# Patient Record
Sex: MALE | Race: WHITE | NOT HISPANIC OR LATINO | Employment: UNEMPLOYED | URBAN - METROPOLITAN AREA
[De-identification: names, ages, dates, MRNs, and addresses within clinical notes are randomized per-mention and may not be internally consistent; named-entity substitution may affect disease eponyms.]

---

## 2017-06-12 ENCOUNTER — HOSPITAL ENCOUNTER (EMERGENCY)
Facility: HOSPITAL | Age: 52
Discharge: HOME | End: 2017-06-12
Attending: EMERGENCY MEDICINE

## 2017-06-12 VITALS
RESPIRATION RATE: 18 BRPM | TEMPERATURE: 98 F | HEART RATE: 79 BPM | WEIGHT: 125 LBS | OXYGEN SATURATION: 98 % | DIASTOLIC BLOOD PRESSURE: 91 MMHG | SYSTOLIC BLOOD PRESSURE: 145 MMHG

## 2017-06-12 DIAGNOSIS — S00.91XA ABRASION OF HEAD, INITIAL ENCOUNTER: ICD-10-CM

## 2017-06-12 DIAGNOSIS — R51.9 ACUTE NONINTRACTABLE HEADACHE, UNSPECIFIED HEADACHE TYPE: Primary | ICD-10-CM

## 2017-06-12 PROCEDURE — 25000003 PHARM REV CODE 250: Performed by: PHYSICIAN ASSISTANT

## 2017-06-12 PROCEDURE — 99283 EMERGENCY DEPT VISIT LOW MDM: CPT

## 2017-06-12 RX ORDER — MUPIROCIN 20 MG/G
OINTMENT TOPICAL 3 TIMES DAILY
Qty: 22 G | Refills: 0 | Status: SHIPPED | OUTPATIENT
Start: 2017-06-12 | End: 2017-06-17

## 2017-06-12 RX ORDER — BUTALBITAL, ACETAMINOPHEN AND CAFFEINE 50; 325; 40 MG/1; MG/1; MG/1
1 TABLET ORAL
Status: COMPLETED | OUTPATIENT
Start: 2017-06-12 | End: 2017-06-12

## 2017-06-12 RX ORDER — BUTALBITAL, ACETAMINOPHEN AND CAFFEINE 50; 325; 40 MG/1; MG/1; MG/1
1 TABLET ORAL EVERY 6 HOURS PRN
Qty: 15 TABLET | Refills: 0 | Status: SHIPPED | OUTPATIENT
Start: 2017-06-12 | End: 2017-07-12

## 2017-06-12 RX ADMIN — BUTALBITAL, ACETAMINOPHEN, AND CAFFEINE 1 TABLET: 50; 325; 40 TABLET ORAL at 08:06

## 2017-06-13 NOTE — DISCHARGE INSTRUCTIONS
Take Fioricet as needed.  Use the cream to help with abrasion.  You need to establish care with a provider or see a health clinic to have your Ambien filled.   For worsening symptoms, chest pain, shortness of breath, increased abdominal pain, high grade fever, stroke or stroke like symptoms, immediately go to the nearest Emergency Room or call 911 as soon as possible.

## 2017-06-13 NOTE — ED PROVIDER NOTES
Encounter Date: 6/12/2017       History     Chief Complaint   Patient presents with    Headache     reports they are frequent. pt has been out of fioricet and ambien. needs refill for both. also reports stomach virus, and scar on head     Review of patient's allergies indicates:  No Known Allergies  Patient is a 51 year old male with complaint of headache. He reports history of headaches. He reports he previously was taking Fioricet but lost his medical insurance a few months ago and has been unable to see his primary care provider. He reports generalized headache that is similar to his previous headaches. He reports mild associated nausea. He denied sudden onset of headache or worse headache of his life. He reports he is also out of his Ambien and has not been sleeping nightly. He has been off of his Ambien for three months. He denied visual changes, photophobia, weakness, speech difficulty, facial droop, weakness or trauma.       The history is provided by the patient and a relative.     History reviewed. No pertinent past medical history.  History reviewed. No pertinent surgical history.  History reviewed. No pertinent family history.  Social History   Substance Use Topics    Smoking status: Current Every Day Smoker    Smokeless tobacco: Not on file    Alcohol use No     Review of Systems   Constitutional: Negative for chills and fever.   HENT: Negative for congestion and sore throat.    Eyes: Negative for photophobia and visual disturbance.   Respiratory: Negative for cough and shortness of breath.    Cardiovascular: Negative for chest pain.   Gastrointestinal: Positive for nausea. Negative for abdominal pain, diarrhea and vomiting.   Genitourinary: Negative for dysuria.   Musculoskeletal: Negative for back pain, neck pain and neck stiffness.   Skin: Negative for rash.   Neurological: Positive for headaches. Negative for dizziness, syncope, weakness and light-headedness.   Hematological: Does not  "bruise/bleed easily.       Physical Exam     Initial Vitals [06/12/17 1810]   BP Pulse Resp Temp SpO2   (!) 145/91 79 18 97.6 °F (36.4 °C) 98 %     Physical Exam    Nursing note and vitals reviewed.  Constitutional: He appears well-developed and well-nourished.   HENT:   Head: Normocephalic and atraumatic.   Right Ear: Tympanic membrane, external ear and ear canal normal.   Left Ear: Tympanic membrane, external ear and ear canal normal.   Nose: Nose normal.   Mouth/Throat: Uvula is midline, oropharynx is clear and moist and mucous membranes are normal.   Eyes: Conjunctivae are normal. Right eye exhibits no discharge. Left eye exhibits no discharge. No scleral icterus.   Neck: Normal range of motion and full passive range of motion without pain. Neck supple.   Cardiovascular: Normal rate, regular rhythm and normal heart sounds. Exam reveals no gallop and no friction rub.    No murmur heard.  Pulmonary/Chest: Breath sounds normal. He has no wheezes. He has no rhonchi. He has no rales.   Abdominal: Soft. Bowel sounds are normal. He exhibits no distension. There is no tenderness.   Musculoskeletal: Normal range of motion. He exhibits no tenderness.   Neurological: He is alert and oriented to person, place, and time. He has normal strength. No cranial nerve deficit or sensory deficit. Gait normal.   Skin: Skin is warm and dry.         ED Course   Procedures  Labs Reviewed - No data to display          Medical Decision Making:   History:   I obtained history from: someone other than patient.  Old Medical Records: I decided to obtain old medical records.       APC / Resident Notes:   This is an emergent evaluation of a 51 year old male who presents with acute on chronic headache. Symptoms are similar to his previous headaches. No clap headache or worst headache of his life. Symptoms were slow on onset with no neuro deficits. He is well appearing and vital signs are stable. He reports "I am just here for a refill on my " "medications. I haven't had insurance for a few months after losing my job and I haven't been able to see anyone". He is requesting Ambien and Fioricet. His neuro exam is normal and I doubt acute intra-cranial process. His symptoms are consistent with his numerous previous headaches. He was given Fioricet with resolution of those symptoms. I discussed that we would be unable to refill his Ambien, that he would need to establish care with a primary care provider for further management of his chronic conditions. Discussed results with patient. Return precautions given. Patient is to follow up with their primary care provider. Case was discussed with Dr. Noriega who is in agreement with the plan of care. All questions answered.            Attending Attestation:     Physician Attestation Statement for NP/PA:   I discussed this assessment and plan of this patient with the NP/PA, but I did not personally examine the patient. The face to face encounter was performed by the NP/PA.    Other NP/PA Attestation Additions:    History of Present Illness: 51-year-old male presented with a chief complaint of a headache.    Medical Decision Making: Initial differential diagnosis included but not limited to migraine headache, tension headache, and cluster headache.  I am in agreement with the physician assistant's  assessment, treatment, and plan of care.                 ED Course     Clinical Impression:   The primary encounter diagnosis was Acute nonintractable headache, unspecified headache type. A diagnosis of Abrasion of head, initial encounter was also pertinent to this visit.          Nancy Dobbs PA-C  06/12/17 0808       Nacho Noriega MD  06/12/17 3464    "

## 2019-11-30 ENCOUNTER — HOSPITAL ENCOUNTER (EMERGENCY)
Facility: HOSPITAL | Age: 54
Discharge: HOME | End: 2019-11-30
Attending: EMERGENCY MEDICINE

## 2019-11-30 VITALS
WEIGHT: 150 LBS | HEIGHT: 69 IN | RESPIRATION RATE: 18 BRPM | OXYGEN SATURATION: 100 % | DIASTOLIC BLOOD PRESSURE: 77 MMHG | SYSTOLIC BLOOD PRESSURE: 135 MMHG | HEART RATE: 64 BPM | BODY MASS INDEX: 22.22 KG/M2 | TEMPERATURE: 98 F

## 2019-11-30 DIAGNOSIS — M54.9 CHRONIC BACK PAIN, UNSPECIFIED BACK LOCATION, UNSPECIFIED BACK PAIN LATERALITY: ICD-10-CM

## 2019-11-30 DIAGNOSIS — R42 VERTIGO: Primary | ICD-10-CM

## 2019-11-30 DIAGNOSIS — G89.29 CHRONIC BACK PAIN, UNSPECIFIED BACK LOCATION, UNSPECIFIED BACK PAIN LATERALITY: ICD-10-CM

## 2019-11-30 DIAGNOSIS — R42 DIZZINESS: ICD-10-CM

## 2019-11-30 LAB
ALBUMIN SERPL BCP-MCNC: 4.3 G/DL (ref 3.5–5.2)
ALP SERPL-CCNC: 48 U/L (ref 55–135)
ALT SERPL W/O P-5'-P-CCNC: 24 U/L (ref 10–44)
ANION GAP SERPL CALC-SCNC: 12 MMOL/L (ref 8–16)
AST SERPL-CCNC: 22 U/L (ref 10–40)
BASOPHILS # BLD AUTO: 0.08 K/UL (ref 0–0.2)
BASOPHILS NFR BLD: 0.7 % (ref 0–1.9)
BILIRUB SERPL-MCNC: 1.1 MG/DL (ref 0.1–1)
BUN SERPL-MCNC: 17 MG/DL (ref 6–20)
CALCIUM SERPL-MCNC: 9.2 MG/DL (ref 8.7–10.5)
CHLORIDE SERPL-SCNC: 102 MMOL/L (ref 95–110)
CO2 SERPL-SCNC: 25 MMOL/L (ref 23–29)
CREAT SERPL-MCNC: 0.8 MG/DL (ref 0.5–1.4)
DIFFERENTIAL METHOD: ABNORMAL
EOSINOPHIL # BLD AUTO: 0.2 K/UL (ref 0–0.5)
EOSINOPHIL NFR BLD: 1.8 % (ref 0–8)
ERYTHROCYTE [DISTWIDTH] IN BLOOD BY AUTOMATED COUNT: 13.1 % (ref 11.5–14.5)
EST. GFR  (AFRICAN AMERICAN): >60 ML/MIN/1.73 M^2
EST. GFR  (NON AFRICAN AMERICAN): >60 ML/MIN/1.73 M^2
GLUCOSE SERPL-MCNC: 101 MG/DL (ref 70–110)
HCT VFR BLD AUTO: 42.8 % (ref 40–54)
HGB BLD-MCNC: 14 G/DL (ref 14–18)
IMM GRANULOCYTES # BLD AUTO: 0.03 K/UL (ref 0–0.04)
IMM GRANULOCYTES NFR BLD AUTO: 0.3 % (ref 0–0.5)
LYMPHOCYTES # BLD AUTO: 2.2 K/UL (ref 1–4.8)
LYMPHOCYTES NFR BLD: 20.1 % (ref 18–48)
MCH RBC QN AUTO: 29.2 PG (ref 27–31)
MCHC RBC AUTO-ENTMCNC: 32.7 G/DL (ref 32–36)
MCV RBC AUTO: 89 FL (ref 82–98)
MONOCYTES # BLD AUTO: 1 K/UL (ref 0.3–1)
MONOCYTES NFR BLD: 9.5 % (ref 4–15)
NEUTROPHILS # BLD AUTO: 7.3 K/UL (ref 1.8–7.7)
NEUTROPHILS NFR BLD: 67.6 % (ref 38–73)
NRBC BLD-RTO: 0 /100 WBC
PLATELET # BLD AUTO: 234 K/UL (ref 150–350)
PMV BLD AUTO: 9 FL (ref 9.2–12.9)
POTASSIUM SERPL-SCNC: 3.3 MMOL/L (ref 3.5–5.1)
PROT SERPL-MCNC: 7.4 G/DL (ref 6–8.4)
RBC # BLD AUTO: 4.8 M/UL (ref 4.6–6.2)
SODIUM SERPL-SCNC: 139 MMOL/L (ref 136–145)
TROPONIN I SERPL DL<=0.01 NG/ML-MCNC: <0.03 NG/ML (ref 0.02–0.04)
TSH SERPL DL<=0.005 MIU/L-ACNC: 0.67 UIU/ML (ref 0.34–5.6)
WBC # BLD AUTO: 10.73 K/UL (ref 3.9–12.7)

## 2019-11-30 PROCEDURE — 93005 ELECTROCARDIOGRAM TRACING: CPT

## 2019-11-30 PROCEDURE — 84484 ASSAY OF TROPONIN QUANT: CPT

## 2019-11-30 PROCEDURE — 25000003 PHARM REV CODE 250: Performed by: EMERGENCY MEDICINE

## 2019-11-30 PROCEDURE — 80053 COMPREHEN METABOLIC PANEL: CPT

## 2019-11-30 PROCEDURE — 99284 EMERGENCY DEPT VISIT MOD MDM: CPT | Mod: 25

## 2019-11-30 PROCEDURE — 85025 COMPLETE CBC W/AUTO DIFF WBC: CPT

## 2019-11-30 PROCEDURE — 96374 THER/PROPH/DIAG INJ IV PUSH: CPT

## 2019-11-30 PROCEDURE — 84443 ASSAY THYROID STIM HORMONE: CPT

## 2019-11-30 PROCEDURE — 96361 HYDRATE IV INFUSION ADD-ON: CPT

## 2019-11-30 PROCEDURE — 63600175 PHARM REV CODE 636 W HCPCS: Performed by: EMERGENCY MEDICINE

## 2019-11-30 RX ORDER — POTASSIUM CHLORIDE 20 MEQ/1
40 TABLET, EXTENDED RELEASE ORAL ONCE
Status: COMPLETED | OUTPATIENT
Start: 2019-11-30 | End: 2019-11-30

## 2019-11-30 RX ORDER — TRAMADOL HYDROCHLORIDE 50 MG/1
50 TABLET ORAL EVERY 6 HOURS PRN
Qty: 12 TABLET | Refills: 0 | Status: ON HOLD | OUTPATIENT
Start: 2019-11-30 | End: 2021-03-18 | Stop reason: HOSPADM

## 2019-11-30 RX ORDER — MECLIZINE HCL 12.5 MG 12.5 MG/1
25 TABLET ORAL
Status: COMPLETED | OUTPATIENT
Start: 2019-11-30 | End: 2019-11-30

## 2019-11-30 RX ORDER — CEPHALEXIN 500 MG/1
500 CAPSULE ORAL EVERY 8 HOURS
Qty: 21 CAPSULE | Refills: 0 | Status: SHIPPED | OUTPATIENT
Start: 2019-11-30 | End: 2019-12-07

## 2019-11-30 RX ORDER — MECLIZINE HYDROCHLORIDE 25 MG/1
25 TABLET ORAL 3 TIMES DAILY PRN
Qty: 20 TABLET | Refills: 0 | Status: SHIPPED | OUTPATIENT
Start: 2019-11-30 | End: 2021-03-02

## 2019-11-30 RX ORDER — KETOROLAC TROMETHAMINE 30 MG/ML
10 INJECTION, SOLUTION INTRAMUSCULAR; INTRAVENOUS
Status: COMPLETED | OUTPATIENT
Start: 2019-11-30 | End: 2019-11-30

## 2019-11-30 RX ADMIN — KETOROLAC TROMETHAMINE 10 MG: 30 INJECTION, SOLUTION INTRAMUSCULAR at 09:11

## 2019-11-30 RX ADMIN — POTASSIUM CHLORIDE 40 MEQ: 1500 TABLET, EXTENDED RELEASE ORAL at 09:11

## 2019-11-30 RX ADMIN — MECLIZINE HYDROCHLORIDE 25 MG: 12.5 TABLET ORAL at 08:11

## 2019-11-30 RX ADMIN — SODIUM CHLORIDE 1000 ML: 900 INJECTION INTRAVENOUS at 08:11

## 2019-12-01 NOTE — DISCHARGE INSTRUCTIONS
The skin findings on your forehead may be a skin infection, but it is also concerning for recurrence of your skin cancer.  Please follow-up with your dermatologist as soon as possible for further evaluation.

## 2019-12-01 NOTE — ED NOTES
APPEARANCE: No acute distress. Appears uncomfortable. C/o everything is spinning, hx of vertigo 5 yrs ago.   NEURO: AAO x 3. Cooperative. Normal affect.   HEENT: No facial asymmetry. Pupils briskly reactive to light.   CARDIAC: Heart tones with normal rate and rhythm; no murmur heard.   PERIPHERAL VASCULAR: Radial pulses present. Cap refill less than 3 seconds. No edema.   RESPIRATORY: Respirations are equal and unlabored. Anterior and posterior bilateral breath sounds clear.  ABDOMEN: Soft, non-tender, non-distended. C/o mild nausea.   MUSCULOSKELETAL: Full ROM. Equal strength bilaterally. Normal gait. C/o mid and lower back pain.  SKIN: Warm, dry, and pink. Normal turgor. Mucous membranes moist. 2 cm round red slightly concave scabbed area to right top of scalp pt states is his skin cancer coming back. He has seen his oncologist and dermatologist for this.

## 2019-12-01 NOTE — ED NOTES
Informed ER Dr of pt's c/o back and head pain and requesting something for pain that won't sedate him. Pt also states that his right forehead feels (lumpy). New orders given.

## 2019-12-01 NOTE — ED PROVIDER NOTES
Encounter Date: 11/30/2019       History     Chief Complaint   Patient presents with    Dizziness     54-year-old male with a history skin cancer, vertigo presents to the ER with dizziness.  Patient states that for the last few days, he has had dizziness that he describes as room spinning, but also notes some lightheadedness.  States it is worse when he turns his head or changes position.  Associated nausea but no vomiting. Also notes some ringing in his left ear.  States this is similar to previous episodes of vertigo.  Denies fever, vomiting, chest pain, shortness of breath, abdominal pain, or changes in bowel or bladder function. Denies pain or swelling in extremities. Denies unilateral weakness, numbness, tingling.        Review of patient's allergies indicates:  No Known Allergies  No past medical history on file.  No past surgical history on file.  No family history on file.  Social History     Tobacco Use    Smoking status: Current Every Day Smoker     Packs/day: 1.00     Types: Cigarettes    Smokeless tobacco: Current User   Substance Use Topics    Alcohol use: No    Drug use: Not on file     Review of Systems   Constitutional: Negative for fever.   HENT: Negative for sore throat.    Respiratory: Negative for shortness of breath.    Cardiovascular: Negative for chest pain.   Gastrointestinal: Negative for nausea and vomiting.   Genitourinary: Negative for dysuria.   Musculoskeletal: Negative for back pain.   Skin: Negative for rash.   Neurological: Positive for dizziness and light-headedness. Negative for weakness and numbness.   Hematological: Does not bruise/bleed easily.   All other systems reviewed and are negative.      Physical Exam     Initial Vitals [11/30/19 1908]   BP Pulse Resp Temp SpO2   (!) 163/93 65 18 97.9 °F (36.6 °C) 99 %      MAP       --         Physical Exam    Constitutional: He appears well-developed and well-nourished. No distress.   HENT:   Head: Normocephalic and atraumatic.    Right Ear: Tympanic membrane normal.   Left Ear: Tympanic membrane normal.   Mouth/Throat: Oropharynx is clear and moist.   Eyes: Conjunctivae and EOM are normal. Pupils are equal, round, and reactive to light.   Neck: Normal range of motion.   Cardiovascular: Normal rate, regular rhythm, normal heart sounds and intact distal pulses.   No murmur heard.  Pulmonary/Chest: Breath sounds normal. No respiratory distress. He has no wheezes. He has no rhonchi. He has no rales.   Abdominal: Soft. He exhibits no distension. There is no tenderness. There is no rebound and no guarding.   Musculoskeletal: Normal range of motion. He exhibits no edema or tenderness.   Neurological: He is alert. He has normal strength. No cranial nerve deficit or sensory deficit. He exhibits normal muscle tone. Coordination normal. GCS eye subscore is 4. GCS verbal subscore is 5. GCS motor subscore is 6.   AAO. EOMI, PERRL. CN II-XII intact. BUE and BLE 5/5 strength. Normal sensation.  Normal finger-to-nose, heel-to-shin, rapid alternating hand movements.     Skin: Skin is warm and dry. No rash noted. No erythema.   Forehead with scarring from previous skin cancer.   Psychiatric: He has a normal mood and affect. Thought content normal.         ED Course   Procedures  Labs Reviewed   CBC W/ AUTO DIFFERENTIAL   COMPREHENSIVE METABOLIC PANEL   TROPONIN I   TSH          Imaging Results    None          Medical Decision Making:   Initial Assessment:   54-year-old male with a history skin cancer, vertigo presents to the ER with dizziness.  ED Management:  Plan:  Afebrile, vital signs stable. Labs, EKG, orthostatics.  IV fluids and meclizine.  Suspect peripheral vertigo.  Low suspicion for central cause at this time.    Reassessed.  Patient lying in bed in no acute distress.  States he feels much better after meclizine and IV fluids.  Lab showed WBC 10.7.  Potassium 3.3, BUN 17, creatinine 0.8.  Troponin less than 0.03.  TSH 0.67.  Suspect patient's  symptoms are secondary to peripheral vertigo from BPPV versus labyrinthitis versus Meniere's.  Low suspicion for central cause.  Patient's skin lesions on his forehead are concerning for recurrence of his skin cancer, although it could be some cellulitis.  Will start on Keflex, but strongly recommend evaluation by a dermatologist.  Patient also complaining of his chronic back pain which he has had for years and is unchanged.  Requesting a refill of tramadol, which I will given.  Recommend follow-up PCP, dermatologist, ENT, and possibly neuro surgery.  Given strict return precautions.  Patient understands plan.                                 Clinical Impression:       ICD-10-CM ICD-9-CM   1. Vertigo R42 780.4   2. Dizziness R42 780.4   3. Chronic back pain, unspecified back location, unspecified back pain laterality M54.9 724.5    G89.29 338.29                             Casey Carlson MD  11/30/19 0266

## 2020-05-24 ENCOUNTER — HOSPITAL ENCOUNTER (EMERGENCY)
Facility: HOSPITAL | Age: 55
Discharge: HOME | End: 2020-05-24
Attending: EMERGENCY MEDICINE
Payer: COMMERCIAL

## 2020-05-24 VITALS
SYSTOLIC BLOOD PRESSURE: 144 MMHG | WEIGHT: 157 LBS | RESPIRATION RATE: 16 BRPM | OXYGEN SATURATION: 96 % | HEART RATE: 55 BPM | HEIGHT: 69 IN | BODY MASS INDEX: 23.25 KG/M2 | TEMPERATURE: 98 F | DIASTOLIC BLOOD PRESSURE: 88 MMHG

## 2020-05-24 DIAGNOSIS — Z85.828 HISTORY OF SKIN CANCER: ICD-10-CM

## 2020-05-24 DIAGNOSIS — M54.9 BACK PAIN, UNSPECIFIED BACK LOCATION, UNSPECIFIED BACK PAIN LATERALITY, UNSPECIFIED CHRONICITY: Primary | ICD-10-CM

## 2020-05-24 DIAGNOSIS — S01.80XA OPEN WOUND OF FACE, INITIAL ENCOUNTER: ICD-10-CM

## 2020-05-24 PROCEDURE — 25000003 PHARM REV CODE 250: Performed by: PHYSICIAN ASSISTANT

## 2020-05-24 PROCEDURE — 99283 EMERGENCY DEPT VISIT LOW MDM: CPT

## 2020-05-24 RX ORDER — HYDROCODONE BITARTRATE AND ACETAMINOPHEN 5; 325 MG/1; MG/1
1 TABLET ORAL EVERY 6 HOURS PRN
Qty: 10 TABLET | Refills: 0 | Status: SHIPPED | OUTPATIENT
Start: 2020-05-24 | End: 2021-03-02

## 2020-05-24 RX ORDER — CEPHALEXIN 500 MG/1
500 CAPSULE ORAL EVERY 8 HOURS
Qty: 21 CAPSULE | Refills: 0 | Status: SHIPPED | OUTPATIENT
Start: 2020-05-24 | End: 2020-05-31

## 2020-05-24 RX ORDER — MUPIROCIN 20 MG/G
1 OINTMENT TOPICAL
Status: COMPLETED | OUTPATIENT
Start: 2020-05-24 | End: 2020-05-24

## 2020-05-24 RX ADMIN — MUPIROCIN 2 G: 20 OINTMENT TOPICAL at 05:05

## 2020-05-24 NOTE — DISCHARGE INSTRUCTIONS
Chronic back pain, advised stop taking tramadol with new pain medication. Only giving for acute pain, call your doctor for follow up on Tuesday.

## 2020-05-24 NOTE — ED PROVIDER NOTES
Encounter Date: 5/24/2020       History     Chief Complaint   Patient presents with    Back Pain     54-year-old male presenting with complaint of back pain.  Patient has a history of back pain with cervical and lumbar disc disease.  Patient states recently had an MRI in April showed this.  Patient states on tramadol for pain and increasing pain.  Patient states was treated may dermatologist Mohs procedure and had chemotherapy.  Not currently on it under any treatment for CA.  Patient states area or cancers treated new ulcer some drainage and redness concern for infection patient states called his dermatologist for an appointment unable to get in as of yet.        Review of patient's allergies indicates:  No Known Allergies  No past medical history on file.  No past surgical history on file.  No family history on file.  Social History     Tobacco Use    Smoking status: Current Every Day Smoker     Packs/day: 1.00     Types: Cigarettes    Smokeless tobacco: Current User   Substance Use Topics    Alcohol use: No    Drug use: Not on file     Review of Systems   HENT: Negative.    Respiratory: Negative.    Cardiovascular: Negative.    Gastrointestinal: Negative.    Genitourinary: Negative.    Musculoskeletal: Positive for back pain. Negative for gait problem, joint swelling, myalgias, neck pain and neck stiffness.   Skin: Positive for rash and wound.   All other systems reviewed and are negative.      Physical Exam     Initial Vitals [05/24/20 1636]   BP Pulse Resp Temp SpO2   (!) 158/104 70 16 97.8 °F (36.6 °C) 97 %      MAP       --         Physical Exam    Nursing note and vitals reviewed.  Constitutional: No distress.   HENT:   Head: Atraumatic.   Right Ear: External ear normal.   Left Ear: External ear normal.   Eyes: EOM are normal.   Neck: Normal range of motion. Neck supple.   Abdominal: Soft. Bowel sounds are normal.   Musculoskeletal: He exhibits tenderness. He exhibits no edema.   Paraspinal muscle  tenderness pain with range of motion patient ambulates without difficulty no numbness or tingling no saddle anesthesia no radicular symptoms   Neurological: He is alert and oriented to person, place, and time. He has normal strength.   Skin: Skin is warm. Capillary refill takes less than 2 seconds.   Forehead ulceration 1cm no drainage present mild tenderness and redness.  Multiple all scars to face from prior bx procedure         ED Course   Procedures  Labs Reviewed - No data to display       Imaging Results    None                                          Clinical Impression:       ICD-10-CM ICD-9-CM   1. Back pain, unspecified back location, unspecified back pain laterality, unspecified chronicity M54.9 724.5   2. Open wound of face, initial encounter S01.80XA 873.40   3. History of skin cancer Z85.828 V10.83         Disposition:   Disposition: Discharged  Condition: Stable                        Yomaira Avlies PA-C  05/24/20 2034

## 2020-09-04 ENCOUNTER — LAB VISIT (OUTPATIENT)
Dept: LAB | Facility: HOSPITAL | Age: 55
End: 2020-09-04
Attending: FAMILY MEDICINE
Payer: COMMERCIAL

## 2020-09-04 ENCOUNTER — HOSPITAL ENCOUNTER (OUTPATIENT)
Dept: RADIOLOGY | Facility: HOSPITAL | Age: 55
Discharge: HOME | End: 2020-09-04
Attending: NURSE PRACTITIONER
Payer: COMMERCIAL

## 2020-09-04 DIAGNOSIS — L02.91 ABSCESS OF SKIN AND SUBCUTANEOUS TISSUE: ICD-10-CM

## 2020-09-04 DIAGNOSIS — Z72.0 TOBACCO ABUSE: ICD-10-CM

## 2020-09-04 DIAGNOSIS — L02.91 ABSCESS OF SKIN AND SUBCUTANEOUS TISSUE: Primary | ICD-10-CM

## 2020-09-04 DIAGNOSIS — Z00.00 ROUTINE GENERAL MEDICAL EXAMINATION AT A HEALTH CARE FACILITY: ICD-10-CM

## 2020-09-04 DIAGNOSIS — E78.5 HYPERLIPEMIA: ICD-10-CM

## 2020-09-04 DIAGNOSIS — I10 ESSENTIAL HYPERTENSION, MALIGNANT: Primary | ICD-10-CM

## 2020-09-04 DIAGNOSIS — R91.1 LUNG NODULE: Primary | ICD-10-CM

## 2020-09-04 LAB
ALBUMIN SERPL BCP-MCNC: 4.3 G/DL (ref 3.5–5.2)
ALP SERPL-CCNC: 80 U/L (ref 55–135)
ALT SERPL W/O P-5'-P-CCNC: 22 U/L (ref 10–44)
ANION GAP SERPL CALC-SCNC: 10 MMOL/L (ref 8–16)
AST SERPL-CCNC: 21 U/L (ref 10–40)
BASOPHILS # BLD AUTO: 0.09 K/UL (ref 0–0.2)
BASOPHILS NFR BLD: 0.9 % (ref 0–1.9)
BILIRUB SERPL-MCNC: 0.7 MG/DL (ref 0.1–1)
BUN SERPL-MCNC: 24 MG/DL (ref 6–20)
CALCIUM SERPL-MCNC: 9.8 MG/DL (ref 8.7–10.5)
CHLORIDE SERPL-SCNC: 102 MMOL/L (ref 95–110)
CHOLEST SERPL-MCNC: 217 MG/DL (ref 120–199)
CHOLEST/HDLC SERPL: 5.7 {RATIO} (ref 2–5)
CO2 SERPL-SCNC: 27 MMOL/L (ref 23–29)
CREAT SERPL-MCNC: 0.9 MG/DL (ref 0.5–1.4)
DIFFERENTIAL METHOD: ABNORMAL
EOSINOPHIL # BLD AUTO: 0.2 K/UL (ref 0–0.5)
EOSINOPHIL NFR BLD: 2.2 % (ref 0–8)
ERYTHROCYTE [DISTWIDTH] IN BLOOD BY AUTOMATED COUNT: 13.5 % (ref 11.5–14.5)
EST. GFR  (AFRICAN AMERICAN): >60 ML/MIN/1.73 M^2
EST. GFR  (NON AFRICAN AMERICAN): >60 ML/MIN/1.73 M^2
GLUCOSE SERPL-MCNC: 80 MG/DL (ref 70–110)
HCT VFR BLD AUTO: 40.1 % (ref 40–54)
HDLC SERPL-MCNC: 38 MG/DL (ref 40–75)
HDLC SERPL: 17.5 % (ref 20–50)
HGB BLD-MCNC: 12.9 G/DL (ref 14–18)
IMM GRANULOCYTES # BLD AUTO: 0.03 K/UL (ref 0–0.04)
IMM GRANULOCYTES NFR BLD AUTO: 0.3 % (ref 0–0.5)
LDLC SERPL CALC-MCNC: 161.4 MG/DL (ref 63–159)
LYMPHOCYTES # BLD AUTO: 1.6 K/UL (ref 1–4.8)
LYMPHOCYTES NFR BLD: 15.8 % (ref 18–48)
MCH RBC QN AUTO: 28.9 PG (ref 27–31)
MCHC RBC AUTO-ENTMCNC: 32.2 G/DL (ref 32–36)
MCV RBC AUTO: 90 FL (ref 82–98)
MONOCYTES # BLD AUTO: 1 K/UL (ref 0.3–1)
MONOCYTES NFR BLD: 9.7 % (ref 4–15)
NEUTROPHILS # BLD AUTO: 6.9 K/UL (ref 1.8–7.7)
NEUTROPHILS NFR BLD: 71.1 % (ref 38–73)
NONHDLC SERPL-MCNC: 179 MG/DL
NRBC BLD-RTO: 0 /100 WBC
PLATELET # BLD AUTO: 358 K/UL (ref 150–350)
PMV BLD AUTO: 8.9 FL (ref 9.2–12.9)
POTASSIUM SERPL-SCNC: 4.4 MMOL/L (ref 3.5–5.1)
PROT SERPL-MCNC: 7.6 G/DL (ref 6–8.4)
RBC # BLD AUTO: 4.47 M/UL (ref 4.6–6.2)
SODIUM SERPL-SCNC: 139 MMOL/L (ref 136–145)
TRIGL SERPL-MCNC: 88 MG/DL (ref 30–150)
TSH SERPL DL<=0.005 MIU/L-ACNC: 0.65 UIU/ML (ref 0.34–5.6)
WBC # BLD AUTO: 9.78 K/UL (ref 3.9–12.7)

## 2020-09-04 PROCEDURE — 80053 COMPREHEN METABOLIC PANEL: CPT

## 2020-09-04 PROCEDURE — 85025 COMPLETE CBC W/AUTO DIFF WBC: CPT

## 2020-09-04 PROCEDURE — 84443 ASSAY THYROID STIM HORMONE: CPT

## 2020-09-04 PROCEDURE — 36415 COLL VENOUS BLD VENIPUNCTURE: CPT

## 2020-09-04 PROCEDURE — 73080 X-RAY EXAM OF ELBOW: CPT | Mod: TC,PO,LT

## 2020-09-04 PROCEDURE — 80061 LIPID PANEL: CPT

## 2020-09-04 PROCEDURE — 71046 X-RAY EXAM CHEST 2 VIEWS: CPT | Mod: TC,PO

## 2020-09-14 ENCOUNTER — HOSPITAL ENCOUNTER (OUTPATIENT)
Dept: RADIOLOGY | Facility: HOSPITAL | Age: 55
Discharge: HOME | End: 2020-09-14
Attending: FAMILY MEDICINE
Payer: COMMERCIAL

## 2020-09-14 DIAGNOSIS — R91.1 LUNG NODULE: ICD-10-CM

## 2020-09-14 PROCEDURE — 71250 CT THORAX DX C-: CPT | Mod: TC,PO

## 2020-09-18 DIAGNOSIS — R91.1 LUNG NODULE: Primary | ICD-10-CM

## 2021-01-26 ENCOUNTER — OFFICE VISIT (OUTPATIENT)
Dept: HEMATOLOGY/ONCOLOGY | Facility: CLINIC | Age: 56
End: 2021-01-26
Payer: COMMERCIAL

## 2021-01-26 VITALS
DIASTOLIC BLOOD PRESSURE: 72 MMHG | BODY MASS INDEX: 20.01 KG/M2 | WEIGHT: 135.13 LBS | SYSTOLIC BLOOD PRESSURE: 102 MMHG | OXYGEN SATURATION: 98 % | TEMPERATURE: 98 F | HEART RATE: 48 BPM | HEIGHT: 69 IN

## 2021-01-26 DIAGNOSIS — C44.41 BASAL CELL CARCINOMA (BCC) OF SCALP: Primary | ICD-10-CM

## 2021-01-26 PROCEDURE — 3008F PR BODY MASS INDEX (BMI) DOCUMENTED: ICD-10-PCS | Mod: CPTII,S$GLB,, | Performed by: OTOLARYNGOLOGY

## 2021-01-26 PROCEDURE — 1125F PR PAIN SEVERITY QUANTIFIED, PAIN PRESENT: ICD-10-PCS | Mod: S$GLB,,, | Performed by: OTOLARYNGOLOGY

## 2021-01-26 PROCEDURE — 3074F PR MOST RECENT SYSTOLIC BLOOD PRESSURE < 130 MM HG: ICD-10-PCS | Mod: CPTII,S$GLB,, | Performed by: OTOLARYNGOLOGY

## 2021-01-26 PROCEDURE — 11106 PR INCISIONAL BIOPSY, SKIN, SINGLE LESION: ICD-10-PCS | Mod: S$GLB,,, | Performed by: OTOLARYNGOLOGY

## 2021-01-26 PROCEDURE — 3078F PR MOST RECENT DIASTOLIC BLOOD PRESSURE < 80 MM HG: ICD-10-PCS | Mod: CPTII,S$GLB,, | Performed by: OTOLARYNGOLOGY

## 2021-01-26 PROCEDURE — 1125F AMNT PAIN NOTED PAIN PRSNT: CPT | Mod: S$GLB,,, | Performed by: OTOLARYNGOLOGY

## 2021-01-26 PROCEDURE — 11105 PR PUNCH BIOPSY, SKIN, EA ADDTL LESION: ICD-10-PCS | Mod: S$GLB,,, | Performed by: OTOLARYNGOLOGY

## 2021-01-26 PROCEDURE — 99203 PR OFFICE/OUTPT VISIT, NEW, LEVL III, 30-44 MIN: ICD-10-PCS | Mod: 25,S$GLB,, | Performed by: OTOLARYNGOLOGY

## 2021-01-26 PROCEDURE — 3008F BODY MASS INDEX DOCD: CPT | Mod: CPTII,S$GLB,, | Performed by: OTOLARYNGOLOGY

## 2021-01-26 PROCEDURE — 3078F DIAST BP <80 MM HG: CPT | Mod: CPTII,S$GLB,, | Performed by: OTOLARYNGOLOGY

## 2021-01-26 PROCEDURE — 3074F SYST BP LT 130 MM HG: CPT | Mod: CPTII,S$GLB,, | Performed by: OTOLARYNGOLOGY

## 2021-01-26 PROCEDURE — 99999 PR PBB SHADOW E&M-NEW PATIENT-LVL V: ICD-10-PCS | Mod: PBBFAC,,, | Performed by: OTOLARYNGOLOGY

## 2021-01-26 PROCEDURE — 11105 PUNCH BX SKIN EA SEP/ADDL: CPT | Mod: S$GLB,,, | Performed by: OTOLARYNGOLOGY

## 2021-01-26 PROCEDURE — 11106 INCAL BX SKN SINGLE LES: CPT | Mod: S$GLB,,, | Performed by: OTOLARYNGOLOGY

## 2021-01-26 PROCEDURE — 99203 OFFICE O/P NEW LOW 30 MIN: CPT | Mod: 25,S$GLB,, | Performed by: OTOLARYNGOLOGY

## 2021-01-26 PROCEDURE — 99999 PR PBB SHADOW E&M-NEW PATIENT-LVL V: CPT | Mod: PBBFAC,,, | Performed by: OTOLARYNGOLOGY

## 2021-01-26 RX ORDER — ATORVASTATIN CALCIUM 20 MG/1
20 TABLET, FILM COATED ORAL DAILY
COMMUNITY
Start: 2020-12-10 | End: 2022-08-19 | Stop reason: CLARIF

## 2021-01-26 RX ORDER — AMLODIPINE BESYLATE 10 MG/1
10 TABLET ORAL DAILY
COMMUNITY
Start: 2021-01-01 | End: 2022-08-19 | Stop reason: CLARIF

## 2021-01-26 RX ORDER — BUTALBITAL, ACETAMINOPHEN AND CAFFEINE 50; 325; 40 MG/1; MG/1; MG/1
1 TABLET ORAL DAILY PRN
Status: ON HOLD | COMMUNITY
Start: 2021-01-21 | End: 2021-03-18 | Stop reason: HOSPADM

## 2021-01-26 RX ORDER — TRAZODONE HYDROCHLORIDE 50 MG/1
50 TABLET ORAL NIGHTLY
Status: ON HOLD | COMMUNITY
End: 2021-03-18 | Stop reason: HOSPADM

## 2021-01-26 RX ORDER — TADALAFIL 20 MG/1
TABLET ORAL
Status: ON HOLD | COMMUNITY
Start: 2021-01-21 | End: 2021-03-18 | Stop reason: HOSPADM

## 2021-01-26 RX ORDER — BUPROPION HYDROCHLORIDE 150 MG/1
TABLET ORAL
COMMUNITY
Start: 2021-01-21

## 2021-01-26 RX ORDER — GABAPENTIN 300 MG/1
CAPSULE ORAL
Status: ON HOLD | COMMUNITY
Start: 2020-12-11 | End: 2021-03-18 | Stop reason: HOSPADM

## 2021-01-26 RX ORDER — ZOLPIDEM TARTRATE 10 MG/1
10 TABLET ORAL
Status: ON HOLD | COMMUNITY
End: 2021-03-18 | Stop reason: HOSPADM

## 2021-01-26 RX ORDER — DIFLUNISAL 500 MG/1
500 TABLET, FILM COATED ORAL 2 TIMES DAILY
COMMUNITY
Start: 2021-01-13 | End: 2021-03-02

## 2021-01-26 RX ORDER — VISMODEGIB 150 MG/1
CAPSULE ORAL DAILY
Status: ON HOLD | COMMUNITY
Start: 2021-01-15 | End: 2021-03-18 | Stop reason: HOSPADM

## 2021-01-26 RX ORDER — BACLOFEN 20 MG/1
20 TABLET ORAL 3 TIMES DAILY PRN
Status: ON HOLD | COMMUNITY
Start: 2021-01-16 | End: 2021-03-18 | Stop reason: HOSPADM

## 2021-01-29 ENCOUNTER — HOSPITAL ENCOUNTER (OUTPATIENT)
Dept: RADIOLOGY | Facility: HOSPITAL | Age: 56
Discharge: HOME | End: 2021-01-29
Attending: NURSE PRACTITIONER
Payer: COMMERCIAL

## 2021-01-29 ENCOUNTER — HOSPITAL ENCOUNTER (OUTPATIENT)
Dept: RADIOLOGY | Facility: HOSPITAL | Age: 56
Discharge: HOME | End: 2021-01-29
Attending: OTOLARYNGOLOGY
Payer: COMMERCIAL

## 2021-01-29 ENCOUNTER — TELEPHONE (OUTPATIENT)
Dept: HEMATOLOGY/ONCOLOGY | Facility: CLINIC | Age: 56
End: 2021-01-29

## 2021-01-29 DIAGNOSIS — R91.1 LUNG NODULE: ICD-10-CM

## 2021-01-29 DIAGNOSIS — C44.41 BASAL CELL CARCINOMA (BCC) OF SCALP: ICD-10-CM

## 2021-01-29 PROCEDURE — 71250 CT CHEST WITHOUT CONTRAST: ICD-10-PCS | Mod: 26,,, | Performed by: RADIOLOGY

## 2021-01-29 PROCEDURE — 71250 CT THORAX DX C-: CPT | Mod: TC

## 2021-01-29 PROCEDURE — 70450 CT HEAD/BRAIN W/O DYE: CPT | Mod: TC

## 2021-01-29 PROCEDURE — 71250 CT THORAX DX C-: CPT | Mod: 26,,, | Performed by: RADIOLOGY

## 2021-01-29 PROCEDURE — 70450 CT HEAD/BRAIN W/O DYE: CPT | Mod: 26,,, | Performed by: RADIOLOGY

## 2021-01-29 PROCEDURE — 70450 CT HEAD WITHOUT CONTRAST: ICD-10-PCS | Mod: 26,,, | Performed by: RADIOLOGY

## 2021-02-04 ENCOUNTER — TELEPHONE (OUTPATIENT)
Dept: NEUROSURGERY | Facility: CLINIC | Age: 56
End: 2021-02-04

## 2021-02-04 ENCOUNTER — OFFICE VISIT (OUTPATIENT)
Dept: HEMATOLOGY/ONCOLOGY | Facility: CLINIC | Age: 56
End: 2021-02-04
Payer: COMMERCIAL

## 2021-02-04 VITALS
SYSTOLIC BLOOD PRESSURE: 125 MMHG | HEART RATE: 76 BPM | WEIGHT: 140 LBS | DIASTOLIC BLOOD PRESSURE: 87 MMHG | OXYGEN SATURATION: 99 % | TEMPERATURE: 98 F | BODY MASS INDEX: 20.73 KG/M2 | HEIGHT: 69 IN

## 2021-02-04 DIAGNOSIS — C44.41 BASAL CELL CARCINOMA (BCC) OF SCALP: Primary | ICD-10-CM

## 2021-02-04 DIAGNOSIS — M85.80 BONE EROSION: ICD-10-CM

## 2021-02-04 DIAGNOSIS — M85.80 EROSION OF BONE: ICD-10-CM

## 2021-02-04 DIAGNOSIS — F32.A DEPRESSION, UNSPECIFIED DEPRESSION TYPE: ICD-10-CM

## 2021-02-04 PROCEDURE — 3008F PR BODY MASS INDEX (BMI) DOCUMENTED: ICD-10-PCS | Mod: CPTII,S$GLB,, | Performed by: OTOLARYNGOLOGY

## 2021-02-04 PROCEDURE — 3079F PR MOST RECENT DIASTOLIC BLOOD PRESSURE 80-89 MM HG: ICD-10-PCS | Mod: CPTII,S$GLB,, | Performed by: OTOLARYNGOLOGY

## 2021-02-04 PROCEDURE — 3074F SYST BP LT 130 MM HG: CPT | Mod: CPTII,S$GLB,, | Performed by: OTOLARYNGOLOGY

## 2021-02-04 PROCEDURE — 3008F BODY MASS INDEX DOCD: CPT | Mod: CPTII,S$GLB,, | Performed by: OTOLARYNGOLOGY

## 2021-02-04 PROCEDURE — 99999 PR PBB SHADOW E&M-EST. PATIENT-LVL V: CPT | Mod: PBBFAC,,, | Performed by: OTOLARYNGOLOGY

## 2021-02-04 PROCEDURE — 99214 PR OFFICE/OUTPT VISIT, EST, LEVL IV, 30-39 MIN: ICD-10-PCS | Mod: S$GLB,,, | Performed by: OTOLARYNGOLOGY

## 2021-02-04 PROCEDURE — 99999 PR PBB SHADOW E&M-EST. PATIENT-LVL V: ICD-10-PCS | Mod: PBBFAC,,, | Performed by: OTOLARYNGOLOGY

## 2021-02-04 PROCEDURE — 99214 OFFICE O/P EST MOD 30 MIN: CPT | Mod: S$GLB,,, | Performed by: OTOLARYNGOLOGY

## 2021-02-04 PROCEDURE — 1126F AMNT PAIN NOTED NONE PRSNT: CPT | Mod: S$GLB,,, | Performed by: OTOLARYNGOLOGY

## 2021-02-04 PROCEDURE — 1126F PR PAIN SEVERITY QUANTIFIED, NO PAIN PRESENT: ICD-10-PCS | Mod: S$GLB,,, | Performed by: OTOLARYNGOLOGY

## 2021-02-04 PROCEDURE — 3074F PR MOST RECENT SYSTOLIC BLOOD PRESSURE < 130 MM HG: ICD-10-PCS | Mod: CPTII,S$GLB,, | Performed by: OTOLARYNGOLOGY

## 2021-02-04 PROCEDURE — 3079F DIAST BP 80-89 MM HG: CPT | Mod: CPTII,S$GLB,, | Performed by: OTOLARYNGOLOGY

## 2021-02-05 ENCOUNTER — TELEPHONE (OUTPATIENT)
Dept: PSYCHIATRY | Facility: CLINIC | Age: 56
End: 2021-02-05

## 2021-02-05 DIAGNOSIS — C44.41 BASAL CELL CARCINOMA (BCC) OF SCALP: Primary | ICD-10-CM

## 2021-02-08 ENCOUNTER — HOSPITAL ENCOUNTER (OUTPATIENT)
Dept: RADIOLOGY | Facility: HOSPITAL | Age: 56
Discharge: HOME | End: 2021-02-08
Attending: OTOLARYNGOLOGY
Payer: COMMERCIAL

## 2021-02-08 DIAGNOSIS — M85.80 EROSION OF BONE: ICD-10-CM

## 2021-02-08 DIAGNOSIS — C44.41 BASAL CELL CARCINOMA (BCC) OF SCALP: ICD-10-CM

## 2021-02-08 PROCEDURE — 70553 MRI BRAIN STEM W/O & W/DYE: CPT | Mod: TC

## 2021-02-08 PROCEDURE — 25500020 PHARM REV CODE 255: Performed by: OTOLARYNGOLOGY

## 2021-02-08 PROCEDURE — A9585 GADOBUTROL INJECTION: HCPCS | Performed by: OTOLARYNGOLOGY

## 2021-02-08 RX ORDER — GADOBUTROL 604.72 MG/ML
6.5 INJECTION INTRAVENOUS
Status: COMPLETED | OUTPATIENT
Start: 2021-02-08 | End: 2021-02-08

## 2021-02-08 RX ADMIN — GADOBUTROL 6.5 ML: 604.72 INJECTION INTRAVENOUS at 06:02

## 2021-02-09 ENCOUNTER — OFFICE VISIT (OUTPATIENT)
Dept: NEUROSURGERY | Facility: CLINIC | Age: 56
End: 2021-02-09
Payer: COMMERCIAL

## 2021-02-09 ENCOUNTER — TELEPHONE (OUTPATIENT)
Dept: NEUROSURGERY | Facility: CLINIC | Age: 56
End: 2021-02-09

## 2021-02-09 ENCOUNTER — OFFICE VISIT (OUTPATIENT)
Dept: OTOLARYNGOLOGY | Facility: CLINIC | Age: 56
End: 2021-02-09
Payer: COMMERCIAL

## 2021-02-09 VITALS
BODY MASS INDEX: 20.35 KG/M2 | DIASTOLIC BLOOD PRESSURE: 75 MMHG | WEIGHT: 137.81 LBS | SYSTOLIC BLOOD PRESSURE: 114 MMHG | HEART RATE: 62 BPM

## 2021-02-09 VITALS — HEART RATE: 63 BPM | SYSTOLIC BLOOD PRESSURE: 118 MMHG | DIASTOLIC BLOOD PRESSURE: 77 MMHG

## 2021-02-09 DIAGNOSIS — C44.41 BASAL CELL CARCINOMA (BCC) OF SCALP: ICD-10-CM

## 2021-02-09 DIAGNOSIS — M85.80 EROSION OF BONE: ICD-10-CM

## 2021-02-09 PROCEDURE — 3074F SYST BP LT 130 MM HG: CPT | Mod: CPTII,S$GLB,, | Performed by: OTOLARYNGOLOGY

## 2021-02-09 PROCEDURE — 3008F BODY MASS INDEX DOCD: CPT | Mod: CPTII,S$GLB,, | Performed by: OTOLARYNGOLOGY

## 2021-02-09 PROCEDURE — 3078F PR MOST RECENT DIASTOLIC BLOOD PRESSURE < 80 MM HG: ICD-10-PCS | Mod: CPTII,S$GLB,, | Performed by: NEUROLOGICAL SURGERY

## 2021-02-09 PROCEDURE — 1126F AMNT PAIN NOTED NONE PRSNT: CPT | Mod: S$GLB,,, | Performed by: NEUROLOGICAL SURGERY

## 2021-02-09 PROCEDURE — 99999 PR PBB SHADOW E&M-EST. PATIENT-LVL III: ICD-10-PCS | Mod: PBBFAC,,, | Performed by: OTOLARYNGOLOGY

## 2021-02-09 PROCEDURE — 3078F PR MOST RECENT DIASTOLIC BLOOD PRESSURE < 80 MM HG: ICD-10-PCS | Mod: CPTII,S$GLB,, | Performed by: OTOLARYNGOLOGY

## 2021-02-09 PROCEDURE — 3074F PR MOST RECENT SYSTOLIC BLOOD PRESSURE < 130 MM HG: ICD-10-PCS | Mod: CPTII,S$GLB,, | Performed by: NEUROLOGICAL SURGERY

## 2021-02-09 PROCEDURE — 99213 OFFICE O/P EST LOW 20 MIN: CPT | Mod: S$GLB,,, | Performed by: OTOLARYNGOLOGY

## 2021-02-09 PROCEDURE — 3078F DIAST BP <80 MM HG: CPT | Mod: CPTII,S$GLB,, | Performed by: OTOLARYNGOLOGY

## 2021-02-09 PROCEDURE — 99213 PR OFFICE/OUTPT VISIT, EST, LEVL III, 20-29 MIN: ICD-10-PCS | Mod: S$GLB,,, | Performed by: OTOLARYNGOLOGY

## 2021-02-09 PROCEDURE — 99999 PR PBB SHADOW E&M-EST. PATIENT-LVL III: CPT | Mod: PBBFAC,,, | Performed by: NEUROLOGICAL SURGERY

## 2021-02-09 PROCEDURE — 3074F SYST BP LT 130 MM HG: CPT | Mod: CPTII,S$GLB,, | Performed by: NEUROLOGICAL SURGERY

## 2021-02-09 PROCEDURE — 99999 PR PBB SHADOW E&M-EST. PATIENT-LVL III: ICD-10-PCS | Mod: PBBFAC,,, | Performed by: NEUROLOGICAL SURGERY

## 2021-02-09 PROCEDURE — 1126F PR PAIN SEVERITY QUANTIFIED, NO PAIN PRESENT: ICD-10-PCS | Mod: S$GLB,,, | Performed by: NEUROLOGICAL SURGERY

## 2021-02-09 PROCEDURE — 1126F AMNT PAIN NOTED NONE PRSNT: CPT | Mod: S$GLB,,, | Performed by: OTOLARYNGOLOGY

## 2021-02-09 PROCEDURE — 99204 OFFICE O/P NEW MOD 45 MIN: CPT | Mod: S$GLB,,, | Performed by: NEUROLOGICAL SURGERY

## 2021-02-09 PROCEDURE — 3008F PR BODY MASS INDEX (BMI) DOCUMENTED: ICD-10-PCS | Mod: CPTII,S$GLB,, | Performed by: OTOLARYNGOLOGY

## 2021-02-09 PROCEDURE — 3078F DIAST BP <80 MM HG: CPT | Mod: CPTII,S$GLB,, | Performed by: NEUROLOGICAL SURGERY

## 2021-02-09 PROCEDURE — 99999 PR PBB SHADOW E&M-EST. PATIENT-LVL III: CPT | Mod: PBBFAC,,, | Performed by: OTOLARYNGOLOGY

## 2021-02-09 PROCEDURE — 1126F PR PAIN SEVERITY QUANTIFIED, NO PAIN PRESENT: ICD-10-PCS | Mod: S$GLB,,, | Performed by: OTOLARYNGOLOGY

## 2021-02-09 PROCEDURE — 3074F PR MOST RECENT SYSTOLIC BLOOD PRESSURE < 130 MM HG: ICD-10-PCS | Mod: CPTII,S$GLB,, | Performed by: OTOLARYNGOLOGY

## 2021-02-09 PROCEDURE — 99204 PR OFFICE/OUTPT VISIT, NEW, LEVL IV, 45-59 MIN: ICD-10-PCS | Mod: S$GLB,,, | Performed by: NEUROLOGICAL SURGERY

## 2021-02-10 ENCOUNTER — TELEPHONE (OUTPATIENT)
Dept: NEUROSURGERY | Facility: CLINIC | Age: 56
End: 2021-02-10

## 2021-02-10 DIAGNOSIS — C44.41 BASAL CELL CARCINOMA (BCC) OF SCALP: Primary | ICD-10-CM

## 2021-02-11 DIAGNOSIS — C44.91 BASAL CELL CARCINOMA: ICD-10-CM

## 2021-02-11 DIAGNOSIS — C44.41 BASAL CELL CARCINOMA (BCC) OF SCALP: Primary | ICD-10-CM

## 2021-02-11 RX ORDER — SODIUM CHLORIDE 0.9 % (FLUSH) 0.9 %
10 SYRINGE (ML) INJECTION
Status: CANCELLED | OUTPATIENT
Start: 2021-02-11

## 2021-02-11 RX ORDER — LIDOCAINE HYDROCHLORIDE 10 MG/ML
1 INJECTION, SOLUTION EPIDURAL; INFILTRATION; INTRACAUDAL; PERINEURAL ONCE
Status: CANCELLED | OUTPATIENT
Start: 2021-02-11 | End: 2021-02-11

## 2021-02-23 DIAGNOSIS — Z01.818 PRE-OP TESTING: ICD-10-CM

## 2021-02-25 ENCOUNTER — TELEPHONE (OUTPATIENT)
Dept: HEMATOLOGY/ONCOLOGY | Facility: CLINIC | Age: 56
End: 2021-02-25

## 2021-02-26 ENCOUNTER — TELEPHONE (OUTPATIENT)
Dept: PREADMISSION TESTING | Facility: HOSPITAL | Age: 56
End: 2021-02-26

## 2021-02-26 ENCOUNTER — OFFICE VISIT (OUTPATIENT)
Dept: RADIATION ONCOLOGY | Facility: CLINIC | Age: 56
End: 2021-02-26
Payer: COMMERCIAL

## 2021-02-26 VITALS
BODY MASS INDEX: 19.94 KG/M2 | OXYGEN SATURATION: 98 % | WEIGHT: 135 LBS | SYSTOLIC BLOOD PRESSURE: 130 MMHG | DIASTOLIC BLOOD PRESSURE: 86 MMHG | RESPIRATION RATE: 16 BRPM | HEART RATE: 68 BPM | TEMPERATURE: 98 F

## 2021-02-26 DIAGNOSIS — C44.41 BASAL CELL CARCINOMA (BCC) OF SCALP: ICD-10-CM

## 2021-02-26 PROCEDURE — 3075F SYST BP GE 130 - 139MM HG: CPT | Mod: S$GLB,,, | Performed by: RADIOLOGY

## 2021-02-26 PROCEDURE — 3079F DIAST BP 80-89 MM HG: CPT | Mod: S$GLB,,, | Performed by: RADIOLOGY

## 2021-02-26 PROCEDURE — 3075F PR MOST RECENT SYSTOLIC BLOOD PRESS GE 130-139MM HG: ICD-10-PCS | Mod: S$GLB,,, | Performed by: RADIOLOGY

## 2021-02-26 PROCEDURE — 99205 OFFICE O/P NEW HI 60 MIN: CPT | Mod: S$GLB,,, | Performed by: RADIOLOGY

## 2021-02-26 PROCEDURE — 3079F PR MOST RECENT DIASTOLIC BLOOD PRESSURE 80-89 MM HG: ICD-10-PCS | Mod: S$GLB,,, | Performed by: RADIOLOGY

## 2021-02-26 PROCEDURE — 1126F PR PAIN SEVERITY QUANTIFIED, NO PAIN PRESENT: ICD-10-PCS | Mod: S$GLB,,, | Performed by: RADIOLOGY

## 2021-02-26 PROCEDURE — 3008F BODY MASS INDEX DOCD: CPT | Mod: S$GLB,,, | Performed by: RADIOLOGY

## 2021-02-26 PROCEDURE — 99205 PR OFFICE/OUTPT VISIT, NEW, LEVL V, 60-74 MIN: ICD-10-PCS | Mod: S$GLB,,, | Performed by: RADIOLOGY

## 2021-02-26 PROCEDURE — 3008F PR BODY MASS INDEX (BMI) DOCUMENTED: ICD-10-PCS | Mod: S$GLB,,, | Performed by: RADIOLOGY

## 2021-02-26 PROCEDURE — 1126F AMNT PAIN NOTED NONE PRSNT: CPT | Mod: S$GLB,,, | Performed by: RADIOLOGY

## 2021-02-28 ENCOUNTER — LAB VISIT (OUTPATIENT)
Dept: PRIMARY CARE CLINIC | Facility: CLINIC | Age: 56
DRG: 577 | End: 2021-02-28
Payer: COMMERCIAL

## 2021-02-28 DIAGNOSIS — Z01.818 PRE-OP TESTING: ICD-10-CM

## 2021-02-28 PROCEDURE — U0003 INFECTIOUS AGENT DETECTION BY NUCLEIC ACID (DNA OR RNA); SEVERE ACUTE RESPIRATORY SYNDROME CORONAVIRUS 2 (SARS-COV-2) (CORONAVIRUS DISEASE [COVID-19]), AMPLIFIED PROBE TECHNIQUE, MAKING USE OF HIGH THROUGHPUT TECHNOLOGIES AS DESCRIBED BY CMS-2020-01-R: HCPCS

## 2021-02-28 PROCEDURE — U0005 INFEC AGEN DETEC AMPLI PROBE: HCPCS

## 2021-03-01 ENCOUNTER — TELEPHONE (OUTPATIENT)
Dept: OTOLARYNGOLOGY | Facility: CLINIC | Age: 56
End: 2021-03-01

## 2021-03-01 ENCOUNTER — TELEPHONE (OUTPATIENT)
Dept: NEUROSURGERY | Facility: CLINIC | Age: 56
End: 2021-03-01

## 2021-03-01 LAB — SARS-COV-2 RNA RESP QL NAA+PROBE: NOT DETECTED

## 2021-03-02 ENCOUNTER — OFFICE VISIT (OUTPATIENT)
Dept: INTERNAL MEDICINE | Facility: CLINIC | Age: 56
DRG: 577 | End: 2021-03-02
Payer: COMMERCIAL

## 2021-03-02 ENCOUNTER — ANESTHESIA EVENT (OUTPATIENT)
Dept: SURGERY | Facility: HOSPITAL | Age: 56
DRG: 577 | End: 2021-03-02
Payer: COMMERCIAL

## 2021-03-02 ENCOUNTER — TELEPHONE (OUTPATIENT)
Dept: HEMATOLOGY/ONCOLOGY | Facility: CLINIC | Age: 56
End: 2021-03-02

## 2021-03-02 ENCOUNTER — HOSPITAL ENCOUNTER (OUTPATIENT)
Dept: CARDIOLOGY | Facility: CLINIC | Age: 56
Discharge: HOME | DRG: 577 | End: 2021-03-02
Payer: COMMERCIAL

## 2021-03-02 ENCOUNTER — HOSPITAL ENCOUNTER (OUTPATIENT)
Dept: RADIOLOGY | Facility: HOSPITAL | Age: 56
Discharge: HOME | DRG: 577 | End: 2021-03-02
Attending: OTOLARYNGOLOGY
Payer: COMMERCIAL

## 2021-03-02 VITALS
OXYGEN SATURATION: 98 % | SYSTOLIC BLOOD PRESSURE: 130 MMHG | BODY MASS INDEX: 20.29 KG/M2 | HEART RATE: 70 BPM | HEIGHT: 69 IN | DIASTOLIC BLOOD PRESSURE: 84 MMHG | TEMPERATURE: 98 F | WEIGHT: 137 LBS

## 2021-03-02 DIAGNOSIS — F32.A DEPRESSION, UNSPECIFIED DEPRESSION TYPE: ICD-10-CM

## 2021-03-02 DIAGNOSIS — M85.80 BONE EROSION: ICD-10-CM

## 2021-03-02 DIAGNOSIS — E78.5 HYPERLIPIDEMIA, UNSPECIFIED HYPERLIPIDEMIA TYPE: ICD-10-CM

## 2021-03-02 DIAGNOSIS — C44.41 BASAL CELL CARCINOMA (BCC) OF SCALP: ICD-10-CM

## 2021-03-02 DIAGNOSIS — Z01.818 PRE-OP TESTING: ICD-10-CM

## 2021-03-02 DIAGNOSIS — I10 HYPERTENSION, UNSPECIFIED TYPE: ICD-10-CM

## 2021-03-02 DIAGNOSIS — N52.9 ERECTILE DYSFUNCTION, UNSPECIFIED ERECTILE DYSFUNCTION TYPE: ICD-10-CM

## 2021-03-02 DIAGNOSIS — M19.90 ARTHRITIS: ICD-10-CM

## 2021-03-02 PROBLEM — R51.9 HEADACHE: Status: ACTIVE | Noted: 2021-03-02

## 2021-03-02 PROCEDURE — 3075F SYST BP GE 130 - 139MM HG: CPT | Mod: CPTII,S$GLB,, | Performed by: HOSPITALIST

## 2021-03-02 PROCEDURE — 3075F PR MOST RECENT SYSTOLIC BLOOD PRESS GE 130-139MM HG: ICD-10-PCS | Mod: CPTII,S$GLB,, | Performed by: HOSPITALIST

## 2021-03-02 PROCEDURE — 93010 ELECTROCARDIOGRAM REPORT: CPT | Mod: S$GLB,,, | Performed by: INTERNAL MEDICINE

## 2021-03-02 PROCEDURE — 99999 PR PBB SHADOW E&M-EST. PATIENT-LVL III: ICD-10-PCS | Mod: PBBFAC,,,

## 2021-03-02 PROCEDURE — 93005 EKG 12-LEAD: ICD-10-PCS | Mod: S$GLB,,, | Performed by: ANESTHESIOLOGY

## 2021-03-02 PROCEDURE — 93005 ELECTROCARDIOGRAM TRACING: CPT | Mod: S$GLB,,, | Performed by: ANESTHESIOLOGY

## 2021-03-02 PROCEDURE — 70450 CT HEAD/BRAIN W/O DYE: CPT | Mod: TC

## 2021-03-02 PROCEDURE — 99999 PR PBB SHADOW E&M-EST. PATIENT-LVL III: CPT | Mod: PBBFAC,,,

## 2021-03-02 PROCEDURE — 3008F BODY MASS INDEX DOCD: CPT | Mod: CPTII,S$GLB,, | Performed by: HOSPITALIST

## 2021-03-02 PROCEDURE — 99204 OFFICE O/P NEW MOD 45 MIN: CPT | Mod: S$GLB,,, | Performed by: HOSPITALIST

## 2021-03-02 PROCEDURE — 99204 PR OFFICE/OUTPT VISIT, NEW, LEVL IV, 45-59 MIN: ICD-10-PCS | Mod: S$GLB,,, | Performed by: HOSPITALIST

## 2021-03-02 PROCEDURE — 3079F DIAST BP 80-89 MM HG: CPT | Mod: CPTII,S$GLB,, | Performed by: HOSPITALIST

## 2021-03-02 PROCEDURE — 3079F PR MOST RECENT DIASTOLIC BLOOD PRESSURE 80-89 MM HG: ICD-10-PCS | Mod: CPTII,S$GLB,, | Performed by: HOSPITALIST

## 2021-03-02 PROCEDURE — 70450 CT HEAD/BRAIN W/O DYE: CPT | Mod: 26,,, | Performed by: RADIOLOGY

## 2021-03-02 PROCEDURE — 70450 CT HEAD STEALTH W/O CONTRAST: ICD-10-PCS | Mod: 26,,, | Performed by: RADIOLOGY

## 2021-03-02 PROCEDURE — 93010 EKG 12-LEAD: ICD-10-PCS | Mod: S$GLB,,, | Performed by: INTERNAL MEDICINE

## 2021-03-02 PROCEDURE — 3008F PR BODY MASS INDEX (BMI) DOCUMENTED: ICD-10-PCS | Mod: CPTII,S$GLB,, | Performed by: HOSPITALIST

## 2021-03-02 RX ORDER — SODIUM CHLORIDE 0.9 % (FLUSH) 0.9 %
10 SYRINGE (ML) INJECTION
Status: CANCELLED | OUTPATIENT
Start: 2021-03-02

## 2021-03-02 RX ORDER — PROCHLORPERAZINE EDISYLATE 5 MG/ML
5 INJECTION INTRAMUSCULAR; INTRAVENOUS EVERY 30 MIN PRN
Status: CANCELLED | OUTPATIENT
Start: 2021-03-02

## 2021-03-02 RX ORDER — FENTANYL CITRATE 50 UG/ML
25 INJECTION, SOLUTION INTRAMUSCULAR; INTRAVENOUS EVERY 5 MIN PRN
Status: CANCELLED | OUTPATIENT
Start: 2021-03-02

## 2021-03-03 ENCOUNTER — HOSPITAL ENCOUNTER (INPATIENT)
Facility: HOSPITAL | Age: 56
LOS: 15 days | Discharge: NURSING, REHAB OR LONG-TERM CARE | DRG: 577 | End: 2021-03-18
Attending: OTOLARYNGOLOGY | Admitting: OTOLARYNGOLOGY
Payer: COMMERCIAL

## 2021-03-03 ENCOUNTER — ANESTHESIA (OUTPATIENT)
Dept: SURGERY | Facility: HOSPITAL | Age: 56
DRG: 577 | End: 2021-03-03
Payer: COMMERCIAL

## 2021-03-03 DIAGNOSIS — I95.2 HYPOTENSION DUE TO DRUGS: ICD-10-CM

## 2021-03-03 DIAGNOSIS — C44.91 BCC (BASAL CELL CARCINOMA OF SKIN): Primary | ICD-10-CM

## 2021-03-03 DIAGNOSIS — Z99.11 ENCOUNTER FOR WEANING FROM VENTILATOR: ICD-10-CM

## 2021-03-03 DIAGNOSIS — R45.1 AGITATION: ICD-10-CM

## 2021-03-03 DIAGNOSIS — R23.1 PALLOR OF EXTREMITY: ICD-10-CM

## 2021-03-03 DIAGNOSIS — C44.41 BASAL CELL CARCINOMA (BCC) OF SCALP: ICD-10-CM

## 2021-03-03 DIAGNOSIS — D62 ACUTE BLOOD LOSS ANEMIA: ICD-10-CM

## 2021-03-03 DIAGNOSIS — C44.91 BASAL CELL CARCINOMA: ICD-10-CM

## 2021-03-03 DIAGNOSIS — Z74.09 IMPAIRED FUNCTIONAL MOBILITY AND ENDURANCE: ICD-10-CM

## 2021-03-03 DIAGNOSIS — J96.01 ACUTE HYPOXEMIC RESPIRATORY FAILURE: ICD-10-CM

## 2021-03-03 DIAGNOSIS — I10 HYPERTENSION, UNSPECIFIED TYPE: ICD-10-CM

## 2021-03-03 LAB
ALBUMIN SERPL BCP-MCNC: 3.6 G/DL (ref 3.5–5.2)
ALLENS TEST: ABNORMAL
ALLENS TEST: ABNORMAL
ALP SERPL-CCNC: 50 U/L (ref 55–135)
ALT SERPL W/O P-5'-P-CCNC: 17 U/L (ref 10–44)
ANION GAP SERPL CALC-SCNC: 10 MMOL/L (ref 8–16)
APTT BLDCRRT: 23.9 SEC (ref 21–32)
AST SERPL-CCNC: 17 U/L (ref 10–40)
BASOPHILS # BLD AUTO: 0.03 K/UL (ref 0–0.2)
BASOPHILS NFR BLD: 0.2 % (ref 0–1.9)
BILIRUB SERPL-MCNC: 0.9 MG/DL (ref 0.1–1)
BUN SERPL-MCNC: 12 MG/DL (ref 6–20)
CALCIUM SERPL-MCNC: 7.6 MG/DL (ref 8.7–10.5)
CHLORIDE SERPL-SCNC: 107 MMOL/L (ref 95–110)
CO2 SERPL-SCNC: 22 MMOL/L (ref 23–29)
CREAT SERPL-MCNC: 0.7 MG/DL (ref 0.5–1.4)
DELSYS: ABNORMAL
DELSYS: ABNORMAL
DIFFERENTIAL METHOD: ABNORMAL
EOSINOPHIL # BLD AUTO: 0 K/UL (ref 0–0.5)
EOSINOPHIL NFR BLD: 0 % (ref 0–8)
ERYTHROCYTE [DISTWIDTH] IN BLOOD BY AUTOMATED COUNT: 13.3 % (ref 11.5–14.5)
ERYTHROCYTE [SEDIMENTATION RATE] IN BLOOD BY WESTERGREN METHOD: 16 MM/H
ERYTHROCYTE [SEDIMENTATION RATE] IN BLOOD BY WESTERGREN METHOD: 20 MM/H
EST. GFR  (AFRICAN AMERICAN): >60 ML/MIN/1.73 M^2
EST. GFR  (NON AFRICAN AMERICAN): >60 ML/MIN/1.73 M^2
FIBRINOGEN PPP-MCNC: 221 MG/DL (ref 182–366)
FIO2: 100
FIO2: 60
GLUCOSE SERPL-MCNC: 100 MG/DL (ref 70–110)
GLUCOSE SERPL-MCNC: 138 MG/DL (ref 70–110)
GLUCOSE SERPL-MCNC: 212 MG/DL (ref 70–110)
HCO3 UR-SCNC: 24.8 MMOL/L (ref 24–28)
HCO3 UR-SCNC: 25.4 MMOL/L (ref 24–28)
HCO3 UR-SCNC: 27.6 MMOL/L (ref 24–28)
HCO3 UR-SCNC: 29.5 MMOL/L (ref 24–28)
HCT VFR BLD AUTO: 29.8 % (ref 40–54)
HCT VFR BLD CALC: 29 %PCV (ref 36–54)
HCT VFR BLD CALC: 35 %PCV (ref 36–54)
HGB BLD-MCNC: 9.7 G/DL (ref 14–18)
IMM GRANULOCYTES # BLD AUTO: 0.07 K/UL (ref 0–0.04)
IMM GRANULOCYTES NFR BLD AUTO: 0.4 % (ref 0–0.5)
INR PPP: 1 (ref 0.8–1.2)
LACTATE SERPL-SCNC: 2 MMOL/L (ref 0.5–2.2)
LYMPHOCYTES # BLD AUTO: 0.7 K/UL (ref 1–4.8)
LYMPHOCYTES NFR BLD: 4.2 % (ref 18–48)
MAGNESIUM SERPL-MCNC: 1.9 MG/DL (ref 1.6–2.6)
MCH RBC QN AUTO: 29 PG (ref 27–31)
MCHC RBC AUTO-ENTMCNC: 32.6 G/DL (ref 32–36)
MCV RBC AUTO: 89 FL (ref 82–98)
MIN VOL: 5.78
MIN VOL: 8.6
MODE: ABNORMAL
MODE: ABNORMAL
MONOCYTES # BLD AUTO: 1.4 K/UL (ref 0.3–1)
MONOCYTES NFR BLD: 8.5 % (ref 4–15)
NEUTROPHILS # BLD AUTO: 13.9 K/UL (ref 1.8–7.7)
NEUTROPHILS NFR BLD: 86.7 % (ref 38–73)
NRBC BLD-RTO: 0 /100 WBC
PCO2 BLDA: 47.3 MMHG (ref 35–45)
PCO2 BLDA: 49.4 MMHG (ref 35–45)
PCO2 BLDA: 53.9 MMHG (ref 35–45)
PCO2 BLDA: 67.8 MMHG (ref 35–45)
PEEP: 5
PEEP: 5
PH SMN: 7.25 [PH] (ref 7.35–7.45)
PH SMN: 7.32 [PH] (ref 7.35–7.45)
PH SMN: 7.32 [PH] (ref 7.35–7.45)
PH SMN: 7.33 [PH] (ref 7.35–7.45)
PHOSPHATE SERPL-MCNC: 3.9 MG/DL (ref 2.7–4.5)
PIP: 17
PIP: 18
PLATELET # BLD AUTO: 208 K/UL (ref 150–350)
PMV BLD AUTO: 9.7 FL (ref 9.2–12.9)
PO2 BLDA: 265 MMHG (ref 80–100)
PO2 BLDA: 282 MMHG (ref 80–100)
PO2 BLDA: 397 MMHG (ref 80–100)
PO2 BLDA: 440 MMHG (ref 80–100)
POC BE: -1 MMOL/L
POC BE: -1 MMOL/L
POC BE: 1 MMOL/L
POC BE: 2 MMOL/L
POC IONIZED CALCIUM: 1 MMOL/L (ref 1.06–1.42)
POC IONIZED CALCIUM: 1.16 MMOL/L (ref 1.06–1.42)
POC SATURATED O2: 100 % (ref 95–100)
POC TCO2: 26 MMOL/L (ref 23–27)
POC TCO2: 27 MMOL/L (ref 23–27)
POC TCO2: 29 MMOL/L (ref 23–27)
POC TCO2: 32 MMOL/L (ref 23–27)
POCT GLUCOSE: 194 MG/DL (ref 70–110)
POTASSIUM BLD-SCNC: 3.6 MMOL/L (ref 3.5–5.1)
POTASSIUM BLD-SCNC: 4 MMOL/L (ref 3.5–5.1)
POTASSIUM SERPL-SCNC: 4.7 MMOL/L (ref 3.5–5.1)
PROT SERPL-MCNC: 5.6 G/DL (ref 6–8.4)
PROTHROMBIN TIME: 11.2 SEC (ref 9–12.5)
RBC # BLD AUTO: 3.34 M/UL (ref 4.6–6.2)
SAMPLE: ABNORMAL
SITE: ABNORMAL
SITE: ABNORMAL
SODIUM BLD-SCNC: 140 MMOL/L (ref 136–145)
SODIUM BLD-SCNC: 141 MMOL/L (ref 136–145)
SODIUM SERPL-SCNC: 139 MMOL/L (ref 136–145)
SP02: 100
SP02: 100
VT: 350
VT: 350
WBC # BLD AUTO: 15.97 K/UL (ref 3.9–12.7)

## 2021-03-03 PROCEDURE — 25000003 PHARM REV CODE 250: Performed by: NURSE ANESTHETIST, CERTIFIED REGISTERED

## 2021-03-03 PROCEDURE — 92242 PR FLUORESCEIN ICG ANGIOGRAPHY: ICD-10-PCS | Mod: 26,,, | Performed by: OTOLARYNGOLOGY

## 2021-03-03 PROCEDURE — 15120 SPLT AGRFT F/S/N/H/F/G/M 1ST: CPT | Mod: 51,,, | Performed by: OTOLARYNGOLOGY

## 2021-03-03 PROCEDURE — 27201037 HC PRESSURE MONITORING SET UP

## 2021-03-03 PROCEDURE — 83735 ASSAY OF MAGNESIUM: CPT | Performed by: OTOLARYNGOLOGY

## 2021-03-03 PROCEDURE — C1769 GUIDE WIRE: HCPCS | Performed by: OTOLARYNGOLOGY

## 2021-03-03 PROCEDURE — 63600175 PHARM REV CODE 636 W HCPCS: Performed by: NURSE ANESTHETIST, CERTIFIED REGISTERED

## 2021-03-03 PROCEDURE — 63600175 PHARM REV CODE 636 W HCPCS: Performed by: OTOLARYNGOLOGY

## 2021-03-03 PROCEDURE — 36000711: Performed by: OTOLARYNGOLOGY

## 2021-03-03 PROCEDURE — 84100 ASSAY OF PHOSPHORUS: CPT | Performed by: OTOLARYNGOLOGY

## 2021-03-03 PROCEDURE — 11626 PR EXC SKIN MALIG >4 CM REMAINDR BODY: ICD-10-PCS | Mod: 51,,, | Performed by: OTOLARYNGOLOGY

## 2021-03-03 PROCEDURE — C1757 CATH, THROMBECTOMY/EMBOLECT: HCPCS | Performed by: OTOLARYNGOLOGY

## 2021-03-03 PROCEDURE — 83605 ASSAY OF LACTIC ACID: CPT | Performed by: OTOLARYNGOLOGY

## 2021-03-03 PROCEDURE — 86920 COMPATIBILITY TEST SPIN: CPT

## 2021-03-03 PROCEDURE — 15757 PR FREE SKIN FLAP W MICROVASC ANAST: ICD-10-PCS | Mod: ,,, | Performed by: OTOLARYNGOLOGY

## 2021-03-03 PROCEDURE — 94002 VENT MGMT INPAT INIT DAY: CPT

## 2021-03-03 PROCEDURE — 62141 CRNOP SKULL DEFECT>5 CM DIAM: CPT | Mod: 59,51,, | Performed by: NEUROLOGICAL SURGERY

## 2021-03-03 PROCEDURE — 61510 PR EXCIS SUPRATENT BRAIN TUMOR: ICD-10-PCS | Mod: ,,, | Performed by: NEUROLOGICAL SURGERY

## 2021-03-03 PROCEDURE — 36620 PR INSERT CATH,ART,PERCUT,SHORTTERM: ICD-10-PCS | Mod: 59,,, | Performed by: ANESTHESIOLOGY

## 2021-03-03 PROCEDURE — 62141 PR REPAIR SKULL DEFECT,>5 CM: ICD-10-PCS | Mod: 59,51,, | Performed by: NEUROLOGICAL SURGERY

## 2021-03-03 PROCEDURE — 85384 FIBRINOGEN ACTIVITY: CPT | Performed by: OTOLARYNGOLOGY

## 2021-03-03 PROCEDURE — 35231: ICD-10-PCS | Mod: 51,RT,, | Performed by: OTOLARYNGOLOGY

## 2021-03-03 PROCEDURE — 20000000 HC ICU ROOM

## 2021-03-03 PROCEDURE — D9220A PRA ANESTHESIA: ICD-10-PCS | Mod: ANES,,, | Performed by: ANESTHESIOLOGY

## 2021-03-03 PROCEDURE — 63600175 PHARM REV CODE 636 W HCPCS: Performed by: STUDENT IN AN ORGANIZED HEALTH CARE EDUCATION/TRAINING PROGRAM

## 2021-03-03 PROCEDURE — 99900035 HC TECH TIME PER 15 MIN (STAT)

## 2021-03-03 PROCEDURE — 15120 PR SPLIT GRFT,HEAD,FAC,HAND,FEET <100 SQCM: ICD-10-PCS | Mod: 51,,, | Performed by: OTOLARYNGOLOGY

## 2021-03-03 PROCEDURE — 88331 PR  PATH CONSULT IN SURG,W FRZ SEC: ICD-10-PCS | Mod: 26,,, | Performed by: PATHOLOGY

## 2021-03-03 PROCEDURE — 88331 PATH CONSLTJ SURG 1 BLK 1SPC: CPT | Mod: 59 | Performed by: PATHOLOGY

## 2021-03-03 PROCEDURE — 35231 REPAIR BLVSL VN GRF NECK: CPT | Mod: 51,RT,, | Performed by: OTOLARYNGOLOGY

## 2021-03-03 PROCEDURE — C1762 CONN TISS, HUMAN(INC FASCIA): HCPCS | Performed by: OTOLARYNGOLOGY

## 2021-03-03 PROCEDURE — 88305 TISSUE EXAM BY PATHOLOGIST: CPT | Mod: 26,,, | Performed by: PATHOLOGY

## 2021-03-03 PROCEDURE — 36000710: Performed by: OTOLARYNGOLOGY

## 2021-03-03 PROCEDURE — 85025 COMPLETE CBC W/AUTO DIFF WBC: CPT | Performed by: OTOLARYNGOLOGY

## 2021-03-03 PROCEDURE — 88305 TISSUE EXAM BY PATHOLOGIST: CPT | Mod: 59 | Performed by: PATHOLOGY

## 2021-03-03 PROCEDURE — 88305 TISSUE EXAM BY PATHOLOGIST: ICD-10-PCS | Mod: 26,,, | Performed by: PATHOLOGY

## 2021-03-03 PROCEDURE — 25000003 PHARM REV CODE 250: Performed by: STUDENT IN AN ORGANIZED HEALTH CARE EDUCATION/TRAINING PROGRAM

## 2021-03-03 PROCEDURE — 85610 PROTHROMBIN TIME: CPT | Performed by: OTOLARYNGOLOGY

## 2021-03-03 PROCEDURE — 27800903 OPTIME MED/SURG SUP & DEVICES OTHER IMPLANTS: Performed by: OTOLARYNGOLOGY

## 2021-03-03 PROCEDURE — 36620 INSERTION CATHETER ARTERY: CPT | Mod: 59,,, | Performed by: ANESTHESIOLOGY

## 2021-03-03 PROCEDURE — D9220A PRA ANESTHESIA: ICD-10-PCS | Mod: CRNA,,, | Performed by: NURSE ANESTHETIST, CERTIFIED REGISTERED

## 2021-03-03 PROCEDURE — 85730 THROMBOPLASTIN TIME PARTIAL: CPT | Performed by: OTOLARYNGOLOGY

## 2021-03-03 PROCEDURE — 37000009 HC ANESTHESIA EA ADD 15 MINS: Performed by: OTOLARYNGOLOGY

## 2021-03-03 PROCEDURE — 94761 N-INVAS EAR/PLS OXIMETRY MLT: CPT

## 2021-03-03 PROCEDURE — C1781 MESH (IMPLANTABLE): HCPCS | Performed by: OTOLARYNGOLOGY

## 2021-03-03 PROCEDURE — 27000221 HC OXYGEN, UP TO 24 HOURS

## 2021-03-03 PROCEDURE — 61781 SCAN PROC CRANIAL INTRA: CPT | Mod: ,,, | Performed by: NEUROLOGICAL SURGERY

## 2021-03-03 PROCEDURE — 92242 FLUORESCEIN&ICG ANGIOGRAPHY: CPT | Mod: 26,,, | Performed by: OTOLARYNGOLOGY

## 2021-03-03 PROCEDURE — C1713 ANCHOR/SCREW BN/BN,TIS/BN: HCPCS | Performed by: OTOLARYNGOLOGY

## 2021-03-03 PROCEDURE — D9220A PRA ANESTHESIA: Mod: CRNA,,, | Performed by: NURSE ANESTHETIST, CERTIFIED REGISTERED

## 2021-03-03 PROCEDURE — 25000003 PHARM REV CODE 250: Performed by: OTOLARYNGOLOGY

## 2021-03-03 PROCEDURE — D9220A PRA ANESTHESIA: Mod: ANES,,, | Performed by: ANESTHESIOLOGY

## 2021-03-03 PROCEDURE — 63600175 PHARM REV CODE 636 W HCPCS: Mod: JG | Performed by: STUDENT IN AN ORGANIZED HEALTH CARE EDUCATION/TRAINING PROGRAM

## 2021-03-03 PROCEDURE — 11626 EXC S/N/H/F/G MAL+MRG >4 CM: CPT | Mod: 51,,, | Performed by: OTOLARYNGOLOGY

## 2021-03-03 PROCEDURE — 15757 FREE SKIN FLAP MICROVASC: CPT | Mod: ,,, | Performed by: OTOLARYNGOLOGY

## 2021-03-03 PROCEDURE — 99900026 HC AIRWAY MAINTENANCE (STAT)

## 2021-03-03 PROCEDURE — 80053 COMPREHEN METABOLIC PANEL: CPT | Performed by: OTOLARYNGOLOGY

## 2021-03-03 PROCEDURE — 37000008 HC ANESTHESIA 1ST 15 MINUTES: Performed by: OTOLARYNGOLOGY

## 2021-03-03 PROCEDURE — 63600175 PHARM REV CODE 636 W HCPCS

## 2021-03-03 PROCEDURE — 25000003 PHARM REV CODE 250: Performed by: NEUROLOGICAL SURGERY

## 2021-03-03 PROCEDURE — P9045 ALBUMIN (HUMAN), 5%, 250 ML: HCPCS | Mod: JG | Performed by: STUDENT IN AN ORGANIZED HEALTH CARE EDUCATION/TRAINING PROGRAM

## 2021-03-03 PROCEDURE — 61781 PR STEREOTACTIC COMP ASSIST PROC,CRANIAL,INTRADURAL: ICD-10-PCS | Mod: ,,, | Performed by: NEUROLOGICAL SURGERY

## 2021-03-03 PROCEDURE — 63600175 PHARM REV CODE 636 W HCPCS: Performed by: NEUROLOGICAL SURGERY

## 2021-03-03 PROCEDURE — 88331 PATH CONSLTJ SURG 1 BLK 1SPC: CPT | Mod: 26,,, | Performed by: PATHOLOGY

## 2021-03-03 PROCEDURE — 61510 CRNEC TREPH EXC BRN TUM STTL: CPT | Mod: ,,, | Performed by: NEUROLOGICAL SURGERY

## 2021-03-03 PROCEDURE — 27201423 OPTIME MED/SURG SUP & DEVICES STERILE SUPPLY: Performed by: OTOLARYNGOLOGY

## 2021-03-03 DEVICE — COUPLER 2.0MM: Type: IMPLANTABLE DEVICE | Site: BRAIN | Status: FUNCTIONAL

## 2021-03-03 DEVICE — IMPLANTABLE DEVICE: Type: IMPLANTABLE DEVICE | Site: BRAIN | Status: FUNCTIONAL

## 2021-03-03 DEVICE — SCREW UN3 AXS SD 1.5X4MM: Type: IMPLANTABLE DEVICE | Site: BRAIN | Status: FUNCTIONAL

## 2021-03-03 DEVICE — DURA MATRIX ONLAY PLUS 3X3: Type: IMPLANTABLE DEVICE | Site: OTHER (ADD COMMENT) | Status: FUNCTIONAL

## 2021-03-03 RX ORDER — CHLORHEXIDINE GLUCONATE ORAL RINSE 1.2 MG/ML
15 SOLUTION DENTAL 2 TIMES DAILY
Status: DISCONTINUED | OUTPATIENT
Start: 2021-03-03 | End: 2021-03-07 | Stop reason: SDUPTHER

## 2021-03-03 RX ORDER — ACETAMINOPHEN 10 MG/ML
INJECTION, SOLUTION INTRAVENOUS
Status: DISCONTINUED | OUTPATIENT
Start: 2021-03-03 | End: 2021-03-03

## 2021-03-03 RX ORDER — FAMOTIDINE 10 MG/ML
20 INJECTION INTRAVENOUS 2 TIMES DAILY
Status: DISCONTINUED | OUTPATIENT
Start: 2021-03-03 | End: 2021-03-10

## 2021-03-03 RX ORDER — MORPHINE SULFATE 2 MG/ML
3 INJECTION, SOLUTION INTRAMUSCULAR; INTRAVENOUS
Status: DISCONTINUED | OUTPATIENT
Start: 2021-03-03 | End: 2021-03-16

## 2021-03-03 RX ORDER — HYDROMORPHONE HYDROCHLORIDE 1 MG/ML
1 INJECTION, SOLUTION INTRAMUSCULAR; INTRAVENOUS; SUBCUTANEOUS EVERY 6 HOURS PRN
Status: DISCONTINUED | OUTPATIENT
Start: 2021-03-03 | End: 2021-03-16

## 2021-03-03 RX ORDER — ACETAMINOPHEN 500 MG
1000 TABLET ORAL
Status: COMPLETED | OUTPATIENT
Start: 2021-03-03 | End: 2021-03-03

## 2021-03-03 RX ORDER — POTASSIUM CHLORIDE 7.45 MG/ML
40 INJECTION INTRAVENOUS
Status: DISCONTINUED | OUTPATIENT
Start: 2021-03-03 | End: 2021-03-13

## 2021-03-03 RX ORDER — PROCHLORPERAZINE EDISYLATE 5 MG/ML
5 INJECTION INTRAMUSCULAR; INTRAVENOUS EVERY 6 HOURS PRN
Status: DISCONTINUED | OUTPATIENT
Start: 2021-03-03 | End: 2021-03-18 | Stop reason: HOSPADM

## 2021-03-03 RX ORDER — HEPARIN SODIUM 10000 [USP'U]/100ML
INJECTION, SOLUTION INTRAVENOUS CONTINUOUS PRN
Status: DISCONTINUED | OUTPATIENT
Start: 2021-03-03 | End: 2021-03-03

## 2021-03-03 RX ORDER — GLUCAGON 1 MG
1 KIT INJECTION
Status: DISCONTINUED | OUTPATIENT
Start: 2021-03-03 | End: 2021-03-13

## 2021-03-03 RX ORDER — DEXTROSE MONOHYDRATE, SODIUM CHLORIDE, AND POTASSIUM CHLORIDE 50; 1.49; 9 G/1000ML; G/1000ML; G/1000ML
INJECTION, SOLUTION INTRAVENOUS CONTINUOUS
Status: DISCONTINUED | OUTPATIENT
Start: 2021-03-03 | End: 2021-03-11

## 2021-03-03 RX ORDER — ALBUMIN HUMAN 50 G/1000ML
SOLUTION INTRAVENOUS CONTINUOUS PRN
Status: DISCONTINUED | OUTPATIENT
Start: 2021-03-03 | End: 2021-03-03

## 2021-03-03 RX ORDER — ROCURONIUM BROMIDE 10 MG/ML
INJECTION, SOLUTION INTRAVENOUS
Status: DISCONTINUED | OUTPATIENT
Start: 2021-03-03 | End: 2021-03-03

## 2021-03-03 RX ORDER — KETAMINE HCL IN 0.9 % NACL 50 MG/5 ML
SYRINGE (ML) INTRAVENOUS
Status: DISCONTINUED | OUTPATIENT
Start: 2021-03-03 | End: 2021-03-03

## 2021-03-03 RX ORDER — BUPIVACAINE HYDROCHLORIDE AND EPINEPHRINE 5; 5 MG/ML; UG/ML
INJECTION, SOLUTION EPIDURAL; INTRACAUDAL; PERINEURAL
Status: DISCONTINUED | OUTPATIENT
Start: 2021-03-03 | End: 2021-03-03 | Stop reason: HOSPADM

## 2021-03-03 RX ORDER — POTASSIUM CHLORIDE 7.45 MG/ML
60 INJECTION INTRAVENOUS
Status: DISCONTINUED | OUTPATIENT
Start: 2021-03-03 | End: 2021-03-13

## 2021-03-03 RX ORDER — MAGNESIUM SULFATE HEPTAHYDRATE 40 MG/ML
2 INJECTION, SOLUTION INTRAVENOUS
Status: DISCONTINUED | OUTPATIENT
Start: 2021-03-03 | End: 2021-03-13

## 2021-03-03 RX ORDER — ENOXAPARIN SODIUM 100 MG/ML
40 INJECTION SUBCUTANEOUS EVERY 24 HOURS
Status: DISCONTINUED | OUTPATIENT
Start: 2021-03-03 | End: 2021-03-06

## 2021-03-03 RX ORDER — BACITRACIN 500 [USP'U]/G
OINTMENT TOPICAL 3 TIMES DAILY
Status: DISCONTINUED | OUTPATIENT
Start: 2021-03-03 | End: 2021-03-18 | Stop reason: HOSPADM

## 2021-03-03 RX ORDER — FENTANYL CITRATE 50 UG/ML
25 INJECTION, SOLUTION INTRAMUSCULAR; INTRAVENOUS ONCE
Status: COMPLETED | OUTPATIENT
Start: 2021-03-03 | End: 2021-03-03

## 2021-03-03 RX ORDER — LIDOCAINE HYDROCHLORIDE 40 MG/ML
INJECTION, SOLUTION RETROBULBAR
Status: DISCONTINUED | OUTPATIENT
Start: 2021-03-03 | End: 2021-03-03 | Stop reason: HOSPADM

## 2021-03-03 RX ORDER — LIDOCAINE HCL/PF 100 MG/5ML
SYRINGE (ML) INTRAVENOUS
Status: DISCONTINUED | OUTPATIENT
Start: 2021-03-03 | End: 2021-03-03

## 2021-03-03 RX ORDER — INDOCYANINE GREEN AND WATER 25 MG
KIT INJECTION
Status: DISCONTINUED | OUTPATIENT
Start: 2021-03-03 | End: 2021-03-03

## 2021-03-03 RX ORDER — HEPARIN SODIUM 1000 [USP'U]/ML
INJECTION, SOLUTION INTRAVENOUS; SUBCUTANEOUS
Status: DISCONTINUED | OUTPATIENT
Start: 2021-03-03 | End: 2021-03-03 | Stop reason: HOSPADM

## 2021-03-03 RX ORDER — PROPOFOL 10 MG/ML
50 INJECTION, EMULSION INTRAVENOUS CONTINUOUS
Status: DISCONTINUED | OUTPATIENT
Start: 2021-03-03 | End: 2021-03-10

## 2021-03-03 RX ORDER — DEXAMETHASONE SODIUM PHOSPHATE 4 MG/ML
INJECTION, SOLUTION INTRA-ARTICULAR; INTRALESIONAL; INTRAMUSCULAR; INTRAVENOUS; SOFT TISSUE
Status: DISCONTINUED | OUTPATIENT
Start: 2021-03-03 | End: 2021-03-03

## 2021-03-03 RX ORDER — VANCOMYCIN HYDROCHLORIDE 1 G/20ML
INJECTION, POWDER, LYOPHILIZED, FOR SOLUTION INTRAVENOUS
Status: DISCONTINUED | OUTPATIENT
Start: 2021-03-03 | End: 2021-03-03 | Stop reason: HOSPADM

## 2021-03-03 RX ORDER — CEFAZOLIN SODIUM 1 G/3ML
2 INJECTION, POWDER, FOR SOLUTION INTRAMUSCULAR; INTRAVENOUS
Status: COMPLETED | OUTPATIENT
Start: 2021-03-03 | End: 2021-03-03

## 2021-03-03 RX ORDER — CALCIUM CHLORIDE INJECTION 100 MG/ML
INJECTION, SOLUTION INTRAVENOUS
Status: DISCONTINUED | OUTPATIENT
Start: 2021-03-03 | End: 2021-03-03

## 2021-03-03 RX ORDER — HYDROMORPHONE HYDROCHLORIDE 1 MG/ML
0.2 INJECTION, SOLUTION INTRAMUSCULAR; INTRAVENOUS; SUBCUTANEOUS EVERY 10 MIN PRN
Status: CANCELLED | OUTPATIENT
Start: 2021-03-03

## 2021-03-03 RX ORDER — ONDANSETRON 2 MG/ML
4 INJECTION INTRAMUSCULAR; INTRAVENOUS EVERY 12 HOURS PRN
Status: DISCONTINUED | OUTPATIENT
Start: 2021-03-03 | End: 2021-03-18 | Stop reason: HOSPADM

## 2021-03-03 RX ORDER — LIDOCAINE HYDROCHLORIDE 10 MG/ML
1 INJECTION, SOLUTION EPIDURAL; INFILTRATION; INTRACAUDAL; PERINEURAL ONCE
Status: COMPLETED | OUTPATIENT
Start: 2021-03-03 | End: 2021-03-03

## 2021-03-03 RX ORDER — PROPOFOL 10 MG/ML
INJECTION, EMULSION INTRAVENOUS
Status: COMPLETED
Start: 2021-03-03 | End: 2021-03-03

## 2021-03-03 RX ORDER — PHENYLEPHRINE HYDROCHLORIDE 10 MG/ML
INJECTION INTRAVENOUS
Status: DISCONTINUED | OUTPATIENT
Start: 2021-03-03 | End: 2021-03-03

## 2021-03-03 RX ORDER — ONDANSETRON 2 MG/ML
INJECTION INTRAMUSCULAR; INTRAVENOUS
Status: DISCONTINUED | OUTPATIENT
Start: 2021-03-03 | End: 2021-03-03

## 2021-03-03 RX ORDER — FENTANYL CITRATE-0.9 % NACL/PF 10 MCG/ML
0-250 PLASTIC BAG, INJECTION (ML) INTRAVENOUS CONTINUOUS
Status: DISCONTINUED | OUTPATIENT
Start: 2021-03-03 | End: 2021-03-10

## 2021-03-03 RX ORDER — VASOPRESSIN 20 [USP'U]/ML
INJECTION, SOLUTION INTRAMUSCULAR; SUBCUTANEOUS
Status: DISCONTINUED | OUTPATIENT
Start: 2021-03-03 | End: 2021-03-03

## 2021-03-03 RX ORDER — SODIUM CHLORIDE 0.9 % (FLUSH) 0.9 %
10 SYRINGE (ML) INJECTION
Status: DISCONTINUED | OUTPATIENT
Start: 2021-03-03 | End: 2021-03-03 | Stop reason: HOSPADM

## 2021-03-03 RX ORDER — LIDOCAINE HYDROCHLORIDE AND EPINEPHRINE 20; 10 MG/ML; UG/ML
INJECTION, SOLUTION INFILTRATION; PERINEURAL
Status: DISCONTINUED | OUTPATIENT
Start: 2021-03-03 | End: 2021-03-03 | Stop reason: HOSPADM

## 2021-03-03 RX ORDER — EPHEDRINE SULFATE 50 MG/ML
INJECTION, SOLUTION INTRAVENOUS
Status: DISCONTINUED | OUTPATIENT
Start: 2021-03-03 | End: 2021-03-03

## 2021-03-03 RX ORDER — POTASSIUM CHLORIDE 7.45 MG/ML
80 INJECTION INTRAVENOUS
Status: DISCONTINUED | OUTPATIENT
Start: 2021-03-03 | End: 2021-03-13

## 2021-03-03 RX ORDER — MUPIROCIN 20 MG/G
OINTMENT TOPICAL 2 TIMES DAILY
Status: DISPENSED | OUTPATIENT
Start: 2021-03-03 | End: 2021-03-08

## 2021-03-03 RX ORDER — FENTANYL CITRATE 50 UG/ML
INJECTION, SOLUTION INTRAMUSCULAR; INTRAVENOUS
Status: DISCONTINUED | OUTPATIENT
Start: 2021-03-03 | End: 2021-03-03

## 2021-03-03 RX ORDER — FENTANYL CITRATE 50 UG/ML
INJECTION, SOLUTION INTRAMUSCULAR; INTRAVENOUS
Status: COMPLETED
Start: 2021-03-03 | End: 2021-03-03

## 2021-03-03 RX ORDER — ALBUMIN HUMAN 50 G/1000ML
25 SOLUTION INTRAVENOUS ONCE
Status: COMPLETED | OUTPATIENT
Start: 2021-03-03 | End: 2021-03-03

## 2021-03-03 RX ORDER — MIDAZOLAM HYDROCHLORIDE 1 MG/ML
INJECTION INTRAMUSCULAR; INTRAVENOUS
Status: DISCONTINUED | OUTPATIENT
Start: 2021-03-03 | End: 2021-03-03

## 2021-03-03 RX ORDER — MAGNESIUM SULFATE HEPTAHYDRATE 40 MG/ML
4 INJECTION, SOLUTION INTRAVENOUS
Status: DISCONTINUED | OUTPATIENT
Start: 2021-03-03 | End: 2021-03-13

## 2021-03-03 RX ORDER — INSULIN ASPART 100 [IU]/ML
0-5 INJECTION, SOLUTION INTRAVENOUS; SUBCUTANEOUS EVERY 6 HOURS PRN
Status: DISCONTINUED | OUTPATIENT
Start: 2021-03-03 | End: 2021-03-13

## 2021-03-03 RX ORDER — PROPOFOL 10 MG/ML
VIAL (ML) INTRAVENOUS CONTINUOUS PRN
Status: DISCONTINUED | OUTPATIENT
Start: 2021-03-03 | End: 2021-03-03

## 2021-03-03 RX ORDER — PROPOFOL 10 MG/ML
VIAL (ML) INTRAVENOUS
Status: DISCONTINUED | OUTPATIENT
Start: 2021-03-03 | End: 2021-03-03

## 2021-03-03 RX ORDER — EPHEDRINE SULFATE/0.9% NACL/PF 25 MG/5 ML
SYRINGE (ML) INTRAVENOUS
Status: DISCONTINUED | OUTPATIENT
Start: 2021-03-03 | End: 2021-03-03

## 2021-03-03 RX ADMIN — EPHEDRINE SULFATE 10 MG: 50 INJECTION INTRAVENOUS at 02:03

## 2021-03-03 RX ADMIN — DEXAMETHASONE SODIUM PHOSPHATE 4 MG: 4 INJECTION, SOLUTION INTRAMUSCULAR; INTRAVENOUS at 10:03

## 2021-03-03 RX ADMIN — ALBUMIN (HUMAN) 25 G: 12.5 SOLUTION INTRAVENOUS at 09:03

## 2021-03-03 RX ADMIN — LIDOCAINE HYDROCHLORIDE 10 MG: 10 INJECTION, SOLUTION EPIDURAL; INFILTRATION; INTRACAUDAL at 07:03

## 2021-03-03 RX ADMIN — VASOPRESSIN 5 UNITS: 20 INJECTION INTRAVENOUS at 03:03

## 2021-03-03 RX ADMIN — Medication 30 MG: at 08:03

## 2021-03-03 RX ADMIN — LIDOCAINE HYDROCHLORIDE 100 MG: 20 INJECTION, SOLUTION INTRAVENOUS at 08:03

## 2021-03-03 RX ADMIN — CALCIUM CHLORIDE 0.5 G: 100 INJECTION, SOLUTION INTRAVENOUS at 01:03

## 2021-03-03 RX ADMIN — FENTANYL CITRATE 100 MCG: 50 INJECTION, SOLUTION INTRAMUSCULAR; INTRAVENOUS at 08:03

## 2021-03-03 RX ADMIN — PHENYLEPHRINE HYDROCHLORIDE 200 MCG: 10 INJECTION INTRAVENOUS at 09:03

## 2021-03-03 RX ADMIN — ROCURONIUM BROMIDE 10 MG: 10 INJECTION, SOLUTION INTRAVENOUS at 02:03

## 2021-03-03 RX ADMIN — VASOPRESSIN 0.06 UNITS/MIN: 20 INJECTION INTRAVENOUS at 09:03

## 2021-03-03 RX ADMIN — MORPHINE SULFATE 3 MG: 2 INJECTION, SOLUTION INTRAMUSCULAR; INTRAVENOUS at 08:03

## 2021-03-03 RX ADMIN — VASOPRESSIN 1 UNITS: 20 INJECTION INTRAVENOUS at 06:03

## 2021-03-03 RX ADMIN — VASOPRESSIN 1 UNITS: 20 INJECTION INTRAVENOUS at 07:03

## 2021-03-03 RX ADMIN — ROCURONIUM BROMIDE 10 MG: 10 INJECTION, SOLUTION INTRAVENOUS at 10:03

## 2021-03-03 RX ADMIN — ALBUMIN (HUMAN): 12.5 SOLUTION INTRAVENOUS at 12:03

## 2021-03-03 RX ADMIN — PROPOFOL 30 MG: 10 INJECTION, EMULSION INTRAVENOUS at 07:03

## 2021-03-03 RX ADMIN — ROCURONIUM BROMIDE 10 MG: 10 INJECTION, SOLUTION INTRAVENOUS at 01:03

## 2021-03-03 RX ADMIN — MUPIROCIN: 20 OINTMENT TOPICAL at 10:03

## 2021-03-03 RX ADMIN — Medication 10 MG: at 10:03

## 2021-03-03 RX ADMIN — PROPOFOL 50 MCG/KG/MIN: 10 INJECTION, EMULSION INTRAVENOUS at 09:03

## 2021-03-03 RX ADMIN — MIDAZOLAM HYDROCHLORIDE 2 MG: 1 INJECTION, SOLUTION INTRAMUSCULAR; INTRAVENOUS at 08:03

## 2021-03-03 RX ADMIN — CEFAZOLIN 2 G: 330 INJECTION, POWDER, FOR SOLUTION INTRAMUSCULAR; INTRAVENOUS at 01:03

## 2021-03-03 RX ADMIN — PROPOFOL 200 MG: 10 INJECTION, EMULSION INTRAVENOUS at 08:03

## 2021-03-03 RX ADMIN — Medication 100 MCG/HR: at 09:03

## 2021-03-03 RX ADMIN — ROCURONIUM BROMIDE 10 MG: 10 INJECTION, SOLUTION INTRAVENOUS at 11:03

## 2021-03-03 RX ADMIN — ROCURONIUM BROMIDE 20 MG: 10 INJECTION, SOLUTION INTRAVENOUS at 05:03

## 2021-03-03 RX ADMIN — ROCURONIUM BROMIDE 10 MG: 10 INJECTION, SOLUTION INTRAVENOUS at 12:03

## 2021-03-03 RX ADMIN — Medication 10 MG: at 09:03

## 2021-03-03 RX ADMIN — SODIUM CHLORIDE 0.25 MCG/KG/MIN: 9 INJECTION, SOLUTION INTRAVENOUS at 10:03

## 2021-03-03 RX ADMIN — HEPARIN SODIUM AND DEXTROSE 500 UNITS/HR: 10000; 5 INJECTION INTRAVENOUS at 05:03

## 2021-03-03 RX ADMIN — ROCURONIUM BROMIDE 10 MG: 10 INJECTION, SOLUTION INTRAVENOUS at 03:03

## 2021-03-03 RX ADMIN — ROCURONIUM BROMIDE 50 MG: 10 INJECTION, SOLUTION INTRAVENOUS at 08:03

## 2021-03-03 RX ADMIN — ROCURONIUM BROMIDE 10 MG: 10 INJECTION, SOLUTION INTRAVENOUS at 06:03

## 2021-03-03 RX ADMIN — EPHEDRINE SULFATE 20 MG: 50 INJECTION INTRAVENOUS at 03:03

## 2021-03-03 RX ADMIN — ACETAMINOPHEN 1000 MG: 10 INJECTION, SOLUTION INTRAVENOUS at 03:03

## 2021-03-03 RX ADMIN — ROCURONIUM BROMIDE 10 MG: 10 INJECTION, SOLUTION INTRAVENOUS at 04:03

## 2021-03-03 RX ADMIN — DEXTROSE MONOHYDRATE, SODIUM CHLORIDE, AND POTASSIUM CHLORIDE: 50; 9; 1.49 INJECTION, SOLUTION INTRAVENOUS at 08:03

## 2021-03-03 RX ADMIN — AMPICILLIN SODIUM AND SULBACTAM SODIUM 3 G: 2; 1 INJECTION, POWDER, FOR SOLUTION INTRAMUSCULAR; INTRAVENOUS at 09:03

## 2021-03-03 RX ADMIN — SODIUM CHLORIDE, SODIUM GLUCONATE, SODIUM ACETATE, POTASSIUM CHLORIDE, MAGNESIUM CHLORIDE, SODIUM PHOSPHATE, DIBASIC, AND POTASSIUM PHOSPHATE: .53; .5; .37; .037; .03; .012; .00082 INJECTION, SOLUTION INTRAVENOUS at 08:03

## 2021-03-03 RX ADMIN — ACETAMINOPHEN 1000 MG: 500 TABLET ORAL at 07:03

## 2021-03-03 RX ADMIN — VASOPRESSIN 0.04 UNITS/HR: 20 INJECTION INTRAVENOUS at 03:03

## 2021-03-03 RX ADMIN — CHLORHEXIDINE GLUCONATE 0.12% ORAL RINSE 15 ML: 1.2 LIQUID ORAL at 10:03

## 2021-03-03 RX ADMIN — ROCURONIUM BROMIDE 30 MG: 10 INJECTION, SOLUTION INTRAVENOUS at 09:03

## 2021-03-03 RX ADMIN — PROPOFOL 25 MCG/KG/MIN: 10 INJECTION, EMULSION INTRAVENOUS at 07:03

## 2021-03-03 RX ADMIN — CEFAZOLIN 2 G: 330 INJECTION, POWDER, FOR SOLUTION INTRAMUSCULAR; INTRAVENOUS at 09:03

## 2021-03-03 RX ADMIN — Medication 10 MG: at 12:03

## 2021-03-03 RX ADMIN — CEFAZOLIN 2 G: 330 INJECTION, POWDER, FOR SOLUTION INTRAMUSCULAR; INTRAVENOUS at 03:03

## 2021-03-03 RX ADMIN — CALCIUM CHLORIDE 0.5 G: 100 INJECTION, SOLUTION INTRAVENOUS at 02:03

## 2021-03-03 RX ADMIN — PHENYLEPHRINE HYDROCHLORIDE 200 MCG: 10 INJECTION INTRAVENOUS at 10:03

## 2021-03-03 RX ADMIN — PHENYLEPHRINE HYDROCHLORIDE 200 MCG: 10 INJECTION INTRAVENOUS at 08:03

## 2021-03-03 RX ADMIN — ENOXAPARIN SODIUM 40 MG: 40 INJECTION SUBCUTANEOUS at 09:03

## 2021-03-03 RX ADMIN — SODIUM CHLORIDE: 0.9 INJECTION, SOLUTION INTRAVENOUS at 08:03

## 2021-03-03 RX ADMIN — INDOCYANINE GREEN AND WATER 10 MG: KIT at 04:03

## 2021-03-03 RX ADMIN — FENTANYL CITRATE 25 MCG: 50 INJECTION, SOLUTION INTRAMUSCULAR; INTRAVENOUS at 06:03

## 2021-03-03 RX ADMIN — PHENYLEPHRINE HYDROCHLORIDE 100 MCG: 10 INJECTION INTRAVENOUS at 10:03

## 2021-03-03 RX ADMIN — ALBUMIN (HUMAN): 12.5 SOLUTION INTRAVENOUS at 06:03

## 2021-03-03 RX ADMIN — ROCURONIUM BROMIDE 20 MG: 10 INJECTION, SOLUTION INTRAVENOUS at 09:03

## 2021-03-03 RX ADMIN — FENTANYL CITRATE 25 MCG: 50 INJECTION, SOLUTION INTRAMUSCULAR; INTRAVENOUS at 09:03

## 2021-03-03 RX ADMIN — FAMOTIDINE 20 MG: 10 INJECTION INTRAVENOUS at 09:03

## 2021-03-03 RX ADMIN — ONDANSETRON 4 MG: 2 INJECTION, SOLUTION INTRAMUSCULAR; INTRAVENOUS at 07:03

## 2021-03-03 RX ADMIN — Medication 10 MG: at 03:03

## 2021-03-03 RX ADMIN — PHENYLEPHRINE HYDROCHLORIDE 100 MCG: 10 INJECTION INTRAVENOUS at 11:03

## 2021-03-03 RX ADMIN — BACITRACIN: 500 OINTMENT TOPICAL at 11:03

## 2021-03-04 ENCOUNTER — ANESTHESIA EVENT (OUTPATIENT)
Dept: SURGERY | Facility: HOSPITAL | Age: 56
DRG: 577 | End: 2021-03-04
Payer: COMMERCIAL

## 2021-03-04 LAB
ALLENS TEST: ABNORMAL
ANION GAP SERPL CALC-SCNC: 10 MMOL/L (ref 8–16)
ANION GAP SERPL CALC-SCNC: 9 MMOL/L (ref 8–16)
APTT BLDCRRT: 24.8 SEC (ref 21–32)
APTT BLDCRRT: 29.1 SEC (ref 21–32)
BASOPHILS # BLD AUTO: 0 K/UL (ref 0–0.2)
BASOPHILS # BLD AUTO: 0.01 K/UL (ref 0–0.2)
BASOPHILS # BLD AUTO: 0.02 K/UL (ref 0–0.2)
BASOPHILS NFR BLD: 0 % (ref 0–1.9)
BASOPHILS NFR BLD: 0.1 % (ref 0–1.9)
BASOPHILS NFR BLD: 0.3 % (ref 0–1.9)
BUN SERPL-MCNC: 13 MG/DL (ref 6–20)
BUN SERPL-MCNC: 14 MG/DL (ref 6–20)
CA-I BLDV-SCNC: 1.31 MMOL/L (ref 1.06–1.42)
CALCIUM SERPL-MCNC: 7.6 MG/DL (ref 8.7–10.5)
CALCIUM SERPL-MCNC: 7.8 MG/DL (ref 8.7–10.5)
CHLORIDE SERPL-SCNC: 108 MMOL/L (ref 95–110)
CHLORIDE SERPL-SCNC: 109 MMOL/L (ref 95–110)
CK SERPL-CCNC: 2982 U/L (ref 20–200)
CO2 SERPL-SCNC: 18 MMOL/L (ref 23–29)
CO2 SERPL-SCNC: 19 MMOL/L (ref 23–29)
CREAT SERPL-MCNC: 0.8 MG/DL (ref 0.5–1.4)
CREAT SERPL-MCNC: 0.8 MG/DL (ref 0.5–1.4)
DELSYS: ABNORMAL
DIFFERENTIAL METHOD: ABNORMAL
EOSINOPHIL # BLD AUTO: 0 K/UL (ref 0–0.5)
EOSINOPHIL NFR BLD: 0 % (ref 0–8)
EOSINOPHIL NFR BLD: 0 % (ref 0–8)
EOSINOPHIL NFR BLD: 0.4 % (ref 0–8)
ERYTHROCYTE [DISTWIDTH] IN BLOOD BY AUTOMATED COUNT: 13.3 % (ref 11.5–14.5)
ERYTHROCYTE [DISTWIDTH] IN BLOOD BY AUTOMATED COUNT: 13.4 % (ref 11.5–14.5)
ERYTHROCYTE [DISTWIDTH] IN BLOOD BY AUTOMATED COUNT: 13.8 % (ref 11.5–14.5)
ERYTHROCYTE [SEDIMENTATION RATE] IN BLOOD BY WESTERGREN METHOD: 20 MM/H
EST. GFR  (AFRICAN AMERICAN): >60 ML/MIN/1.73 M^2
EST. GFR  (AFRICAN AMERICAN): >60 ML/MIN/1.73 M^2
EST. GFR  (NON AFRICAN AMERICAN): >60 ML/MIN/1.73 M^2
EST. GFR  (NON AFRICAN AMERICAN): >60 ML/MIN/1.73 M^2
FIO2: 40
GLUCOSE SERPL-MCNC: 160 MG/DL (ref 70–110)
GLUCOSE SERPL-MCNC: 221 MG/DL (ref 70–110)
HCO3 UR-SCNC: 25.8 MMOL/L (ref 24–28)
HCT VFR BLD AUTO: 23.2 % (ref 40–54)
HCT VFR BLD AUTO: 25 % (ref 40–54)
HCT VFR BLD AUTO: 27.2 % (ref 40–54)
HGB BLD-MCNC: 7.5 G/DL (ref 14–18)
HGB BLD-MCNC: 8.2 G/DL (ref 14–18)
HGB BLD-MCNC: 8.9 G/DL (ref 14–18)
IMM GRANULOCYTES # BLD AUTO: 0.03 K/UL (ref 0–0.04)
IMM GRANULOCYTES # BLD AUTO: 0.03 K/UL (ref 0–0.04)
IMM GRANULOCYTES # BLD AUTO: 0.04 K/UL (ref 0–0.04)
IMM GRANULOCYTES NFR BLD AUTO: 0.3 % (ref 0–0.5)
IMM GRANULOCYTES NFR BLD AUTO: 0.3 % (ref 0–0.5)
IMM GRANULOCYTES NFR BLD AUTO: 0.4 % (ref 0–0.5)
INR PPP: 1 (ref 0.8–1.2)
INR PPP: 1.1 (ref 0.8–1.2)
LACTATE SERPL-SCNC: 2.4 MMOL/L (ref 0.5–2.2)
LACTATE SERPL-SCNC: 2.9 MMOL/L (ref 0.5–2.2)
LYMPHOCYTES # BLD AUTO: 0.7 K/UL (ref 1–4.8)
LYMPHOCYTES # BLD AUTO: 0.8 K/UL (ref 1–4.8)
LYMPHOCYTES # BLD AUTO: 1.4 K/UL (ref 1–4.8)
LYMPHOCYTES NFR BLD: 18.8 % (ref 18–48)
LYMPHOCYTES NFR BLD: 5.4 % (ref 18–48)
LYMPHOCYTES NFR BLD: 7.9 % (ref 18–48)
MAGNESIUM SERPL-MCNC: 1.8 MG/DL (ref 1.6–2.6)
MAGNESIUM SERPL-MCNC: 1.9 MG/DL (ref 1.6–2.6)
MCH RBC QN AUTO: 28.7 PG (ref 27–31)
MCH RBC QN AUTO: 29.3 PG (ref 27–31)
MCH RBC QN AUTO: 29.7 PG (ref 27–31)
MCHC RBC AUTO-ENTMCNC: 32.3 G/DL (ref 32–36)
MCHC RBC AUTO-ENTMCNC: 32.7 G/DL (ref 32–36)
MCHC RBC AUTO-ENTMCNC: 32.8 G/DL (ref 32–36)
MCV RBC AUTO: 89 FL (ref 82–98)
MCV RBC AUTO: 90 FL (ref 82–98)
MCV RBC AUTO: 91 FL (ref 82–98)
MIN VOL: 8.4
MODE: ABNORMAL
MONOCYTES # BLD AUTO: 0.8 K/UL (ref 0.3–1)
MONOCYTES # BLD AUTO: 1.3 K/UL (ref 0.3–1)
MONOCYTES # BLD AUTO: 1.4 K/UL (ref 0.3–1)
MONOCYTES NFR BLD: 10.7 % (ref 4–15)
MONOCYTES NFR BLD: 11.1 % (ref 4–15)
MONOCYTES NFR BLD: 13.8 % (ref 4–15)
NEUTROPHILS # BLD AUTO: 10.7 K/UL (ref 1.8–7.7)
NEUTROPHILS # BLD AUTO: 5.3 K/UL (ref 1.8–7.7)
NEUTROPHILS # BLD AUTO: 7.5 K/UL (ref 1.8–7.7)
NEUTROPHILS NFR BLD: 69.4 % (ref 38–73)
NEUTROPHILS NFR BLD: 78 % (ref 38–73)
NEUTROPHILS NFR BLD: 83.1 % (ref 38–73)
NRBC BLD-RTO: 0 /100 WBC
PCO2 BLDA: 45 MMHG (ref 35–45)
PEEP: 5
PH SMN: 7.37 [PH] (ref 7.35–7.45)
PHOSPHATE SERPL-MCNC: 2.5 MG/DL (ref 2.7–4.5)
PHOSPHATE SERPL-MCNC: 2.7 MG/DL (ref 2.7–4.5)
PIP: 17
PLATELET # BLD AUTO: 131 K/UL (ref 150–350)
PLATELET # BLD AUTO: 137 K/UL (ref 150–350)
PLATELET # BLD AUTO: 150 K/UL (ref 150–350)
PMV BLD AUTO: 9.2 FL (ref 9.2–12.9)
PMV BLD AUTO: 9.3 FL (ref 9.2–12.9)
PMV BLD AUTO: 9.5 FL (ref 9.2–12.9)
PO2 BLDA: 184 MMHG (ref 80–100)
POC BE: 0 MMOL/L
POC PCO2 TEMP: 45.1 MMHG
POC PH TEMP: 7.37
POC PO2 TEMP: 185 MMHG
POC SATURATED O2: 100 % (ref 95–100)
POC TCO2: 27 MMOL/L (ref 23–27)
POC TEMPERATURE: ABNORMAL
POCT GLUCOSE: 115 MG/DL (ref 70–110)
POCT GLUCOSE: 156 MG/DL (ref 70–110)
POCT GLUCOSE: 157 MG/DL (ref 70–110)
POCT GLUCOSE: 218 MG/DL (ref 70–110)
POCT GLUCOSE: 256 MG/DL (ref 70–110)
POCT GLUCOSE: 56 MG/DL (ref 70–110)
POCT GLUCOSE: 93 MG/DL (ref 70–110)
POTASSIUM SERPL-SCNC: 4.4 MMOL/L (ref 3.5–5.1)
POTASSIUM SERPL-SCNC: 4.6 MMOL/L (ref 3.5–5.1)
PROTHROMBIN TIME: 10.9 SEC (ref 9–12.5)
PROTHROMBIN TIME: 11.7 SEC (ref 9–12.5)
RBC # BLD AUTO: 2.61 M/UL (ref 4.6–6.2)
RBC # BLD AUTO: 2.76 M/UL (ref 4.6–6.2)
RBC # BLD AUTO: 3.04 M/UL (ref 4.6–6.2)
SAMPLE: ABNORMAL
SITE: ABNORMAL
SODIUM SERPL-SCNC: 136 MMOL/L (ref 136–145)
SODIUM SERPL-SCNC: 137 MMOL/L (ref 136–145)
SP02: 99
VT: 350
WBC # BLD AUTO: 12.83 K/UL (ref 3.9–12.7)
WBC # BLD AUTO: 7.66 K/UL (ref 3.9–12.7)
WBC # BLD AUTO: 9.59 K/UL (ref 3.9–12.7)

## 2021-03-04 PROCEDURE — 94761 N-INVAS EAR/PLS OXIMETRY MLT: CPT

## 2021-03-04 PROCEDURE — 99223 1ST HOSP IP/OBS HIGH 75: CPT | Mod: ,,, | Performed by: INTERNAL MEDICINE

## 2021-03-04 PROCEDURE — 36430 TRANSFUSION BLD/BLD COMPNT: CPT

## 2021-03-04 PROCEDURE — 85610 PROTHROMBIN TIME: CPT | Mod: 91 | Performed by: OTOLARYNGOLOGY

## 2021-03-04 PROCEDURE — P9021 RED BLOOD CELLS UNIT: HCPCS | Performed by: NEUROLOGICAL SURGERY

## 2021-03-04 PROCEDURE — 82550 ASSAY OF CK (CPK): CPT | Performed by: OTOLARYNGOLOGY

## 2021-03-04 PROCEDURE — 83735 ASSAY OF MAGNESIUM: CPT | Performed by: OTOLARYNGOLOGY

## 2021-03-04 PROCEDURE — 99900035 HC TECH TIME PER 15 MIN (STAT)

## 2021-03-04 PROCEDURE — U0005 INFEC AGEN DETEC AMPLI PROBE: HCPCS | Performed by: STUDENT IN AN ORGANIZED HEALTH CARE EDUCATION/TRAINING PROGRAM

## 2021-03-04 PROCEDURE — 63600175 PHARM REV CODE 636 W HCPCS: Performed by: STUDENT IN AN ORGANIZED HEALTH CARE EDUCATION/TRAINING PROGRAM

## 2021-03-04 PROCEDURE — 85730 THROMBOPLASTIN TIME PARTIAL: CPT | Mod: 91 | Performed by: OTOLARYNGOLOGY

## 2021-03-04 PROCEDURE — 99291 PR CRITICAL CARE, E/M 30-74 MINUTES: ICD-10-PCS | Mod: ,,, | Performed by: SURGERY

## 2021-03-04 PROCEDURE — 99900026 HC AIRWAY MAINTENANCE (STAT)

## 2021-03-04 PROCEDURE — 99223 PR INITIAL HOSPITAL CARE,LEVL III: ICD-10-PCS | Mod: ,,, | Performed by: INTERNAL MEDICINE

## 2021-03-04 PROCEDURE — 85025 COMPLETE CBC W/AUTO DIFF WBC: CPT | Performed by: OTOLARYNGOLOGY

## 2021-03-04 PROCEDURE — 83605 ASSAY OF LACTIC ACID: CPT | Performed by: OTOLARYNGOLOGY

## 2021-03-04 PROCEDURE — 84100 ASSAY OF PHOSPHORUS: CPT | Mod: 91 | Performed by: OTOLARYNGOLOGY

## 2021-03-04 PROCEDURE — 25000003 PHARM REV CODE 250: Performed by: STUDENT IN AN ORGANIZED HEALTH CARE EDUCATION/TRAINING PROGRAM

## 2021-03-04 PROCEDURE — 20000000 HC ICU ROOM

## 2021-03-04 PROCEDURE — 82330 ASSAY OF CALCIUM: CPT | Performed by: OTOLARYNGOLOGY

## 2021-03-04 PROCEDURE — U0003 INFECTIOUS AGENT DETECTION BY NUCLEIC ACID (DNA OR RNA); SEVERE ACUTE RESPIRATORY SYNDROME CORONAVIRUS 2 (SARS-COV-2) (CORONAVIRUS DISEASE [COVID-19]), AMPLIFIED PROBE TECHNIQUE, MAKING USE OF HIGH THROUGHPUT TECHNOLOGIES AS DESCRIBED BY CMS-2020-01-R: HCPCS | Performed by: STUDENT IN AN ORGANIZED HEALTH CARE EDUCATION/TRAINING PROGRAM

## 2021-03-04 PROCEDURE — 27000221 HC OXYGEN, UP TO 24 HOURS

## 2021-03-04 PROCEDURE — P9045 ALBUMIN (HUMAN), 5%, 250 ML: HCPCS | Mod: JG

## 2021-03-04 PROCEDURE — 25000003 PHARM REV CODE 250: Performed by: OTOLARYNGOLOGY

## 2021-03-04 PROCEDURE — 63600175 PHARM REV CODE 636 W HCPCS: Mod: JG

## 2021-03-04 PROCEDURE — 80048 BASIC METABOLIC PNL TOTAL CA: CPT | Mod: 91 | Performed by: OTOLARYNGOLOGY

## 2021-03-04 PROCEDURE — 99291 CRITICAL CARE FIRST HOUR: CPT | Mod: ,,, | Performed by: SURGERY

## 2021-03-04 PROCEDURE — 94003 VENT MGMT INPAT SUBQ DAY: CPT

## 2021-03-04 RX ORDER — ALBUMIN HUMAN 50 G/1000ML
SOLUTION INTRAVENOUS
Status: COMPLETED
Start: 2021-03-04 | End: 2021-03-04

## 2021-03-04 RX ORDER — ALBUMIN HUMAN 50 G/1000ML
25 SOLUTION INTRAVENOUS ONCE
Status: COMPLETED | OUTPATIENT
Start: 2021-03-04 | End: 2021-03-04

## 2021-03-04 RX ORDER — HYDROCODONE BITARTRATE AND ACETAMINOPHEN 500; 5 MG/1; MG/1
TABLET ORAL
Status: DISCONTINUED | OUTPATIENT
Start: 2021-03-04 | End: 2021-03-09

## 2021-03-04 RX ADMIN — DEXTROSE MONOHYDRATE 12.5 G: 25 INJECTION, SOLUTION INTRAVENOUS at 12:03

## 2021-03-04 RX ADMIN — PROPOFOL 30 MCG/KG/MIN: 10 INJECTION, EMULSION INTRAVENOUS at 01:03

## 2021-03-04 RX ADMIN — CHLORHEXIDINE GLUCONATE 0.12% ORAL RINSE 15 ML: 1.2 LIQUID ORAL at 08:03

## 2021-03-04 RX ADMIN — ENOXAPARIN SODIUM 40 MG: 40 INJECTION SUBCUTANEOUS at 05:03

## 2021-03-04 RX ADMIN — BACITRACIN: 500 OINTMENT TOPICAL at 08:03

## 2021-03-04 RX ADMIN — PROPOFOL 50 MCG/KG/MIN: 10 INJECTION, EMULSION INTRAVENOUS at 06:03

## 2021-03-04 RX ADMIN — MUPIROCIN: 20 OINTMENT TOPICAL at 10:03

## 2021-03-04 RX ADMIN — AMPICILLIN SODIUM AND SULBACTAM SODIUM 3 G: 2; 1 INJECTION, POWDER, FOR SOLUTION INTRAMUSCULAR; INTRAVENOUS at 08:03

## 2021-03-04 RX ADMIN — SODIUM CHLORIDE, SODIUM LACTATE, POTASSIUM CHLORIDE, AND CALCIUM CHLORIDE 500 ML: .6; .31; .03; .02 INJECTION, SOLUTION INTRAVENOUS at 02:03

## 2021-03-04 RX ADMIN — FAMOTIDINE 20 MG: 10 INJECTION INTRAVENOUS at 08:03

## 2021-03-04 RX ADMIN — CALCIUM GLUCONATE 2 G: 98 INJECTION, SOLUTION INTRAVENOUS at 03:03

## 2021-03-04 RX ADMIN — AMPICILLIN SODIUM AND SULBACTAM SODIUM 3 G: 2; 1 INJECTION, POWDER, FOR SOLUTION INTRAMUSCULAR; INTRAVENOUS at 06:03

## 2021-03-04 RX ADMIN — ALBUMIN (HUMAN) 25 G: 12.5 SOLUTION INTRAVENOUS at 12:03

## 2021-03-04 RX ADMIN — ALBUMIN HUMAN 25 G: 50 SOLUTION INTRAVENOUS at 12:03

## 2021-03-04 RX ADMIN — VASOPRESSIN 0.04 UNITS/MIN: 20 INJECTION INTRAVENOUS at 08:03

## 2021-03-04 RX ADMIN — Medication 100 MCG/HR: at 08:03

## 2021-03-04 RX ADMIN — VASOPRESSIN 0.06 UNITS/MIN: 20 INJECTION INTRAVENOUS at 03:03

## 2021-03-04 RX ADMIN — AMPICILLIN SODIUM AND SULBACTAM SODIUM 3 G: 2; 1 INJECTION, POWDER, FOR SOLUTION INTRAMUSCULAR; INTRAVENOUS at 12:03

## 2021-03-04 RX ADMIN — CHLORHEXIDINE GLUCONATE 0.12% ORAL RINSE 15 ML: 1.2 LIQUID ORAL at 09:03

## 2021-03-04 RX ADMIN — BACITRACIN: 500 OINTMENT TOPICAL at 10:03

## 2021-03-04 RX ADMIN — MUPIROCIN: 20 OINTMENT TOPICAL at 08:03

## 2021-03-04 RX ADMIN — FAMOTIDINE 20 MG: 10 INJECTION INTRAVENOUS at 09:03

## 2021-03-04 RX ADMIN — DEXTROSE MONOHYDRATE, SODIUM CHLORIDE, AND POTASSIUM CHLORIDE: 50; 9; 1.49 INJECTION, SOLUTION INTRAVENOUS at 01:03

## 2021-03-04 RX ADMIN — BACITRACIN: 500 OINTMENT TOPICAL at 04:03

## 2021-03-04 RX ADMIN — SODIUM PHOSPHATE, MONOBASIC, MONOHYDRATE 15 MMOL: 276; 142 INJECTION, SOLUTION INTRAVENOUS at 03:03

## 2021-03-05 ENCOUNTER — ANESTHESIA (OUTPATIENT)
Dept: SURGERY | Facility: HOSPITAL | Age: 56
DRG: 577 | End: 2021-03-05
Payer: COMMERCIAL

## 2021-03-05 LAB
ABO + RH BLD: NORMAL
ANION GAP SERPL CALC-SCNC: 7 MMOL/L (ref 8–16)
ANION GAP SERPL CALC-SCNC: 8 MMOL/L (ref 8–16)
APTT BLDCRRT: 39.3 SEC (ref 21–32)
BASOPHILS # BLD AUTO: 0.03 K/UL (ref 0–0.2)
BASOPHILS # BLD AUTO: 0.04 K/UL (ref 0–0.2)
BASOPHILS NFR BLD: 0.4 % (ref 0–1.9)
BASOPHILS NFR BLD: 0.4 % (ref 0–1.9)
BLD GP AB SCN CELLS X3 SERPL QL: NORMAL
BUN SERPL-MCNC: 12 MG/DL (ref 6–20)
BUN SERPL-MCNC: 17 MG/DL (ref 6–20)
CALCIUM SERPL-MCNC: 7.5 MG/DL (ref 8.7–10.5)
CALCIUM SERPL-MCNC: 8 MG/DL (ref 8.7–10.5)
CHLORIDE SERPL-SCNC: 107 MMOL/L (ref 95–110)
CHLORIDE SERPL-SCNC: 107 MMOL/L (ref 95–110)
CK SERPL-CCNC: 2979 U/L (ref 20–200)
CO2 SERPL-SCNC: 22 MMOL/L (ref 23–29)
CO2 SERPL-SCNC: 25 MMOL/L (ref 23–29)
CREAT SERPL-MCNC: 0.7 MG/DL (ref 0.5–1.4)
CREAT SERPL-MCNC: 0.8 MG/DL (ref 0.5–1.4)
DIFFERENTIAL METHOD: ABNORMAL
DIFFERENTIAL METHOD: ABNORMAL
EOSINOPHIL # BLD AUTO: 0 K/UL (ref 0–0.5)
EOSINOPHIL # BLD AUTO: 0.1 K/UL (ref 0–0.5)
EOSINOPHIL NFR BLD: 0.2 % (ref 0–8)
EOSINOPHIL NFR BLD: 0.8 % (ref 0–8)
ERYTHROCYTE [DISTWIDTH] IN BLOOD BY AUTOMATED COUNT: 14.1 % (ref 11.5–14.5)
ERYTHROCYTE [DISTWIDTH] IN BLOOD BY AUTOMATED COUNT: 14.2 % (ref 11.5–14.5)
EST. GFR  (AFRICAN AMERICAN): >60 ML/MIN/1.73 M^2
EST. GFR  (AFRICAN AMERICAN): >60 ML/MIN/1.73 M^2
EST. GFR  (NON AFRICAN AMERICAN): >60 ML/MIN/1.73 M^2
EST. GFR  (NON AFRICAN AMERICAN): >60 ML/MIN/1.73 M^2
GLUCOSE SERPL-MCNC: 100 MG/DL (ref 70–110)
GLUCOSE SERPL-MCNC: 104 MG/DL (ref 70–110)
HCT VFR BLD AUTO: 27.1 % (ref 40–54)
HCT VFR BLD AUTO: 27.2 % (ref 40–54)
HGB BLD-MCNC: 8.5 G/DL (ref 14–18)
HGB BLD-MCNC: 8.6 G/DL (ref 14–18)
IMM GRANULOCYTES # BLD AUTO: 0.03 K/UL (ref 0–0.04)
IMM GRANULOCYTES # BLD AUTO: 0.04 K/UL (ref 0–0.04)
IMM GRANULOCYTES NFR BLD AUTO: 0.4 % (ref 0–0.5)
IMM GRANULOCYTES NFR BLD AUTO: 0.4 % (ref 0–0.5)
LYMPHOCYTES # BLD AUTO: 0.9 K/UL (ref 1–4.8)
LYMPHOCYTES # BLD AUTO: 1.8 K/UL (ref 1–4.8)
LYMPHOCYTES NFR BLD: 22.8 % (ref 18–48)
LYMPHOCYTES NFR BLD: 8.6 % (ref 18–48)
MAGNESIUM SERPL-MCNC: 1.8 MG/DL (ref 1.6–2.6)
MAGNESIUM SERPL-MCNC: 2.2 MG/DL (ref 1.6–2.6)
MCH RBC QN AUTO: 28.5 PG (ref 27–31)
MCH RBC QN AUTO: 28.8 PG (ref 27–31)
MCHC RBC AUTO-ENTMCNC: 31.4 G/DL (ref 32–36)
MCHC RBC AUTO-ENTMCNC: 31.6 G/DL (ref 32–36)
MCV RBC AUTO: 90 FL (ref 82–98)
MCV RBC AUTO: 92 FL (ref 82–98)
MONOCYTES # BLD AUTO: 1.1 K/UL (ref 0.3–1)
MONOCYTES # BLD AUTO: 1.1 K/UL (ref 0.3–1)
MONOCYTES NFR BLD: 10.7 % (ref 4–15)
MONOCYTES NFR BLD: 13.7 % (ref 4–15)
NEUTROPHILS # BLD AUTO: 5 K/UL (ref 1.8–7.7)
NEUTROPHILS # BLD AUTO: 8.5 K/UL (ref 1.8–7.7)
NEUTROPHILS NFR BLD: 61.9 % (ref 38–73)
NEUTROPHILS NFR BLD: 79.7 % (ref 38–73)
NRBC BLD-RTO: 0 /100 WBC
NRBC BLD-RTO: 0 /100 WBC
PHOSPHATE SERPL-MCNC: 1.8 MG/DL (ref 2.7–4.5)
PLATELET # BLD AUTO: 143 K/UL (ref 150–350)
PLATELET # BLD AUTO: 153 K/UL (ref 150–350)
PMV BLD AUTO: 10.3 FL (ref 9.2–12.9)
PMV BLD AUTO: 9.4 FL (ref 9.2–12.9)
POCT GLUCOSE: 107 MG/DL (ref 70–110)
POCT GLUCOSE: 120 MG/DL (ref 70–110)
POCT GLUCOSE: 99 MG/DL (ref 70–110)
POTASSIUM SERPL-SCNC: 4.3 MMOL/L (ref 3.5–5.1)
POTASSIUM SERPL-SCNC: 4.4 MMOL/L (ref 3.5–5.1)
RBC # BLD AUTO: 2.95 M/UL (ref 4.6–6.2)
RBC # BLD AUTO: 3.02 M/UL (ref 4.6–6.2)
SARS-COV-2 RNA RESP QL NAA+PROBE: NOT DETECTED
SODIUM SERPL-SCNC: 136 MMOL/L (ref 136–145)
SODIUM SERPL-SCNC: 140 MMOL/L (ref 136–145)
WBC # BLD AUTO: 10.64 K/UL (ref 3.9–12.7)
WBC # BLD AUTO: 7.98 K/UL (ref 3.9–12.7)

## 2021-03-05 PROCEDURE — 15757 PR FREE SKIN FLAP W MICROVASC ANAST: ICD-10-PCS | Mod: ,,, | Performed by: OTOLARYNGOLOGY

## 2021-03-05 PROCEDURE — 27201040 HC RC 50 FILTER: Performed by: NEUROLOGICAL SURGERY

## 2021-03-05 PROCEDURE — 15757 FREE SKIN FLAP MICROVASC: CPT | Mod: ,,, | Performed by: OTOLARYNGOLOGY

## 2021-03-05 PROCEDURE — 63600175 PHARM REV CODE 636 W HCPCS: Performed by: STUDENT IN AN ORGANIZED HEALTH CARE EDUCATION/TRAINING PROGRAM

## 2021-03-05 PROCEDURE — 83735 ASSAY OF MAGNESIUM: CPT | Performed by: OTOLARYNGOLOGY

## 2021-03-05 PROCEDURE — 86920 COMPATIBILITY TEST SPIN: CPT | Performed by: STUDENT IN AN ORGANIZED HEALTH CARE EDUCATION/TRAINING PROGRAM

## 2021-03-05 PROCEDURE — 25000003 PHARM REV CODE 250: Performed by: STUDENT IN AN ORGANIZED HEALTH CARE EDUCATION/TRAINING PROGRAM

## 2021-03-05 PROCEDURE — 27000221 HC OXYGEN, UP TO 24 HOURS

## 2021-03-05 PROCEDURE — 84100 ASSAY OF PHOSPHORUS: CPT | Performed by: OTOLARYNGOLOGY

## 2021-03-05 PROCEDURE — 99900026 HC AIRWAY MAINTENANCE (STAT)

## 2021-03-05 PROCEDURE — 25000003 PHARM REV CODE 250: Performed by: OTOLARYNGOLOGY

## 2021-03-05 PROCEDURE — 63600175 PHARM REV CODE 636 W HCPCS: Performed by: NURSE ANESTHETIST, CERTIFIED REGISTERED

## 2021-03-05 PROCEDURE — D9220A PRA ANESTHESIA: ICD-10-PCS | Mod: CRNA,,, | Performed by: NURSE ANESTHETIST, CERTIFIED REGISTERED

## 2021-03-05 PROCEDURE — 63600175 PHARM REV CODE 636 W HCPCS: Performed by: OTOLARYNGOLOGY

## 2021-03-05 PROCEDURE — 99291 CRITICAL CARE FIRST HOUR: CPT | Mod: ,,, | Performed by: SURGERY

## 2021-03-05 PROCEDURE — 15120 PR SPLIT GRFT,HEAD,FAC,HAND,FEET <100 SQCM: ICD-10-PCS | Mod: 51,,, | Performed by: OTOLARYNGOLOGY

## 2021-03-05 PROCEDURE — 94003 VENT MGMT INPAT SUBQ DAY: CPT

## 2021-03-05 PROCEDURE — 15005 PR WND PREP,PED, FACE/NCK/HND/FT/GEN ADD 100 CM: ICD-10-PCS | Mod: ,,, | Performed by: OTOLARYNGOLOGY

## 2021-03-05 PROCEDURE — 27800903 OPTIME MED/SURG SUP & DEVICES OTHER IMPLANTS: Performed by: OTOLARYNGOLOGY

## 2021-03-05 PROCEDURE — 37000008 HC ANESTHESIA 1ST 15 MINUTES: Performed by: OTOLARYNGOLOGY

## 2021-03-05 PROCEDURE — 86900 BLOOD TYPING SEROLOGIC ABO: CPT | Performed by: OTOLARYNGOLOGY

## 2021-03-05 PROCEDURE — 36000708 HC OR TIME LEV III 1ST 15 MIN: Performed by: OTOLARYNGOLOGY

## 2021-03-05 PROCEDURE — 27201037 HC PRESSURE MONITORING SET UP

## 2021-03-05 PROCEDURE — 36000709 HC OR TIME LEV III EA ADD 15 MIN: Performed by: OTOLARYNGOLOGY

## 2021-03-05 PROCEDURE — D9220A PRA ANESTHESIA: Mod: CRNA,,, | Performed by: NURSE ANESTHETIST, CERTIFIED REGISTERED

## 2021-03-05 PROCEDURE — 25000003 PHARM REV CODE 250: Performed by: NURSE ANESTHETIST, CERTIFIED REGISTERED

## 2021-03-05 PROCEDURE — 88300 SURGICAL PATH GROSS: CPT | Mod: 26,,, | Performed by: PATHOLOGY

## 2021-03-05 PROCEDURE — 15004 PR WND PREP PED, FACE/NCK/HND/FT/GEN 1ST 100 CM: ICD-10-PCS | Mod: ,,, | Performed by: OTOLARYNGOLOGY

## 2021-03-05 PROCEDURE — 15004 WOUND PREP F/N/HF/G: CPT | Mod: ,,, | Performed by: OTOLARYNGOLOGY

## 2021-03-05 PROCEDURE — 37000009 HC ANESTHESIA EA ADD 15 MINS: Performed by: OTOLARYNGOLOGY

## 2021-03-05 PROCEDURE — 85025 COMPLETE CBC W/AUTO DIFF WBC: CPT | Mod: 91 | Performed by: OTOLARYNGOLOGY

## 2021-03-05 PROCEDURE — 99291 PR CRITICAL CARE, E/M 30-74 MINUTES: ICD-10-PCS | Mod: ,,, | Performed by: SURGERY

## 2021-03-05 PROCEDURE — 88300 PR  SURG PATH,GROSS,LEVEL I: ICD-10-PCS | Mod: 26,,, | Performed by: PATHOLOGY

## 2021-03-05 PROCEDURE — 27201423 OPTIME MED/SURG SUP & DEVICES STERILE SUPPLY: Performed by: OTOLARYNGOLOGY

## 2021-03-05 PROCEDURE — 20000000 HC ICU ROOM

## 2021-03-05 PROCEDURE — 99900035 HC TECH TIME PER 15 MIN (STAT)

## 2021-03-05 PROCEDURE — 94761 N-INVAS EAR/PLS OXIMETRY MLT: CPT

## 2021-03-05 PROCEDURE — 15005 WND PREP F/N/HF/G ADDL CM: CPT | Mod: ,,, | Performed by: OTOLARYNGOLOGY

## 2021-03-05 PROCEDURE — 15120 SPLT AGRFT F/S/N/H/F/G/M 1ST: CPT | Mod: 51,,, | Performed by: OTOLARYNGOLOGY

## 2021-03-05 PROCEDURE — 85730 THROMBOPLASTIN TIME PARTIAL: CPT | Performed by: OTOLARYNGOLOGY

## 2021-03-05 PROCEDURE — 88300 SURGICAL PATH GROSS: CPT | Performed by: PATHOLOGY

## 2021-03-05 PROCEDURE — C1769 GUIDE WIRE: HCPCS | Performed by: OTOLARYNGOLOGY

## 2021-03-05 PROCEDURE — D9220A PRA ANESTHESIA: ICD-10-PCS | Mod: ANES,,, | Performed by: ANESTHESIOLOGY

## 2021-03-05 PROCEDURE — D9220A PRA ANESTHESIA: Mod: ANES,,, | Performed by: ANESTHESIOLOGY

## 2021-03-05 PROCEDURE — 80048 BASIC METABOLIC PNL TOTAL CA: CPT | Performed by: OTOLARYNGOLOGY

## 2021-03-05 DEVICE — COUPLER FLOW ANASTOMOSIS 2CM: Type: IMPLANTABLE DEVICE | Site: SCALP | Status: FUNCTIONAL

## 2021-03-05 RX ORDER — HEPARIN SODIUM 1000 [USP'U]/ML
INJECTION, SOLUTION INTRAVENOUS; SUBCUTANEOUS
Status: DISCONTINUED | OUTPATIENT
Start: 2021-03-05 | End: 2021-03-05 | Stop reason: HOSPADM

## 2021-03-05 RX ORDER — LIDOCAINE HYDROCHLORIDE AND EPINEPHRINE 20; 10 MG/ML; UG/ML
INJECTION, SOLUTION INFILTRATION; PERINEURAL
Status: DISCONTINUED | OUTPATIENT
Start: 2021-03-05 | End: 2021-03-05 | Stop reason: HOSPADM

## 2021-03-05 RX ORDER — LIDOCAINE HCL/EPINEPHRINE/PF 2%-1:200K
VIAL (ML) INJECTION
Status: DISPENSED
Start: 2021-03-05 | End: 2021-03-05

## 2021-03-05 RX ORDER — VASOPRESSIN 20 [USP'U]/ML
INJECTION, SOLUTION INTRAMUSCULAR; SUBCUTANEOUS
Status: DISCONTINUED | OUTPATIENT
Start: 2021-03-05 | End: 2021-03-05

## 2021-03-05 RX ORDER — HEPARIN SODIUM 10000 [USP'U]/100ML
500 INJECTION, SOLUTION INTRAVENOUS CONTINUOUS
Status: DISCONTINUED | OUTPATIENT
Start: 2021-03-05 | End: 2021-03-13

## 2021-03-05 RX ORDER — PHENYLEPHRINE HYDROCHLORIDE 10 MG/ML
INJECTION INTRAVENOUS
Status: DISCONTINUED | OUTPATIENT
Start: 2021-03-05 | End: 2021-03-05

## 2021-03-05 RX ORDER — NEOSTIGMINE METHYLSULFATE 0.5 MG/ML
INJECTION, SOLUTION INTRAVENOUS
Status: DISCONTINUED | OUTPATIENT
Start: 2021-03-05 | End: 2021-03-05

## 2021-03-05 RX ORDER — LIDOCAINE HYDROCHLORIDE 40 MG/ML
INJECTION, SOLUTION RETROBULBAR
Status: DISCONTINUED | OUTPATIENT
Start: 2021-03-05 | End: 2021-03-05 | Stop reason: HOSPADM

## 2021-03-05 RX ORDER — HEPARIN SODIUM 10000 [USP'U]/100ML
INJECTION, SOLUTION INTRAVENOUS CONTINUOUS PRN
Status: DISCONTINUED | OUTPATIENT
Start: 2021-03-05 | End: 2021-03-05

## 2021-03-05 RX ORDER — KETAMINE HCL IN 0.9 % NACL 50 MG/5 ML
SYRINGE (ML) INTRAVENOUS
Status: DISCONTINUED | OUTPATIENT
Start: 2021-03-05 | End: 2021-03-05

## 2021-03-05 RX ORDER — BUSPIRONE HYDROCHLORIDE 10 MG/1
10 TABLET ORAL 2 TIMES DAILY
Status: ON HOLD | COMMUNITY
Start: 2021-02-25 | End: 2021-03-18 | Stop reason: HOSPADM

## 2021-03-05 RX ORDER — ROCURONIUM BROMIDE 10 MG/ML
INJECTION, SOLUTION INTRAVENOUS
Status: DISCONTINUED | OUTPATIENT
Start: 2021-03-05 | End: 2021-03-05

## 2021-03-05 RX ORDER — MIDAZOLAM HYDROCHLORIDE 1 MG/ML
INJECTION INTRAMUSCULAR; INTRAVENOUS
Status: DISCONTINUED | OUTPATIENT
Start: 2021-03-05 | End: 2021-03-05

## 2021-03-05 RX ORDER — SODIUM CHLORIDE 0.9 % (FLUSH) 0.9 %
10 SYRINGE (ML) INJECTION
Status: DISCONTINUED | OUTPATIENT
Start: 2021-03-05 | End: 2021-03-18 | Stop reason: HOSPADM

## 2021-03-05 RX ORDER — FENTANYL CITRATE 50 UG/ML
INJECTION, SOLUTION INTRAMUSCULAR; INTRAVENOUS
Status: DISCONTINUED | OUTPATIENT
Start: 2021-03-05 | End: 2021-03-05

## 2021-03-05 RX ORDER — ONDANSETRON 2 MG/ML
INJECTION INTRAMUSCULAR; INTRAVENOUS
Status: DISCONTINUED | OUTPATIENT
Start: 2021-03-05 | End: 2021-03-05

## 2021-03-05 RX ADMIN — HEPARIN SODIUM AND DEXTROSE 500 UNITS/HR: 10000; 5 INJECTION INTRAVENOUS at 12:03

## 2021-03-05 RX ADMIN — SODIUM CHLORIDE, SODIUM GLUCONATE, SODIUM ACETATE, POTASSIUM CHLORIDE, MAGNESIUM CHLORIDE, SODIUM PHOSPHATE, DIBASIC, AND POTASSIUM PHOSPHATE: .53; .5; .37; .037; .03; .012; .00082 INJECTION, SOLUTION INTRAVENOUS at 11:03

## 2021-03-05 RX ADMIN — FENTANYL CITRATE 25 MCG: 50 INJECTION, SOLUTION INTRAMUSCULAR; INTRAVENOUS at 01:03

## 2021-03-05 RX ADMIN — VASOPRESSIN 1 UNITS: 20 INJECTION INTRAVENOUS at 12:03

## 2021-03-05 RX ADMIN — MUPIROCIN: 20 OINTMENT TOPICAL at 09:03

## 2021-03-05 RX ADMIN — GLYCOPYRROLATE 0.4 MCG: 0.2 INJECTION, SOLUTION INTRAMUSCULAR; INTRAVITREAL at 03:03

## 2021-03-05 RX ADMIN — BACITRACIN: 500 OINTMENT TOPICAL at 09:03

## 2021-03-05 RX ADMIN — AMPICILLIN SODIUM AND SULBACTAM SODIUM 3 G: 2; 1 INJECTION, POWDER, FOR SOLUTION INTRAMUSCULAR; INTRAVENOUS at 09:03

## 2021-03-05 RX ADMIN — FENTANYL CITRATE 50 MCG: 50 INJECTION, SOLUTION INTRAMUSCULAR; INTRAVENOUS at 11:03

## 2021-03-05 RX ADMIN — ROCURONIUM BROMIDE 50 MG: 10 INJECTION, SOLUTION INTRAVENOUS at 07:03

## 2021-03-05 RX ADMIN — PHENYLEPHRINE HYDROCHLORIDE 100 MCG: 10 INJECTION INTRAVENOUS at 11:03

## 2021-03-05 RX ADMIN — FENTANYL CITRATE 25 MCG: 50 INJECTION, SOLUTION INTRAMUSCULAR; INTRAVENOUS at 03:03

## 2021-03-05 RX ADMIN — VASOPRESSIN 1 UNITS: 20 INJECTION INTRAVENOUS at 11:03

## 2021-03-05 RX ADMIN — Medication 20 MG: at 08:03

## 2021-03-05 RX ADMIN — SODIUM CHLORIDE, SODIUM GLUCONATE, SODIUM ACETATE, POTASSIUM CHLORIDE, MAGNESIUM CHLORIDE, SODIUM PHOSPHATE, DIBASIC, AND POTASSIUM PHOSPHATE: .53; .5; .37; .037; .03; .012; .00082 INJECTION, SOLUTION INTRAVENOUS at 02:03

## 2021-03-05 RX ADMIN — ENOXAPARIN SODIUM 40 MG: 40 INJECTION SUBCUTANEOUS at 04:03

## 2021-03-05 RX ADMIN — Medication 10 MG: at 11:03

## 2021-03-05 RX ADMIN — CHLORHEXIDINE GLUCONATE 0.12% ORAL RINSE 15 ML: 1.2 LIQUID ORAL at 09:03

## 2021-03-05 RX ADMIN — ONDANSETRON 4 MG: 2 INJECTION, SOLUTION INTRAMUSCULAR; INTRAVENOUS at 03:03

## 2021-03-05 RX ADMIN — PROPOFOL 50 MCG/KG/MIN: 10 INJECTION, EMULSION INTRAVENOUS at 09:03

## 2021-03-05 RX ADMIN — AMPICILLIN SODIUM AND SULBACTAM SODIUM 3 G: 2; 1 INJECTION, POWDER, FOR SOLUTION INTRAMUSCULAR; INTRAVENOUS at 05:03

## 2021-03-05 RX ADMIN — VASOPRESSIN 1 UNITS: 20 INJECTION INTRAVENOUS at 02:03

## 2021-03-05 RX ADMIN — AMPICILLIN SODIUM AND SULBACTAM SODIUM 3 G: 2; 1 INJECTION, POWDER, FOR SOLUTION INTRAMUSCULAR; INTRAVENOUS at 01:03

## 2021-03-05 RX ADMIN — FENTANYL CITRATE 50 MCG: 50 INJECTION, SOLUTION INTRAMUSCULAR; INTRAVENOUS at 09:03

## 2021-03-05 RX ADMIN — FENTANYL CITRATE 25 MCG: 50 INJECTION, SOLUTION INTRAMUSCULAR; INTRAVENOUS at 02:03

## 2021-03-05 RX ADMIN — VASOPRESSIN 0.02 UNITS/MIN: 20 INJECTION INTRAVENOUS at 03:03

## 2021-03-05 RX ADMIN — MAGNESIUM SULFATE IN WATER 2 G: 40 INJECTION, SOLUTION INTRAVENOUS at 03:03

## 2021-03-05 RX ADMIN — ROCURONIUM BROMIDE 30 MG: 10 INJECTION, SOLUTION INTRAVENOUS at 11:03

## 2021-03-05 RX ADMIN — ROCURONIUM BROMIDE 20 MG: 10 INJECTION, SOLUTION INTRAVENOUS at 12:03

## 2021-03-05 RX ADMIN — SODIUM PHOSPHATE, MONOBASIC, MONOHYDRATE 20.01 MMOL: 276; 142 INJECTION, SOLUTION INTRAVENOUS at 03:03

## 2021-03-05 RX ADMIN — FAMOTIDINE 20 MG: 10 INJECTION INTRAVENOUS at 09:03

## 2021-03-05 RX ADMIN — Medication 100 MCG/HR: at 09:03

## 2021-03-05 RX ADMIN — ROCURONIUM BROMIDE 10 MG: 10 INJECTION, SOLUTION INTRAVENOUS at 10:03

## 2021-03-05 RX ADMIN — ROCURONIUM BROMIDE 50 MG: 10 INJECTION, SOLUTION INTRAVENOUS at 08:03

## 2021-03-05 RX ADMIN — ROCURONIUM BROMIDE 10 MG: 10 INJECTION, SOLUTION INTRAVENOUS at 03:03

## 2021-03-05 RX ADMIN — Medication 10 MG: at 10:03

## 2021-03-05 RX ADMIN — PROPOFOL 50 MCG/KG/MIN: 10 INJECTION, EMULSION INTRAVENOUS at 02:03

## 2021-03-05 RX ADMIN — Medication 10 MG: at 01:03

## 2021-03-05 RX ADMIN — HEPARIN SODIUM 3000 UNITS: 1000 INJECTION, SOLUTION INTRAVENOUS; SUBCUTANEOUS at 12:03

## 2021-03-05 RX ADMIN — NEOSTIGMINE METHYLSULFATE 3 MG: 0.5 INJECTION INTRAVENOUS at 03:03

## 2021-03-05 RX ADMIN — ROCURONIUM BROMIDE 10 MG: 10 INJECTION, SOLUTION INTRAVENOUS at 01:03

## 2021-03-05 RX ADMIN — SODIUM CHLORIDE, SODIUM GLUCONATE, SODIUM ACETATE, POTASSIUM CHLORIDE, MAGNESIUM CHLORIDE, SODIUM PHOSPHATE, DIBASIC, AND POTASSIUM PHOSPHATE: .53; .5; .37; .037; .03; .012; .00082 INJECTION, SOLUTION INTRAVENOUS at 07:03

## 2021-03-05 RX ADMIN — MIDAZOLAM HYDROCHLORIDE 2 MG: 1 INJECTION, SOLUTION INTRAMUSCULAR; INTRAVENOUS at 07:03

## 2021-03-06 LAB
ALLENS TEST: ABNORMAL
ANION GAP SERPL CALC-SCNC: 3 MMOL/L (ref 8–16)
APTT BLDCRRT: 35.1 SEC (ref 21–32)
BASOPHILS # BLD AUTO: 0.03 K/UL (ref 0–0.2)
BASOPHILS # BLD AUTO: 0.04 K/UL (ref 0–0.2)
BASOPHILS NFR BLD: 0.4 % (ref 0–1.9)
BASOPHILS NFR BLD: 0.5 % (ref 0–1.9)
BLD PROD TYP BPU: NORMAL
BLOOD UNIT EXPIRATION DATE: NORMAL
BLOOD UNIT TYPE CODE: 6200
BLOOD UNIT TYPE: NORMAL
BUN SERPL-MCNC: 13 MG/DL (ref 6–20)
CA-I BLDV-SCNC: 0.88 MMOL/L (ref 1.06–1.42)
CALCIUM SERPL-MCNC: 7.5 MG/DL (ref 8.7–10.5)
CHLORIDE SERPL-SCNC: 115 MMOL/L (ref 95–110)
CO2 SERPL-SCNC: 27 MMOL/L (ref 23–29)
CODING SYSTEM: NORMAL
CREAT SERPL-MCNC: 0.8 MG/DL (ref 0.5–1.4)
DELSYS: ABNORMAL
DIFFERENTIAL METHOD: ABNORMAL
DIFFERENTIAL METHOD: ABNORMAL
DISPENSE STATUS: NORMAL
EOSINOPHIL # BLD AUTO: 0.1 K/UL (ref 0–0.5)
EOSINOPHIL # BLD AUTO: 0.2 K/UL (ref 0–0.5)
EOSINOPHIL NFR BLD: 0.7 % (ref 0–8)
EOSINOPHIL NFR BLD: 2.2 % (ref 0–8)
ERYTHROCYTE [DISTWIDTH] IN BLOOD BY AUTOMATED COUNT: 13.6 % (ref 11.5–14.5)
ERYTHROCYTE [DISTWIDTH] IN BLOOD BY AUTOMATED COUNT: 14.1 % (ref 11.5–14.5)
ERYTHROCYTE [SEDIMENTATION RATE] IN BLOOD BY WESTERGREN METHOD: 18 MM/H
EST. GFR  (AFRICAN AMERICAN): >60 ML/MIN/1.73 M^2
EST. GFR  (NON AFRICAN AMERICAN): >60 ML/MIN/1.73 M^2
FACT X PPP CHRO-ACNC: <0.1 IU/ML (ref 0.3–0.7)
FIO2: 40
GLUCOSE SERPL-MCNC: 159 MG/DL (ref 70–110)
HCO3 UR-SCNC: 27.9 MMOL/L (ref 24–28)
HCT VFR BLD AUTO: 19.2 % (ref 40–54)
HCT VFR BLD AUTO: 24.1 % (ref 40–54)
HGB BLD-MCNC: 6.2 G/DL (ref 14–18)
HGB BLD-MCNC: 7.6 G/DL (ref 14–18)
IMM GRANULOCYTES # BLD AUTO: 0.03 K/UL (ref 0–0.04)
IMM GRANULOCYTES # BLD AUTO: 0.04 K/UL (ref 0–0.04)
IMM GRANULOCYTES NFR BLD AUTO: 0.4 % (ref 0–0.5)
IMM GRANULOCYTES NFR BLD AUTO: 0.5 % (ref 0–0.5)
LYMPHOCYTES # BLD AUTO: 0.9 K/UL (ref 1–4.8)
LYMPHOCYTES # BLD AUTO: 1.2 K/UL (ref 1–4.8)
LYMPHOCYTES NFR BLD: 12 % (ref 18–48)
LYMPHOCYTES NFR BLD: 15.7 % (ref 18–48)
MAGNESIUM SERPL-MCNC: 2 MG/DL (ref 1.6–2.6)
MCH RBC QN AUTO: 29 PG (ref 27–31)
MCH RBC QN AUTO: 29.2 PG (ref 27–31)
MCHC RBC AUTO-ENTMCNC: 31.5 G/DL (ref 32–36)
MCHC RBC AUTO-ENTMCNC: 32.3 G/DL (ref 32–36)
MCV RBC AUTO: 91 FL (ref 82–98)
MCV RBC AUTO: 92 FL (ref 82–98)
MODE: ABNORMAL
MONOCYTES # BLD AUTO: 1.1 K/UL (ref 0.3–1)
MONOCYTES # BLD AUTO: 1.1 K/UL (ref 0.3–1)
MONOCYTES NFR BLD: 14 % (ref 4–15)
MONOCYTES NFR BLD: 14.9 % (ref 4–15)
NEUTROPHILS # BLD AUTO: 5.2 K/UL (ref 1.8–7.7)
NEUTROPHILS # BLD AUTO: 5.4 K/UL (ref 1.8–7.7)
NEUTROPHILS NFR BLD: 67.2 % (ref 38–73)
NEUTROPHILS NFR BLD: 71.5 % (ref 38–73)
NRBC BLD-RTO: 0 /100 WBC
NRBC BLD-RTO: 0 /100 WBC
NUM UNITS TRANS PACKED RBC: NORMAL
PCO2 BLDA: 38.3 MMHG (ref 35–45)
PEEP: 5
PH SMN: 7.47 [PH] (ref 7.35–7.45)
PHOSPHATE SERPL-MCNC: 2.1 MG/DL (ref 2.7–4.5)
PLATELET # BLD AUTO: 131 K/UL (ref 150–350)
PLATELET # BLD AUTO: 139 K/UL (ref 150–350)
PLATELET BLD QL SMEAR: ABNORMAL
PMV BLD AUTO: 10.1 FL (ref 9.2–12.9)
PMV BLD AUTO: 10.6 FL (ref 9.2–12.9)
PO2 BLDA: 142 MMHG (ref 80–100)
POC BE: 4 MMOL/L
POC SATURATED O2: 99 % (ref 95–100)
POC TCO2: 29 MMOL/L (ref 23–27)
POCT GLUCOSE: 159 MG/DL (ref 70–110)
POCT GLUCOSE: 52 MG/DL (ref 70–110)
POCT GLUCOSE: 78 MG/DL (ref 70–110)
POCT GLUCOSE: 81 MG/DL (ref 70–110)
POCT GLUCOSE: 82 MG/DL (ref 70–110)
POCT GLUCOSE: 95 MG/DL (ref 70–110)
POTASSIUM SERPL-SCNC: 4.7 MMOL/L (ref 3.5–5.1)
RBC # BLD AUTO: 2.12 M/UL (ref 4.6–6.2)
RBC # BLD AUTO: 2.62 M/UL (ref 4.6–6.2)
SAMPLE: ABNORMAL
SITE: ABNORMAL
SODIUM SERPL-SCNC: 145 MMOL/L (ref 136–145)
TRANS ERYTHROCYTES VOL PATIENT: NORMAL ML
VT: 480
WBC # BLD AUTO: 7.6 K/UL (ref 3.9–12.7)
WBC # BLD AUTO: 7.72 K/UL (ref 3.9–12.7)

## 2021-03-06 PROCEDURE — 99900035 HC TECH TIME PER 15 MIN (STAT)

## 2021-03-06 PROCEDURE — 63600175 PHARM REV CODE 636 W HCPCS: Performed by: STUDENT IN AN ORGANIZED HEALTH CARE EDUCATION/TRAINING PROGRAM

## 2021-03-06 PROCEDURE — 80048 BASIC METABOLIC PNL TOTAL CA: CPT | Performed by: OTOLARYNGOLOGY

## 2021-03-06 PROCEDURE — 20000000 HC ICU ROOM

## 2021-03-06 PROCEDURE — 25000003 PHARM REV CODE 250: Performed by: STUDENT IN AN ORGANIZED HEALTH CARE EDUCATION/TRAINING PROGRAM

## 2021-03-06 PROCEDURE — P9038 RBC IRRADIATED: HCPCS | Performed by: STUDENT IN AN ORGANIZED HEALTH CARE EDUCATION/TRAINING PROGRAM

## 2021-03-06 PROCEDURE — 99291 PR CRITICAL CARE, E/M 30-74 MINUTES: ICD-10-PCS | Mod: ,,, | Performed by: SURGERY

## 2021-03-06 PROCEDURE — 83735 ASSAY OF MAGNESIUM: CPT | Performed by: OTOLARYNGOLOGY

## 2021-03-06 PROCEDURE — 36430 TRANSFUSION BLD/BLD COMPNT: CPT

## 2021-03-06 PROCEDURE — P9040 RBC LEUKOREDUCED IRRADIATED: HCPCS | Performed by: STUDENT IN AN ORGANIZED HEALTH CARE EDUCATION/TRAINING PROGRAM

## 2021-03-06 PROCEDURE — 85520 HEPARIN ASSAY: CPT | Performed by: OTOLARYNGOLOGY

## 2021-03-06 PROCEDURE — 27000221 HC OXYGEN, UP TO 24 HOURS

## 2021-03-06 PROCEDURE — 85025 COMPLETE CBC W/AUTO DIFF WBC: CPT | Performed by: OTOLARYNGOLOGY

## 2021-03-06 PROCEDURE — 82803 BLOOD GASES ANY COMBINATION: CPT

## 2021-03-06 PROCEDURE — 94761 N-INVAS EAR/PLS OXIMETRY MLT: CPT

## 2021-03-06 PROCEDURE — 99900026 HC AIRWAY MAINTENANCE (STAT)

## 2021-03-06 PROCEDURE — 85730 THROMBOPLASTIN TIME PARTIAL: CPT | Performed by: OTOLARYNGOLOGY

## 2021-03-06 PROCEDURE — 94003 VENT MGMT INPAT SUBQ DAY: CPT

## 2021-03-06 PROCEDURE — 82330 ASSAY OF CALCIUM: CPT | Performed by: OTOLARYNGOLOGY

## 2021-03-06 PROCEDURE — 84100 ASSAY OF PHOSPHORUS: CPT | Performed by: OTOLARYNGOLOGY

## 2021-03-06 PROCEDURE — 36600 WITHDRAWAL OF ARTERIAL BLOOD: CPT

## 2021-03-06 PROCEDURE — 99291 CRITICAL CARE FIRST HOUR: CPT | Mod: ,,, | Performed by: SURGERY

## 2021-03-06 RX ORDER — PHENYLEPHRINE HCL IN 0.9% NACL 1 MG/10 ML
SYRINGE (ML) INTRAVENOUS
Status: DISCONTINUED
Start: 2021-03-06 | End: 2021-03-06 | Stop reason: WASHOUT

## 2021-03-06 RX ORDER — HYDROCODONE BITARTRATE AND ACETAMINOPHEN 500; 5 MG/1; MG/1
TABLET ORAL
Status: DISCONTINUED | OUTPATIENT
Start: 2021-03-06 | End: 2021-03-09

## 2021-03-06 RX ADMIN — CHLORHEXIDINE GLUCONATE 0.12% ORAL RINSE 15 ML: 1.2 LIQUID ORAL at 08:03

## 2021-03-06 RX ADMIN — HEPARIN SODIUM AND DEXTROSE 500 UNITS/HR: 10000; 5 INJECTION INTRAVENOUS at 08:03

## 2021-03-06 RX ADMIN — DEXTROSE MONOHYDRATE 12.5 G: 25 INJECTION, SOLUTION INTRAVENOUS at 05:03

## 2021-03-06 RX ADMIN — BACITRACIN: 500 OINTMENT TOPICAL at 08:03

## 2021-03-06 RX ADMIN — SODIUM PHOSPHATE, MONOBASIC, MONOHYDRATE 20.01 MMOL: 276; 142 INJECTION, SOLUTION INTRAVENOUS at 03:03

## 2021-03-06 RX ADMIN — CALCIUM GLUCONATE 3 G: 98 INJECTION, SOLUTION INTRAVENOUS at 12:03

## 2021-03-06 RX ADMIN — PROPOFOL 25 MCG/KG/MIN: 10 INJECTION, EMULSION INTRAVENOUS at 07:03

## 2021-03-06 RX ADMIN — AMPICILLIN SODIUM AND SULBACTAM SODIUM 3 G: 2; 1 INJECTION, POWDER, FOR SOLUTION INTRAMUSCULAR; INTRAVENOUS at 05:03

## 2021-03-06 RX ADMIN — MUPIROCIN: 20 OINTMENT TOPICAL at 09:03

## 2021-03-06 RX ADMIN — AMPICILLIN SODIUM AND SULBACTAM SODIUM 3 G: 2; 1 INJECTION, POWDER, FOR SOLUTION INTRAMUSCULAR; INTRAVENOUS at 01:03

## 2021-03-06 RX ADMIN — ONDANSETRON 4 MG: 2 INJECTION INTRAMUSCULAR; INTRAVENOUS at 08:03

## 2021-03-06 RX ADMIN — BACITRACIN: 500 OINTMENT TOPICAL at 02:03

## 2021-03-06 RX ADMIN — MUPIROCIN: 20 OINTMENT TOPICAL at 08:03

## 2021-03-06 RX ADMIN — FAMOTIDINE 20 MG: 10 INJECTION INTRAVENOUS at 09:03

## 2021-03-06 RX ADMIN — CHLORHEXIDINE GLUCONATE 0.12% ORAL RINSE 15 ML: 1.2 LIQUID ORAL at 09:03

## 2021-03-06 RX ADMIN — CALCIUM GLUCONATE 3 G: 98 INJECTION, SOLUTION INTRAVENOUS at 09:03

## 2021-03-06 RX ADMIN — AMPICILLIN SODIUM AND SULBACTAM SODIUM 3 G: 2; 1 INJECTION, POWDER, FOR SOLUTION INTRAMUSCULAR; INTRAVENOUS at 08:03

## 2021-03-06 RX ADMIN — PROPOFOL 25 MCG/KG/MIN: 10 INJECTION, EMULSION INTRAVENOUS at 03:03

## 2021-03-06 RX ADMIN — BACITRACIN: 500 OINTMENT TOPICAL at 09:03

## 2021-03-06 RX ADMIN — FAMOTIDINE 20 MG: 10 INJECTION INTRAVENOUS at 08:03

## 2021-03-07 LAB
ANION GAP SERPL CALC-SCNC: 10 MMOL/L (ref 8–16)
ANISOCYTOSIS BLD QL SMEAR: SLIGHT
BASOPHILS # BLD AUTO: 0.05 K/UL (ref 0–0.2)
BASOPHILS NFR BLD: 0.5 % (ref 0–1.9)
BUN SERPL-MCNC: 11 MG/DL (ref 6–20)
CA-I BLDV-SCNC: 1.15 MMOL/L (ref 1.06–1.42)
CALCIUM SERPL-MCNC: 8.6 MG/DL (ref 8.7–10.5)
CHLORIDE SERPL-SCNC: 107 MMOL/L (ref 95–110)
CO2 SERPL-SCNC: 26 MMOL/L (ref 23–29)
CREAT SERPL-MCNC: 0.8 MG/DL (ref 0.5–1.4)
DIFFERENTIAL METHOD: ABNORMAL
EOSINOPHIL # BLD AUTO: 0.2 K/UL (ref 0–0.5)
EOSINOPHIL NFR BLD: 2.3 % (ref 0–8)
ERYTHROCYTE [DISTWIDTH] IN BLOOD BY AUTOMATED COUNT: 14.2 % (ref 11.5–14.5)
EST. GFR  (AFRICAN AMERICAN): >60 ML/MIN/1.73 M^2
EST. GFR  (NON AFRICAN AMERICAN): >60 ML/MIN/1.73 M^2
GLUCOSE SERPL-MCNC: 116 MG/DL (ref 70–110)
HCT VFR BLD AUTO: 26 % (ref 40–54)
HGB BLD-MCNC: 8.7 G/DL (ref 14–18)
HYPOCHROMIA BLD QL SMEAR: ABNORMAL
IMM GRANULOCYTES # BLD AUTO: 0.04 K/UL (ref 0–0.04)
IMM GRANULOCYTES NFR BLD AUTO: 0.4 % (ref 0–0.5)
LYMPHOCYTES # BLD AUTO: 1.6 K/UL (ref 1–4.8)
LYMPHOCYTES NFR BLD: 17.7 % (ref 18–48)
MAGNESIUM SERPL-MCNC: 1.9 MG/DL (ref 1.6–2.6)
MCH RBC QN AUTO: 29.3 PG (ref 27–31)
MCHC RBC AUTO-ENTMCNC: 33.5 G/DL (ref 32–36)
MCV RBC AUTO: 88 FL (ref 82–98)
MONOCYTES # BLD AUTO: 1.3 K/UL (ref 0.3–1)
MONOCYTES NFR BLD: 13.5 % (ref 4–15)
NEUTROPHILS # BLD AUTO: 6.1 K/UL (ref 1.8–7.7)
NEUTROPHILS NFR BLD: 65.6 % (ref 38–73)
NRBC BLD-RTO: 0 /100 WBC
PHOSPHATE SERPL-MCNC: 2.2 MG/DL (ref 2.7–4.5)
PLATELET # BLD AUTO: 171 K/UL (ref 150–350)
PLATELET BLD QL SMEAR: ABNORMAL
PMV BLD AUTO: 9.8 FL (ref 9.2–12.9)
POCT GLUCOSE: 297 MG/DL (ref 70–110)
POCT GLUCOSE: 65 MG/DL (ref 70–110)
POCT GLUCOSE: 87 MG/DL (ref 70–110)
POCT GLUCOSE: 93 MG/DL (ref 70–110)
POTASSIUM SERPL-SCNC: 3.5 MMOL/L (ref 3.5–5.1)
RBC # BLD AUTO: 2.97 M/UL (ref 4.6–6.2)
SODIUM SERPL-SCNC: 143 MMOL/L (ref 136–145)
WBC # BLD AUTO: 9.27 K/UL (ref 3.9–12.7)

## 2021-03-07 PROCEDURE — 99900026 HC AIRWAY MAINTENANCE (STAT)

## 2021-03-07 PROCEDURE — 63600175 PHARM REV CODE 636 W HCPCS: Performed by: STUDENT IN AN ORGANIZED HEALTH CARE EDUCATION/TRAINING PROGRAM

## 2021-03-07 PROCEDURE — 27000221 HC OXYGEN, UP TO 24 HOURS

## 2021-03-07 PROCEDURE — 85025 COMPLETE CBC W/AUTO DIFF WBC: CPT | Performed by: OTOLARYNGOLOGY

## 2021-03-07 PROCEDURE — 94761 N-INVAS EAR/PLS OXIMETRY MLT: CPT

## 2021-03-07 PROCEDURE — 99900035 HC TECH TIME PER 15 MIN (STAT)

## 2021-03-07 PROCEDURE — 25000003 PHARM REV CODE 250: Performed by: STUDENT IN AN ORGANIZED HEALTH CARE EDUCATION/TRAINING PROGRAM

## 2021-03-07 PROCEDURE — 94003 VENT MGMT INPAT SUBQ DAY: CPT

## 2021-03-07 PROCEDURE — 80048 BASIC METABOLIC PNL TOTAL CA: CPT | Performed by: OTOLARYNGOLOGY

## 2021-03-07 PROCEDURE — 99291 CRITICAL CARE FIRST HOUR: CPT | Mod: ,,, | Performed by: SURGERY

## 2021-03-07 PROCEDURE — 82330 ASSAY OF CALCIUM: CPT | Performed by: OTOLARYNGOLOGY

## 2021-03-07 PROCEDURE — 99291 PR CRITICAL CARE, E/M 30-74 MINUTES: ICD-10-PCS | Mod: ,,, | Performed by: SURGERY

## 2021-03-07 PROCEDURE — 83735 ASSAY OF MAGNESIUM: CPT | Performed by: OTOLARYNGOLOGY

## 2021-03-07 PROCEDURE — 84100 ASSAY OF PHOSPHORUS: CPT | Performed by: OTOLARYNGOLOGY

## 2021-03-07 PROCEDURE — 20000000 HC ICU ROOM

## 2021-03-07 RX ORDER — HEPARIN SODIUM,PORCINE/D5W 25000/250
0-40 INTRAVENOUS SOLUTION INTRAVENOUS CONTINUOUS
Status: CANCELLED | OUTPATIENT
Start: 2021-03-07

## 2021-03-07 RX ADMIN — MUPIROCIN: 20 OINTMENT TOPICAL at 09:03

## 2021-03-07 RX ADMIN — PROPOFOL 50 MCG/KG/MIN: 10 INJECTION, EMULSION INTRAVENOUS at 05:03

## 2021-03-07 RX ADMIN — PROPOFOL 50 MCG/KG/MIN: 10 INJECTION, EMULSION INTRAVENOUS at 03:03

## 2021-03-07 RX ADMIN — Medication 100 MCG/HR: at 11:03

## 2021-03-07 RX ADMIN — PROPOFOL 50 MCG/KG/MIN: 10 INJECTION, EMULSION INTRAVENOUS at 09:03

## 2021-03-07 RX ADMIN — FAMOTIDINE 20 MG: 10 INJECTION INTRAVENOUS at 09:03

## 2021-03-07 RX ADMIN — BACITRACIN: 500 OINTMENT TOPICAL at 02:03

## 2021-03-07 RX ADMIN — BACITRACIN: 500 OINTMENT TOPICAL at 09:03

## 2021-03-07 RX ADMIN — DEXTROSE MONOHYDRATE, SODIUM CHLORIDE, AND POTASSIUM CHLORIDE: 50; 9; 1.49 INJECTION, SOLUTION INTRAVENOUS at 09:03

## 2021-03-07 RX ADMIN — DEXTROSE MONOHYDRATE 12.5 G: 25 INJECTION, SOLUTION INTRAVENOUS at 01:03

## 2021-03-07 RX ADMIN — AMPICILLIN SODIUM AND SULBACTAM SODIUM 3 G: 2; 1 INJECTION, POWDER, FOR SOLUTION INTRAMUSCULAR; INTRAVENOUS at 12:03

## 2021-03-07 RX ADMIN — AMPICILLIN SODIUM AND SULBACTAM SODIUM 3 G: 2; 1 INJECTION, POWDER, FOR SOLUTION INTRAMUSCULAR; INTRAVENOUS at 09:03

## 2021-03-07 RX ADMIN — SODIUM PHOSPHATE, MONOBASIC, MONOHYDRATE 20.01 MMOL: 276; 142 INJECTION, SOLUTION INTRAVENOUS at 06:03

## 2021-03-07 RX ADMIN — SODIUM CHLORIDE, SODIUM LACTATE, POTASSIUM CHLORIDE, AND CALCIUM CHLORIDE 500 ML: .6; .31; .03; .02 INJECTION, SOLUTION INTRAVENOUS at 06:03

## 2021-03-07 RX ADMIN — PROPOFOL 50 MCG/KG/MIN: 10 INJECTION, EMULSION INTRAVENOUS at 10:03

## 2021-03-07 RX ADMIN — POTASSIUM CHLORIDE 40 MEQ: 10 INJECTION, SOLUTION INTRAVENOUS at 09:03

## 2021-03-07 RX ADMIN — AMPICILLIN SODIUM AND SULBACTAM SODIUM 3 G: 2; 1 INJECTION, POWDER, FOR SOLUTION INTRAMUSCULAR; INTRAVENOUS at 05:03

## 2021-03-08 LAB
ABO + RH BLD: NORMAL
ANION GAP SERPL CALC-SCNC: 7 MMOL/L (ref 8–16)
BASOPHILS # BLD AUTO: 0.02 K/UL (ref 0–0.2)
BASOPHILS # BLD AUTO: 0.03 K/UL (ref 0–0.2)
BASOPHILS NFR BLD: 0.4 % (ref 0–1.9)
BASOPHILS NFR BLD: 0.4 % (ref 0–1.9)
BLD GP AB SCN CELLS X3 SERPL QL: NORMAL
BLD PROD TYP BPU: NORMAL
BLOOD UNIT EXPIRATION DATE: NORMAL
BLOOD UNIT TYPE CODE: 6200
BLOOD UNIT TYPE: NORMAL
BUN SERPL-MCNC: 10 MG/DL (ref 6–20)
CA-I BLDV-SCNC: 1.09 MMOL/L (ref 1.06–1.42)
CALCIUM SERPL-MCNC: 7.5 MG/DL (ref 8.7–10.5)
CHLORIDE SERPL-SCNC: 109 MMOL/L (ref 95–110)
CO2 SERPL-SCNC: 23 MMOL/L (ref 23–29)
CODING SYSTEM: NORMAL
CREAT SERPL-MCNC: 0.7 MG/DL (ref 0.5–1.4)
DIFFERENTIAL METHOD: ABNORMAL
DIFFERENTIAL METHOD: ABNORMAL
DISPENSE STATUS: NORMAL
EOSINOPHIL # BLD AUTO: 0.2 K/UL (ref 0–0.5)
EOSINOPHIL # BLD AUTO: 0.2 K/UL (ref 0–0.5)
EOSINOPHIL NFR BLD: 3.2 % (ref 0–8)
EOSINOPHIL NFR BLD: 3.6 % (ref 0–8)
ERYTHROCYTE [DISTWIDTH] IN BLOOD BY AUTOMATED COUNT: 13.8 % (ref 11.5–14.5)
ERYTHROCYTE [DISTWIDTH] IN BLOOD BY AUTOMATED COUNT: 14.2 % (ref 11.5–14.5)
EST. GFR  (AFRICAN AMERICAN): >60 ML/MIN/1.73 M^2
EST. GFR  (NON AFRICAN AMERICAN): >60 ML/MIN/1.73 M^2
GLUCOSE SERPL-MCNC: 81 MG/DL (ref 70–110)
HCT VFR BLD AUTO: 20.3 % (ref 40–54)
HCT VFR BLD AUTO: 27.5 % (ref 40–54)
HGB BLD-MCNC: 7.1 G/DL (ref 14–18)
HGB BLD-MCNC: 9.1 G/DL (ref 14–18)
IMM GRANULOCYTES # BLD AUTO: 0.01 K/UL (ref 0–0.04)
IMM GRANULOCYTES # BLD AUTO: 0.03 K/UL (ref 0–0.04)
IMM GRANULOCYTES NFR BLD AUTO: 0.2 % (ref 0–0.5)
IMM GRANULOCYTES NFR BLD AUTO: 0.4 % (ref 0–0.5)
LYMPHOCYTES # BLD AUTO: 1.1 K/UL (ref 1–4.8)
LYMPHOCYTES # BLD AUTO: 1.7 K/UL (ref 1–4.8)
LYMPHOCYTES NFR BLD: 22.2 % (ref 18–48)
LYMPHOCYTES NFR BLD: 22.7 % (ref 18–48)
MAGNESIUM SERPL-MCNC: 1.7 MG/DL (ref 1.6–2.6)
MCH RBC QN AUTO: 28.7 PG (ref 27–31)
MCH RBC QN AUTO: 30.5 PG (ref 27–31)
MCHC RBC AUTO-ENTMCNC: 33.1 G/DL (ref 32–36)
MCHC RBC AUTO-ENTMCNC: 35 G/DL (ref 32–36)
MCV RBC AUTO: 87 FL (ref 82–98)
MCV RBC AUTO: 87 FL (ref 82–98)
MONOCYTES # BLD AUTO: 0.6 K/UL (ref 0.3–1)
MONOCYTES # BLD AUTO: 1.1 K/UL (ref 0.3–1)
MONOCYTES NFR BLD: 13.7 % (ref 4–15)
MONOCYTES NFR BLD: 14.7 % (ref 4–15)
NEUTROPHILS # BLD AUTO: 2.8 K/UL (ref 1.8–7.7)
NEUTROPHILS # BLD AUTO: 4.4 K/UL (ref 1.8–7.7)
NEUTROPHILS NFR BLD: 59.1 % (ref 38–73)
NEUTROPHILS NFR BLD: 59.4 % (ref 38–73)
NRBC BLD-RTO: 0 /100 WBC
NRBC BLD-RTO: 0 /100 WBC
NUM UNITS TRANS PACKED RBC: NORMAL
PHOSPHATE SERPL-MCNC: 3 MG/DL (ref 2.7–4.5)
PLATELET # BLD AUTO: 148 K/UL (ref 150–350)
PLATELET # BLD AUTO: 193 K/UL (ref 150–350)
PMV BLD AUTO: 10.4 FL (ref 9.2–12.9)
PMV BLD AUTO: 9.7 FL (ref 9.2–12.9)
POCT GLUCOSE: 102 MG/DL (ref 70–110)
POCT GLUCOSE: 132 MG/DL (ref 70–110)
POCT GLUCOSE: 164 MG/DL (ref 70–110)
POCT GLUCOSE: 75 MG/DL (ref 70–110)
POCT GLUCOSE: 76 MG/DL (ref 70–110)
POCT GLUCOSE: 96 MG/DL (ref 70–110)
POTASSIUM SERPL-SCNC: 3.9 MMOL/L (ref 3.5–5.1)
RBC # BLD AUTO: 2.33 M/UL (ref 4.6–6.2)
RBC # BLD AUTO: 3.17 M/UL (ref 4.6–6.2)
SODIUM SERPL-SCNC: 139 MMOL/L (ref 136–145)
WBC # BLD AUTO: 4.66 K/UL (ref 3.9–12.7)
WBC # BLD AUTO: 7.49 K/UL (ref 3.9–12.7)

## 2021-03-08 PROCEDURE — 99900026 HC AIRWAY MAINTENANCE (STAT)

## 2021-03-08 PROCEDURE — 25000003 PHARM REV CODE 250: Performed by: STUDENT IN AN ORGANIZED HEALTH CARE EDUCATION/TRAINING PROGRAM

## 2021-03-08 PROCEDURE — 63600175 PHARM REV CODE 636 W HCPCS: Performed by: STUDENT IN AN ORGANIZED HEALTH CARE EDUCATION/TRAINING PROGRAM

## 2021-03-08 PROCEDURE — 85025 COMPLETE CBC W/AUTO DIFF WBC: CPT | Mod: 91 | Performed by: STUDENT IN AN ORGANIZED HEALTH CARE EDUCATION/TRAINING PROGRAM

## 2021-03-08 PROCEDURE — 85025 COMPLETE CBC W/AUTO DIFF WBC: CPT | Performed by: OTOLARYNGOLOGY

## 2021-03-08 PROCEDURE — 20000000 HC ICU ROOM

## 2021-03-08 PROCEDURE — 99900035 HC TECH TIME PER 15 MIN (STAT)

## 2021-03-08 PROCEDURE — 27000221 HC OXYGEN, UP TO 24 HOURS

## 2021-03-08 PROCEDURE — P9040 RBC LEUKOREDUCED IRRADIATED: HCPCS | Performed by: STUDENT IN AN ORGANIZED HEALTH CARE EDUCATION/TRAINING PROGRAM

## 2021-03-08 PROCEDURE — 94003 VENT MGMT INPAT SUBQ DAY: CPT

## 2021-03-08 PROCEDURE — 99291 CRITICAL CARE FIRST HOUR: CPT | Mod: ,,, | Performed by: STUDENT IN AN ORGANIZED HEALTH CARE EDUCATION/TRAINING PROGRAM

## 2021-03-08 PROCEDURE — 36410 VNPNXR 3YR/> PHY/QHP DX/THER: CPT

## 2021-03-08 PROCEDURE — 25000242 PHARM REV CODE 250 ALT 637 W/ HCPCS: Performed by: STUDENT IN AN ORGANIZED HEALTH CARE EDUCATION/TRAINING PROGRAM

## 2021-03-08 PROCEDURE — 84100 ASSAY OF PHOSPHORUS: CPT | Performed by: OTOLARYNGOLOGY

## 2021-03-08 PROCEDURE — 86900 BLOOD TYPING SEROLOGIC ABO: CPT | Performed by: OTOLARYNGOLOGY

## 2021-03-08 PROCEDURE — 36430 TRANSFUSION BLD/BLD COMPNT: CPT

## 2021-03-08 PROCEDURE — 80048 BASIC METABOLIC PNL TOTAL CA: CPT | Performed by: OTOLARYNGOLOGY

## 2021-03-08 PROCEDURE — 76937 US GUIDE VASCULAR ACCESS: CPT

## 2021-03-08 PROCEDURE — 82330 ASSAY OF CALCIUM: CPT | Performed by: OTOLARYNGOLOGY

## 2021-03-08 PROCEDURE — C1751 CATH, INF, PER/CENT/MIDLINE: HCPCS

## 2021-03-08 PROCEDURE — 99291 PR CRITICAL CARE, E/M 30-74 MINUTES: ICD-10-PCS | Mod: ,,, | Performed by: STUDENT IN AN ORGANIZED HEALTH CARE EDUCATION/TRAINING PROGRAM

## 2021-03-08 PROCEDURE — 94761 N-INVAS EAR/PLS OXIMETRY MLT: CPT

## 2021-03-08 PROCEDURE — 27200966 HC CLOSED SUCTION SYSTEM

## 2021-03-08 PROCEDURE — 83735 ASSAY OF MAGNESIUM: CPT | Performed by: OTOLARYNGOLOGY

## 2021-03-08 RX ORDER — QUETIAPINE FUMARATE 25 MG/1
25 TABLET, FILM COATED ORAL ONCE
Status: DISCONTINUED | OUTPATIENT
Start: 2021-03-08 | End: 2021-03-08

## 2021-03-08 RX ORDER — HYDROCODONE BITARTRATE AND ACETAMINOPHEN 500; 5 MG/1; MG/1
TABLET ORAL
Status: DISCONTINUED | OUTPATIENT
Start: 2021-03-08 | End: 2021-03-09

## 2021-03-08 RX ORDER — DEXMEDETOMIDINE HYDROCHLORIDE 4 UG/ML
0-1.4 INJECTION, SOLUTION INTRAVENOUS CONTINUOUS
Status: DISCONTINUED | OUTPATIENT
Start: 2021-03-08 | End: 2021-03-10

## 2021-03-08 RX ORDER — OXYCODONE HCL 5 MG/5 ML
5 SOLUTION, ORAL ORAL EVERY 4 HOURS PRN
Status: DISCONTINUED | OUTPATIENT
Start: 2021-03-08 | End: 2021-03-18 | Stop reason: HOSPADM

## 2021-03-08 RX ORDER — HALOPERIDOL 5 MG/ML
5 INJECTION INTRAMUSCULAR ONCE
Status: COMPLETED | OUTPATIENT
Start: 2021-03-08 | End: 2021-03-08

## 2021-03-08 RX ADMIN — HALOPERIDOL LACTATE 5 MG: 5 INJECTION, SOLUTION INTRAMUSCULAR at 10:03

## 2021-03-08 RX ADMIN — BACITRACIN: 500 OINTMENT TOPICAL at 10:03

## 2021-03-08 RX ADMIN — FAMOTIDINE 20 MG: 10 INJECTION INTRAVENOUS at 10:03

## 2021-03-08 RX ADMIN — DEXTROSE MONOHYDRATE 12.5 G: 25 INJECTION, SOLUTION INTRAVENOUS at 12:03

## 2021-03-08 RX ADMIN — BACITRACIN: 500 OINTMENT TOPICAL at 03:03

## 2021-03-08 RX ADMIN — BACITRACIN: 500 OINTMENT TOPICAL at 09:03

## 2021-03-08 RX ADMIN — AMPICILLIN SODIUM AND SULBACTAM SODIUM 3 G: 2; 1 INJECTION, POWDER, FOR SOLUTION INTRAMUSCULAR; INTRAVENOUS at 01:03

## 2021-03-08 RX ADMIN — OXYCODONE HYDROCHLORIDE 5 MG: 5 SOLUTION ORAL at 12:03

## 2021-03-08 RX ADMIN — MUPIROCIN: 20 OINTMENT TOPICAL at 10:03

## 2021-03-08 RX ADMIN — AMPICILLIN SODIUM AND SULBACTAM SODIUM 3 G: 2; 1 INJECTION, POWDER, FOR SOLUTION INTRAMUSCULAR; INTRAVENOUS at 04:03

## 2021-03-08 RX ADMIN — AMPICILLIN SODIUM AND SULBACTAM SODIUM 3 G: 2; 1 INJECTION, POWDER, FOR SOLUTION INTRAMUSCULAR; INTRAVENOUS at 09:03

## 2021-03-08 RX ADMIN — PROPOFOL 50 MCG/KG/MIN: 10 INJECTION, EMULSION INTRAVENOUS at 02:03

## 2021-03-08 RX ADMIN — Medication 100 MCG/HR: at 10:03

## 2021-03-08 RX ADMIN — DEXTROSE MONOHYDRATE 12.5 G: 25 INJECTION, SOLUTION INTRAVENOUS at 05:03

## 2021-03-08 RX ADMIN — FAMOTIDINE 20 MG: 10 INJECTION INTRAVENOUS at 09:03

## 2021-03-08 RX ADMIN — PROPOFOL 50 MCG/KG/MIN: 10 INJECTION, EMULSION INTRAVENOUS at 10:03

## 2021-03-09 LAB
ANION GAP SERPL CALC-SCNC: 10 MMOL/L (ref 8–16)
APTT BLDCRRT: 24.3 SEC (ref 21–32)
BASOPHILS # BLD AUTO: 0.02 K/UL (ref 0–0.2)
BASOPHILS NFR BLD: 0.3 % (ref 0–1.9)
BUN SERPL-MCNC: 10 MG/DL (ref 6–20)
CA-I BLDV-SCNC: 1.12 MMOL/L (ref 1.06–1.42)
CALCIUM SERPL-MCNC: 8.2 MG/DL (ref 8.7–10.5)
CHLORIDE SERPL-SCNC: 108 MMOL/L (ref 95–110)
CO2 SERPL-SCNC: 23 MMOL/L (ref 23–29)
CREAT SERPL-MCNC: 0.7 MG/DL (ref 0.5–1.4)
DIFFERENTIAL METHOD: ABNORMAL
EOSINOPHIL # BLD AUTO: 0.2 K/UL (ref 0–0.5)
EOSINOPHIL NFR BLD: 3.5 % (ref 0–8)
ERYTHROCYTE [DISTWIDTH] IN BLOOD BY AUTOMATED COUNT: 13.8 % (ref 11.5–14.5)
EST. GFR  (AFRICAN AMERICAN): >60 ML/MIN/1.73 M^2
EST. GFR  (NON AFRICAN AMERICAN): >60 ML/MIN/1.73 M^2
GLUCOSE SERPL-MCNC: 79 MG/DL (ref 70–110)
HCT VFR BLD AUTO: 26.1 % (ref 40–54)
HGB BLD-MCNC: 8.6 G/DL (ref 14–18)
IMM GRANULOCYTES # BLD AUTO: 0.04 K/UL (ref 0–0.04)
IMM GRANULOCYTES NFR BLD AUTO: 0.6 % (ref 0–0.5)
LYMPHOCYTES # BLD AUTO: 1.4 K/UL (ref 1–4.8)
LYMPHOCYTES NFR BLD: 22.8 % (ref 18–48)
MAGNESIUM SERPL-MCNC: 1.9 MG/DL (ref 1.6–2.6)
MCH RBC QN AUTO: 29 PG (ref 27–31)
MCHC RBC AUTO-ENTMCNC: 33 G/DL (ref 32–36)
MCV RBC AUTO: 88 FL (ref 82–98)
MONOCYTES # BLD AUTO: 1.1 K/UL (ref 0.3–1)
MONOCYTES NFR BLD: 16.9 % (ref 4–15)
NEUTROPHILS # BLD AUTO: 3.5 K/UL (ref 1.8–7.7)
NEUTROPHILS NFR BLD: 55.9 % (ref 38–73)
NRBC BLD-RTO: 0 /100 WBC
PHOSPHATE SERPL-MCNC: 3.6 MG/DL (ref 2.7–4.5)
PLATELET # BLD AUTO: 182 K/UL (ref 150–350)
PMV BLD AUTO: 9.5 FL (ref 9.2–12.9)
POCT GLUCOSE: 65 MG/DL (ref 70–110)
POCT GLUCOSE: 70 MG/DL (ref 70–110)
POCT GLUCOSE: 73 MG/DL (ref 70–110)
POCT GLUCOSE: 75 MG/DL (ref 70–110)
POTASSIUM SERPL-SCNC: 4 MMOL/L (ref 3.5–5.1)
RBC # BLD AUTO: 2.97 M/UL (ref 4.6–6.2)
SODIUM SERPL-SCNC: 141 MMOL/L (ref 136–145)
WBC # BLD AUTO: 6.23 K/UL (ref 3.9–12.7)

## 2021-03-09 PROCEDURE — 85025 COMPLETE CBC W/AUTO DIFF WBC: CPT | Performed by: OTOLARYNGOLOGY

## 2021-03-09 PROCEDURE — 99900026 HC AIRWAY MAINTENANCE (STAT)

## 2021-03-09 PROCEDURE — 82330 ASSAY OF CALCIUM: CPT | Performed by: OTOLARYNGOLOGY

## 2021-03-09 PROCEDURE — 20000000 HC ICU ROOM

## 2021-03-09 PROCEDURE — 94761 N-INVAS EAR/PLS OXIMETRY MLT: CPT

## 2021-03-09 PROCEDURE — 85730 THROMBOPLASTIN TIME PARTIAL: CPT | Performed by: OTOLARYNGOLOGY

## 2021-03-09 PROCEDURE — 99291 PR CRITICAL CARE, E/M 30-74 MINUTES: ICD-10-PCS | Mod: ,,, | Performed by: STUDENT IN AN ORGANIZED HEALTH CARE EDUCATION/TRAINING PROGRAM

## 2021-03-09 PROCEDURE — 25000003 PHARM REV CODE 250: Performed by: STUDENT IN AN ORGANIZED HEALTH CARE EDUCATION/TRAINING PROGRAM

## 2021-03-09 PROCEDURE — 99900035 HC TECH TIME PER 15 MIN (STAT)

## 2021-03-09 PROCEDURE — 84100 ASSAY OF PHOSPHORUS: CPT | Performed by: OTOLARYNGOLOGY

## 2021-03-09 PROCEDURE — 63600175 PHARM REV CODE 636 W HCPCS: Performed by: STUDENT IN AN ORGANIZED HEALTH CARE EDUCATION/TRAINING PROGRAM

## 2021-03-09 PROCEDURE — 27000221 HC OXYGEN, UP TO 24 HOURS

## 2021-03-09 PROCEDURE — 94003 VENT MGMT INPAT SUBQ DAY: CPT

## 2021-03-09 PROCEDURE — 80048 BASIC METABOLIC PNL TOTAL CA: CPT | Performed by: OTOLARYNGOLOGY

## 2021-03-09 PROCEDURE — 83735 ASSAY OF MAGNESIUM: CPT | Performed by: OTOLARYNGOLOGY

## 2021-03-09 PROCEDURE — 25000242 PHARM REV CODE 250 ALT 637 W/ HCPCS: Performed by: STUDENT IN AN ORGANIZED HEALTH CARE EDUCATION/TRAINING PROGRAM

## 2021-03-09 PROCEDURE — 99291 CRITICAL CARE FIRST HOUR: CPT | Mod: ,,, | Performed by: STUDENT IN AN ORGANIZED HEALTH CARE EDUCATION/TRAINING PROGRAM

## 2021-03-09 RX ORDER — QUETIAPINE FUMARATE 25 MG/1
25 TABLET, FILM COATED ORAL ONCE
Status: COMPLETED | OUTPATIENT
Start: 2021-03-09 | End: 2021-03-09

## 2021-03-09 RX ORDER — QUETIAPINE FUMARATE 25 MG/1
25 TABLET, FILM COATED ORAL NIGHTLY
Status: DISCONTINUED | OUTPATIENT
Start: 2021-03-09 | End: 2021-03-11

## 2021-03-09 RX ORDER — BUPROPION HYDROCHLORIDE 75 MG/1
75 TABLET ORAL 2 TIMES DAILY
Status: DISCONTINUED | OUTPATIENT
Start: 2021-03-09 | End: 2021-03-11

## 2021-03-09 RX ORDER — TRAZODONE HYDROCHLORIDE 50 MG/1
50 TABLET ORAL NIGHTLY
Status: DISCONTINUED | OUTPATIENT
Start: 2021-03-09 | End: 2021-03-11

## 2021-03-09 RX ORDER — BUPROPION HYDROCHLORIDE 150 MG/1
150 TABLET ORAL DAILY
Status: DISCONTINUED | OUTPATIENT
Start: 2021-03-09 | End: 2021-03-09

## 2021-03-09 RX ADMIN — BACITRACIN: 500 OINTMENT TOPICAL at 02:03

## 2021-03-09 RX ADMIN — BACITRACIN: 500 OINTMENT TOPICAL at 08:03

## 2021-03-09 RX ADMIN — AMPICILLIN SODIUM AND SULBACTAM SODIUM 3 G: 2; 1 INJECTION, POWDER, FOR SOLUTION INTRAMUSCULAR; INTRAVENOUS at 08:03

## 2021-03-09 RX ADMIN — DEXTROSE MONOHYDRATE 12.5 G: 25 INJECTION, SOLUTION INTRAVENOUS at 12:03

## 2021-03-09 RX ADMIN — OXYCODONE HYDROCHLORIDE 5 MG: 5 SOLUTION ORAL at 08:03

## 2021-03-09 RX ADMIN — BUPROPION HYDROCHLORIDE 75 MG: 75 TABLET, FILM COATED ORAL at 08:03

## 2021-03-09 RX ADMIN — OXYCODONE HYDROCHLORIDE 5 MG: 5 SOLUTION ORAL at 02:03

## 2021-03-09 RX ADMIN — AMPICILLIN SODIUM AND SULBACTAM SODIUM 3 G: 2; 1 INJECTION, POWDER, FOR SOLUTION INTRAMUSCULAR; INTRAVENOUS at 12:03

## 2021-03-09 RX ADMIN — HEPARIN SODIUM AND DEXTROSE 500 UNITS/HR: 10000; 5 INJECTION INTRAVENOUS at 04:03

## 2021-03-09 RX ADMIN — TRAZODONE HYDROCHLORIDE 50 MG: 50 TABLET ORAL at 08:03

## 2021-03-09 RX ADMIN — QUETIAPINE FUMARATE 25 MG: 25 TABLET ORAL at 08:03

## 2021-03-09 RX ADMIN — DEXMEDETOMIDINE HYDROCHLORIDE 1.2 MCG/KG/HR: 4 INJECTION, SOLUTION INTRAVENOUS at 10:03

## 2021-03-09 RX ADMIN — FAMOTIDINE 20 MG: 10 INJECTION INTRAVENOUS at 08:03

## 2021-03-09 RX ADMIN — AMPICILLIN SODIUM AND SULBACTAM SODIUM 3 G: 2; 1 INJECTION, POWDER, FOR SOLUTION INTRAMUSCULAR; INTRAVENOUS at 04:03

## 2021-03-09 RX ADMIN — DEXMEDETOMIDINE HYDROCHLORIDE 0.2 MCG/KG/HR: 4 INJECTION, SOLUTION INTRAVENOUS at 05:03

## 2021-03-10 LAB
ANION GAP SERPL CALC-SCNC: 11 MMOL/L (ref 8–16)
APTT BLDCRRT: 22.7 SEC (ref 21–32)
BASOPHILS # BLD AUTO: 0.03 K/UL (ref 0–0.2)
BASOPHILS NFR BLD: 0.4 % (ref 0–1.9)
BUN SERPL-MCNC: 13 MG/DL (ref 6–20)
CA-I BLDV-SCNC: 1.19 MMOL/L (ref 1.06–1.42)
CALCIUM SERPL-MCNC: 8.7 MG/DL (ref 8.7–10.5)
CHLORIDE SERPL-SCNC: 106 MMOL/L (ref 95–110)
CO2 SERPL-SCNC: 25 MMOL/L (ref 23–29)
CREAT SERPL-MCNC: 0.7 MG/DL (ref 0.5–1.4)
DIFFERENTIAL METHOD: ABNORMAL
EOSINOPHIL # BLD AUTO: 0.2 K/UL (ref 0–0.5)
EOSINOPHIL NFR BLD: 3 % (ref 0–8)
ERYTHROCYTE [DISTWIDTH] IN BLOOD BY AUTOMATED COUNT: 13.2 % (ref 11.5–14.5)
EST. GFR  (AFRICAN AMERICAN): >60 ML/MIN/1.73 M^2
EST. GFR  (NON AFRICAN AMERICAN): >60 ML/MIN/1.73 M^2
FINAL PATHOLOGIC DIAGNOSIS: NORMAL
GLUCOSE SERPL-MCNC: 90 MG/DL (ref 70–110)
GROSS: NORMAL
HCT VFR BLD AUTO: 28.1 % (ref 40–54)
HGB BLD-MCNC: 9.4 G/DL (ref 14–18)
IMM GRANULOCYTES # BLD AUTO: 0.05 K/UL (ref 0–0.04)
IMM GRANULOCYTES NFR BLD AUTO: 0.7 % (ref 0–0.5)
LYMPHOCYTES # BLD AUTO: 1 K/UL (ref 1–4.8)
LYMPHOCYTES NFR BLD: 13.5 % (ref 18–48)
Lab: NORMAL
MAGNESIUM SERPL-MCNC: 2 MG/DL (ref 1.6–2.6)
MCH RBC QN AUTO: 29 PG (ref 27–31)
MCHC RBC AUTO-ENTMCNC: 33.5 G/DL (ref 32–36)
MCV RBC AUTO: 87 FL (ref 82–98)
MONOCYTES # BLD AUTO: 1 K/UL (ref 0.3–1)
MONOCYTES NFR BLD: 13.6 % (ref 4–15)
NEUTROPHILS # BLD AUTO: 5.1 K/UL (ref 1.8–7.7)
NEUTROPHILS NFR BLD: 68.8 % (ref 38–73)
NRBC BLD-RTO: 0 /100 WBC
PHOSPHATE SERPL-MCNC: 3.4 MG/DL (ref 2.7–4.5)
PLATELET # BLD AUTO: 203 K/UL (ref 150–350)
PMV BLD AUTO: 9.3 FL (ref 9.2–12.9)
POCT GLUCOSE: 68 MG/DL (ref 70–110)
POCT GLUCOSE: 73 MG/DL (ref 70–110)
POCT GLUCOSE: 77 MG/DL (ref 70–110)
POCT GLUCOSE: 87 MG/DL (ref 70–110)
POCT GLUCOSE: 92 MG/DL (ref 70–110)
POCT GLUCOSE: 93 MG/DL (ref 70–110)
POCT GLUCOSE: 97 MG/DL (ref 70–110)
POCT GLUCOSE: 98 MG/DL (ref 70–110)
POTASSIUM SERPL-SCNC: 4.2 MMOL/L (ref 3.5–5.1)
RBC # BLD AUTO: 3.24 M/UL (ref 4.6–6.2)
SODIUM SERPL-SCNC: 142 MMOL/L (ref 136–145)
WBC # BLD AUTO: 7.42 K/UL (ref 3.9–12.7)

## 2021-03-10 PROCEDURE — 84100 ASSAY OF PHOSPHORUS: CPT | Performed by: OTOLARYNGOLOGY

## 2021-03-10 PROCEDURE — 99291 PR CRITICAL CARE, E/M 30-74 MINUTES: ICD-10-PCS | Mod: ,,, | Performed by: STUDENT IN AN ORGANIZED HEALTH CARE EDUCATION/TRAINING PROGRAM

## 2021-03-10 PROCEDURE — 99291 CRITICAL CARE FIRST HOUR: CPT | Mod: ,,, | Performed by: STUDENT IN AN ORGANIZED HEALTH CARE EDUCATION/TRAINING PROGRAM

## 2021-03-10 PROCEDURE — 82330 ASSAY OF CALCIUM: CPT | Performed by: OTOLARYNGOLOGY

## 2021-03-10 PROCEDURE — 63600175 PHARM REV CODE 636 W HCPCS: Performed by: STUDENT IN AN ORGANIZED HEALTH CARE EDUCATION/TRAINING PROGRAM

## 2021-03-10 PROCEDURE — 85730 THROMBOPLASTIN TIME PARTIAL: CPT | Performed by: OTOLARYNGOLOGY

## 2021-03-10 PROCEDURE — 25000003 PHARM REV CODE 250: Performed by: STUDENT IN AN ORGANIZED HEALTH CARE EDUCATION/TRAINING PROGRAM

## 2021-03-10 PROCEDURE — 83735 ASSAY OF MAGNESIUM: CPT | Performed by: OTOLARYNGOLOGY

## 2021-03-10 PROCEDURE — 80048 BASIC METABOLIC PNL TOTAL CA: CPT | Performed by: OTOLARYNGOLOGY

## 2021-03-10 PROCEDURE — 94010 BREATHING CAPACITY TEST: CPT

## 2021-03-10 PROCEDURE — 99900017 HC EXTUBATION W/PARAMETERS (STAT)

## 2021-03-10 PROCEDURE — 99900035 HC TECH TIME PER 15 MIN (STAT)

## 2021-03-10 PROCEDURE — 94761 N-INVAS EAR/PLS OXIMETRY MLT: CPT

## 2021-03-10 PROCEDURE — 27000221 HC OXYGEN, UP TO 24 HOURS

## 2021-03-10 PROCEDURE — 20000000 HC ICU ROOM

## 2021-03-10 PROCEDURE — 99900026 HC AIRWAY MAINTENANCE (STAT)

## 2021-03-10 PROCEDURE — 85025 COMPLETE CBC W/AUTO DIFF WBC: CPT | Performed by: OTOLARYNGOLOGY

## 2021-03-10 PROCEDURE — 94150 VITAL CAPACITY TEST: CPT

## 2021-03-10 PROCEDURE — 94003 VENT MGMT INPAT SUBQ DAY: CPT

## 2021-03-10 RX ORDER — HALOPERIDOL 5 MG/ML
5 INJECTION INTRAMUSCULAR ONCE
Status: DISCONTINUED | OUTPATIENT
Start: 2021-03-10 | End: 2021-03-11

## 2021-03-10 RX ORDER — ACETAMINOPHEN 325 MG/1
650 TABLET ORAL EVERY 6 HOURS PRN
Status: DISCONTINUED | OUTPATIENT
Start: 2021-03-10 | End: 2021-03-16

## 2021-03-10 RX ADMIN — TRAZODONE HYDROCHLORIDE 50 MG: 50 TABLET ORAL at 09:03

## 2021-03-10 RX ADMIN — PROCHLORPERAZINE EDISYLATE 5 MG: 5 INJECTION INTRAMUSCULAR; INTRAVENOUS at 08:03

## 2021-03-10 RX ADMIN — BACITRACIN: 500 OINTMENT TOPICAL at 10:03

## 2021-03-10 RX ADMIN — DEXMEDETOMIDINE HYDROCHLORIDE 1.4 MCG/KG/HR: 4 INJECTION, SOLUTION INTRAVENOUS at 06:03

## 2021-03-10 RX ADMIN — BUPROPION HYDROCHLORIDE 75 MG: 75 TABLET, FILM COATED ORAL at 09:03

## 2021-03-10 RX ADMIN — DEXMEDETOMIDINE HYDROCHLORIDE 1.4 MCG/KG/HR: 4 INJECTION, SOLUTION INTRAVENOUS at 03:03

## 2021-03-10 RX ADMIN — AMPICILLIN SODIUM AND SULBACTAM SODIUM 3 G: 2; 1 INJECTION, POWDER, FOR SOLUTION INTRAMUSCULAR; INTRAVENOUS at 09:03

## 2021-03-10 RX ADMIN — BACITRACIN: 500 OINTMENT TOPICAL at 03:03

## 2021-03-10 RX ADMIN — BACITRACIN: 500 OINTMENT TOPICAL at 09:03

## 2021-03-10 RX ADMIN — HEPARIN SODIUM AND DEXTROSE 500 UNITS/HR: 10000; 5 INJECTION INTRAVENOUS at 09:03

## 2021-03-10 RX ADMIN — AMPICILLIN SODIUM AND SULBACTAM SODIUM 3 G: 2; 1 INJECTION, POWDER, FOR SOLUTION INTRAMUSCULAR; INTRAVENOUS at 01:03

## 2021-03-10 RX ADMIN — AMPICILLIN SODIUM AND SULBACTAM SODIUM 3 G: 2; 1 INJECTION, POWDER, FOR SOLUTION INTRAMUSCULAR; INTRAVENOUS at 05:03

## 2021-03-10 RX ADMIN — QUETIAPINE FUMARATE 25 MG: 25 TABLET ORAL at 09:03

## 2021-03-10 RX ADMIN — BUPROPION HYDROCHLORIDE 75 MG: 75 TABLET, FILM COATED ORAL at 12:03

## 2021-03-10 RX ADMIN — ONDANSETRON 4 MG: 2 INJECTION INTRAMUSCULAR; INTRAVENOUS at 04:03

## 2021-03-10 RX ADMIN — ACETAMINOPHEN 650 MG: 325 TABLET ORAL at 01:03

## 2021-03-11 LAB
ALLENS TEST: ABNORMAL
ALLENS TEST: NORMAL
ANION GAP SERPL CALC-SCNC: 14 MMOL/L (ref 8–16)
APTT BLDCRRT: 25.1 SEC (ref 21–32)
BASOPHILS # BLD AUTO: 0.03 K/UL (ref 0–0.2)
BASOPHILS NFR BLD: 0.3 % (ref 0–1.9)
BUN SERPL-MCNC: 19 MG/DL (ref 6–20)
CA-I BLDV-SCNC: 1.12 MMOL/L (ref 1.06–1.42)
CALCIUM SERPL-MCNC: 8.6 MG/DL (ref 8.7–10.5)
CHLORIDE SERPL-SCNC: 104 MMOL/L (ref 95–110)
CO2 SERPL-SCNC: 24 MMOL/L (ref 23–29)
CREAT SERPL-MCNC: 0.7 MG/DL (ref 0.5–1.4)
DELSYS: ABNORMAL
DELSYS: NORMAL
DIFFERENTIAL METHOD: ABNORMAL
EOSINOPHIL # BLD AUTO: 0 K/UL (ref 0–0.5)
EOSINOPHIL NFR BLD: 0.3 % (ref 0–8)
ERYTHROCYTE [DISTWIDTH] IN BLOOD BY AUTOMATED COUNT: 13.2 % (ref 11.5–14.5)
EST. GFR  (AFRICAN AMERICAN): >60 ML/MIN/1.73 M^2
EST. GFR  (NON AFRICAN AMERICAN): >60 ML/MIN/1.73 M^2
GLUCOSE SERPL-MCNC: 88 MG/DL (ref 70–110)
HCO3 UR-SCNC: 26.8 MMOL/L (ref 24–28)
HCT VFR BLD AUTO: 30.4 % (ref 40–54)
HGB BLD-MCNC: 10.3 G/DL (ref 14–18)
IMM GRANULOCYTES # BLD AUTO: 0.08 K/UL (ref 0–0.04)
IMM GRANULOCYTES NFR BLD AUTO: 0.8 % (ref 0–0.5)
LDH SERPL L TO P-CCNC: 0.78 MMOL/L (ref 0.36–1.25)
LYMPHOCYTES # BLD AUTO: 1.2 K/UL (ref 1–4.8)
LYMPHOCYTES NFR BLD: 12.6 % (ref 18–48)
MAGNESIUM SERPL-MCNC: 2 MG/DL (ref 1.6–2.6)
MCH RBC QN AUTO: 28.6 PG (ref 27–31)
MCHC RBC AUTO-ENTMCNC: 33.9 G/DL (ref 32–36)
MCV RBC AUTO: 84 FL (ref 82–98)
MODE: ABNORMAL
MODE: NORMAL
MONOCYTES # BLD AUTO: 1.4 K/UL (ref 0.3–1)
MONOCYTES NFR BLD: 14.6 % (ref 4–15)
NEUTROPHILS # BLD AUTO: 6.9 K/UL (ref 1.8–7.7)
NEUTROPHILS NFR BLD: 71.4 % (ref 38–73)
NRBC BLD-RTO: 0 /100 WBC
PCO2 BLDA: 31 MMHG (ref 35–45)
PH SMN: 7.54 [PH] (ref 7.35–7.45)
PHOSPHATE SERPL-MCNC: 3 MG/DL (ref 2.7–4.5)
PLATELET # BLD AUTO: 216 K/UL (ref 150–350)
PMV BLD AUTO: 9.9 FL (ref 9.2–12.9)
PO2 BLDA: 77 MMHG (ref 80–100)
POC BE: 4 MMOL/L
POC SATURATED O2: 97 % (ref 95–100)
POC TCO2: 28 MMOL/L (ref 23–27)
POCT GLUCOSE: 121 MG/DL (ref 70–110)
POCT GLUCOSE: 90 MG/DL (ref 70–110)
POTASSIUM SERPL-SCNC: 3.6 MMOL/L (ref 3.5–5.1)
RBC # BLD AUTO: 3.6 M/UL (ref 4.6–6.2)
SAMPLE: ABNORMAL
SAMPLE: NORMAL
SITE: ABNORMAL
SITE: NORMAL
SODIUM SERPL-SCNC: 142 MMOL/L (ref 136–145)
WBC # BLD AUTO: 9.7 K/UL (ref 3.9–12.7)

## 2021-03-11 PROCEDURE — 99291 CRITICAL CARE FIRST HOUR: CPT | Mod: ,,, | Performed by: STUDENT IN AN ORGANIZED HEALTH CARE EDUCATION/TRAINING PROGRAM

## 2021-03-11 PROCEDURE — 63600175 PHARM REV CODE 636 W HCPCS: Performed by: STUDENT IN AN ORGANIZED HEALTH CARE EDUCATION/TRAINING PROGRAM

## 2021-03-11 PROCEDURE — 99291 PR CRITICAL CARE, E/M 30-74 MINUTES: ICD-10-PCS | Mod: ,,, | Performed by: STUDENT IN AN ORGANIZED HEALTH CARE EDUCATION/TRAINING PROGRAM

## 2021-03-11 PROCEDURE — 83605 ASSAY OF LACTIC ACID: CPT

## 2021-03-11 PROCEDURE — 93005 ELECTROCARDIOGRAM TRACING: CPT

## 2021-03-11 PROCEDURE — 82803 BLOOD GASES ANY COMBINATION: CPT

## 2021-03-11 PROCEDURE — 25000003 PHARM REV CODE 250: Performed by: STUDENT IN AN ORGANIZED HEALTH CARE EDUCATION/TRAINING PROGRAM

## 2021-03-11 PROCEDURE — 99900035 HC TECH TIME PER 15 MIN (STAT)

## 2021-03-11 PROCEDURE — 93010 EKG 12-LEAD: ICD-10-PCS | Mod: ,,, | Performed by: INTERNAL MEDICINE

## 2021-03-11 PROCEDURE — 85025 COMPLETE CBC W/AUTO DIFF WBC: CPT | Performed by: OTOLARYNGOLOGY

## 2021-03-11 PROCEDURE — 84100 ASSAY OF PHOSPHORUS: CPT | Performed by: OTOLARYNGOLOGY

## 2021-03-11 PROCEDURE — 82330 ASSAY OF CALCIUM: CPT | Performed by: OTOLARYNGOLOGY

## 2021-03-11 PROCEDURE — 86900 BLOOD TYPING SEROLOGIC ABO: CPT | Performed by: OTOLARYNGOLOGY

## 2021-03-11 PROCEDURE — 93010 ELECTROCARDIOGRAM REPORT: CPT | Mod: ,,, | Performed by: INTERNAL MEDICINE

## 2021-03-11 PROCEDURE — 36600 WITHDRAWAL OF ARTERIAL BLOOD: CPT

## 2021-03-11 PROCEDURE — 83735 ASSAY OF MAGNESIUM: CPT | Performed by: OTOLARYNGOLOGY

## 2021-03-11 PROCEDURE — 20000000 HC ICU ROOM

## 2021-03-11 PROCEDURE — 85730 THROMBOPLASTIN TIME PARTIAL: CPT | Performed by: OTOLARYNGOLOGY

## 2021-03-11 PROCEDURE — 94761 N-INVAS EAR/PLS OXIMETRY MLT: CPT

## 2021-03-11 PROCEDURE — 80048 BASIC METABOLIC PNL TOTAL CA: CPT | Performed by: OTOLARYNGOLOGY

## 2021-03-11 RX ORDER — POTASSIUM CHLORIDE 750 MG/1
40 CAPSULE, EXTENDED RELEASE ORAL ONCE
Status: DISCONTINUED | OUTPATIENT
Start: 2021-03-11 | End: 2021-03-18 | Stop reason: HOSPADM

## 2021-03-11 RX ORDER — AMLODIPINE BESYLATE 10 MG/1
10 TABLET ORAL DAILY
Status: DISCONTINUED | OUTPATIENT
Start: 2021-03-11 | End: 2021-03-18 | Stop reason: HOSPADM

## 2021-03-11 RX ORDER — DEXTROSE MONOHYDRATE, SODIUM CHLORIDE, AND POTASSIUM CHLORIDE 50; 1.49; 4.5 G/1000ML; G/1000ML; G/1000ML
INJECTION, SOLUTION INTRAVENOUS CONTINUOUS
Status: DISCONTINUED | OUTPATIENT
Start: 2021-03-11 | End: 2021-03-17

## 2021-03-11 RX ORDER — QUETIAPINE FUMARATE 25 MG/1
25 TABLET, FILM COATED ORAL NIGHTLY
Status: DISCONTINUED | OUTPATIENT
Start: 2021-03-11 | End: 2021-03-18 | Stop reason: HOSPADM

## 2021-03-11 RX ORDER — BUPROPION HYDROCHLORIDE 75 MG/1
75 TABLET ORAL 2 TIMES DAILY
Status: DISCONTINUED | OUTPATIENT
Start: 2021-03-11 | End: 2021-03-18 | Stop reason: HOSPADM

## 2021-03-11 RX ORDER — METOCLOPRAMIDE HYDROCHLORIDE 5 MG/ML
10 INJECTION INTRAMUSCULAR; INTRAVENOUS EVERY 6 HOURS
Status: COMPLETED | OUTPATIENT
Start: 2021-03-11 | End: 2021-03-12

## 2021-03-11 RX ORDER — TRAZODONE HYDROCHLORIDE 50 MG/1
50 TABLET ORAL NIGHTLY
Status: DISCONTINUED | OUTPATIENT
Start: 2021-03-11 | End: 2021-03-18 | Stop reason: HOSPADM

## 2021-03-11 RX ORDER — ATORVASTATIN CALCIUM 20 MG/1
20 TABLET, FILM COATED ORAL DAILY
Status: DISCONTINUED | OUTPATIENT
Start: 2021-03-11 | End: 2021-03-18 | Stop reason: HOSPADM

## 2021-03-11 RX ORDER — INDOMETHACIN 25 MG/1
CAPSULE ORAL
Status: DISPENSED
Start: 2021-03-11 | End: 2021-03-12

## 2021-03-11 RX ADMIN — PROCHLORPERAZINE EDISYLATE 5 MG: 5 INJECTION INTRAMUSCULAR; INTRAVENOUS at 02:03

## 2021-03-11 RX ADMIN — PROMETHAZINE HYDROCHLORIDE 6.25 MG: 25 INJECTION INTRAMUSCULAR; INTRAVENOUS at 11:03

## 2021-03-11 RX ADMIN — ONDANSETRON 4 MG: 2 INJECTION INTRAMUSCULAR; INTRAVENOUS at 10:03

## 2021-03-11 RX ADMIN — AMLODIPINE BESYLATE 10 MG: 10 TABLET ORAL at 10:03

## 2021-03-11 RX ADMIN — ATORVASTATIN CALCIUM 20 MG: 20 TABLET, FILM COATED ORAL at 10:03

## 2021-03-11 RX ADMIN — METOCLOPRAMIDE 10 MG: 5 INJECTION, SOLUTION INTRAMUSCULAR; INTRAVENOUS at 11:03

## 2021-03-11 RX ADMIN — BUPROPION HYDROCHLORIDE 75 MG: 75 TABLET, FILM COATED ORAL at 10:03

## 2021-03-11 RX ADMIN — POTASSIUM CHLORIDE 40 MEQ: 10 INJECTION, SOLUTION INTRAVENOUS at 06:03

## 2021-03-11 RX ADMIN — AMPICILLIN SODIUM AND SULBACTAM SODIUM 3 G: 2; 1 INJECTION, POWDER, FOR SOLUTION INTRAMUSCULAR; INTRAVENOUS at 09:03

## 2021-03-11 RX ADMIN — POTASSIUM CHLORIDE, DEXTROSE MONOHYDRATE AND SODIUM CHLORIDE: 150; 5; 450 INJECTION, SOLUTION INTRAVENOUS at 03:03

## 2021-03-11 RX ADMIN — BACITRACIN: 500 OINTMENT TOPICAL at 09:03

## 2021-03-11 RX ADMIN — BACITRACIN: 500 OINTMENT TOPICAL at 03:03

## 2021-03-11 RX ADMIN — AMPICILLIN SODIUM AND SULBACTAM SODIUM 3 G: 2; 1 INJECTION, POWDER, FOR SOLUTION INTRAMUSCULAR; INTRAVENOUS at 05:03

## 2021-03-11 RX ADMIN — HYDROMORPHONE HYDROCHLORIDE 1 MG: 1 INJECTION, SOLUTION INTRAMUSCULAR; INTRAVENOUS; SUBCUTANEOUS at 09:03

## 2021-03-11 RX ADMIN — METOCLOPRAMIDE 10 MG: 5 INJECTION, SOLUTION INTRAMUSCULAR; INTRAVENOUS at 01:03

## 2021-03-11 RX ADMIN — AMPICILLIN SODIUM AND SULBACTAM SODIUM 3 G: 2; 1 INJECTION, POWDER, FOR SOLUTION INTRAMUSCULAR; INTRAVENOUS at 01:03

## 2021-03-11 RX ADMIN — BACITRACIN: 500 OINTMENT TOPICAL at 10:03

## 2021-03-11 RX ADMIN — METOCLOPRAMIDE 10 MG: 5 INJECTION, SOLUTION INTRAMUSCULAR; INTRAVENOUS at 07:03

## 2021-03-12 LAB
ABO + RH BLD: NORMAL
ANION GAP SERPL CALC-SCNC: 10 MMOL/L (ref 8–16)
APTT BLDCRRT: 22.2 SEC (ref 21–32)
BASOPHILS # BLD AUTO: 0.05 K/UL (ref 0–0.2)
BASOPHILS NFR BLD: 0.4 % (ref 0–1.9)
BLD GP AB SCN CELLS X3 SERPL QL: NORMAL
BUN SERPL-MCNC: 29 MG/DL (ref 6–20)
CA-I BLDV-SCNC: 1.14 MMOL/L (ref 1.06–1.42)
CALCIUM SERPL-MCNC: 8.4 MG/DL (ref 8.7–10.5)
CHLORIDE SERPL-SCNC: 104 MMOL/L (ref 95–110)
CO2 SERPL-SCNC: 29 MMOL/L (ref 23–29)
CREAT SERPL-MCNC: 0.8 MG/DL (ref 0.5–1.4)
DIFFERENTIAL METHOD: ABNORMAL
EOSINOPHIL # BLD AUTO: 0.1 K/UL (ref 0–0.5)
EOSINOPHIL NFR BLD: 0.5 % (ref 0–8)
ERYTHROCYTE [DISTWIDTH] IN BLOOD BY AUTOMATED COUNT: 13.4 % (ref 11.5–14.5)
EST. GFR  (AFRICAN AMERICAN): >60 ML/MIN/1.73 M^2
EST. GFR  (NON AFRICAN AMERICAN): >60 ML/MIN/1.73 M^2
GLUCOSE SERPL-MCNC: 144 MG/DL (ref 70–110)
HCT VFR BLD AUTO: 30.7 % (ref 40–54)
HGB BLD-MCNC: 10.2 G/DL (ref 14–18)
IMM GRANULOCYTES # BLD AUTO: 0.14 K/UL (ref 0–0.04)
IMM GRANULOCYTES NFR BLD AUTO: 1.1 % (ref 0–0.5)
LYMPHOCYTES # BLD AUTO: 1.7 K/UL (ref 1–4.8)
LYMPHOCYTES NFR BLD: 12.7 % (ref 18–48)
MAGNESIUM SERPL-MCNC: 2.3 MG/DL (ref 1.6–2.6)
MCH RBC QN AUTO: 29 PG (ref 27–31)
MCHC RBC AUTO-ENTMCNC: 33.2 G/DL (ref 32–36)
MCV RBC AUTO: 87 FL (ref 82–98)
MONOCYTES # BLD AUTO: 2 K/UL (ref 0.3–1)
MONOCYTES NFR BLD: 15.4 % (ref 4–15)
NEUTROPHILS # BLD AUTO: 9.2 K/UL (ref 1.8–7.7)
NEUTROPHILS NFR BLD: 69.9 % (ref 38–73)
NRBC BLD-RTO: 0 /100 WBC
PHOSPHATE SERPL-MCNC: 2.8 MG/DL (ref 2.7–4.5)
PLATELET # BLD AUTO: 138 K/UL (ref 150–350)
PMV BLD AUTO: 9.7 FL (ref 9.2–12.9)
POCT GLUCOSE: 139 MG/DL (ref 70–110)
POCT GLUCOSE: 143 MG/DL (ref 70–110)
POCT GLUCOSE: 143 MG/DL (ref 70–110)
POCT GLUCOSE: 174 MG/DL (ref 70–110)
POTASSIUM SERPL-SCNC: 3.8 MMOL/L (ref 3.5–5.1)
RBC # BLD AUTO: 3.52 M/UL (ref 4.6–6.2)
SODIUM SERPL-SCNC: 143 MMOL/L (ref 136–145)
WBC # BLD AUTO: 13.12 K/UL (ref 3.9–12.7)

## 2021-03-12 PROCEDURE — 83735 ASSAY OF MAGNESIUM: CPT | Performed by: OTOLARYNGOLOGY

## 2021-03-12 PROCEDURE — 25000003 PHARM REV CODE 250: Performed by: STUDENT IN AN ORGANIZED HEALTH CARE EDUCATION/TRAINING PROGRAM

## 2021-03-12 PROCEDURE — 84100 ASSAY OF PHOSPHORUS: CPT | Performed by: OTOLARYNGOLOGY

## 2021-03-12 PROCEDURE — 63600175 PHARM REV CODE 636 W HCPCS: Performed by: STUDENT IN AN ORGANIZED HEALTH CARE EDUCATION/TRAINING PROGRAM

## 2021-03-12 PROCEDURE — 20600001 HC STEP DOWN PRIVATE ROOM

## 2021-03-12 PROCEDURE — 97162 PT EVAL MOD COMPLEX 30 MIN: CPT

## 2021-03-12 PROCEDURE — 97165 OT EVAL LOW COMPLEX 30 MIN: CPT

## 2021-03-12 PROCEDURE — 94761 N-INVAS EAR/PLS OXIMETRY MLT: CPT

## 2021-03-12 PROCEDURE — 85025 COMPLETE CBC W/AUTO DIFF WBC: CPT | Performed by: OTOLARYNGOLOGY

## 2021-03-12 PROCEDURE — 82330 ASSAY OF CALCIUM: CPT | Performed by: OTOLARYNGOLOGY

## 2021-03-12 PROCEDURE — 99291 PR CRITICAL CARE, E/M 30-74 MINUTES: ICD-10-PCS | Mod: ,,, | Performed by: STUDENT IN AN ORGANIZED HEALTH CARE EDUCATION/TRAINING PROGRAM

## 2021-03-12 PROCEDURE — 85730 THROMBOPLASTIN TIME PARTIAL: CPT | Performed by: OTOLARYNGOLOGY

## 2021-03-12 PROCEDURE — 97112 NEUROMUSCULAR REEDUCATION: CPT

## 2021-03-12 PROCEDURE — 99291 CRITICAL CARE FIRST HOUR: CPT | Mod: ,,, | Performed by: STUDENT IN AN ORGANIZED HEALTH CARE EDUCATION/TRAINING PROGRAM

## 2021-03-12 PROCEDURE — 80048 BASIC METABOLIC PNL TOTAL CA: CPT | Performed by: OTOLARYNGOLOGY

## 2021-03-12 PROCEDURE — 97530 THERAPEUTIC ACTIVITIES: CPT

## 2021-03-12 PROCEDURE — 92610 EVALUATE SWALLOWING FUNCTION: CPT

## 2021-03-12 RX ADMIN — AMLODIPINE BESYLATE 10 MG: 10 TABLET ORAL at 09:03

## 2021-03-12 RX ADMIN — ONDANSETRON 4 MG: 2 INJECTION INTRAMUSCULAR; INTRAVENOUS at 06:03

## 2021-03-12 RX ADMIN — AMPICILLIN SODIUM AND SULBACTAM SODIUM 3 G: 2; 1 INJECTION, POWDER, FOR SOLUTION INTRAMUSCULAR; INTRAVENOUS at 08:03

## 2021-03-12 RX ADMIN — MORPHINE SULFATE 3 MG: 2 INJECTION, SOLUTION INTRAMUSCULAR; INTRAVENOUS at 11:03

## 2021-03-12 RX ADMIN — HYDROMORPHONE HYDROCHLORIDE 1 MG: 1 INJECTION, SOLUTION INTRAMUSCULAR; INTRAVENOUS; SUBCUTANEOUS at 08:03

## 2021-03-12 RX ADMIN — HYDROMORPHONE HYDROCHLORIDE 1 MG: 1 INJECTION, SOLUTION INTRAMUSCULAR; INTRAVENOUS; SUBCUTANEOUS at 02:03

## 2021-03-12 RX ADMIN — ONDANSETRON 4 MG: 2 INJECTION INTRAMUSCULAR; INTRAVENOUS at 09:03

## 2021-03-12 RX ADMIN — HYDROMORPHONE HYDROCHLORIDE 1 MG: 1 INJECTION, SOLUTION INTRAMUSCULAR; INTRAVENOUS; SUBCUTANEOUS at 12:03

## 2021-03-12 RX ADMIN — AMPICILLIN SODIUM AND SULBACTAM SODIUM 3 G: 2; 1 INJECTION, POWDER, FOR SOLUTION INTRAMUSCULAR; INTRAVENOUS at 05:03

## 2021-03-12 RX ADMIN — BUPROPION HYDROCHLORIDE 75 MG: 75 TABLET, FILM COATED ORAL at 09:03

## 2021-03-12 RX ADMIN — ATORVASTATIN CALCIUM 20 MG: 20 TABLET, FILM COATED ORAL at 09:03

## 2021-03-12 RX ADMIN — BUPROPION HYDROCHLORIDE 75 MG: 75 TABLET, FILM COATED ORAL at 08:03

## 2021-03-12 RX ADMIN — TRAZODONE HYDROCHLORIDE 50 MG: 50 TABLET ORAL at 08:03

## 2021-03-12 RX ADMIN — POTASSIUM CHLORIDE 40 MEQ: 10 INJECTION, SOLUTION INTRAVENOUS at 09:03

## 2021-03-12 RX ADMIN — AMPICILLIN SODIUM AND SULBACTAM SODIUM 3 G: 2; 1 INJECTION, POWDER, FOR SOLUTION INTRAMUSCULAR; INTRAVENOUS at 12:03

## 2021-03-12 RX ADMIN — BACITRACIN: 500 OINTMENT TOPICAL at 09:03

## 2021-03-12 RX ADMIN — BACITRACIN: 500 OINTMENT TOPICAL at 08:03

## 2021-03-12 RX ADMIN — METOCLOPRAMIDE 10 MG: 5 INJECTION, SOLUTION INTRAMUSCULAR; INTRAVENOUS at 05:03

## 2021-03-12 RX ADMIN — ONDANSETRON 4 MG: 2 INJECTION INTRAMUSCULAR; INTRAVENOUS at 08:03

## 2021-03-12 RX ADMIN — QUETIAPINE FUMARATE 25 MG: 25 TABLET ORAL at 08:03

## 2021-03-12 RX ADMIN — POTASSIUM CHLORIDE, DEXTROSE MONOHYDRATE AND SODIUM CHLORIDE: 150; 5; 450 INJECTION, SOLUTION INTRAVENOUS at 01:03

## 2021-03-13 LAB
ANION GAP SERPL CALC-SCNC: 9 MMOL/L (ref 8–16)
ANISOCYTOSIS BLD QL SMEAR: SLIGHT
APTT BLDCRRT: 24.2 SEC (ref 21–32)
BASOPHILS # BLD AUTO: 0.05 K/UL (ref 0–0.2)
BASOPHILS NFR BLD: 0.3 % (ref 0–1.9)
BUN SERPL-MCNC: 21 MG/DL (ref 6–20)
CA-I BLDV-SCNC: 1.18 MMOL/L (ref 1.06–1.42)
CALCIUM SERPL-MCNC: 8.7 MG/DL (ref 8.7–10.5)
CHLORIDE SERPL-SCNC: 103 MMOL/L (ref 95–110)
CO2 SERPL-SCNC: 27 MMOL/L (ref 23–29)
CREAT SERPL-MCNC: 0.8 MG/DL (ref 0.5–1.4)
DIFFERENTIAL METHOD: ABNORMAL
EOSINOPHIL # BLD AUTO: 0.1 K/UL (ref 0–0.5)
EOSINOPHIL NFR BLD: 0.5 % (ref 0–8)
ERYTHROCYTE [DISTWIDTH] IN BLOOD BY AUTOMATED COUNT: 13.9 % (ref 11.5–14.5)
EST. GFR  (AFRICAN AMERICAN): >60 ML/MIN/1.73 M^2
EST. GFR  (NON AFRICAN AMERICAN): >60 ML/MIN/1.73 M^2
GIANT PLATELETS BLD QL SMEAR: PRESENT
GLUCOSE SERPL-MCNC: 124 MG/DL (ref 70–110)
HCT VFR BLD AUTO: 30.7 % (ref 40–54)
HGB BLD-MCNC: 10 G/DL (ref 14–18)
HYPOCHROMIA BLD QL SMEAR: ABNORMAL
IMM GRANULOCYTES # BLD AUTO: 0.16 K/UL (ref 0–0.04)
IMM GRANULOCYTES NFR BLD AUTO: 1.1 % (ref 0–0.5)
LYMPHOCYTES # BLD AUTO: 1.4 K/UL (ref 1–4.8)
LYMPHOCYTES NFR BLD: 9.8 % (ref 18–48)
MAGNESIUM SERPL-MCNC: 2 MG/DL (ref 1.6–2.6)
MCH RBC QN AUTO: 28.7 PG (ref 27–31)
MCHC RBC AUTO-ENTMCNC: 32.6 G/DL (ref 32–36)
MCV RBC AUTO: 88 FL (ref 82–98)
MONOCYTES # BLD AUTO: 2.2 K/UL (ref 0.3–1)
MONOCYTES NFR BLD: 14.6 % (ref 4–15)
NEUTROPHILS # BLD AUTO: 10.9 K/UL (ref 1.8–7.7)
NEUTROPHILS NFR BLD: 73.7 % (ref 38–73)
NRBC BLD-RTO: 0 /100 WBC
OVALOCYTES BLD QL SMEAR: ABNORMAL
PHOSPHATE SERPL-MCNC: 2.7 MG/DL (ref 2.7–4.5)
PLATELET # BLD AUTO: 113 K/UL (ref 150–350)
PLATELET BLD QL SMEAR: ABNORMAL
PMV BLD AUTO: 9.9 FL (ref 9.2–12.9)
POCT GLUCOSE: 102 MG/DL (ref 70–110)
POCT GLUCOSE: 120 MG/DL (ref 70–110)
POCT GLUCOSE: 151 MG/DL (ref 70–110)
POCT GLUCOSE: 171 MG/DL (ref 70–110)
POIKILOCYTOSIS BLD QL SMEAR: SLIGHT
POLYCHROMASIA BLD QL SMEAR: ABNORMAL
POTASSIUM SERPL-SCNC: 3.8 MMOL/L (ref 3.5–5.1)
RBC # BLD AUTO: 3.48 M/UL (ref 4.6–6.2)
SODIUM SERPL-SCNC: 139 MMOL/L (ref 136–145)
WBC # BLD AUTO: 14.75 K/UL (ref 3.9–12.7)

## 2021-03-13 PROCEDURE — 25000003 PHARM REV CODE 250: Performed by: STUDENT IN AN ORGANIZED HEALTH CARE EDUCATION/TRAINING PROGRAM

## 2021-03-13 PROCEDURE — 80048 BASIC METABOLIC PNL TOTAL CA: CPT | Performed by: OTOLARYNGOLOGY

## 2021-03-13 PROCEDURE — 25000242 PHARM REV CODE 250 ALT 637 W/ HCPCS: Performed by: STUDENT IN AN ORGANIZED HEALTH CARE EDUCATION/TRAINING PROGRAM

## 2021-03-13 PROCEDURE — 36415 COLL VENOUS BLD VENIPUNCTURE: CPT | Performed by: OTOLARYNGOLOGY

## 2021-03-13 PROCEDURE — 63600175 PHARM REV CODE 636 W HCPCS: Performed by: STUDENT IN AN ORGANIZED HEALTH CARE EDUCATION/TRAINING PROGRAM

## 2021-03-13 PROCEDURE — 85730 THROMBOPLASTIN TIME PARTIAL: CPT | Performed by: OTOLARYNGOLOGY

## 2021-03-13 PROCEDURE — 84100 ASSAY OF PHOSPHORUS: CPT | Performed by: OTOLARYNGOLOGY

## 2021-03-13 PROCEDURE — 20600001 HC STEP DOWN PRIVATE ROOM

## 2021-03-13 PROCEDURE — 82330 ASSAY OF CALCIUM: CPT | Performed by: OTOLARYNGOLOGY

## 2021-03-13 PROCEDURE — 83735 ASSAY OF MAGNESIUM: CPT | Performed by: OTOLARYNGOLOGY

## 2021-03-13 PROCEDURE — 85025 COMPLETE CBC W/AUTO DIFF WBC: CPT | Performed by: OTOLARYNGOLOGY

## 2021-03-13 RX ORDER — IBUPROFEN 200 MG
24 TABLET ORAL
Status: DISCONTINUED | OUTPATIENT
Start: 2021-03-13 | End: 2021-03-17

## 2021-03-13 RX ORDER — BISACODYL 10 MG
10 SUPPOSITORY, RECTAL RECTAL DAILY PRN
Status: DISCONTINUED | OUTPATIENT
Start: 2021-03-13 | End: 2021-03-18 | Stop reason: HOSPADM

## 2021-03-13 RX ORDER — ENOXAPARIN SODIUM 100 MG/ML
40 INJECTION SUBCUTANEOUS EVERY 24 HOURS
Status: DISCONTINUED | OUTPATIENT
Start: 2021-03-13 | End: 2021-03-18 | Stop reason: HOSPADM

## 2021-03-13 RX ORDER — GLUCAGON 1 MG
1 KIT INJECTION
Status: DISCONTINUED | OUTPATIENT
Start: 2021-03-13 | End: 2021-03-17

## 2021-03-13 RX ORDER — IBUPROFEN 200 MG
16 TABLET ORAL
Status: DISCONTINUED | OUTPATIENT
Start: 2021-03-13 | End: 2021-03-17

## 2021-03-13 RX ORDER — INSULIN ASPART 100 [IU]/ML
0-5 INJECTION, SOLUTION INTRAVENOUS; SUBCUTANEOUS
Status: DISCONTINUED | OUTPATIENT
Start: 2021-03-13 | End: 2021-03-17

## 2021-03-13 RX ORDER — DOCUSATE SODIUM 50 MG/5ML
100 LIQUID ORAL DAILY
Status: DISCONTINUED | OUTPATIENT
Start: 2021-03-13 | End: 2021-03-18 | Stop reason: HOSPADM

## 2021-03-13 RX ADMIN — MORPHINE SULFATE 3 MG: 2 INJECTION, SOLUTION INTRAMUSCULAR; INTRAVENOUS at 06:03

## 2021-03-13 RX ADMIN — DOCUSATE SODIUM 100 MG: 50 LIQUID ORAL at 08:03

## 2021-03-13 RX ADMIN — MORPHINE SULFATE 3 MG: 2 INJECTION, SOLUTION INTRAMUSCULAR; INTRAVENOUS at 01:03

## 2021-03-13 RX ADMIN — OXYCODONE HYDROCHLORIDE 5 MG: 5 SOLUTION ORAL at 08:03

## 2021-03-13 RX ADMIN — OXYCODONE HYDROCHLORIDE 5 MG: 5 SOLUTION ORAL at 12:03

## 2021-03-13 RX ADMIN — ONDANSETRON 4 MG: 2 INJECTION INTRAMUSCULAR; INTRAVENOUS at 06:03

## 2021-03-13 RX ADMIN — HYDROMORPHONE HYDROCHLORIDE 1 MG: 1 INJECTION, SOLUTION INTRAMUSCULAR; INTRAVENOUS; SUBCUTANEOUS at 04:03

## 2021-03-13 RX ADMIN — BUPROPION HYDROCHLORIDE 75 MG: 75 TABLET, FILM COATED ORAL at 09:03

## 2021-03-13 RX ADMIN — AMPICILLIN SODIUM AND SULBACTAM SODIUM 3 G: 2; 1 INJECTION, POWDER, FOR SOLUTION INTRAMUSCULAR; INTRAVENOUS at 08:03

## 2021-03-13 RX ADMIN — BACITRACIN: 500 OINTMENT TOPICAL at 09:03

## 2021-03-13 RX ADMIN — ATORVASTATIN CALCIUM 20 MG: 20 TABLET, FILM COATED ORAL at 08:03

## 2021-03-13 RX ADMIN — POTASSIUM CHLORIDE, DEXTROSE MONOHYDRATE AND SODIUM CHLORIDE: 150; 5; 450 INJECTION, SOLUTION INTRAVENOUS at 12:03

## 2021-03-13 RX ADMIN — AMPICILLIN SODIUM AND SULBACTAM SODIUM 3 G: 2; 1 INJECTION, POWDER, FOR SOLUTION INTRAMUSCULAR; INTRAVENOUS at 12:03

## 2021-03-13 RX ADMIN — BACITRACIN: 500 OINTMENT TOPICAL at 02:03

## 2021-03-13 RX ADMIN — HYDROMORPHONE HYDROCHLORIDE 1 MG: 1 INJECTION, SOLUTION INTRAMUSCULAR; INTRAVENOUS; SUBCUTANEOUS at 03:03

## 2021-03-13 RX ADMIN — MORPHINE SULFATE 3 MG: 2 INJECTION, SOLUTION INTRAMUSCULAR; INTRAVENOUS at 09:03

## 2021-03-13 RX ADMIN — HEPARIN SODIUM AND DEXTROSE 500 UNITS/HR: 10000; 5 INJECTION INTRAVENOUS at 12:03

## 2021-03-13 RX ADMIN — AMLODIPINE BESYLATE 10 MG: 10 TABLET ORAL at 08:03

## 2021-03-13 RX ADMIN — HYDROMORPHONE HYDROCHLORIDE 1 MG: 1 INJECTION, SOLUTION INTRAMUSCULAR; INTRAVENOUS; SUBCUTANEOUS at 10:03

## 2021-03-13 RX ADMIN — TRAZODONE HYDROCHLORIDE 50 MG: 50 TABLET ORAL at 09:03

## 2021-03-13 RX ADMIN — ENOXAPARIN SODIUM 40 MG: 40 INJECTION SUBCUTANEOUS at 04:03

## 2021-03-13 RX ADMIN — QUETIAPINE FUMARATE 25 MG: 25 TABLET ORAL at 09:03

## 2021-03-13 RX ADMIN — AMPICILLIN SODIUM AND SULBACTAM SODIUM 3 G: 2; 1 INJECTION, POWDER, FOR SOLUTION INTRAMUSCULAR; INTRAVENOUS at 09:03

## 2021-03-13 RX ADMIN — OXYCODONE HYDROCHLORIDE 5 MG: 5 SOLUTION ORAL at 07:03

## 2021-03-13 RX ADMIN — BUPROPION HYDROCHLORIDE 75 MG: 75 TABLET, FILM COATED ORAL at 08:03

## 2021-03-13 RX ADMIN — POTASSIUM CHLORIDE, DEXTROSE MONOHYDRATE AND SODIUM CHLORIDE: 150; 5; 450 INJECTION, SOLUTION INTRAVENOUS at 01:03

## 2021-03-14 LAB
ANION GAP SERPL CALC-SCNC: 10 MMOL/L (ref 8–16)
BASOPHILS # BLD AUTO: 0.06 K/UL (ref 0–0.2)
BASOPHILS NFR BLD: 0.3 % (ref 0–1.9)
BUN SERPL-MCNC: 19 MG/DL (ref 6–20)
CA-I BLDV-SCNC: 1.2 MMOL/L (ref 1.06–1.42)
CALCIUM SERPL-MCNC: 8.8 MG/DL (ref 8.7–10.5)
CHLORIDE SERPL-SCNC: 102 MMOL/L (ref 95–110)
CO2 SERPL-SCNC: 25 MMOL/L (ref 23–29)
CREAT SERPL-MCNC: 0.7 MG/DL (ref 0.5–1.4)
DIFFERENTIAL METHOD: ABNORMAL
EOSINOPHIL # BLD AUTO: 0.1 K/UL (ref 0–0.5)
EOSINOPHIL NFR BLD: 0.7 % (ref 0–8)
ERYTHROCYTE [DISTWIDTH] IN BLOOD BY AUTOMATED COUNT: 13.8 % (ref 11.5–14.5)
EST. GFR  (AFRICAN AMERICAN): >60 ML/MIN/1.73 M^2
EST. GFR  (NON AFRICAN AMERICAN): >60 ML/MIN/1.73 M^2
GLUCOSE SERPL-MCNC: 89 MG/DL (ref 70–110)
HCT VFR BLD AUTO: 30.2 % (ref 40–54)
HGB BLD-MCNC: 10 G/DL (ref 14–18)
IMM GRANULOCYTES # BLD AUTO: 0.16 K/UL (ref 0–0.04)
IMM GRANULOCYTES NFR BLD AUTO: 0.9 % (ref 0–0.5)
LYMPHOCYTES # BLD AUTO: 1.3 K/UL (ref 1–4.8)
LYMPHOCYTES NFR BLD: 7.5 % (ref 18–48)
MAGNESIUM SERPL-MCNC: 1.9 MG/DL (ref 1.6–2.6)
MCH RBC QN AUTO: 28.7 PG (ref 27–31)
MCHC RBC AUTO-ENTMCNC: 33.1 G/DL (ref 32–36)
MCV RBC AUTO: 87 FL (ref 82–98)
MONOCYTES # BLD AUTO: 1.9 K/UL (ref 0.3–1)
MONOCYTES NFR BLD: 10.7 % (ref 4–15)
NEUTROPHILS # BLD AUTO: 14.1 K/UL (ref 1.8–7.7)
NEUTROPHILS NFR BLD: 79.9 % (ref 38–73)
NRBC BLD-RTO: 0 /100 WBC
PHOSPHATE SERPL-MCNC: 2.7 MG/DL (ref 2.7–4.5)
PLATELET # BLD AUTO: 106 K/UL (ref 150–350)
PMV BLD AUTO: 10.2 FL (ref 9.2–12.9)
POCT GLUCOSE: 105 MG/DL (ref 70–110)
POCT GLUCOSE: 108 MG/DL (ref 70–110)
POCT GLUCOSE: 108 MG/DL (ref 70–110)
POCT GLUCOSE: 76 MG/DL (ref 70–110)
POTASSIUM SERPL-SCNC: 3.8 MMOL/L (ref 3.5–5.1)
RBC # BLD AUTO: 3.49 M/UL (ref 4.6–6.2)
SODIUM SERPL-SCNC: 137 MMOL/L (ref 136–145)
WBC # BLD AUTO: 17.7 K/UL (ref 3.9–12.7)

## 2021-03-14 PROCEDURE — 63600175 PHARM REV CODE 636 W HCPCS: Performed by: STUDENT IN AN ORGANIZED HEALTH CARE EDUCATION/TRAINING PROGRAM

## 2021-03-14 PROCEDURE — 83735 ASSAY OF MAGNESIUM: CPT | Performed by: OTOLARYNGOLOGY

## 2021-03-14 PROCEDURE — 25000003 PHARM REV CODE 250: Performed by: STUDENT IN AN ORGANIZED HEALTH CARE EDUCATION/TRAINING PROGRAM

## 2021-03-14 PROCEDURE — 25000242 PHARM REV CODE 250 ALT 637 W/ HCPCS: Performed by: STUDENT IN AN ORGANIZED HEALTH CARE EDUCATION/TRAINING PROGRAM

## 2021-03-14 PROCEDURE — 84100 ASSAY OF PHOSPHORUS: CPT | Performed by: OTOLARYNGOLOGY

## 2021-03-14 PROCEDURE — 85025 COMPLETE CBC W/AUTO DIFF WBC: CPT | Performed by: OTOLARYNGOLOGY

## 2021-03-14 PROCEDURE — 20600001 HC STEP DOWN PRIVATE ROOM

## 2021-03-14 PROCEDURE — 36415 COLL VENOUS BLD VENIPUNCTURE: CPT | Performed by: OTOLARYNGOLOGY

## 2021-03-14 PROCEDURE — 80048 BASIC METABOLIC PNL TOTAL CA: CPT | Performed by: OTOLARYNGOLOGY

## 2021-03-14 PROCEDURE — 82330 ASSAY OF CALCIUM: CPT | Performed by: OTOLARYNGOLOGY

## 2021-03-14 RX ADMIN — AMLODIPINE BESYLATE 10 MG: 10 TABLET ORAL at 08:03

## 2021-03-14 RX ADMIN — OXYCODONE HYDROCHLORIDE 5 MG: 5 SOLUTION ORAL at 05:03

## 2021-03-14 RX ADMIN — OXYCODONE HYDROCHLORIDE 5 MG: 5 SOLUTION ORAL at 11:03

## 2021-03-14 RX ADMIN — QUETIAPINE FUMARATE 25 MG: 25 TABLET ORAL at 08:03

## 2021-03-14 RX ADMIN — AMPICILLIN SODIUM AND SULBACTAM SODIUM 3 G: 2; 1 INJECTION, POWDER, FOR SOLUTION INTRAMUSCULAR; INTRAVENOUS at 02:03

## 2021-03-14 RX ADMIN — POTASSIUM CHLORIDE, DEXTROSE MONOHYDRATE AND SODIUM CHLORIDE: 150; 5; 450 INJECTION, SOLUTION INTRAVENOUS at 08:03

## 2021-03-14 RX ADMIN — BACITRACIN: 500 OINTMENT TOPICAL at 02:03

## 2021-03-14 RX ADMIN — OXYCODONE HYDROCHLORIDE 5 MG: 5 SOLUTION ORAL at 08:03

## 2021-03-14 RX ADMIN — POTASSIUM CHLORIDE, DEXTROSE MONOHYDRATE AND SODIUM CHLORIDE: 150; 5; 450 INJECTION, SOLUTION INTRAVENOUS at 01:03

## 2021-03-14 RX ADMIN — AMPICILLIN SODIUM AND SULBACTAM SODIUM 3 G: 2; 1 INJECTION, POWDER, FOR SOLUTION INTRAMUSCULAR; INTRAVENOUS at 04:03

## 2021-03-14 RX ADMIN — HYDROMORPHONE HYDROCHLORIDE 1 MG: 1 INJECTION, SOLUTION INTRAMUSCULAR; INTRAVENOUS; SUBCUTANEOUS at 06:03

## 2021-03-14 RX ADMIN — BACITRACIN: 500 OINTMENT TOPICAL at 08:03

## 2021-03-14 RX ADMIN — OXYCODONE HYDROCHLORIDE 5 MG: 5 SOLUTION ORAL at 12:03

## 2021-03-14 RX ADMIN — BUPROPION HYDROCHLORIDE 75 MG: 75 TABLET, FILM COATED ORAL at 08:03

## 2021-03-14 RX ADMIN — OXYCODONE HYDROCHLORIDE 5 MG: 5 SOLUTION ORAL at 01:03

## 2021-03-14 RX ADMIN — TRAZODONE HYDROCHLORIDE 50 MG: 50 TABLET ORAL at 08:03

## 2021-03-14 RX ADMIN — AMPICILLIN SODIUM AND SULBACTAM SODIUM 3 G: 2; 1 INJECTION, POWDER, FOR SOLUTION INTRAMUSCULAR; INTRAVENOUS at 08:03

## 2021-03-14 RX ADMIN — ENOXAPARIN SODIUM 40 MG: 40 INJECTION SUBCUTANEOUS at 04:03

## 2021-03-14 RX ADMIN — DOCUSATE SODIUM 100 MG: 50 LIQUID ORAL at 08:03

## 2021-03-14 RX ADMIN — ATORVASTATIN CALCIUM 20 MG: 20 TABLET, FILM COATED ORAL at 08:03

## 2021-03-14 RX ADMIN — HYDROMORPHONE HYDROCHLORIDE 1 MG: 1 INJECTION, SOLUTION INTRAMUSCULAR; INTRAVENOUS; SUBCUTANEOUS at 10:03

## 2021-03-15 PROBLEM — Z74.09 IMPAIRED FUNCTIONAL MOBILITY AND ENDURANCE: Status: ACTIVE | Noted: 2021-03-15

## 2021-03-15 LAB
ANION GAP SERPL CALC-SCNC: 8 MMOL/L (ref 8–16)
BASOPHILS # BLD AUTO: 0.07 K/UL (ref 0–0.2)
BASOPHILS NFR BLD: 0.5 % (ref 0–1.9)
BUN SERPL-MCNC: 23 MG/DL (ref 6–20)
CA-I BLDV-SCNC: 1.21 MMOL/L (ref 1.06–1.42)
CALCIUM SERPL-MCNC: 8.2 MG/DL (ref 8.7–10.5)
CHLORIDE SERPL-SCNC: 102 MMOL/L (ref 95–110)
CO2 SERPL-SCNC: 25 MMOL/L (ref 23–29)
COMMENT: NORMAL
CREAT SERPL-MCNC: 0.8 MG/DL (ref 0.5–1.4)
CRP SERPL-MCNC: 177.6 MG/L (ref 0–8.2)
DIFFERENTIAL METHOD: ABNORMAL
EOSINOPHIL # BLD AUTO: 0.2 K/UL (ref 0–0.5)
EOSINOPHIL NFR BLD: 1.1 % (ref 0–8)
ERYTHROCYTE [DISTWIDTH] IN BLOOD BY AUTOMATED COUNT: 14 % (ref 11.5–14.5)
EST. GFR  (AFRICAN AMERICAN): >60 ML/MIN/1.73 M^2
EST. GFR  (NON AFRICAN AMERICAN): >60 ML/MIN/1.73 M^2
FINAL PATHOLOGIC DIAGNOSIS: NORMAL
FROZEN SECTION DIAGNOSIS: NORMAL
FROZEN SECTION FOOTNOTE: NORMAL
GLUCOSE SERPL-MCNC: 79 MG/DL (ref 70–110)
GROSS: NORMAL
HCT VFR BLD AUTO: 27.7 % (ref 40–54)
HGB BLD-MCNC: 9 G/DL (ref 14–18)
IMM GRANULOCYTES # BLD AUTO: 0.12 K/UL (ref 0–0.04)
IMM GRANULOCYTES NFR BLD AUTO: 0.8 % (ref 0–0.5)
LYMPHOCYTES # BLD AUTO: 1.5 K/UL (ref 1–4.8)
LYMPHOCYTES NFR BLD: 10 % (ref 18–48)
Lab: NORMAL
MAGNESIUM SERPL-MCNC: 1.8 MG/DL (ref 1.6–2.6)
MCH RBC QN AUTO: 28.5 PG (ref 27–31)
MCHC RBC AUTO-ENTMCNC: 32.5 G/DL (ref 32–36)
MCV RBC AUTO: 88 FL (ref 82–98)
MONOCYTES # BLD AUTO: 1.6 K/UL (ref 0.3–1)
MONOCYTES NFR BLD: 10.8 % (ref 4–15)
NEUTROPHILS # BLD AUTO: 11.6 K/UL (ref 1.8–7.7)
NEUTROPHILS NFR BLD: 76.8 % (ref 38–73)
NRBC BLD-RTO: 0 /100 WBC
PHOSPHATE SERPL-MCNC: 3.1 MG/DL (ref 2.7–4.5)
PLATELET # BLD AUTO: 102 K/UL (ref 150–350)
PMV BLD AUTO: 10.7 FL (ref 9.2–12.9)
POCT GLUCOSE: 91 MG/DL (ref 70–110)
POTASSIUM SERPL-SCNC: 4 MMOL/L (ref 3.5–5.1)
PREALB SERPL-MCNC: 9 MG/DL (ref 20–43)
RBC # BLD AUTO: 3.16 M/UL (ref 4.6–6.2)
SODIUM SERPL-SCNC: 135 MMOL/L (ref 136–145)
WBC # BLD AUTO: 15.02 K/UL (ref 3.9–12.7)

## 2021-03-15 PROCEDURE — 97530 THERAPEUTIC ACTIVITIES: CPT

## 2021-03-15 PROCEDURE — 84134 ASSAY OF PREALBUMIN: CPT | Performed by: STUDENT IN AN ORGANIZED HEALTH CARE EDUCATION/TRAINING PROGRAM

## 2021-03-15 PROCEDURE — 97535 SELF CARE MNGMENT TRAINING: CPT

## 2021-03-15 PROCEDURE — 97116 GAIT TRAINING THERAPY: CPT | Mod: CQ

## 2021-03-15 PROCEDURE — 84100 ASSAY OF PHOSPHORUS: CPT | Performed by: OTOLARYNGOLOGY

## 2021-03-15 PROCEDURE — 97530 THERAPEUTIC ACTIVITIES: CPT | Mod: CQ

## 2021-03-15 PROCEDURE — 83735 ASSAY OF MAGNESIUM: CPT | Performed by: OTOLARYNGOLOGY

## 2021-03-15 PROCEDURE — 92526 ORAL FUNCTION THERAPY: CPT

## 2021-03-15 PROCEDURE — 99222 1ST HOSP IP/OBS MODERATE 55: CPT | Mod: ,,, | Performed by: NURSE PRACTITIONER

## 2021-03-15 PROCEDURE — 25000003 PHARM REV CODE 250: Performed by: STUDENT IN AN ORGANIZED HEALTH CARE EDUCATION/TRAINING PROGRAM

## 2021-03-15 PROCEDURE — 99222 PR INITIAL HOSPITAL CARE,LEVL II: ICD-10-PCS | Mod: ,,, | Performed by: NURSE PRACTITIONER

## 2021-03-15 PROCEDURE — 80048 BASIC METABOLIC PNL TOTAL CA: CPT | Performed by: OTOLARYNGOLOGY

## 2021-03-15 PROCEDURE — 20600001 HC STEP DOWN PRIVATE ROOM

## 2021-03-15 PROCEDURE — 85025 COMPLETE CBC W/AUTO DIFF WBC: CPT | Performed by: OTOLARYNGOLOGY

## 2021-03-15 PROCEDURE — 63600175 PHARM REV CODE 636 W HCPCS: Performed by: STUDENT IN AN ORGANIZED HEALTH CARE EDUCATION/TRAINING PROGRAM

## 2021-03-15 PROCEDURE — 25000242 PHARM REV CODE 250 ALT 637 W/ HCPCS: Performed by: STUDENT IN AN ORGANIZED HEALTH CARE EDUCATION/TRAINING PROGRAM

## 2021-03-15 PROCEDURE — 86140 C-REACTIVE PROTEIN: CPT | Performed by: STUDENT IN AN ORGANIZED HEALTH CARE EDUCATION/TRAINING PROGRAM

## 2021-03-15 PROCEDURE — 97110 THERAPEUTIC EXERCISES: CPT

## 2021-03-15 PROCEDURE — 82330 ASSAY OF CALCIUM: CPT | Performed by: OTOLARYNGOLOGY

## 2021-03-15 RX ORDER — CALCIUM CARBONATE 200(500)MG
500 TABLET,CHEWABLE ORAL DAILY PRN
Status: DISCONTINUED | OUTPATIENT
Start: 2021-03-15 | End: 2021-03-18 | Stop reason: HOSPADM

## 2021-03-15 RX ADMIN — HYDROMORPHONE HYDROCHLORIDE 1 MG: 1 INJECTION, SOLUTION INTRAMUSCULAR; INTRAVENOUS; SUBCUTANEOUS at 01:03

## 2021-03-15 RX ADMIN — BUPROPION HYDROCHLORIDE 75 MG: 75 TABLET, FILM COATED ORAL at 09:03

## 2021-03-15 RX ADMIN — CALCIUM CARBONATE (ANTACID) CHEW TAB 500 MG 500 MG: 500 CHEW TAB at 05:03

## 2021-03-15 RX ADMIN — ATORVASTATIN CALCIUM 20 MG: 20 TABLET, FILM COATED ORAL at 08:03

## 2021-03-15 RX ADMIN — HYDROMORPHONE HYDROCHLORIDE 1 MG: 1 INJECTION, SOLUTION INTRAMUSCULAR; INTRAVENOUS; SUBCUTANEOUS at 07:03

## 2021-03-15 RX ADMIN — AMLODIPINE BESYLATE 10 MG: 10 TABLET ORAL at 08:03

## 2021-03-15 RX ADMIN — BACITRACIN: 500 OINTMENT TOPICAL at 08:03

## 2021-03-15 RX ADMIN — AMPICILLIN SODIUM AND SULBACTAM SODIUM 3 G: 2; 1 INJECTION, POWDER, FOR SOLUTION INTRAMUSCULAR; INTRAVENOUS at 04:03

## 2021-03-15 RX ADMIN — HYDROMORPHONE HYDROCHLORIDE 1 MG: 1 INJECTION, SOLUTION INTRAMUSCULAR; INTRAVENOUS; SUBCUTANEOUS at 11:03

## 2021-03-15 RX ADMIN — AMPICILLIN SODIUM AND SULBACTAM SODIUM 3 G: 2; 1 INJECTION, POWDER, FOR SOLUTION INTRAMUSCULAR; INTRAVENOUS at 12:03

## 2021-03-15 RX ADMIN — OXYCODONE HYDROCHLORIDE 5 MG: 5 SOLUTION ORAL at 07:03

## 2021-03-15 RX ADMIN — BUPROPION HYDROCHLORIDE 75 MG: 75 TABLET, FILM COATED ORAL at 08:03

## 2021-03-15 RX ADMIN — ENOXAPARIN SODIUM 40 MG: 40 INJECTION SUBCUTANEOUS at 04:03

## 2021-03-15 RX ADMIN — POTASSIUM CHLORIDE, DEXTROSE MONOHYDRATE AND SODIUM CHLORIDE: 150; 5; 450 INJECTION, SOLUTION INTRAVENOUS at 09:03

## 2021-03-15 RX ADMIN — TRAZODONE HYDROCHLORIDE 50 MG: 50 TABLET ORAL at 09:03

## 2021-03-15 RX ADMIN — AMPICILLIN SODIUM AND SULBACTAM SODIUM 3 G: 2; 1 INJECTION, POWDER, FOR SOLUTION INTRAMUSCULAR; INTRAVENOUS at 09:03

## 2021-03-15 RX ADMIN — POTASSIUM CHLORIDE, DEXTROSE MONOHYDRATE AND SODIUM CHLORIDE: 150; 5; 450 INJECTION, SOLUTION INTRAVENOUS at 10:03

## 2021-03-15 RX ADMIN — OXYCODONE HYDROCHLORIDE 5 MG: 5 SOLUTION ORAL at 10:03

## 2021-03-15 RX ADMIN — BACITRACIN: 500 OINTMENT TOPICAL at 02:03

## 2021-03-15 RX ADMIN — OXYCODONE HYDROCHLORIDE 5 MG: 5 SOLUTION ORAL at 02:03

## 2021-03-15 RX ADMIN — OXYCODONE HYDROCHLORIDE 5 MG: 5 SOLUTION ORAL at 03:03

## 2021-03-15 RX ADMIN — HYDROMORPHONE HYDROCHLORIDE 1 MG: 1 INJECTION, SOLUTION INTRAMUSCULAR; INTRAVENOUS; SUBCUTANEOUS at 04:03

## 2021-03-15 RX ADMIN — ACETAMINOPHEN 650 MG: 325 TABLET ORAL at 06:03

## 2021-03-15 RX ADMIN — DOCUSATE SODIUM 100 MG: 50 LIQUID ORAL at 08:03

## 2021-03-15 RX ADMIN — QUETIAPINE FUMARATE 25 MG: 25 TABLET ORAL at 09:03

## 2021-03-15 RX ADMIN — BACITRACIN: 500 OINTMENT TOPICAL at 09:03

## 2021-03-16 LAB
ANION GAP SERPL CALC-SCNC: 10 MMOL/L (ref 8–16)
BASOPHILS # BLD AUTO: 0.07 K/UL (ref 0–0.2)
BASOPHILS NFR BLD: 0.5 % (ref 0–1.9)
BUN SERPL-MCNC: 20 MG/DL (ref 6–20)
CA-I BLDV-SCNC: 1.21 MMOL/L (ref 1.06–1.42)
CALCIUM SERPL-MCNC: 8.2 MG/DL (ref 8.7–10.5)
CHLORIDE SERPL-SCNC: 100 MMOL/L (ref 95–110)
CO2 SERPL-SCNC: 24 MMOL/L (ref 23–29)
CREAT SERPL-MCNC: 0.9 MG/DL (ref 0.5–1.4)
DIFFERENTIAL METHOD: ABNORMAL
EOSINOPHIL # BLD AUTO: 0.2 K/UL (ref 0–0.5)
EOSINOPHIL NFR BLD: 1.6 % (ref 0–8)
ERYTHROCYTE [DISTWIDTH] IN BLOOD BY AUTOMATED COUNT: 14 % (ref 11.5–14.5)
EST. GFR  (AFRICAN AMERICAN): >60 ML/MIN/1.73 M^2
EST. GFR  (NON AFRICAN AMERICAN): >60 ML/MIN/1.73 M^2
GLUCOSE SERPL-MCNC: 84 MG/DL (ref 70–110)
HCT VFR BLD AUTO: 26.6 % (ref 40–54)
HGB BLD-MCNC: 8.7 G/DL (ref 14–18)
IMM GRANULOCYTES # BLD AUTO: 0.11 K/UL (ref 0–0.04)
IMM GRANULOCYTES NFR BLD AUTO: 0.8 % (ref 0–0.5)
LYMPHOCYTES # BLD AUTO: 1.3 K/UL (ref 1–4.8)
LYMPHOCYTES NFR BLD: 9.6 % (ref 18–48)
MAGNESIUM SERPL-MCNC: 1.9 MG/DL (ref 1.6–2.6)
MCH RBC QN AUTO: 29.2 PG (ref 27–31)
MCHC RBC AUTO-ENTMCNC: 32.7 G/DL (ref 32–36)
MCV RBC AUTO: 89 FL (ref 82–98)
MONOCYTES # BLD AUTO: 1.6 K/UL (ref 0.3–1)
MONOCYTES NFR BLD: 11.3 % (ref 4–15)
NEUTROPHILS # BLD AUTO: 10.5 K/UL (ref 1.8–7.7)
NEUTROPHILS NFR BLD: 76.2 % (ref 38–73)
NRBC BLD-RTO: 0 /100 WBC
PHOSPHATE SERPL-MCNC: 3.5 MG/DL (ref 2.7–4.5)
PLATELET # BLD AUTO: 148 K/UL (ref 150–350)
PMV BLD AUTO: 10.7 FL (ref 9.2–12.9)
POTASSIUM SERPL-SCNC: 4.1 MMOL/L (ref 3.5–5.1)
RBC # BLD AUTO: 2.98 M/UL (ref 4.6–6.2)
SODIUM SERPL-SCNC: 134 MMOL/L (ref 136–145)
WBC # BLD AUTO: 13.76 K/UL (ref 3.9–12.7)

## 2021-03-16 PROCEDURE — 83735 ASSAY OF MAGNESIUM: CPT | Performed by: OTOLARYNGOLOGY

## 2021-03-16 PROCEDURE — 63600175 PHARM REV CODE 636 W HCPCS: Performed by: STUDENT IN AN ORGANIZED HEALTH CARE EDUCATION/TRAINING PROGRAM

## 2021-03-16 PROCEDURE — 84100 ASSAY OF PHOSPHORUS: CPT | Performed by: OTOLARYNGOLOGY

## 2021-03-16 PROCEDURE — 99232 PR SUBSEQUENT HOSPITAL CARE,LEVL II: ICD-10-PCS | Mod: ,,, | Performed by: NURSE PRACTITIONER

## 2021-03-16 PROCEDURE — 85025 COMPLETE CBC W/AUTO DIFF WBC: CPT | Performed by: OTOLARYNGOLOGY

## 2021-03-16 PROCEDURE — 36415 COLL VENOUS BLD VENIPUNCTURE: CPT | Performed by: OTOLARYNGOLOGY

## 2021-03-16 PROCEDURE — 20600001 HC STEP DOWN PRIVATE ROOM

## 2021-03-16 PROCEDURE — 97530 THERAPEUTIC ACTIVITIES: CPT

## 2021-03-16 PROCEDURE — 25000242 PHARM REV CODE 250 ALT 637 W/ HCPCS: Performed by: STUDENT IN AN ORGANIZED HEALTH CARE EDUCATION/TRAINING PROGRAM

## 2021-03-16 PROCEDURE — 25000003 PHARM REV CODE 250: Performed by: STUDENT IN AN ORGANIZED HEALTH CARE EDUCATION/TRAINING PROGRAM

## 2021-03-16 PROCEDURE — 99232 SBSQ HOSP IP/OBS MODERATE 35: CPT | Mod: ,,, | Performed by: NURSE PRACTITIONER

## 2021-03-16 PROCEDURE — 82330 ASSAY OF CALCIUM: CPT | Performed by: OTOLARYNGOLOGY

## 2021-03-16 PROCEDURE — 97535 SELF CARE MNGMENT TRAINING: CPT

## 2021-03-16 PROCEDURE — 80048 BASIC METABOLIC PNL TOTAL CA: CPT | Performed by: OTOLARYNGOLOGY

## 2021-03-16 RX ORDER — OXYCODONE HCL 5 MG/5 ML
10 SOLUTION, ORAL ORAL EVERY 4 HOURS PRN
Status: DISCONTINUED | OUTPATIENT
Start: 2021-03-16 | End: 2021-03-18 | Stop reason: HOSPADM

## 2021-03-16 RX ORDER — MORPHINE SULFATE 2 MG/ML
2 INJECTION, SOLUTION INTRAMUSCULAR; INTRAVENOUS EVERY 6 HOURS PRN
Status: DISCONTINUED | OUTPATIENT
Start: 2021-03-16 | End: 2021-03-17

## 2021-03-16 RX ORDER — ACETAMINOPHEN 325 MG/1
650 TABLET ORAL EVERY 6 HOURS PRN
Status: DISCONTINUED | OUTPATIENT
Start: 2021-03-16 | End: 2021-03-18 | Stop reason: HOSPADM

## 2021-03-16 RX ADMIN — TRAZODONE HYDROCHLORIDE 50 MG: 50 TABLET ORAL at 08:03

## 2021-03-16 RX ADMIN — OXYCODONE HYDROCHLORIDE 10 MG: 5 SOLUTION ORAL at 04:03

## 2021-03-16 RX ADMIN — BACITRACIN: 500 OINTMENT TOPICAL at 08:03

## 2021-03-16 RX ADMIN — QUETIAPINE FUMARATE 25 MG: 25 TABLET ORAL at 08:03

## 2021-03-16 RX ADMIN — AMPICILLIN SODIUM AND SULBACTAM SODIUM 3 G: 2; 1 INJECTION, POWDER, FOR SOLUTION INTRAMUSCULAR; INTRAVENOUS at 12:03

## 2021-03-16 RX ADMIN — ACETAMINOPHEN 650 MG: 325 TABLET ORAL at 12:03

## 2021-03-16 RX ADMIN — ATORVASTATIN CALCIUM 20 MG: 20 TABLET, FILM COATED ORAL at 07:03

## 2021-03-16 RX ADMIN — AMPICILLIN SODIUM AND SULBACTAM SODIUM 3 G: 2; 1 INJECTION, POWDER, FOR SOLUTION INTRAMUSCULAR; INTRAVENOUS at 08:03

## 2021-03-16 RX ADMIN — HYDROMORPHONE HYDROCHLORIDE 1 MG: 1 INJECTION, SOLUTION INTRAMUSCULAR; INTRAVENOUS; SUBCUTANEOUS at 07:03

## 2021-03-16 RX ADMIN — OXYCODONE HYDROCHLORIDE 5 MG: 5 SOLUTION ORAL at 02:03

## 2021-03-16 RX ADMIN — AMPICILLIN SODIUM AND SULBACTAM SODIUM 3 G: 2; 1 INJECTION, POWDER, FOR SOLUTION INTRAMUSCULAR; INTRAVENOUS at 05:03

## 2021-03-16 RX ADMIN — BUPROPION HYDROCHLORIDE 75 MG: 75 TABLET, FILM COATED ORAL at 08:03

## 2021-03-16 RX ADMIN — POTASSIUM CHLORIDE, DEXTROSE MONOHYDRATE AND SODIUM CHLORIDE: 150; 5; 450 INJECTION, SOLUTION INTRAVENOUS at 04:03

## 2021-03-16 RX ADMIN — BUPROPION HYDROCHLORIDE 75 MG: 75 TABLET, FILM COATED ORAL at 07:03

## 2021-03-16 RX ADMIN — BACITRACIN: 500 OINTMENT TOPICAL at 03:03

## 2021-03-16 RX ADMIN — OXYCODONE HYDROCHLORIDE 5 MG: 5 SOLUTION ORAL at 12:03

## 2021-03-16 RX ADMIN — OXYCODONE HYDROCHLORIDE 10 MG: 5 SOLUTION ORAL at 08:03

## 2021-03-16 RX ADMIN — AMLODIPINE BESYLATE 10 MG: 10 TABLET ORAL at 07:03

## 2021-03-17 LAB
ANION GAP SERPL CALC-SCNC: 9 MMOL/L (ref 8–16)
BASOPHILS # BLD AUTO: 0.06 K/UL (ref 0–0.2)
BASOPHILS NFR BLD: 0.5 % (ref 0–1.9)
BUN SERPL-MCNC: 18 MG/DL (ref 6–20)
CA-I BLDV-SCNC: 1.11 MMOL/L (ref 1.06–1.42)
CALCIUM SERPL-MCNC: 8 MG/DL (ref 8.7–10.5)
CHLORIDE SERPL-SCNC: 100 MMOL/L (ref 95–110)
CO2 SERPL-SCNC: 23 MMOL/L (ref 23–29)
CREAT SERPL-MCNC: 0.8 MG/DL (ref 0.5–1.4)
DIFFERENTIAL METHOD: ABNORMAL
EOSINOPHIL # BLD AUTO: 0.3 K/UL (ref 0–0.5)
EOSINOPHIL NFR BLD: 2.5 % (ref 0–8)
ERYTHROCYTE [DISTWIDTH] IN BLOOD BY AUTOMATED COUNT: 13.9 % (ref 11.5–14.5)
EST. GFR  (AFRICAN AMERICAN): >60 ML/MIN/1.73 M^2
EST. GFR  (NON AFRICAN AMERICAN): >60 ML/MIN/1.73 M^2
GLUCOSE SERPL-MCNC: 67 MG/DL (ref 70–110)
HCT VFR BLD AUTO: 25.6 % (ref 40–54)
HGB BLD-MCNC: 8.4 G/DL (ref 14–18)
IMM GRANULOCYTES # BLD AUTO: 0.07 K/UL (ref 0–0.04)
IMM GRANULOCYTES NFR BLD AUTO: 0.6 % (ref 0–0.5)
LYMPHOCYTES # BLD AUTO: 1.7 K/UL (ref 1–4.8)
LYMPHOCYTES NFR BLD: 15 % (ref 18–48)
MAGNESIUM SERPL-MCNC: 2 MG/DL (ref 1.6–2.6)
MCH RBC QN AUTO: 28.5 PG (ref 27–31)
MCHC RBC AUTO-ENTMCNC: 32.8 G/DL (ref 32–36)
MCV RBC AUTO: 87 FL (ref 82–98)
MONOCYTES # BLD AUTO: 1.5 K/UL (ref 0.3–1)
MONOCYTES NFR BLD: 13.3 % (ref 4–15)
NEUTROPHILS # BLD AUTO: 7.8 K/UL (ref 1.8–7.7)
NEUTROPHILS NFR BLD: 68.1 % (ref 38–73)
NRBC BLD-RTO: 0 /100 WBC
PHOSPHATE SERPL-MCNC: 3.7 MG/DL (ref 2.7–4.5)
PLATELET # BLD AUTO: 206 K/UL (ref 150–350)
PMV BLD AUTO: 10.6 FL (ref 9.2–12.9)
POTASSIUM SERPL-SCNC: 4.2 MMOL/L (ref 3.5–5.1)
RBC # BLD AUTO: 2.95 M/UL (ref 4.6–6.2)
SODIUM SERPL-SCNC: 132 MMOL/L (ref 136–145)
WBC # BLD AUTO: 11.4 K/UL (ref 3.9–12.7)

## 2021-03-17 PROCEDURE — 63600175 PHARM REV CODE 636 W HCPCS: Performed by: STUDENT IN AN ORGANIZED HEALTH CARE EDUCATION/TRAINING PROGRAM

## 2021-03-17 PROCEDURE — 97116 GAIT TRAINING THERAPY: CPT

## 2021-03-17 PROCEDURE — 25000003 PHARM REV CODE 250: Performed by: STUDENT IN AN ORGANIZED HEALTH CARE EDUCATION/TRAINING PROGRAM

## 2021-03-17 PROCEDURE — 36415 COLL VENOUS BLD VENIPUNCTURE: CPT | Performed by: OTOLARYNGOLOGY

## 2021-03-17 PROCEDURE — 85025 COMPLETE CBC W/AUTO DIFF WBC: CPT | Performed by: OTOLARYNGOLOGY

## 2021-03-17 PROCEDURE — 80048 BASIC METABOLIC PNL TOTAL CA: CPT | Performed by: OTOLARYNGOLOGY

## 2021-03-17 PROCEDURE — 20600001 HC STEP DOWN PRIVATE ROOM

## 2021-03-17 PROCEDURE — 25000242 PHARM REV CODE 250 ALT 637 W/ HCPCS: Performed by: STUDENT IN AN ORGANIZED HEALTH CARE EDUCATION/TRAINING PROGRAM

## 2021-03-17 PROCEDURE — 97110 THERAPEUTIC EXERCISES: CPT

## 2021-03-17 PROCEDURE — 83735 ASSAY OF MAGNESIUM: CPT | Performed by: OTOLARYNGOLOGY

## 2021-03-17 PROCEDURE — 84100 ASSAY OF PHOSPHORUS: CPT | Performed by: OTOLARYNGOLOGY

## 2021-03-17 PROCEDURE — 97535 SELF CARE MNGMENT TRAINING: CPT

## 2021-03-17 PROCEDURE — 97530 THERAPEUTIC ACTIVITIES: CPT

## 2021-03-17 PROCEDURE — 82330 ASSAY OF CALCIUM: CPT | Performed by: OTOLARYNGOLOGY

## 2021-03-17 RX ORDER — ENOXAPARIN SODIUM 100 MG/ML
40 INJECTION SUBCUTANEOUS DAILY
Status: CANCELLED
Start: 2021-03-17

## 2021-03-17 RX ORDER — QUETIAPINE FUMARATE 25 MG/1
25 TABLET, FILM COATED ORAL NIGHTLY
Qty: 30 TABLET | Refills: 0 | Status: CANCELLED
Start: 2021-03-17 | End: 2022-03-17

## 2021-03-17 RX ORDER — OXYCODONE HCL 5 MG/5 ML
10 SOLUTION, ORAL ORAL EVERY 4 HOURS PRN
Refills: 0 | Status: CANCELLED
Start: 2021-03-17

## 2021-03-17 RX ORDER — ACETAMINOPHEN 325 MG/1
650 TABLET ORAL EVERY 6 HOURS PRN
Refills: 0 | Status: CANCELLED
Start: 2021-03-17

## 2021-03-17 RX ORDER — BISACODYL 10 MG
10 SUPPOSITORY, RECTAL RECTAL DAILY PRN
Refills: 0 | Status: CANCELLED | COMMUNITY
Start: 2021-03-17

## 2021-03-17 RX ORDER — DOCUSATE SODIUM 50 MG/5ML
100 LIQUID ORAL DAILY
Refills: 0 | Status: CANCELLED
Start: 2021-03-18

## 2021-03-17 RX ORDER — CALCIUM CARBONATE 200(500)MG
500 TABLET,CHEWABLE ORAL DAILY PRN
Status: CANCELLED | COMMUNITY
Start: 2021-03-17 | End: 2022-03-17

## 2021-03-17 RX ORDER — BACITRACIN 500 [USP'U]/G
OINTMENT TOPICAL 3 TIMES DAILY
Refills: 0 | Status: CANCELLED | COMMUNITY
Start: 2021-03-17

## 2021-03-17 RX ADMIN — BUPROPION HYDROCHLORIDE 75 MG: 75 TABLET, FILM COATED ORAL at 08:03

## 2021-03-17 RX ADMIN — OXYCODONE HYDROCHLORIDE 5 MG: 5 SOLUTION ORAL at 03:03

## 2021-03-17 RX ADMIN — BACITRACIN: 500 OINTMENT TOPICAL at 08:03

## 2021-03-17 RX ADMIN — AMPICILLIN SODIUM AND SULBACTAM SODIUM 3 G: 2; 1 INJECTION, POWDER, FOR SOLUTION INTRAMUSCULAR; INTRAVENOUS at 05:03

## 2021-03-17 RX ADMIN — ATORVASTATIN CALCIUM 20 MG: 20 TABLET, FILM COATED ORAL at 08:03

## 2021-03-17 RX ADMIN — AMLODIPINE BESYLATE 10 MG: 10 TABLET ORAL at 08:03

## 2021-03-17 RX ADMIN — ENOXAPARIN SODIUM 40 MG: 40 INJECTION SUBCUTANEOUS at 04:03

## 2021-03-17 RX ADMIN — BACITRACIN: 500 OINTMENT TOPICAL at 03:03

## 2021-03-17 RX ADMIN — QUETIAPINE FUMARATE 25 MG: 25 TABLET ORAL at 08:03

## 2021-03-17 RX ADMIN — TRAZODONE HYDROCHLORIDE 50 MG: 50 TABLET ORAL at 08:03

## 2021-03-17 RX ADMIN — OXYCODONE HYDROCHLORIDE 10 MG: 5 SOLUTION ORAL at 08:03

## 2021-03-17 RX ADMIN — POTASSIUM CHLORIDE, DEXTROSE MONOHYDRATE AND SODIUM CHLORIDE 100 ML/HR: 150; 5; 450 INJECTION, SOLUTION INTRAVENOUS at 07:03

## 2021-03-17 RX ADMIN — AMPICILLIN SODIUM AND SULBACTAM SODIUM 3 G: 2; 1 INJECTION, POWDER, FOR SOLUTION INTRAMUSCULAR; INTRAVENOUS at 12:03

## 2021-03-17 RX ADMIN — AMPICILLIN SODIUM AND SULBACTAM SODIUM 3 G: 2; 1 INJECTION, POWDER, FOR SOLUTION INTRAMUSCULAR; INTRAVENOUS at 08:03

## 2021-03-17 RX ADMIN — DOCUSATE SODIUM 100 MG: 50 LIQUID ORAL at 08:03

## 2021-03-18 ENCOUNTER — PATIENT MESSAGE (OUTPATIENT)
Dept: OTOLARYNGOLOGY | Facility: CLINIC | Age: 56
End: 2021-03-18

## 2021-03-18 VITALS
TEMPERATURE: 98 F | RESPIRATION RATE: 8 BRPM | HEART RATE: 75 BPM | SYSTOLIC BLOOD PRESSURE: 94 MMHG | DIASTOLIC BLOOD PRESSURE: 59 MMHG | WEIGHT: 132.06 LBS | OXYGEN SATURATION: 98 % | HEIGHT: 69 IN | BODY MASS INDEX: 19.56 KG/M2

## 2021-03-18 LAB
ANION GAP SERPL CALC-SCNC: 8 MMOL/L (ref 8–16)
BASOPHILS # BLD AUTO: 0.07 K/UL (ref 0–0.2)
BASOPHILS NFR BLD: 0.8 % (ref 0–1.9)
BUN SERPL-MCNC: 22 MG/DL (ref 6–20)
CA-I BLDV-SCNC: 1.13 MMOL/L (ref 1.06–1.42)
CALCIUM SERPL-MCNC: 8.4 MG/DL (ref 8.7–10.5)
CHLORIDE SERPL-SCNC: 99 MMOL/L (ref 95–110)
CO2 SERPL-SCNC: 26 MMOL/L (ref 23–29)
CREAT SERPL-MCNC: 1 MG/DL (ref 0.5–1.4)
CRP SERPL-MCNC: 78 MG/L (ref 0–8.2)
DIFFERENTIAL METHOD: ABNORMAL
EOSINOPHIL # BLD AUTO: 0.2 K/UL (ref 0–0.5)
EOSINOPHIL NFR BLD: 2.6 % (ref 0–8)
ERYTHROCYTE [DISTWIDTH] IN BLOOD BY AUTOMATED COUNT: 14 % (ref 11.5–14.5)
EST. GFR  (AFRICAN AMERICAN): >60 ML/MIN/1.73 M^2
EST. GFR  (NON AFRICAN AMERICAN): >60 ML/MIN/1.73 M^2
GLUCOSE SERPL-MCNC: 92 MG/DL (ref 70–110)
HCT VFR BLD AUTO: 27 % (ref 40–54)
HGB BLD-MCNC: 8.6 G/DL (ref 14–18)
IMM GRANULOCYTES # BLD AUTO: 0.1 K/UL (ref 0–0.04)
IMM GRANULOCYTES NFR BLD AUTO: 1.1 % (ref 0–0.5)
LYMPHOCYTES # BLD AUTO: 2 K/UL (ref 1–4.8)
LYMPHOCYTES NFR BLD: 21.4 % (ref 18–48)
MAGNESIUM SERPL-MCNC: 2.2 MG/DL (ref 1.6–2.6)
MCH RBC QN AUTO: 28.1 PG (ref 27–31)
MCHC RBC AUTO-ENTMCNC: 31.9 G/DL (ref 32–36)
MCV RBC AUTO: 88 FL (ref 82–98)
MONOCYTES # BLD AUTO: 1.3 K/UL (ref 0.3–1)
MONOCYTES NFR BLD: 14.7 % (ref 4–15)
NEUTROPHILS # BLD AUTO: 5.4 K/UL (ref 1.8–7.7)
NEUTROPHILS NFR BLD: 59.4 % (ref 38–73)
NRBC BLD-RTO: 0 /100 WBC
PHOSPHATE SERPL-MCNC: 3.9 MG/DL (ref 2.7–4.5)
PLATELET # BLD AUTO: 234 K/UL (ref 150–350)
PMV BLD AUTO: 9.4 FL (ref 9.2–12.9)
POTASSIUM SERPL-SCNC: 4.2 MMOL/L (ref 3.5–5.1)
PREALB SERPL-MCNC: 11 MG/DL (ref 20–43)
RBC # BLD AUTO: 3.06 M/UL (ref 4.6–6.2)
SODIUM SERPL-SCNC: 133 MMOL/L (ref 136–145)
WBC # BLD AUTO: 9.13 K/UL (ref 3.9–12.7)

## 2021-03-18 PROCEDURE — 84100 ASSAY OF PHOSPHORUS: CPT | Performed by: OTOLARYNGOLOGY

## 2021-03-18 PROCEDURE — 80048 BASIC METABOLIC PNL TOTAL CA: CPT | Performed by: OTOLARYNGOLOGY

## 2021-03-18 PROCEDURE — 82330 ASSAY OF CALCIUM: CPT | Performed by: OTOLARYNGOLOGY

## 2021-03-18 PROCEDURE — 25000003 PHARM REV CODE 250: Performed by: STUDENT IN AN ORGANIZED HEALTH CARE EDUCATION/TRAINING PROGRAM

## 2021-03-18 PROCEDURE — 85025 COMPLETE CBC W/AUTO DIFF WBC: CPT | Performed by: OTOLARYNGOLOGY

## 2021-03-18 PROCEDURE — 63600175 PHARM REV CODE 636 W HCPCS: Performed by: STUDENT IN AN ORGANIZED HEALTH CARE EDUCATION/TRAINING PROGRAM

## 2021-03-18 PROCEDURE — 86140 C-REACTIVE PROTEIN: CPT | Performed by: STUDENT IN AN ORGANIZED HEALTH CARE EDUCATION/TRAINING PROGRAM

## 2021-03-18 PROCEDURE — 84134 ASSAY OF PREALBUMIN: CPT | Performed by: STUDENT IN AN ORGANIZED HEALTH CARE EDUCATION/TRAINING PROGRAM

## 2021-03-18 PROCEDURE — 36415 COLL VENOUS BLD VENIPUNCTURE: CPT | Performed by: STUDENT IN AN ORGANIZED HEALTH CARE EDUCATION/TRAINING PROGRAM

## 2021-03-18 PROCEDURE — 25000242 PHARM REV CODE 250 ALT 637 W/ HCPCS: Performed by: STUDENT IN AN ORGANIZED HEALTH CARE EDUCATION/TRAINING PROGRAM

## 2021-03-18 PROCEDURE — 83735 ASSAY OF MAGNESIUM: CPT | Performed by: OTOLARYNGOLOGY

## 2021-03-18 RX ORDER — CEPHALEXIN 500 MG/1
500 CAPSULE ORAL EVERY 6 HOURS
Start: 2021-03-25 | End: 2021-11-19

## 2021-03-18 RX ORDER — DOCUSATE SODIUM 50 MG/5ML
100 LIQUID ORAL DAILY
Refills: 0
Start: 2021-03-19

## 2021-03-18 RX ORDER — ENOXAPARIN SODIUM 100 MG/ML
40 INJECTION SUBCUTANEOUS DAILY
Start: 2021-03-18

## 2021-03-18 RX ORDER — BISACODYL 10 MG
10 SUPPOSITORY, RECTAL RECTAL DAILY PRN
Refills: 0 | COMMUNITY
Start: 2021-03-18

## 2021-03-18 RX ORDER — OXYCODONE HCL 5 MG/5 ML
10 SOLUTION, ORAL ORAL EVERY 4 HOURS PRN
Refills: 0
Start: 2021-03-18 | End: 2021-06-22 | Stop reason: ALTCHOICE

## 2021-03-18 RX ORDER — CALCIUM CARBONATE 200(500)MG
500 TABLET,CHEWABLE ORAL DAILY PRN
COMMUNITY
Start: 2021-03-18 | End: 2022-08-24

## 2021-03-18 RX ORDER — ACETAMINOPHEN 325 MG/1
650 TABLET ORAL EVERY 6 HOURS PRN
Refills: 0
Start: 2021-03-18

## 2021-03-18 RX ORDER — QUETIAPINE FUMARATE 25 MG/1
25 TABLET, FILM COATED ORAL NIGHTLY
Qty: 30 TABLET | Refills: 11
Start: 2021-03-18 | End: 2022-03-18

## 2021-03-18 RX ORDER — BACITRACIN 500 [USP'U]/G
OINTMENT TOPICAL 3 TIMES DAILY
Refills: 0 | COMMUNITY
Start: 2021-03-18

## 2021-03-18 RX ADMIN — ATORVASTATIN CALCIUM 20 MG: 20 TABLET, FILM COATED ORAL at 08:03

## 2021-03-18 RX ADMIN — BACITRACIN: 500 OINTMENT TOPICAL at 08:03

## 2021-03-18 RX ADMIN — OXYCODONE HYDROCHLORIDE 10 MG: 5 SOLUTION ORAL at 02:03

## 2021-03-18 RX ADMIN — AMPICILLIN SODIUM AND SULBACTAM SODIUM 3 G: 2; 1 INJECTION, POWDER, FOR SOLUTION INTRAMUSCULAR; INTRAVENOUS at 05:03

## 2021-03-18 RX ADMIN — BUPROPION HYDROCHLORIDE 75 MG: 75 TABLET, FILM COATED ORAL at 08:03

## 2021-03-18 RX ADMIN — DOCUSATE SODIUM 100 MG: 50 LIQUID ORAL at 08:03

## 2021-03-18 RX ADMIN — AMPICILLIN SODIUM AND SULBACTAM SODIUM 3 G: 2; 1 INJECTION, POWDER, FOR SOLUTION INTRAMUSCULAR; INTRAVENOUS at 01:03

## 2021-03-18 RX ADMIN — AMLODIPINE BESYLATE 10 MG: 10 TABLET ORAL at 08:03

## 2021-03-18 RX ADMIN — OXYCODONE HYDROCHLORIDE 10 MG: 5 SOLUTION ORAL at 03:03

## 2021-03-22 ENCOUNTER — TELEPHONE (OUTPATIENT)
Dept: SURGERY | Facility: CLINIC | Age: 56
End: 2021-03-22

## 2021-03-23 ENCOUNTER — DOCUMENTATION ONLY (OUTPATIENT)
Dept: HEMATOLOGY/ONCOLOGY | Facility: CLINIC | Age: 56
End: 2021-03-23

## 2021-03-23 ENCOUNTER — TELEPHONE (OUTPATIENT)
Dept: HEMATOLOGY/ONCOLOGY | Facility: CLINIC | Age: 56
End: 2021-03-23

## 2021-03-25 ENCOUNTER — OFFICE VISIT (OUTPATIENT)
Dept: HEMATOLOGY/ONCOLOGY | Facility: CLINIC | Age: 56
End: 2021-03-25
Payer: COMMERCIAL

## 2021-03-25 ENCOUNTER — TELEPHONE (OUTPATIENT)
Dept: RADIATION ONCOLOGY | Facility: CLINIC | Age: 56
End: 2021-03-25

## 2021-03-25 ENCOUNTER — DOCUMENTATION ONLY (OUTPATIENT)
Dept: HEMATOLOGY/ONCOLOGY | Facility: CLINIC | Age: 56
End: 2021-03-25

## 2021-03-25 ENCOUNTER — TELEPHONE (OUTPATIENT)
Dept: HEMATOLOGY/ONCOLOGY | Facility: CLINIC | Age: 56
End: 2021-03-25

## 2021-03-25 VITALS
BODY MASS INDEX: 18.92 KG/M2 | OXYGEN SATURATION: 98 % | DIASTOLIC BLOOD PRESSURE: 76 MMHG | WEIGHT: 127.75 LBS | SYSTOLIC BLOOD PRESSURE: 122 MMHG | TEMPERATURE: 99 F | HEART RATE: 83 BPM | HEIGHT: 69 IN

## 2021-03-25 DIAGNOSIS — C44.41 BASAL CELL CARCINOMA (BCC) OF SCALP: Primary | ICD-10-CM

## 2021-03-25 PROCEDURE — 99024 POSTOP FOLLOW-UP VISIT: CPT | Mod: S$GLB,,, | Performed by: OTOLARYNGOLOGY

## 2021-03-25 PROCEDURE — 3008F PR BODY MASS INDEX (BMI) DOCUMENTED: ICD-10-PCS | Mod: CPTII,S$GLB,, | Performed by: OTOLARYNGOLOGY

## 2021-03-25 PROCEDURE — 99024 PR POST-OP FOLLOW-UP VISIT: ICD-10-PCS | Mod: S$GLB,,, | Performed by: OTOLARYNGOLOGY

## 2021-03-25 PROCEDURE — 1125F AMNT PAIN NOTED PAIN PRSNT: CPT | Mod: S$GLB,,, | Performed by: OTOLARYNGOLOGY

## 2021-03-25 PROCEDURE — 3008F BODY MASS INDEX DOCD: CPT | Mod: CPTII,S$GLB,, | Performed by: OTOLARYNGOLOGY

## 2021-03-25 PROCEDURE — 99999 PR PBB SHADOW E&M-EST. PATIENT-LVL III: ICD-10-PCS | Mod: PBBFAC,,, | Performed by: OTOLARYNGOLOGY

## 2021-03-25 PROCEDURE — 1125F PR PAIN SEVERITY QUANTIFIED, PAIN PRESENT: ICD-10-PCS | Mod: S$GLB,,, | Performed by: OTOLARYNGOLOGY

## 2021-03-25 PROCEDURE — 99999 PR PBB SHADOW E&M-EST. PATIENT-LVL III: CPT | Mod: PBBFAC,,, | Performed by: OTOLARYNGOLOGY

## 2021-03-26 ENCOUNTER — TELEPHONE (OUTPATIENT)
Dept: HEMATOLOGY/ONCOLOGY | Facility: CLINIC | Age: 56
End: 2021-03-26

## 2021-03-26 ENCOUNTER — OFFICE VISIT (OUTPATIENT)
Dept: OTOLARYNGOLOGY | Facility: CLINIC | Age: 56
End: 2021-03-26
Payer: COMMERCIAL

## 2021-03-26 ENCOUNTER — TELEPHONE (OUTPATIENT)
Dept: RADIATION ONCOLOGY | Facility: CLINIC | Age: 56
End: 2021-03-26

## 2021-03-26 VITALS
WEIGHT: 125 LBS | SYSTOLIC BLOOD PRESSURE: 133 MMHG | HEART RATE: 68 BPM | DIASTOLIC BLOOD PRESSURE: 77 MMHG | BODY MASS INDEX: 18.46 KG/M2

## 2021-03-26 DIAGNOSIS — C44.41 BASAL CELL CARCINOMA (BCC) OF SCALP: Primary | ICD-10-CM

## 2021-03-26 PROBLEM — C44.91 BASAL CELL CARCINOMA: Status: RESOLVED | Noted: 2021-03-03 | Resolved: 2021-03-26

## 2021-03-26 PROCEDURE — 1126F AMNT PAIN NOTED NONE PRSNT: CPT | Mod: S$GLB,,, | Performed by: NURSE PRACTITIONER

## 2021-03-26 PROCEDURE — 3008F BODY MASS INDEX DOCD: CPT | Mod: CPTII,S$GLB,, | Performed by: NURSE PRACTITIONER

## 2021-03-26 PROCEDURE — 1126F PR PAIN SEVERITY QUANTIFIED, NO PAIN PRESENT: ICD-10-PCS | Mod: S$GLB,,, | Performed by: NURSE PRACTITIONER

## 2021-03-26 PROCEDURE — 99024 POSTOP FOLLOW-UP VISIT: CPT | Mod: S$GLB,,, | Performed by: NURSE PRACTITIONER

## 2021-03-26 PROCEDURE — 3008F PR BODY MASS INDEX (BMI) DOCUMENTED: ICD-10-PCS | Mod: CPTII,S$GLB,, | Performed by: NURSE PRACTITIONER

## 2021-03-26 PROCEDURE — 99999 PR PBB SHADOW E&M-EST. PATIENT-LVL III: ICD-10-PCS | Mod: PBBFAC,,, | Performed by: NURSE PRACTITIONER

## 2021-03-26 PROCEDURE — 99999 PR PBB SHADOW E&M-EST. PATIENT-LVL III: CPT | Mod: PBBFAC,,, | Performed by: NURSE PRACTITIONER

## 2021-03-26 PROCEDURE — 99024 PR POST-OP FOLLOW-UP VISIT: ICD-10-PCS | Mod: S$GLB,,, | Performed by: NURSE PRACTITIONER

## 2021-03-30 ENCOUNTER — DOCUMENTATION ONLY (OUTPATIENT)
Dept: HEMATOLOGY/ONCOLOGY | Facility: CLINIC | Age: 56
End: 2021-03-30

## 2021-04-01 ENCOUNTER — TREATMENT (OUTPATIENT)
Dept: RADIATION ONCOLOGY | Facility: CLINIC | Age: 56
End: 2021-04-01
Payer: COMMERCIAL

## 2021-04-01 ENCOUNTER — EXTERNAL HOME HEALTH (OUTPATIENT)
Dept: HOME HEALTH SERVICES | Facility: HOSPITAL | Age: 56
End: 2021-04-01

## 2021-04-07 ENCOUNTER — DOCUMENT SCAN (OUTPATIENT)
Dept: HOME HEALTH SERVICES | Facility: HOSPITAL | Age: 56
End: 2021-04-07
Payer: COMMERCIAL

## 2021-04-08 ENCOUNTER — OFFICE VISIT (OUTPATIENT)
Dept: HEMATOLOGY/ONCOLOGY | Facility: CLINIC | Age: 56
End: 2021-04-08
Payer: COMMERCIAL

## 2021-04-08 VITALS
WEIGHT: 126.19 LBS | TEMPERATURE: 98 F | BODY MASS INDEX: 18.69 KG/M2 | HEIGHT: 69 IN | DIASTOLIC BLOOD PRESSURE: 86 MMHG | OXYGEN SATURATION: 99 % | HEART RATE: 85 BPM | SYSTOLIC BLOOD PRESSURE: 125 MMHG

## 2021-04-08 DIAGNOSIS — G89.18 POST-OP PAIN: Primary | ICD-10-CM

## 2021-04-08 DIAGNOSIS — C44.41 BASAL CELL CARCINOMA (BCC) OF SCALP: ICD-10-CM

## 2021-04-08 PROCEDURE — 99024 POSTOP FOLLOW-UP VISIT: CPT | Mod: S$GLB,,, | Performed by: OTOLARYNGOLOGY

## 2021-04-08 PROCEDURE — 99999 PR PBB SHADOW E&M-EST. PATIENT-LVL IV: CPT | Mod: PBBFAC,,, | Performed by: OTOLARYNGOLOGY

## 2021-04-08 PROCEDURE — 3008F PR BODY MASS INDEX (BMI) DOCUMENTED: ICD-10-PCS | Mod: CPTII,S$GLB,, | Performed by: OTOLARYNGOLOGY

## 2021-04-08 PROCEDURE — 1125F PR PAIN SEVERITY QUANTIFIED, PAIN PRESENT: ICD-10-PCS | Mod: S$GLB,,, | Performed by: OTOLARYNGOLOGY

## 2021-04-08 PROCEDURE — 1125F AMNT PAIN NOTED PAIN PRSNT: CPT | Mod: S$GLB,,, | Performed by: OTOLARYNGOLOGY

## 2021-04-08 PROCEDURE — 3008F BODY MASS INDEX DOCD: CPT | Mod: CPTII,S$GLB,, | Performed by: OTOLARYNGOLOGY

## 2021-04-08 PROCEDURE — 99999 PR PBB SHADOW E&M-EST. PATIENT-LVL IV: ICD-10-PCS | Mod: PBBFAC,,, | Performed by: OTOLARYNGOLOGY

## 2021-04-08 PROCEDURE — 99024 PR POST-OP FOLLOW-UP VISIT: ICD-10-PCS | Mod: S$GLB,,, | Performed by: OTOLARYNGOLOGY

## 2021-04-08 RX ORDER — TRAMADOL HYDROCHLORIDE 100 MG/1
50 TABLET, EXTENDED RELEASE ORAL DAILY
COMMUNITY
End: 2023-03-09 | Stop reason: DRUGHIGH

## 2021-04-08 RX ORDER — PANTOPRAZOLE SODIUM 40 MG/1
40 TABLET, DELAYED RELEASE ORAL DAILY
COMMUNITY
End: 2022-08-19 | Stop reason: CLARIF

## 2021-04-08 RX ORDER — NAPROXEN SODIUM 220 MG/1
81 TABLET, FILM COATED ORAL DAILY
COMMUNITY

## 2021-04-08 RX ORDER — OXYCODONE AND ACETAMINOPHEN 10; 325 MG/1; MG/1
1 TABLET ORAL EVERY 8 HOURS PRN
Qty: 90 TABLET | Refills: 0 | Status: SHIPPED | OUTPATIENT
Start: 2021-04-08 | End: 2021-05-08

## 2021-04-09 ENCOUNTER — DOCUMENTATION ONLY (OUTPATIENT)
Dept: INFUSION THERAPY | Facility: HOSPITAL | Age: 56
End: 2021-04-09

## 2021-04-09 ENCOUNTER — DOCUMENT SCAN (OUTPATIENT)
Dept: HOME HEALTH SERVICES | Facility: HOSPITAL | Age: 56
End: 2021-04-09
Payer: COMMERCIAL

## 2021-04-14 ENCOUNTER — DOCUMENT SCAN (OUTPATIENT)
Dept: HOME HEALTH SERVICES | Facility: HOSPITAL | Age: 56
End: 2021-04-14
Payer: COMMERCIAL

## 2021-04-16 ENCOUNTER — TELEPHONE (OUTPATIENT)
Dept: HEMATOLOGY/ONCOLOGY | Facility: CLINIC | Age: 56
End: 2021-04-16

## 2021-04-19 ENCOUNTER — TREATMENT (OUTPATIENT)
Dept: RADIATION ONCOLOGY | Facility: CLINIC | Age: 56
End: 2021-04-19
Payer: COMMERCIAL

## 2021-04-19 PROCEDURE — 77263 THER RADIOLOGY TX PLNG CPLX: CPT | Mod: S$GLB,,, | Performed by: RADIOLOGY

## 2021-04-19 PROCEDURE — 77334 RADIATION TREATMENT AID(S): CPT | Mod: S$GLB,,, | Performed by: RADIOLOGY

## 2021-04-19 PROCEDURE — 77263 PR  RADIATION THERAPY PLAN COMPLEX: ICD-10-PCS | Mod: S$GLB,,, | Performed by: RADIOLOGY

## 2021-04-19 PROCEDURE — 77399 PR IMAGE FUSION, RADIATION THERAPY: ICD-10-PCS | Mod: S$GLB,,, | Performed by: RADIOLOGY

## 2021-04-19 PROCEDURE — 77334 PR  RADN TREATMENT AID(S) COMPLX: ICD-10-PCS | Mod: S$GLB,,, | Performed by: RADIOLOGY

## 2021-04-19 PROCEDURE — 77399 UNLISTED PX MED RADJ PHYSICS: CPT | Mod: S$GLB,,, | Performed by: RADIOLOGY

## 2021-04-21 PROCEDURE — 77301 RADIOTHERAPY DOSE PLAN IMRT: CPT | Mod: S$GLB,,, | Performed by: RADIOLOGY

## 2021-04-21 PROCEDURE — 77338 DESIGN MLC DEVICE FOR IMRT: CPT | Mod: S$GLB,,, | Performed by: RADIOLOGY

## 2021-04-21 PROCEDURE — 77301 PR  INTEN MOD RADIOTHER PLAN W/DOSE VOL HIST: ICD-10-PCS | Mod: S$GLB,,, | Performed by: RADIOLOGY

## 2021-04-21 PROCEDURE — 77300 RADIATION THERAPY DOSE PLAN: CPT | Mod: S$GLB,,, | Performed by: RADIOLOGY

## 2021-04-21 PROCEDURE — 77338 PR  MLC IMRT DESIGN & CONSTRUCTION PER IMRT PLAN: ICD-10-PCS | Mod: S$GLB,,, | Performed by: RADIOLOGY

## 2021-04-21 PROCEDURE — 77300 PR RADIATION THERAPY,DOSIMETRY PLAN: ICD-10-PCS | Mod: S$GLB,,, | Performed by: RADIOLOGY

## 2021-04-28 ENCOUNTER — TELEPHONE (OUTPATIENT)
Dept: HEMATOLOGY/ONCOLOGY | Facility: CLINIC | Age: 56
End: 2021-04-28

## 2021-04-29 ENCOUNTER — TELEPHONE (OUTPATIENT)
Dept: HEMATOLOGY/ONCOLOGY | Facility: CLINIC | Age: 56
End: 2021-04-29

## 2021-04-29 ENCOUNTER — SOCIAL WORK (OUTPATIENT)
Dept: HEMATOLOGY/ONCOLOGY | Facility: CLINIC | Age: 56
End: 2021-04-29

## 2021-04-29 ENCOUNTER — PATIENT MESSAGE (OUTPATIENT)
Dept: RESEARCH | Facility: HOSPITAL | Age: 56
End: 2021-04-29

## 2021-05-03 ENCOUNTER — SOCIAL WORK (OUTPATIENT)
Dept: HEMATOLOGY/ONCOLOGY | Facility: CLINIC | Age: 56
End: 2021-05-03

## 2021-05-04 ENCOUNTER — TELEPHONE (OUTPATIENT)
Dept: HEMATOLOGY/ONCOLOGY | Facility: CLINIC | Age: 56
End: 2021-05-04

## 2021-05-05 ENCOUNTER — TELEPHONE (OUTPATIENT)
Dept: HEMATOLOGY/ONCOLOGY | Facility: CLINIC | Age: 56
End: 2021-05-05

## 2021-05-11 ENCOUNTER — SOCIAL WORK (OUTPATIENT)
Dept: HEMATOLOGY/ONCOLOGY | Facility: CLINIC | Age: 56
End: 2021-05-11

## 2021-05-17 ENCOUNTER — DOCUMENT SCAN (OUTPATIENT)
Dept: HOME HEALTH SERVICES | Facility: HOSPITAL | Age: 56
End: 2021-05-17
Payer: COMMERCIAL

## 2021-05-18 ENCOUNTER — TELEPHONE (OUTPATIENT)
Dept: HEMATOLOGY/ONCOLOGY | Facility: CLINIC | Age: 56
End: 2021-05-18

## 2021-05-18 ENCOUNTER — OFFICE VISIT (OUTPATIENT)
Dept: HEMATOLOGY/ONCOLOGY | Facility: CLINIC | Age: 56
End: 2021-05-18
Payer: COMMERCIAL

## 2021-05-18 VITALS
OXYGEN SATURATION: 98 % | WEIGHT: 133.06 LBS | HEART RATE: 81 BPM | SYSTOLIC BLOOD PRESSURE: 124 MMHG | TEMPERATURE: 98 F | BODY MASS INDEX: 19.71 KG/M2 | HEIGHT: 69 IN | DIASTOLIC BLOOD PRESSURE: 74 MMHG

## 2021-05-18 DIAGNOSIS — H53.9 VISUAL CHANGES: ICD-10-CM

## 2021-05-18 DIAGNOSIS — C44.41 BASAL CELL CARCINOMA (BCC) OF SCALP: ICD-10-CM

## 2021-05-18 DIAGNOSIS — G89.3 CANCER ASSOCIATED PAIN: ICD-10-CM

## 2021-05-18 DIAGNOSIS — C44.41 BASAL CELL CARCINOMA (BCC) OF SCALP: Primary | ICD-10-CM

## 2021-05-18 PROCEDURE — 3008F BODY MASS INDEX DOCD: CPT | Mod: CPTII,S$GLB,, | Performed by: OTOLARYNGOLOGY

## 2021-05-18 PROCEDURE — 3008F PR BODY MASS INDEX (BMI) DOCUMENTED: ICD-10-PCS | Mod: CPTII,S$GLB,, | Performed by: OTOLARYNGOLOGY

## 2021-05-18 PROCEDURE — 99999 PR PBB SHADOW E&M-EST. PATIENT-LVL IV: ICD-10-PCS | Mod: PBBFAC,,, | Performed by: OTOLARYNGOLOGY

## 2021-05-18 PROCEDURE — 99024 PR POST-OP FOLLOW-UP VISIT: ICD-10-PCS | Mod: S$GLB,,, | Performed by: OTOLARYNGOLOGY

## 2021-05-18 PROCEDURE — 99024 POSTOP FOLLOW-UP VISIT: CPT | Mod: S$GLB,,, | Performed by: OTOLARYNGOLOGY

## 2021-05-18 PROCEDURE — 1125F AMNT PAIN NOTED PAIN PRSNT: CPT | Mod: S$GLB,,, | Performed by: OTOLARYNGOLOGY

## 2021-05-18 PROCEDURE — 1125F PR PAIN SEVERITY QUANTIFIED, PAIN PRESENT: ICD-10-PCS | Mod: S$GLB,,, | Performed by: OTOLARYNGOLOGY

## 2021-05-18 PROCEDURE — 99999 PR PBB SHADOW E&M-EST. PATIENT-LVL IV: CPT | Mod: PBBFAC,,, | Performed by: OTOLARYNGOLOGY

## 2021-05-18 RX ORDER — OXYCODONE AND ACETAMINOPHEN 10; 325 MG/1; MG/1
1 TABLET ORAL EVERY 6 HOURS PRN
Qty: 60 TABLET | Refills: 0 | Status: SHIPPED | OUTPATIENT
Start: 2021-05-18 | End: 2021-06-17

## 2021-05-19 ENCOUNTER — TELEPHONE (OUTPATIENT)
Dept: HEMATOLOGY/ONCOLOGY | Facility: CLINIC | Age: 56
End: 2021-05-19

## 2021-05-19 ENCOUNTER — TELEPHONE (OUTPATIENT)
Dept: GASTROENTEROLOGY | Facility: CLINIC | Age: 56
End: 2021-05-19

## 2021-05-20 ENCOUNTER — SOCIAL WORK (OUTPATIENT)
Dept: HEMATOLOGY/ONCOLOGY | Facility: CLINIC | Age: 56
End: 2021-05-20

## 2021-05-25 DIAGNOSIS — L58.9 RADIATION DERMATITIS: ICD-10-CM

## 2021-05-25 DIAGNOSIS — C44.41 BASAL CELL CARCINOMA (BCC) OF SCALP: Primary | ICD-10-CM

## 2021-05-25 RX ORDER — SILVER SULFADIAZINE 10 G/1000G
CREAM TOPICAL 2 TIMES DAILY
Qty: 400 G | Refills: 0 | Status: SHIPPED | OUTPATIENT
Start: 2021-05-25

## 2021-06-11 ENCOUNTER — TREATMENT (OUTPATIENT)
Dept: RADIATION ONCOLOGY | Facility: CLINIC | Age: 56
End: 2021-06-11
Payer: COMMERCIAL

## 2021-06-11 PROCEDURE — G6015 PR RADN TX DELIVERY,  INTENS MOD, 1+ FIELDS PER TX: ICD-10-PCS | Mod: S$GLB,,, | Performed by: RADIOLOGY

## 2021-06-11 PROCEDURE — 77014 PR  CT GUIDANCE PLACEMENT RAD THERAPY FIELDS: ICD-10-PCS | Mod: S$GLB,,, | Performed by: RADIOLOGY

## 2021-06-11 PROCEDURE — 77014 PR  CT GUIDANCE PLACEMENT RAD THERAPY FIELDS: CPT | Mod: S$GLB,,, | Performed by: RADIOLOGY

## 2021-06-11 PROCEDURE — G6015 RADIATION TX DELIVERY IMRT: HCPCS | Mod: S$GLB,,, | Performed by: RADIOLOGY

## 2021-06-22 ENCOUNTER — OFFICE VISIT (OUTPATIENT)
Dept: HEMATOLOGY/ONCOLOGY | Facility: CLINIC | Age: 56
End: 2021-06-22
Payer: COMMERCIAL

## 2021-06-22 VITALS
TEMPERATURE: 98 F | SYSTOLIC BLOOD PRESSURE: 122 MMHG | DIASTOLIC BLOOD PRESSURE: 63 MMHG | BODY MASS INDEX: 19.92 KG/M2 | WEIGHT: 134.94 LBS | RESPIRATION RATE: 16 BRPM | HEART RATE: 63 BPM | OXYGEN SATURATION: 98 %

## 2021-06-22 DIAGNOSIS — S01.00XS OPEN WOUND OF SCALP, UNSPECIFIED OPEN WOUND TYPE, SEQUELA: ICD-10-CM

## 2021-06-22 DIAGNOSIS — G89.18 POST-OP PAIN: ICD-10-CM

## 2021-06-22 DIAGNOSIS — M62.838 MUSCLE SPASM: Primary | ICD-10-CM

## 2021-06-22 PROBLEM — S01.00XA OPEN WOUND OF SCALP: Status: ACTIVE | Noted: 2021-06-22

## 2021-06-22 PROCEDURE — 99213 OFFICE O/P EST LOW 20 MIN: CPT | Mod: S$GLB,,, | Performed by: OTOLARYNGOLOGY

## 2021-06-22 PROCEDURE — 1126F AMNT PAIN NOTED NONE PRSNT: CPT | Mod: S$GLB,,, | Performed by: OTOLARYNGOLOGY

## 2021-06-22 PROCEDURE — 99213 PR OFFICE/OUTPT VISIT, EST, LEVL III, 20-29 MIN: ICD-10-PCS | Mod: S$GLB,,, | Performed by: OTOLARYNGOLOGY

## 2021-06-22 PROCEDURE — 1126F PR PAIN SEVERITY QUANTIFIED, NO PAIN PRESENT: ICD-10-PCS | Mod: S$GLB,,, | Performed by: OTOLARYNGOLOGY

## 2021-06-22 PROCEDURE — 99999 PR PBB SHADOW E&M-EST. PATIENT-LVL III: ICD-10-PCS | Mod: PBBFAC,,, | Performed by: OTOLARYNGOLOGY

## 2021-06-22 PROCEDURE — 3008F PR BODY MASS INDEX (BMI) DOCUMENTED: ICD-10-PCS | Mod: CPTII,S$GLB,, | Performed by: OTOLARYNGOLOGY

## 2021-06-22 PROCEDURE — 99999 PR PBB SHADOW E&M-EST. PATIENT-LVL III: CPT | Mod: PBBFAC,,, | Performed by: OTOLARYNGOLOGY

## 2021-06-22 PROCEDURE — 3008F BODY MASS INDEX DOCD: CPT | Mod: CPTII,S$GLB,, | Performed by: OTOLARYNGOLOGY

## 2021-06-22 RX ORDER — CYCLOBENZAPRINE HCL 10 MG
10 TABLET ORAL 3 TIMES DAILY PRN
Qty: 90 TABLET | Refills: 1 | Status: SHIPPED | OUTPATIENT
Start: 2021-06-22 | End: 2021-07-22

## 2021-06-22 RX ORDER — HYDROCODONE BITARTRATE AND ACETAMINOPHEN 7.5; 325 MG/1; MG/1
1 TABLET ORAL EVERY 8 HOURS PRN
Qty: 90 TABLET | Refills: 0 | Status: SHIPPED | OUTPATIENT
Start: 2021-06-22 | End: 2021-07-22

## 2021-06-25 ENCOUNTER — TELEPHONE (OUTPATIENT)
Dept: PSYCHIATRY | Facility: CLINIC | Age: 56
End: 2021-06-25

## 2021-06-28 ENCOUNTER — TELEPHONE (OUTPATIENT)
Dept: OPTOMETRY | Facility: CLINIC | Age: 56
End: 2021-06-28

## 2021-07-02 ENCOUNTER — CLINICAL SUPPORT (OUTPATIENT)
Dept: RADIATION ONCOLOGY | Facility: CLINIC | Age: 56
End: 2021-07-02
Payer: COMMERCIAL

## 2021-07-02 ENCOUNTER — TELEPHONE (OUTPATIENT)
Dept: RADIATION ONCOLOGY | Facility: CLINIC | Age: 56
End: 2021-07-02

## 2021-07-02 DIAGNOSIS — C76.0 MALIGNANT NEOPLASM OF HEAD, FACE AND NECK: ICD-10-CM

## 2021-07-02 DIAGNOSIS — C44.41 BASAL CELL CARCINOMA (BCC) OF SCALP: Primary | ICD-10-CM

## 2021-07-16 DIAGNOSIS — G89.3 CANCER ASSOCIATED PAIN: ICD-10-CM

## 2021-07-16 DIAGNOSIS — C76.0 MALIGNANT NEOPLASM OF HEAD, FACE AND NECK: Primary | ICD-10-CM

## 2021-07-16 RX ORDER — DEXAMETHASONE 2 MG/1
2 TABLET ORAL 2 TIMES DAILY WITH MEALS
Qty: 60 TABLET | Refills: 4 | Status: SHIPPED | OUTPATIENT
Start: 2021-07-16

## 2021-07-26 ENCOUNTER — TELEPHONE (OUTPATIENT)
Dept: HEMATOLOGY/ONCOLOGY | Facility: CLINIC | Age: 56
End: 2021-07-26

## 2021-07-27 ENCOUNTER — OFFICE VISIT (OUTPATIENT)
Dept: HEMATOLOGY/ONCOLOGY | Facility: CLINIC | Age: 56
End: 2021-07-27
Payer: COMMERCIAL

## 2021-07-27 ENCOUNTER — TELEPHONE (OUTPATIENT)
Dept: HEMATOLOGY/ONCOLOGY | Facility: CLINIC | Age: 56
End: 2021-07-27

## 2021-07-27 VITALS
RESPIRATION RATE: 18 BRPM | SYSTOLIC BLOOD PRESSURE: 148 MMHG | DIASTOLIC BLOOD PRESSURE: 98 MMHG | WEIGHT: 133.81 LBS | BODY MASS INDEX: 19.82 KG/M2 | OXYGEN SATURATION: 98 % | HEIGHT: 69 IN | TEMPERATURE: 98 F | HEART RATE: 70 BPM

## 2021-07-27 DIAGNOSIS — S01.00XS OPEN WOUND OF SCALP, UNSPECIFIED OPEN WOUND TYPE, SEQUELA: ICD-10-CM

## 2021-07-27 DIAGNOSIS — C44.41 BASAL CELL CARCINOMA (BCC) OF SCALP: Primary | ICD-10-CM

## 2021-07-27 PROCEDURE — 3008F PR BODY MASS INDEX (BMI) DOCUMENTED: ICD-10-PCS | Mod: CPTII,S$GLB,, | Performed by: OTOLARYNGOLOGY

## 2021-07-27 PROCEDURE — 3080F DIAST BP >= 90 MM HG: CPT | Mod: CPTII,S$GLB,, | Performed by: OTOLARYNGOLOGY

## 2021-07-27 PROCEDURE — 1159F MED LIST DOCD IN RCRD: CPT | Mod: CPTII,S$GLB,, | Performed by: OTOLARYNGOLOGY

## 2021-07-27 PROCEDURE — 1125F AMNT PAIN NOTED PAIN PRSNT: CPT | Mod: CPTII,S$GLB,, | Performed by: OTOLARYNGOLOGY

## 2021-07-27 PROCEDURE — 3080F PR MOST RECENT DIASTOLIC BLOOD PRESSURE >= 90 MM HG: ICD-10-PCS | Mod: CPTII,S$GLB,, | Performed by: OTOLARYNGOLOGY

## 2021-07-27 PROCEDURE — 99999 PR PBB SHADOW E&M-EST. PATIENT-LVL V: ICD-10-PCS | Mod: PBBFAC,,, | Performed by: OTOLARYNGOLOGY

## 2021-07-27 PROCEDURE — 1159F PR MEDICATION LIST DOCUMENTED IN MEDICAL RECORD: ICD-10-PCS | Mod: CPTII,S$GLB,, | Performed by: OTOLARYNGOLOGY

## 2021-07-27 PROCEDURE — 3077F SYST BP >= 140 MM HG: CPT | Mod: CPTII,S$GLB,, | Performed by: OTOLARYNGOLOGY

## 2021-07-27 PROCEDURE — 1125F PR PAIN SEVERITY QUANTIFIED, PAIN PRESENT: ICD-10-PCS | Mod: CPTII,S$GLB,, | Performed by: OTOLARYNGOLOGY

## 2021-07-27 PROCEDURE — 1160F PR REVIEW ALL MEDS BY PRESCRIBER/CLIN PHARMACIST DOCUMENTED: ICD-10-PCS | Mod: CPTII,S$GLB,, | Performed by: OTOLARYNGOLOGY

## 2021-07-27 PROCEDURE — 99213 PR OFFICE/OUTPT VISIT, EST, LEVL III, 20-29 MIN: ICD-10-PCS | Mod: S$GLB,,, | Performed by: OTOLARYNGOLOGY

## 2021-07-27 PROCEDURE — 3077F PR MOST RECENT SYSTOLIC BLOOD PRESSURE >= 140 MM HG: ICD-10-PCS | Mod: CPTII,S$GLB,, | Performed by: OTOLARYNGOLOGY

## 2021-07-27 PROCEDURE — 3008F BODY MASS INDEX DOCD: CPT | Mod: CPTII,S$GLB,, | Performed by: OTOLARYNGOLOGY

## 2021-07-27 PROCEDURE — 1160F RVW MEDS BY RX/DR IN RCRD: CPT | Mod: CPTII,S$GLB,, | Performed by: OTOLARYNGOLOGY

## 2021-07-27 PROCEDURE — 99999 PR PBB SHADOW E&M-EST. PATIENT-LVL V: CPT | Mod: PBBFAC,,, | Performed by: OTOLARYNGOLOGY

## 2021-07-27 PROCEDURE — 99213 OFFICE O/P EST LOW 20 MIN: CPT | Mod: S$GLB,,, | Performed by: OTOLARYNGOLOGY

## 2021-07-27 RX ORDER — GABAPENTIN 300 MG/1
600 CAPSULE ORAL 3 TIMES DAILY PRN
COMMUNITY
Start: 2021-07-11

## 2021-07-27 RX ORDER — BACLOFEN 20 MG/1
20 TABLET ORAL 3 TIMES DAILY PRN
COMMUNITY
Start: 2021-07-07

## 2021-07-27 RX ORDER — OXYCODONE HYDROCHLORIDE 10 MG/1
TABLET ORAL
COMMUNITY
Start: 2021-03-23 | End: 2021-09-21 | Stop reason: DRUGHIGH

## 2021-07-27 RX ORDER — ASPIRIN 81 MG/1
TABLET ORAL
COMMUNITY
Start: 2021-04-19

## 2021-07-27 RX ORDER — SILDENAFIL 50 MG/1
50 TABLET, FILM COATED ORAL DAILY PRN
COMMUNITY
Start: 2021-07-11

## 2021-07-27 RX ORDER — TRAMADOL HYDROCHLORIDE 50 MG/1
TABLET ORAL 3 TIMES DAILY PRN
COMMUNITY
Start: 2021-07-20 | End: 2021-09-21 | Stop reason: DRUGHIGH

## 2021-07-27 RX ORDER — BUTALBITAL, ACETAMINOPHEN AND CAFFEINE 50; 325; 40 MG/1; MG/1; MG/1
1 TABLET ORAL 2 TIMES DAILY
COMMUNITY
Start: 2021-07-07

## 2021-08-06 ENCOUNTER — HOSPITAL ENCOUNTER (OUTPATIENT)
Dept: RADIOLOGY | Facility: HOSPITAL | Age: 56
Discharge: HOME | End: 2021-08-06
Attending: RADIOLOGY
Payer: COMMERCIAL

## 2021-08-06 ENCOUNTER — TELEPHONE (OUTPATIENT)
Dept: RADIATION ONCOLOGY | Facility: CLINIC | Age: 56
End: 2021-08-06

## 2021-08-06 DIAGNOSIS — C44.41 BASAL CELL CARCINOMA (BCC) OF SCALP: ICD-10-CM

## 2021-08-06 DIAGNOSIS — C76.0 MALIGNANT NEOPLASM OF HEAD, FACE AND NECK: Primary | ICD-10-CM

## 2021-08-06 DIAGNOSIS — Z12.89 ENCOUNTER FOR SCREENING FOR MALIGNANT NEOPLASM OF OTHER SITES: ICD-10-CM

## 2021-08-06 LAB
CREAT SERPL-MCNC: 1 MG/DL (ref 0.5–1.4)
SAMPLE: NORMAL

## 2021-08-06 PROCEDURE — 25500020 PHARM REV CODE 255: Mod: PO | Performed by: RADIOLOGY

## 2021-08-06 PROCEDURE — 70553 MRI BRAIN STEM W/O & W/DYE: CPT | Mod: TC,PO

## 2021-08-06 PROCEDURE — A9585 GADOBUTROL INJECTION: HCPCS | Mod: PO | Performed by: RADIOLOGY

## 2021-08-06 PROCEDURE — 70491 CT SOFT TISSUE NECK W/DYE: CPT | Mod: TC,PO

## 2021-08-06 PROCEDURE — 82565 ASSAY OF CREATININE: CPT | Mod: PO

## 2021-08-06 RX ORDER — GADOBUTROL 604.72 MG/ML
6.5 INJECTION INTRAVENOUS
Status: COMPLETED | OUTPATIENT
Start: 2021-08-06 | End: 2021-08-06

## 2021-08-06 RX ADMIN — GADOBUTROL 6.5 ML: 604.72 INJECTION INTRAVENOUS at 11:08

## 2021-08-06 RX ADMIN — IOHEXOL 100 ML: 350 INJECTION, SOLUTION INTRAVENOUS at 10:08

## 2021-08-19 ENCOUNTER — TELEPHONE (OUTPATIENT)
Dept: HEMATOLOGY/ONCOLOGY | Facility: CLINIC | Age: 56
End: 2021-08-19

## 2021-08-24 RX ORDER — TRAMADOL HYDROCHLORIDE 50 MG/1
TABLET ORAL 3 TIMES DAILY PRN
OUTPATIENT
Start: 2021-08-24

## 2021-08-24 RX ORDER — AMLODIPINE BESYLATE 10 MG/1
10 TABLET ORAL DAILY
OUTPATIENT
Start: 2021-08-24

## 2021-09-21 DIAGNOSIS — C76.0 MALIGNANT NEOPLASM OF HEAD, FACE AND NECK: ICD-10-CM

## 2021-09-21 DIAGNOSIS — G89.3 CANCER ASSOCIATED PAIN: Primary | ICD-10-CM

## 2021-09-21 RX ORDER — HYDROCODONE BITARTRATE AND ACETAMINOPHEN 5; 325 MG/1; MG/1
1 TABLET ORAL EVERY 8 HOURS PRN
Qty: 60 TABLET | Refills: 0 | Status: ON HOLD | OUTPATIENT
Start: 2021-09-21 | End: 2022-08-24 | Stop reason: ALTCHOICE

## 2021-09-28 ENCOUNTER — OFFICE VISIT (OUTPATIENT)
Dept: HEMATOLOGY/ONCOLOGY | Facility: CLINIC | Age: 56
End: 2021-09-28
Payer: COMMERCIAL

## 2021-09-28 VITALS
SYSTOLIC BLOOD PRESSURE: 131 MMHG | HEART RATE: 75 BPM | RESPIRATION RATE: 18 BRPM | HEIGHT: 69 IN | OXYGEN SATURATION: 99 % | WEIGHT: 146.38 LBS | DIASTOLIC BLOOD PRESSURE: 81 MMHG | TEMPERATURE: 98 F | BODY MASS INDEX: 21.68 KG/M2

## 2021-09-28 DIAGNOSIS — C44.41 BASAL CELL CARCINOMA (BCC) OF SCALP: Primary | ICD-10-CM

## 2021-09-28 PROCEDURE — 3079F PR MOST RECENT DIASTOLIC BLOOD PRESSURE 80-89 MM HG: ICD-10-PCS | Mod: CPTII,S$GLB,, | Performed by: OTOLARYNGOLOGY

## 2021-09-28 PROCEDURE — 99999 PR PBB SHADOW E&M-EST. PATIENT-LVL V: ICD-10-PCS | Mod: PBBFAC,,, | Performed by: OTOLARYNGOLOGY

## 2021-09-28 PROCEDURE — 1160F PR REVIEW ALL MEDS BY PRESCRIBER/CLIN PHARMACIST DOCUMENTED: ICD-10-PCS | Mod: CPTII,S$GLB,, | Performed by: OTOLARYNGOLOGY

## 2021-09-28 PROCEDURE — 1159F PR MEDICATION LIST DOCUMENTED IN MEDICAL RECORD: ICD-10-PCS | Mod: CPTII,S$GLB,, | Performed by: OTOLARYNGOLOGY

## 2021-09-28 PROCEDURE — 3079F DIAST BP 80-89 MM HG: CPT | Mod: CPTII,S$GLB,, | Performed by: OTOLARYNGOLOGY

## 2021-09-28 PROCEDURE — 1160F RVW MEDS BY RX/DR IN RCRD: CPT | Mod: CPTII,S$GLB,, | Performed by: OTOLARYNGOLOGY

## 2021-09-28 PROCEDURE — 3075F PR MOST RECENT SYSTOLIC BLOOD PRESS GE 130-139MM HG: ICD-10-PCS | Mod: CPTII,S$GLB,, | Performed by: OTOLARYNGOLOGY

## 2021-09-28 PROCEDURE — 3075F SYST BP GE 130 - 139MM HG: CPT | Mod: CPTII,S$GLB,, | Performed by: OTOLARYNGOLOGY

## 2021-09-28 PROCEDURE — 99999 PR PBB SHADOW E&M-EST. PATIENT-LVL V: CPT | Mod: PBBFAC,,, | Performed by: OTOLARYNGOLOGY

## 2021-09-28 PROCEDURE — 99213 OFFICE O/P EST LOW 20 MIN: CPT | Mod: S$GLB,,, | Performed by: OTOLARYNGOLOGY

## 2021-09-28 PROCEDURE — 3008F BODY MASS INDEX DOCD: CPT | Mod: CPTII,S$GLB,, | Performed by: OTOLARYNGOLOGY

## 2021-09-28 PROCEDURE — 99213 PR OFFICE/OUTPT VISIT, EST, LEVL III, 20-29 MIN: ICD-10-PCS | Mod: S$GLB,,, | Performed by: OTOLARYNGOLOGY

## 2021-09-28 PROCEDURE — 1159F MED LIST DOCD IN RCRD: CPT | Mod: CPTII,S$GLB,, | Performed by: OTOLARYNGOLOGY

## 2021-09-28 PROCEDURE — 3008F PR BODY MASS INDEX (BMI) DOCUMENTED: ICD-10-PCS | Mod: CPTII,S$GLB,, | Performed by: OTOLARYNGOLOGY

## 2021-09-28 RX ORDER — HYDROCODONE BITARTRATE AND ACETAMINOPHEN 7.5; 325 MG/1; MG/1
TABLET ORAL
Status: ON HOLD | COMMUNITY
Start: 2021-08-21 | End: 2022-08-24 | Stop reason: ALTCHOICE

## 2021-09-28 RX ORDER — DEXTROAMPHETAMINE SACCHARATE, AMPHETAMINE ASPARTATE, DEXTROAMPHETAMINE SULFATE AND AMPHETAMINE SULFATE 3.75; 3.75; 3.75; 3.75 MG/1; MG/1; MG/1; MG/1
15 TABLET ORAL 2 TIMES DAILY
COMMUNITY
Start: 2021-08-26

## 2021-09-28 RX ORDER — TADALAFIL 20 MG/1
TABLET ORAL
COMMUNITY
Start: 2021-09-22

## 2021-10-27 ENCOUNTER — OFFICE VISIT (OUTPATIENT)
Dept: OTOLARYNGOLOGY | Facility: CLINIC | Age: 56
End: 2021-10-27
Payer: COMMERCIAL

## 2021-10-27 VITALS
DIASTOLIC BLOOD PRESSURE: 99 MMHG | BODY MASS INDEX: 22.17 KG/M2 | WEIGHT: 150.13 LBS | HEART RATE: 84 BPM | SYSTOLIC BLOOD PRESSURE: 145 MMHG

## 2021-10-27 DIAGNOSIS — C44.41 BASAL CELL CARCINOMA (BCC) OF SCALP: Primary | ICD-10-CM

## 2021-10-27 DIAGNOSIS — S01.00XS OPEN WOUND OF SCALP, UNSPECIFIED OPEN WOUND TYPE, SEQUELA: ICD-10-CM

## 2021-10-27 PROCEDURE — 99213 OFFICE O/P EST LOW 20 MIN: CPT | Mod: S$GLB,,, | Performed by: OTOLARYNGOLOGY

## 2021-10-27 PROCEDURE — 99999 PR PBB SHADOW E&M-EST. PATIENT-LVL III: CPT | Mod: PBBFAC,,, | Performed by: OTOLARYNGOLOGY

## 2021-10-27 PROCEDURE — 3080F DIAST BP >= 90 MM HG: CPT | Mod: CPTII,S$GLB,, | Performed by: OTOLARYNGOLOGY

## 2021-10-27 PROCEDURE — 99999 PR PBB SHADOW E&M-EST. PATIENT-LVL III: ICD-10-PCS | Mod: PBBFAC,,, | Performed by: OTOLARYNGOLOGY

## 2021-10-27 PROCEDURE — 99213 PR OFFICE/OUTPT VISIT, EST, LEVL III, 20-29 MIN: ICD-10-PCS | Mod: S$GLB,,, | Performed by: OTOLARYNGOLOGY

## 2021-10-27 PROCEDURE — 3080F PR MOST RECENT DIASTOLIC BLOOD PRESSURE >= 90 MM HG: ICD-10-PCS | Mod: CPTII,S$GLB,, | Performed by: OTOLARYNGOLOGY

## 2021-10-27 PROCEDURE — 1159F MED LIST DOCD IN RCRD: CPT | Mod: CPTII,S$GLB,, | Performed by: OTOLARYNGOLOGY

## 2021-10-27 PROCEDURE — 3008F BODY MASS INDEX DOCD: CPT | Mod: CPTII,S$GLB,, | Performed by: OTOLARYNGOLOGY

## 2021-10-27 PROCEDURE — 3077F SYST BP >= 140 MM HG: CPT | Mod: CPTII,S$GLB,, | Performed by: OTOLARYNGOLOGY

## 2021-10-27 PROCEDURE — 3077F PR MOST RECENT SYSTOLIC BLOOD PRESSURE >= 140 MM HG: ICD-10-PCS | Mod: CPTII,S$GLB,, | Performed by: OTOLARYNGOLOGY

## 2021-10-27 PROCEDURE — 1159F PR MEDICATION LIST DOCUMENTED IN MEDICAL RECORD: ICD-10-PCS | Mod: CPTII,S$GLB,, | Performed by: OTOLARYNGOLOGY

## 2021-10-27 PROCEDURE — 3008F PR BODY MASS INDEX (BMI) DOCUMENTED: ICD-10-PCS | Mod: CPTII,S$GLB,, | Performed by: OTOLARYNGOLOGY

## 2021-11-06 ENCOUNTER — HOSPITAL ENCOUNTER (EMERGENCY)
Facility: HOSPITAL | Age: 56
Discharge: HOME | End: 2021-11-06
Attending: EMERGENCY MEDICINE
Payer: COMMERCIAL

## 2021-11-06 VITALS
DIASTOLIC BLOOD PRESSURE: 80 MMHG | RESPIRATION RATE: 18 BRPM | HEART RATE: 89 BPM | OXYGEN SATURATION: 96 % | TEMPERATURE: 99 F | SYSTOLIC BLOOD PRESSURE: 131 MMHG

## 2021-11-06 DIAGNOSIS — N50.819 TESTICLE PAIN: ICD-10-CM

## 2021-11-06 DIAGNOSIS — N43.3 HYDROCELE, UNSPECIFIED HYDROCELE TYPE: Primary | ICD-10-CM

## 2021-11-06 PROCEDURE — 99283 EMERGENCY DEPT VISIT LOW MDM: CPT

## 2021-11-06 PROCEDURE — 25000003 PHARM REV CODE 250: Performed by: EMERGENCY MEDICINE

## 2021-11-06 RX ORDER — CIPROFLOXACIN 500 MG/1
500 TABLET ORAL 2 TIMES DAILY
Qty: 14 TABLET | Refills: 0 | Status: SHIPPED | OUTPATIENT
Start: 2021-11-06 | End: 2021-11-14 | Stop reason: SDUPTHER

## 2021-11-06 RX ORDER — HYDROCODONE BITARTRATE AND ACETAMINOPHEN 5; 325 MG/1; MG/1
1 TABLET ORAL
Status: COMPLETED | OUTPATIENT
Start: 2021-11-06 | End: 2021-11-06

## 2021-11-06 RX ORDER — OXYCODONE HYDROCHLORIDE 10 MG/1
TABLET ORAL
COMMUNITY
End: 2023-03-09 | Stop reason: ALTCHOICE

## 2021-11-06 RX ORDER — ZOLPIDEM TARTRATE 10 MG/1
TABLET ORAL
COMMUNITY

## 2021-11-06 RX ADMIN — HYDROCODONE BITARTRATE AND ACETAMINOPHEN 1 TABLET: 5; 325 TABLET ORAL at 03:11

## 2021-11-14 ENCOUNTER — TELEPHONE (OUTPATIENT)
Dept: EMERGENCY MEDICINE | Facility: HOSPITAL | Age: 56
End: 2021-11-14
Payer: COMMERCIAL

## 2021-11-14 RX ORDER — CIPROFLOXACIN 500 MG/1
500 TABLET ORAL 2 TIMES DAILY
Qty: 20 TABLET | Refills: 0 | Status: SHIPPED | OUTPATIENT
Start: 2021-11-14 | End: 2021-11-19 | Stop reason: SDUPTHER

## 2021-11-19 ENCOUNTER — OFFICE VISIT (OUTPATIENT)
Dept: UROLOGY | Facility: CLINIC | Age: 56
End: 2021-11-19
Payer: COMMERCIAL

## 2021-11-19 VITALS — DIASTOLIC BLOOD PRESSURE: 82 MMHG | HEART RATE: 86 BPM | TEMPERATURE: 99 F | SYSTOLIC BLOOD PRESSURE: 118 MMHG

## 2021-11-19 DIAGNOSIS — N50.812 PAIN IN LEFT TESTICLE: ICD-10-CM

## 2021-11-19 DIAGNOSIS — N43.3 HYDROCELE, BILATERAL: ICD-10-CM

## 2021-11-19 DIAGNOSIS — N50.819 PAIN IN TESTICLE, UNSPECIFIED LATERALITY: Primary | ICD-10-CM

## 2021-11-19 PROCEDURE — 1159F MED LIST DOCD IN RCRD: CPT | Mod: CPTII,S$GLB,, | Performed by: NURSE PRACTITIONER

## 2021-11-19 PROCEDURE — 99213 PR OFFICE/OUTPT VISIT, EST, LEVL III, 20-29 MIN: ICD-10-PCS | Mod: S$GLB,,, | Performed by: NURSE PRACTITIONER

## 2021-11-19 PROCEDURE — 3079F PR MOST RECENT DIASTOLIC BLOOD PRESSURE 80-89 MM HG: ICD-10-PCS | Mod: CPTII,S$GLB,, | Performed by: NURSE PRACTITIONER

## 2021-11-19 PROCEDURE — 1160F PR REVIEW ALL MEDS BY PRESCRIBER/CLIN PHARMACIST DOCUMENTED: ICD-10-PCS | Mod: CPTII,S$GLB,, | Performed by: NURSE PRACTITIONER

## 2021-11-19 PROCEDURE — 1160F RVW MEDS BY RX/DR IN RCRD: CPT | Mod: CPTII,S$GLB,, | Performed by: NURSE PRACTITIONER

## 2021-11-19 PROCEDURE — 3074F SYST BP LT 130 MM HG: CPT | Mod: CPTII,S$GLB,, | Performed by: NURSE PRACTITIONER

## 2021-11-19 PROCEDURE — 99213 OFFICE O/P EST LOW 20 MIN: CPT | Mod: S$GLB,,, | Performed by: NURSE PRACTITIONER

## 2021-11-19 PROCEDURE — 3074F PR MOST RECENT SYSTOLIC BLOOD PRESSURE < 130 MM HG: ICD-10-PCS | Mod: CPTII,S$GLB,, | Performed by: NURSE PRACTITIONER

## 2021-11-19 PROCEDURE — 1159F PR MEDICATION LIST DOCUMENTED IN MEDICAL RECORD: ICD-10-PCS | Mod: CPTII,S$GLB,, | Performed by: NURSE PRACTITIONER

## 2021-11-19 PROCEDURE — 99999 PR PBB SHADOW E&M-EST. PATIENT-LVL V: CPT | Mod: PBBFAC,,, | Performed by: NURSE PRACTITIONER

## 2021-11-19 PROCEDURE — 99999 PR PBB SHADOW E&M-EST. PATIENT-LVL V: ICD-10-PCS | Mod: PBBFAC,,, | Performed by: NURSE PRACTITIONER

## 2021-11-19 PROCEDURE — 3079F DIAST BP 80-89 MM HG: CPT | Mod: CPTII,S$GLB,, | Performed by: NURSE PRACTITIONER

## 2021-11-19 RX ORDER — CIPROFLOXACIN 500 MG/1
500 TABLET ORAL 2 TIMES DAILY
Qty: 20 TABLET | Refills: 0 | Status: SHIPPED | OUTPATIENT
Start: 2021-11-19 | End: 2021-11-29

## 2021-11-29 ENCOUNTER — HOSPITAL ENCOUNTER (EMERGENCY)
Facility: HOSPITAL | Age: 56
Discharge: HOME | End: 2021-11-29
Attending: EMERGENCY MEDICINE
Payer: COMMERCIAL

## 2021-11-29 VITALS
HEIGHT: 69 IN | HEART RATE: 62 BPM | BODY MASS INDEX: 24.44 KG/M2 | DIASTOLIC BLOOD PRESSURE: 92 MMHG | SYSTOLIC BLOOD PRESSURE: 152 MMHG | RESPIRATION RATE: 18 BRPM | TEMPERATURE: 99 F | WEIGHT: 165 LBS | OXYGEN SATURATION: 99 %

## 2021-11-29 DIAGNOSIS — N50.812 PAIN IN LEFT TESTICLE: ICD-10-CM

## 2021-11-29 DIAGNOSIS — N43.3 BILATERAL HYDROCELE: ICD-10-CM

## 2021-11-29 DIAGNOSIS — N50.3 EPIDIDYMAL CYST: ICD-10-CM

## 2021-11-29 DIAGNOSIS — K40.90 UNILATERAL INGUINAL HERNIA WITHOUT OBSTRUCTION OR GANGRENE, RECURRENCE NOT SPECIFIED: Primary | ICD-10-CM

## 2021-11-29 LAB
BILIRUB UR QL STRIP: NEGATIVE
CLARITY UR: CLEAR
COLOR UR: YELLOW
GLUCOSE UR QL STRIP: NEGATIVE
HGB UR QL STRIP: NEGATIVE
KETONES UR QL STRIP: NEGATIVE
LEUKOCYTE ESTERASE UR QL STRIP: NEGATIVE
NITRITE UR QL STRIP: NEGATIVE
PH UR STRIP: 7 [PH] (ref 5–8)
PROT UR QL STRIP: NEGATIVE
SP GR UR STRIP: 1.01 (ref 1–1.03)
URN SPEC COLLECT METH UR: NORMAL
UROBILINOGEN UR STRIP-ACNC: NEGATIVE EU/DL

## 2021-11-29 PROCEDURE — 99284 EMERGENCY DEPT VISIT MOD MDM: CPT

## 2021-11-29 PROCEDURE — 25000003 PHARM REV CODE 250: Performed by: NURSE PRACTITIONER

## 2021-11-29 PROCEDURE — 81003 URINALYSIS AUTO W/O SCOPE: CPT | Performed by: NURSE PRACTITIONER

## 2021-11-29 RX ORDER — NAPROXEN 500 MG/1
500 TABLET ORAL 2 TIMES DAILY WITH MEALS
Qty: 10 TABLET | Refills: 0 | Status: SHIPPED | OUTPATIENT
Start: 2021-11-29 | End: 2021-11-29 | Stop reason: SDUPTHER

## 2021-11-29 RX ORDER — NAPROXEN 500 MG/1
500 TABLET ORAL 2 TIMES DAILY WITH MEALS
Qty: 10 TABLET | Refills: 0 | Status: SHIPPED | OUTPATIENT
Start: 2021-11-29 | End: 2021-12-04

## 2021-11-29 RX ORDER — CEPHALEXIN 500 MG/1
CAPSULE ORAL
COMMUNITY
End: 2022-08-19 | Stop reason: CLARIF

## 2021-11-29 RX ORDER — MELOXICAM 7.5 MG/1
7.5 TABLET ORAL DAILY
Status: DISCONTINUED | OUTPATIENT
Start: 2021-11-29 | End: 2021-11-29

## 2021-11-29 RX ORDER — MELOXICAM 7.5 MG/1
7.5 TABLET ORAL
Status: COMPLETED | OUTPATIENT
Start: 2021-11-29 | End: 2021-11-29

## 2021-11-29 RX ADMIN — MELOXICAM 7.5 MG: 7.5 TABLET ORAL at 09:11

## 2021-12-13 ENCOUNTER — DOCUMENTATION ONLY (OUTPATIENT)
Dept: RADIATION ONCOLOGY | Facility: CLINIC | Age: 56
End: 2021-12-13
Payer: COMMERCIAL

## 2021-12-13 VITALS — BODY MASS INDEX: 21.43 KG/M2 | WEIGHT: 145.13 LBS

## 2022-01-26 ENCOUNTER — HOSPITAL ENCOUNTER (OUTPATIENT)
Dept: RADIOLOGY | Facility: HOSPITAL | Age: 57
Discharge: HOME | End: 2022-01-26
Attending: RADIOLOGY
Payer: COMMERCIAL

## 2022-01-26 DIAGNOSIS — C76.0 MALIGNANT NEOPLASM OF HEAD, FACE AND NECK: ICD-10-CM

## 2022-01-26 LAB
CREAT SERPL-MCNC: 0.9 MG/DL (ref 0.5–1.4)
SAMPLE: NORMAL

## 2022-01-26 PROCEDURE — 25500020 PHARM REV CODE 255: Mod: PO | Performed by: RADIOLOGY

## 2022-01-26 PROCEDURE — A9585 GADOBUTROL INJECTION: HCPCS | Mod: PO | Performed by: RADIOLOGY

## 2022-01-26 PROCEDURE — 70553 MRI BRAIN STEM W/O & W/DYE: CPT | Mod: TC,PO

## 2022-01-26 PROCEDURE — 70491 CT SOFT TISSUE NECK W/DYE: CPT | Mod: TC,PO

## 2022-01-26 RX ORDER — GADOBUTROL 604.72 MG/ML
7 INJECTION INTRAVENOUS
Status: COMPLETED | OUTPATIENT
Start: 2022-01-26 | End: 2022-01-26

## 2022-01-26 RX ADMIN — GADOBUTROL 7 ML: 604.72 INJECTION INTRAVENOUS at 02:01

## 2022-01-26 RX ADMIN — IOHEXOL 100 ML: 350 INJECTION, SOLUTION INTRAVENOUS at 03:01

## 2022-03-22 ENCOUNTER — DOCUMENTATION ONLY (OUTPATIENT)
Dept: HEMATOLOGY/ONCOLOGY | Facility: CLINIC | Age: 57
End: 2022-03-22
Payer: COMMERCIAL

## 2022-03-22 ENCOUNTER — TELEPHONE (OUTPATIENT)
Dept: RADIATION ONCOLOGY | Facility: CLINIC | Age: 57
End: 2022-03-22

## 2022-03-22 ENCOUNTER — TELEPHONE (OUTPATIENT)
Dept: HEMATOLOGY/ONCOLOGY | Facility: CLINIC | Age: 57
End: 2022-03-22
Payer: COMMERCIAL

## 2022-03-22 ENCOUNTER — OFFICE VISIT (OUTPATIENT)
Dept: RADIATION ONCOLOGY | Facility: CLINIC | Age: 57
End: 2022-03-22
Payer: COMMERCIAL

## 2022-03-22 VITALS
DIASTOLIC BLOOD PRESSURE: 72 MMHG | HEART RATE: 78 BPM | RESPIRATION RATE: 18 BRPM | OXYGEN SATURATION: 98 % | BODY MASS INDEX: 20.02 KG/M2 | TEMPERATURE: 98 F | WEIGHT: 135.63 LBS | SYSTOLIC BLOOD PRESSURE: 108 MMHG

## 2022-03-22 DIAGNOSIS — C76.0 MALIGNANT NEOPLASM OF HEAD, FACE AND NECK: Primary | ICD-10-CM

## 2022-03-22 PROCEDURE — 3078F PR MOST RECENT DIASTOLIC BLOOD PRESSURE < 80 MM HG: ICD-10-PCS | Mod: S$GLB,,, | Performed by: RADIOLOGY

## 2022-03-22 PROCEDURE — 99213 PR OFFICE/OUTPT VISIT, EST, LEVL III, 20-29 MIN: ICD-10-PCS | Mod: S$GLB,,, | Performed by: RADIOLOGY

## 2022-03-22 PROCEDURE — 3008F PR BODY MASS INDEX (BMI) DOCUMENTED: ICD-10-PCS | Mod: S$GLB,,, | Performed by: RADIOLOGY

## 2022-03-22 PROCEDURE — 4010F ACE/ARB THERAPY RXD/TAKEN: CPT | Mod: S$GLB,,, | Performed by: RADIOLOGY

## 2022-03-22 PROCEDURE — 4010F PR ACE/ARB THEARPY RXD/TAKEN: ICD-10-PCS | Mod: S$GLB,,, | Performed by: RADIOLOGY

## 2022-03-22 PROCEDURE — 3074F SYST BP LT 130 MM HG: CPT | Mod: S$GLB,,, | Performed by: RADIOLOGY

## 2022-03-22 PROCEDURE — 3008F BODY MASS INDEX DOCD: CPT | Mod: S$GLB,,, | Performed by: RADIOLOGY

## 2022-03-22 PROCEDURE — 99213 OFFICE O/P EST LOW 20 MIN: CPT | Mod: S$GLB,,, | Performed by: RADIOLOGY

## 2022-03-22 PROCEDURE — 3078F DIAST BP <80 MM HG: CPT | Mod: S$GLB,,, | Performed by: RADIOLOGY

## 2022-03-22 PROCEDURE — 3074F PR MOST RECENT SYSTOLIC BLOOD PRESSURE < 130 MM HG: ICD-10-PCS | Mod: S$GLB,,, | Performed by: RADIOLOGY

## 2022-03-22 NOTE — TELEPHONE ENCOUNTER
----- Message from Ariella Vincent sent at 3/22/2022  2:40 PM CDT -----  Contact: patient  Type: Needs Medical Advice  Who Called:  patient  Best Call Back Number: [phone] (home)   Additional Information: patient is requesting a call back- he is wanting to know if he can come in today instead of next week. Please advise

## 2022-03-22 NOTE — TELEPHONE ENCOUNTER
----- Message from Jooj Middleton sent at 3/22/2022  2:24 PM CDT -----  Regarding: sooner appointment  Contact: patient  Type:  Sooner Apoointment Request    Caller is requesting a sooner appointment.  Caller declined first available appointment listed below.  Caller will not accept being placed on the waitlist and is requesting a message be sent to doctor.    Name of Caller:  Patient   When is the first available appointment?  March 29th  Symptoms:  follow up  Best Call Back Number:  [phone] (home)

## 2022-03-22 NOTE — PROGRESS NOTES
Dr Cherry is concerned about the pearly telangiectasic area above his right brow-may need punch biopsy I    Also 2 new cancers but can't afford co pay to have removed  He sent him to  for medicaid application.

## 2022-03-22 NOTE — PROGRESS NOTES
Margarito Mcnamara  [id number]  1965  3/22/2022    DIAGNOSIS: Recurrent BCC of the vertex scalp w/ invasion through cranium into superior sagittal sinus    REASON FOR VISIT: Routine scheduled follow-up.     HISTORY OF PRESENT ILLNESS:   Mr. Mcnamara is a 55M who was referred to Dr. Vidal for evaluation and treatment of recurrent basal cell carcinoma of the right forehead and scalp for possible resection.  He was previously treated with a hedgehog inhibitor, Erivedge, in 2018 with good response.  However, patient discontinued this prematurely after 6 months and the tumor has recurred. The tumor then recurred locally and, per imaging, the tumor was larger and had invaded through cranium into superior sagittal sinus.     He then underwent WLE w/ mesh/graft closure by Ksenia Vidal, Lauren, and Norma on 3/3/21 folllowed by reexcisions for (+)SM on 3/5/21.  He then underwent adj scalp/cranial RT @ 6600 cGy in 33 fxns, completed 6/11/21.    INTERVAL HISTORY:     Since his last visit, he has continue to endure slow healing of his scalp desquamation w/o any concerning regression or infection/ulceration.  Additionally, his CT and MRI imaging studies from Jan 2022 remain w/ [facility].  He has f/u w/ / [facility] next week as well. He continues daily use of silvadene on scalp. He also reports having had bx's of a SCC of his right neck and BCC of his left neck, however due to work/insurance issues cannot afford his copay for WLE right now.  He denies other changes/issues/lesions/LAD, pain, focal deficits, sz's, syncope, falls, or f/c/n/v/d/cp/sob..              CT neck (1/26/22):        Brain MRI (1/26/22):        REVIEW OF SYSTEMS:  A complete review of systems was performed and the patient denies any acute concerns/changes other than per HPI    Past Medical History:   Diagnosis Date    Arthritis     Depression     Hydrocele in adult     Migraine      Past Surgical History:   Procedure Laterality Date    BACK SURGERY  11/2020    CRANIOTOMY  N/A 3/3/2021    Procedure: CRANIOTOMY, USING FRAMELESS STEREOTAXY, OPEN, BIFRONTAL;  Surgeon: Raheel Green MD;  Location: [facility] OR [address] [place] [address];  Service: Neurosurgery;  Laterality: N/A;  TOR I  ASA I  TYPE & CROSS 2 UNITS  REGULAR BED  PORRAS HEADREST  CHILDRESS  STEALTH CT    FLAP PROCEDURE N/A 3/3/2021    Procedure: CREATION, FREE FLAP;  Surgeon: Ayaan Aguilar MD;  [facility];  Service: ENT;  Laterality: N/A;  Latissimus free flap. Will need bean bag and Porras.    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[employer]   Tobacco Use    Smoking status: Current Every Day Smoker     Packs/day: 0.50     Types: Cigarettes    Smokeless tobacco: Current User    Tobacco comment: 1/2 pack per day   Substance and Sexual Activity    Alcohol use: No     Comment: stopped 8 months ago    Drug use: Not Currently     Types: Marijuana    Sexual activity: Yes     Partners: Female   Social History Narrative    Lives in a house with 25 year old son     Family History   Problem Relation Age of Onset    Depression Mother     Alcohol abuse Father     Hypertension Brother     Headaches Sister      Medication List with Changes/Refills   Current Medications    ACETAMINOPHEN (TYLENOL) 325 MG TABLET    Take 2 tablets (650 mg total) by mouth every 6 (six) hours as needed for Pain.    AMLODIPINE (NORVASC) 10 MG TABLET    Take 10 mg by mouth once daily.    ASPIRIN (ECOTRIN) 81 MG EC TABLET    TAKE 1 TABLET (81 MG TOTAL) BY MOUTH DAILY    ASPIRIN 81 MG CHEW    Take 81 mg by mouth once daily.    ATORVASTATIN (LIPITOR) 20 MG TABLET    Take 20 mg by mouth once daily.    BACITRACIN 500 UNIT/GRAM OINTMENT    Apply topically 3 (three) times daily.    BACLOFEN (LIORESAL)  20 MG TABLET    Take 20 mg by mouth 3 (three) times daily as needed.    BISACODYL (DULCOLAX) 10 MG SUPP    Place 1 suppository (10 mg total) rectally daily as needed.    BUPROPION (WELLBUTRIN XL) 150 MG TB24 TABLET        BUTALBITAL-ACETAMINOPHEN-CAFFEINE -40 MG (FIORICET, ESGIC) -40 MG PER TABLET    Take 1 tablet by mouth 2 (two) times daily.    CALCIUM CARBONATE (TUMS) 200 MG CALCIUM (500 MG) CHEWABLE TABLET    Take 1 tablet (500 mg total) by mouth daily as needed.    CEPHALEXIN (KEFLEX) 500 MG CAPSULE        DEXAMETHASONE (DECADRON) 2 MG TABLET    Take 1 tablet (2 mg total) by mouth 2 (two) times daily with meals.    DEXTROAMPHETAMINE-AMPHETAMINE (ADDERALL) 15 MG TABLET    Take 15 mg by mouth 2 (two) times daily.    DOCUSATE (COLACE) 50 MG/5 ML LIQUID    Take 10 mLs (100 mg total) by mouth once daily.    ENOXAPARIN (LOVENOX) 40 MG/0.4 ML SYRG    Inject 0.4 mLs (40 mg total) into the skin once daily.    GABAPENTIN (NEURONTIN) 300 MG CAPSULE    Take 300 mg by mouth 3 (three) times daily.    HYDROCODONE-ACETAMINOPHEN (NORCO) 5-325 MG PER TABLET    Take 1 tablet by mouth every 8 (eight) hours as needed for Pain.    HYDROCODONE-ACETAMINOPHEN (NORCO) 7.5-325 MG PER TABLET    TAKE 1 TABLET BY MOUTH TWICE A DAY AS NEEDED FOR PAIN MORE THAN 7 DAYS SUPPLY IS MEDICALLY NECESSARY    OXYCODONE (ROXICODONE) 10 MG TAB IMMEDIATE RELEASE TABLET    1 tablet as needed    PANTOPRAZOLE (PROTONIX) 40 MG TABLET    Take 40 mg by mouth once daily.    QUETIAPINE (SEROQUEL) 25 MG TAB    Take 1 tablet (25 mg total) by mouth every evening.    SILDENAFIL (VIAGRA) 50 MG TABLET    Take 50 mg by mouth daily as needed.    SILVER SULFADIAZINE 1% (SILVADENE) 1 % CREAM    Apply topically 2 (two) times daily.    TADALAFIL (CIALIS) 20 MG TAB    Take by mouth.    TRAMADOL (ULTRAM-ER) 100 MG TB24    Take 50 mg by mouth once daily.     ZOLPIDEM (AMBIEN) 10 MG TAB    1 tablet at bedtime as needed     Review of patient's allergies  indicates:  No Known Allergies    QUALITY OF LIFE: 80%- Normal Activity with Effort: Some Symptoms of Disease    There were no vitals filed for this visit.  There is no height or weight on file to calculate BMI.    PHYSICAL EXAM:  GENERAL: alert; in no apparent distress.   HEAD: ~4x4cm area of moist desq and thick purulence of anterior midline scalp that is smaller and more healed than his last visit - no noted bleeding or active drainage, nor masses/ulceration/defect - however above right eye brow there is extension of pearly telangiectasias and paralysis of his right brow motion, but this muscular loss is not new  EYES: pupils are equal, round, reactive to light and accommodation. Sclera anicteric. Conjunctiva not injected.   NOSE/THROAT: no nasal erythema or rhinorrhea. Oropharynx pink, without erythema, ulcerations or thrush.   NECK: no cervical motion rigidity; supple with no masses; ~1cm shave bx'd kjeratoses of right neck which pt reports to have been deemed SCC, as well as a larger scab of left neck that was also bx'd and reported to patient as BCC; no LAD or masses.  CHEST: clear to auscultation bilaterally; no wheezes, crackles or rubs. Patient is speaking comfortably on room air with normal work of breathing without using accessory muscles of respiration.  CARDIOVASCULAR: regular rate and rhythm; no murmurs, rubs or gallops.  ABDOMEN: soft, nontender, nondistended. Bowel sounds present.   MUSCULOSKELETAL: no tenderness to palpation along the spine or scapulae. Normal range of motion.  NEUROLOGIC: cranial nerves II-XII intact bilaterally other than above brown furrowing loss on right side. Strength 5/5 in bilateral upper and lower extremities. No sensory deficits appreciated outside of skin peripheral to scalp lesion. Reflexes globally intact. No cerebellar signs. Normal gait.  LYMPHATIC: no cervical, supraclavicular or axillary adenopathy appreciated bilaterally.       ASSESSMENT: Margartio Mcnamara is a 56  y.o. male with h/o recurrent BCC of the vertex scalp w/ invasion through cranium into superior sagittal sinus s/p WLE and adj RT completed June 2021.    PLAN:   - Refer pt to SW to discuss Medicaid and other assistance in paying for tx of his bilateral neck skin cancers; definitive XRT also offered but likely will incur similar copay as surgery he cannot currently afford - he will discuss w/ SW, and is aware that the lesion need attention soon.    - Pt has f/u w/ ENT 3/29/22, as well dermatology soon - and I explained to him (and cc'd Dr. Vidal & Dr. Scales here) to request that the telangiectasias above right eye brow be considered for bx as appear to have increased in size w/ pearly BCC-like features.    - Repeat CT neck and Brain MRI in 2-3 weeks    All questions answered and contact information provided. Patient understands free to call us anytime with any questions or concerns regarding radiation therapy.    I have personally seen and evaluated this patient. Greater than 50% of this time was spent discussing coordination of care and/or counseling.    PHYSICIAN: Carlos Cherry III, MD

## 2022-03-23 ENCOUNTER — TELEPHONE (OUTPATIENT)
Dept: HEMATOLOGY/ONCOLOGY | Facility: CLINIC | Age: 57
End: 2022-03-23
Payer: COMMERCIAL

## 2022-03-23 NOTE — PROGRESS NOTES
Dr. Cherry requested I speak with patient regarding him needing assistance with paying co-pays and other medical bills.  I called patient's number listed in his chart; he did not answer.  I left a voicemail requesting a return call.
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
## 2022-03-23 NOTE — PROGRESS NOTES
The number to [facility] Financial Assistance provided to patient.  He was also encouraged to contact Kiki Merino in order for her to assist him in completing a Medicaid application.  [facility] convenience to complete the [employer] application for gas card assistance, complete a [employer] application for gas/[employer] card, and to be provided the list of [place] community resources.

## 2022-03-24 ENCOUNTER — OFFICE VISIT (OUTPATIENT)
Dept: HEMATOLOGY/ONCOLOGY | Facility: CLINIC | Age: 57
End: 2022-03-24
Payer: COMMERCIAL

## 2022-03-24 VITALS
SYSTOLIC BLOOD PRESSURE: 116 MMHG | RESPIRATION RATE: 18 BRPM | TEMPERATURE: 98 F | DIASTOLIC BLOOD PRESSURE: 78 MMHG | OXYGEN SATURATION: 98 % | HEIGHT: 69 IN | BODY MASS INDEX: 19.99 KG/M2 | WEIGHT: 134.94 LBS | HEART RATE: 87 BPM

## 2022-03-24 DIAGNOSIS — R45.1 AGITATION: ICD-10-CM

## 2022-03-24 DIAGNOSIS — F32.A DEPRESSION, UNSPECIFIED DEPRESSION TYPE: Primary | ICD-10-CM

## 2022-03-24 DIAGNOSIS — C44.320 SQUAMOUS CELL SKIN CANCER, FACE: ICD-10-CM

## 2022-03-24 DIAGNOSIS — S01.00XS OPEN WOUND OF SCALP, UNSPECIFIED OPEN WOUND TYPE, SEQUELA: ICD-10-CM

## 2022-03-24 PROCEDURE — 99213 OFFICE O/P EST LOW 20 MIN: CPT | Mod: S$GLB,,, | Performed by: OTOLARYNGOLOGY

## 2022-03-24 PROCEDURE — 3008F PR BODY MASS INDEX (BMI) DOCUMENTED: ICD-10-PCS | Mod: CPTII,S$GLB,, | Performed by: OTOLARYNGOLOGY

## 2022-03-24 PROCEDURE — 4010F PR ACE/ARB THEARPY RXD/TAKEN: ICD-10-PCS | Mod: CPTII,S$GLB,, | Performed by: OTOLARYNGOLOGY

## 2022-03-24 PROCEDURE — 3074F PR MOST RECENT SYSTOLIC BLOOD PRESSURE < 130 MM HG: ICD-10-PCS | Mod: CPTII,S$GLB,, | Performed by: OTOLARYNGOLOGY

## 2022-03-24 PROCEDURE — 4010F ACE/ARB THERAPY RXD/TAKEN: CPT | Mod: CPTII,S$GLB,, | Performed by: OTOLARYNGOLOGY

## 2022-03-24 PROCEDURE — 3078F DIAST BP <80 MM HG: CPT | Mod: CPTII,S$GLB,, | Performed by: OTOLARYNGOLOGY

## 2022-03-24 PROCEDURE — 1159F MED LIST DOCD IN RCRD: CPT | Mod: CPTII,S$GLB,, | Performed by: OTOLARYNGOLOGY

## 2022-03-24 PROCEDURE — 1160F PR REVIEW ALL MEDS BY PRESCRIBER/CLIN PHARMACIST DOCUMENTED: ICD-10-PCS | Mod: CPTII,S$GLB,, | Performed by: OTOLARYNGOLOGY

## 2022-03-24 PROCEDURE — 99213 PR OFFICE/OUTPT VISIT, EST, LEVL III, 20-29 MIN: ICD-10-PCS | Mod: S$GLB,,, | Performed by: OTOLARYNGOLOGY

## 2022-03-24 PROCEDURE — 99999 PR PBB SHADOW E&M-EST. PATIENT-LVL V: CPT | Mod: PBBFAC,,, | Performed by: OTOLARYNGOLOGY

## 2022-03-24 PROCEDURE — 1159F PR MEDICATION LIST DOCUMENTED IN MEDICAL RECORD: ICD-10-PCS | Mod: CPTII,S$GLB,, | Performed by: OTOLARYNGOLOGY

## 2022-03-24 PROCEDURE — 3074F SYST BP LT 130 MM HG: CPT | Mod: CPTII,S$GLB,, | Performed by: OTOLARYNGOLOGY

## 2022-03-24 PROCEDURE — 99999 PR PBB SHADOW E&M-EST. PATIENT-LVL V: ICD-10-PCS | Mod: PBBFAC,,, | Performed by: OTOLARYNGOLOGY

## 2022-03-24 PROCEDURE — 3078F PR MOST RECENT DIASTOLIC BLOOD PRESSURE < 80 MM HG: ICD-10-PCS | Mod: CPTII,S$GLB,, | Performed by: OTOLARYNGOLOGY

## 2022-03-24 PROCEDURE — 1160F RVW MEDS BY RX/DR IN RCRD: CPT | Mod: CPTII,S$GLB,, | Performed by: OTOLARYNGOLOGY

## 2022-03-24 PROCEDURE — 3008F BODY MASS INDEX DOCD: CPT | Mod: CPTII,S$GLB,, | Performed by: OTOLARYNGOLOGY

## 2022-03-24 RX ORDER — ROSUVASTATIN CALCIUM 20 MG/1
TABLET, COATED ORAL
COMMUNITY
Start: 2021-12-06

## 2022-03-24 RX ORDER — AMLODIPINE AND BENAZEPRIL HYDROCHLORIDE 10; 20 MG/1; MG/1
1 CAPSULE ORAL DAILY
COMMUNITY
Start: 2021-12-02

## 2022-03-25 ENCOUNTER — SOCIAL WORK (OUTPATIENT)
Dept: HEMATOLOGY/ONCOLOGY | Facility: CLINIC | Age: 57
End: 2022-03-25
Payer: COMMERCIAL

## 2022-03-25 ENCOUNTER — DOCUMENTATION ONLY (OUTPATIENT)
Dept: INFUSION THERAPY | Facility: HOSPITAL | Age: 57
End: 2022-03-25
Payer: COMMERCIAL

## 2022-03-25 NOTE — PROGRESS NOTES
Patient came to [facility] to sign [employer]EM[employer] application and [employer] application; [employer] application emailed to that agency.  Patient was given the [employer] application and return to me by 2/28/22 for possible financial assistance.  Patient has yet to contact [facility] for assistance with the [place] Medicaid application.  He has not contacted [facility] or [facility] numbers provided to him for financial assistance with medical bills.  Patient encouraged to complete aforementioned as soon as possible.

## 2022-03-25 NOTE — PROGRESS NOTES
Late entry for 3/24/22    VEDA called to clinic to see pt for resources. Pt has lost his job and is unsure when his current insurance will end. Pt needs to have skin tags on his neck removed.     Pt has spoken with the SW in [place] and plans to follow up with her for resources. Pt was given name of person to apply for financial aid and to do a Medicaid application by the [employer].     This SW encouraged pt to follow up with applying for Medicaid and with SW in [place]. Pt has considered applying disability. He voiced frustration with process of applying.     Pt voiced anger about his job situation. 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 He said he is worried about asking too many questions of his HR department saying he dopes not want to rock the boat.    Dr Vidal and VEDA encouraged pt to contact his insurance company to see when his current insurance is ending. Depending on end date he maybe able to have surgery before insurance ends. Pt agreed to call them and call Dr. Vidal's nurse DEXTER Krishnan with this information.    VEDA did provide pt with an emergency gas card today to help pt get back to [place].     SW to follow.     Rhiannon Mosley, FELA

## 2022-03-29 ENCOUNTER — DOCUMENTATION ONLY (OUTPATIENT)
Dept: INFUSION THERAPY | Facility: HOSPITAL | Age: 57
End: 2022-03-29
Payer: COMMERCIAL

## 2022-03-29 NOTE — PROGRESS NOTES
Late entry for 3/28/22    SW was called to see pt per his request. SW met with pt who reported he has not yet called insurance company to assertain when current coverage will be ending. SW encouraged him to call asap in the hope can schedule his needed procedure.     SW also strongly encouraged to follow up wit applying for Medicaid. He said he could not remember the name if the person to contact but knows the SW in [place] who was helping him. SW asked pt to reach out to her in [place] for follow up.     Pt has received his prof of income via e-mail that he needs. He attempted to forward to this SW to print out a copy for him but unable to forward.  Pt will show emails to SW in [place].   SW to follow.    Rhiannon Mosley, FELA

## 2022-03-30 ENCOUNTER — TELEPHONE (OUTPATIENT)
Dept: HEMATOLOGY/ONCOLOGY | Facility: CLINIC | Age: 57
End: 2022-03-30
Payer: COMMERCIAL

## 2022-04-26 DIAGNOSIS — S66.811A: Primary | ICD-10-CM

## 2022-05-02 ENCOUNTER — CLINICAL SUPPORT (OUTPATIENT)
Dept: REHABILITATION | Facility: HOSPITAL | Age: 57
End: 2022-05-02
Payer: COMMERCIAL

## 2022-05-02 DIAGNOSIS — M79.641 RIGHT HAND PAIN: Primary | ICD-10-CM

## 2022-05-02 PROBLEM — S66.811A: Status: ACTIVE | Noted: 2022-05-02

## 2022-05-02 PROCEDURE — 97165 OT EVAL LOW COMPLEX 30 MIN: CPT | Mod: PN

## 2022-05-02 NOTE — PLAN OF CARE
[facility] Occupational Therapy  Initial Evaluation     Date: 5/2/2022  Name: Margarito Mcnamara  Clinic Number: [id number]    Therapy Diagnosis:   Encounter Diagnosis   Name Primary?    Right hand pain Yes     Physician: Anthony Nicole MD    Physician Orders: evaluate and tx, see epic  Medical Diagnosis: right mcp sprain  Surgical Procedure and Date: n/a, n/a  Evaluation Date: 5/2/2022  Insurance Authorization Period Expiration: referral [id number] 4-26-22 thru 12-31-22             Plan of Care Certification Period: 5-2-22 thru 5-20-22  Date of Return to MD: 5-6-22    Visit # / Visits authorized: 1 / 1 referral [id number] 4-26-22 thru 12-31-22  Time In:1115  Time Out: 1135  Total Billable Time: 0 minutes    Precautions:  universal, see epic, protocol if applicable  Subjective     Involved Side: right  Dominant Side: nt  Date of Onset: 12 days ago  History of Current Condition/Mechanism of Injury:  injury, er same day, saw referring md who sent here, concerns wound healing reviewed in referring md note-see media  Imaging: see epic  Previous Therapy: none    Past Medical History/Physical Systems Review:   Margarito Mcnamara  has a past medical history of Arthritis, Depression, Hydrocele in adult, and Migraine.    Margarito Mcnamara  has a past surgical history that includes Back surgery (11/2020); Flap procedure (N/A, 3/3/2021); Craniotomy (N/A, 3/3/2021); Flap procedure (Left, 3/5/2021); and Laparoscopic repair of hernia.    Margarito has a current medication list which includes the following prescription(s): acetaminophen, amlodipine, amlodipine-benazepril 10-20mg, aspirin, aspirin, atorvastatin, bacitracin, baclofen, bisacodyl, bupropion, butalbital-acetaminophen-caffeine -40 mg, calcium carbonate, cephalexin, dexamethasone, dextroamphetamine-amphetamine, docusate, enoxaparin, gabapentin, hydrocodone-acetaminophen, hydrocodone-acetaminophen, oxycodone, pantoprazole, quetiapine,  rosuvastatin, sildenafil, silver sulfadiazine 1%, tadalafil, tramadol, and zolpidem.    Review of patient's allergies indicates:  No Known Allergies     Patient's Goals for Therapy: full painless use    Pain:  Functional Pain Scale Rating 0-10:   4/10 on average  0/10 at best  7/10 at worst  Side:right  Location: diffuse thumb  Description: sharp, ache  Aggravating Factors: unpredicatable  Easing Factors: repositioning  Occupation:  Xray technician   Working presently: no  Duties: per job if working; typical self and home care    Functional Limitations/Social History:    Previous functional status includes: Independent with all ADLs.     Current Functional Status   Home/Living environment : lives with son    Limitation of Functional Status as follows: decreased motion, use, and strength with ADL   ADLs/IADLs:               See above   Leisure: not tested  pain meds: per referring md  nicotine: about 2 cigarettes daily  caffeine: about 8 ounces of regular coffee daily    Objective   Posture:noadherent dressing applied, has healed longitudnial incision on dorsal distal metacarpal with about a 2 x 2 cm spot with yellowish tissue around base of metacarpal  Palpation:nt  Sensation:intermittent numbness full length of disral thumb  ROM: full prom all planes of thumb, full active opp, no active extension of mcp or ip          CMS Impairment/Limitation/Restriction for FOTO Survey    Therapist reviewed FOTO scores for Margarito Mcnamara on 5/2/2022.   FOTO documents entered into [employer] - see Media section.    Limitation Score: 50%  Category: Carrying    Current : CK = at least 40% but < 60% impaired, limited or restricted  Goal: CJ = at least 20% but < 40% impaired, limited or restricted               Treatment           Home Exercise Program/Education:      Explained to clean wound with pump dial soap and to continue with nonstick dressings, explained to keep wound dry    Explained passive composite thumb ext and opp which he  should do frequently thru day to discourage motion loss    Explained continued clinic OT not indicated        Assessment     Margarito Mcnamara is a 56 y.o. male referred to outpatient occupational therapy and presents with a medical diagnosis of see above resulting in Decreased ROM and Diminished/Impaired Sensation and demonstrates limitations as described in the chart below.       Pt has no cultural, educational or language barriers to learning provided.    Profile and History Assessment of Occupational Performance Level of Clinical Decision Making Complexity Score   Occupational Profile:   Margarito Mcnamara is a 56 y.o. male who lives with their son and is on medical leave Margarito Mcnamara has difficulty with  ADLs and IADLs as listed previously, which  affecting his/her daily functional abilities.      Comorbidities:    has a past medical history of Arthritis, Depression, Hydrocele in adult, and Migraine.    Medical and Therapy History Review:   Brief               Performance Deficits    Physical:  Muscle Power/Strength  Skin Integrity/Scar Formation  Pain    Cognitive:  No Deficits    Psychosocial:    No Deficits     Clinical Decision Making:  low    Assessment Process:  Problem-Focused Assessments    Modification/Need for Assistance:  Not Necessary    Intervention Selection:  Limited Treatment Options       low  Based on PMHX, co morbidities , data from assessments and functional level of assistance required with task and clinical presentation directly impacting function.           The following goals were discussed with the patient and patient is in agreement with them as to be addressed in the treatment plan.     Goals:   N/a since patient does not need continued OT    Plan   Certification Period/Plan of care expiration: n/a    CADENCE Hu certify the need for the services furnished under this plan of care.    doctor's signature/referring provider's  signature:______________________________________________________

## 2022-06-07 ENCOUNTER — TELEPHONE (OUTPATIENT)
Dept: HEMATOLOGY/ONCOLOGY | Facility: CLINIC | Age: 57
End: 2022-06-07
Payer: COMMERCIAL

## 2022-06-07 NOTE — TELEPHONE ENCOUNTER
----- Message from Catalina Vidal MD sent at 6/6/2022  3:57 PM CDT -----  Regarding: RE: surgery  He has to come see me first  Thanks  ----- Message -----  From: Ariella Patel RN  Sent: 6/6/2022   3:48 PM CDT  To: Catalina Vidal MD  Subject: surgery                                          Pt is ready to schedule his surgery.    Pt has hired an  for disability and has left a functional capacity questionnaire to be filled.    Thank you

## 2022-06-13 ENCOUNTER — DOCUMENTATION ONLY (OUTPATIENT)
Dept: REHABILITATION | Facility: HOSPITAL | Age: 57
End: 2022-06-13
Payer: MEDICAID

## 2022-06-13 NOTE — PROGRESS NOTES
Outpatient Therapy Discharge Summary     Date: 6-13-22      Name: Margarito Mcnamara  [facility] Number: [id number]    Therapy Diagnosis: No diagnosis found.  Physician: No ref. provider found    Physician Orders: see evaluation  Medical Diagnosis: see evaluation  Evaluation Date: 5-2-22      Date of Last visit: 5-2-22  Total Visits Received: 1  Cancelled Visits: see epic  No Show Visits: see epic    Assessment    Goals: see last note    Discharge reason: Patient has reached the maximum rehab potential for the present time    Plan   This patient is discharged from Occupational Therapy

## 2022-06-14 ENCOUNTER — TELEPHONE (OUTPATIENT)
Dept: HEMATOLOGY/ONCOLOGY | Facility: CLINIC | Age: 57
End: 2022-06-14
Payer: MEDICAID

## 2022-06-14 NOTE — TELEPHONE ENCOUNTER
Spoke with patient wants to talk with Ariella when she returns in the am nothing urgent at this time.  Instructed to call for any concerns.

## 2022-06-14 NOTE — TELEPHONE ENCOUNTER
----- Message from Dana Doss Patient Care Assistant sent at 6/14/2022  2:56 PM CDT -----  Type:  Patient Returning Call    Who Called:  Margarito  Who Left Message for Patient:  prabhjot  Does the patient know what this is regarding?:  ?  Best Call Back Number:  [phone]    Additional Information:  margarito was returning a call from prabhjot , please call to further discuss ,thank you

## 2022-06-17 ENCOUNTER — TELEPHONE (OUTPATIENT)
Dept: HEMATOLOGY/ONCOLOGY | Facility: CLINIC | Age: 57
End: 2022-06-17
Payer: MEDICAID

## 2022-06-17 NOTE — TELEPHONE ENCOUNTER
----- Message from Priyanka Ruiz MA sent at 6/17/2022 10:57 AM CDT -----  Type:  Patient Returning Call    Who Called:  Margarito  Who Left Message for Patient:  Ariella  Does the patient know what this is regarding?:  appt  Best Call Back Number:  [phone]  Additional Information:

## 2022-07-12 ENCOUNTER — HOSPITAL ENCOUNTER (OUTPATIENT)
Dept: RADIOLOGY | Facility: HOSPITAL | Age: 57
Discharge: HOME | End: 2022-07-12
Attending: SURGERY
Payer: COMMERCIAL

## 2022-07-12 DIAGNOSIS — K40.20 NON-RECURRENT BILATERAL INGUINAL HERNIA WITHOUT OBSTRUCTION OR GANGRENE: Primary | ICD-10-CM

## 2022-07-12 DIAGNOSIS — K40.20 NON-RECURRENT BILATERAL INGUINAL HERNIA WITHOUT OBSTRUCTION OR GANGRENE: ICD-10-CM

## 2022-07-12 PROCEDURE — 74176 CT ABD & PELVIS W/O CONTRAST: CPT | Mod: TC

## 2022-07-13 DIAGNOSIS — K40.20 NON-RECURRENT BILATERAL INGUINAL HERNIA WITHOUT OBSTRUCTION OR GANGRENE: Primary | ICD-10-CM

## 2022-07-13 DIAGNOSIS — C44.41 BASAL CELL CARCINOMA (BCC) OF LEFT SIDE OF NECK: ICD-10-CM

## 2022-07-13 DIAGNOSIS — C44.320 SQUAMOUS CELL SKIN CANCER, FACE: ICD-10-CM

## 2022-07-18 ENCOUNTER — OFFICE VISIT (OUTPATIENT)
Dept: HEMATOLOGY/ONCOLOGY | Facility: CLINIC | Age: 57
End: 2022-07-18
Payer: COMMERCIAL

## 2022-07-18 VITALS
BODY MASS INDEX: 18.64 KG/M2 | HEIGHT: 69 IN | RESPIRATION RATE: 18 BRPM | SYSTOLIC BLOOD PRESSURE: 130 MMHG | WEIGHT: 125.88 LBS | DIASTOLIC BLOOD PRESSURE: 86 MMHG | OXYGEN SATURATION: 98 % | TEMPERATURE: 97 F | HEART RATE: 71 BPM

## 2022-07-18 DIAGNOSIS — S01.00XS OPEN WOUND OF SCALP, UNSPECIFIED OPEN WOUND TYPE, SEQUELA: ICD-10-CM

## 2022-07-18 DIAGNOSIS — C44.41 BASAL CELL CARCINOMA (BCC) OF LEFT SIDE OF NECK: Primary | ICD-10-CM

## 2022-07-18 DIAGNOSIS — G89.28 OTHER CHRONIC POSTPROCEDURAL PAIN: ICD-10-CM

## 2022-07-18 PROCEDURE — 3079F DIAST BP 80-89 MM HG: CPT | Mod: CPTII,S$GLB,, | Performed by: OTOLARYNGOLOGY

## 2022-07-18 PROCEDURE — 3008F PR BODY MASS INDEX (BMI) DOCUMENTED: ICD-10-PCS | Mod: CPTII,S$GLB,, | Performed by: OTOLARYNGOLOGY

## 2022-07-18 PROCEDURE — 3008F BODY MASS INDEX DOCD: CPT | Mod: CPTII,S$GLB,, | Performed by: OTOLARYNGOLOGY

## 2022-07-18 PROCEDURE — 4010F PR ACE/ARB THEARPY RXD/TAKEN: ICD-10-PCS | Mod: CPTII,S$GLB,, | Performed by: OTOLARYNGOLOGY

## 2022-07-18 PROCEDURE — 99999 PR PBB SHADOW E&M-EST. PATIENT-LVL III: ICD-10-PCS | Mod: PBBFAC,,, | Performed by: OTOLARYNGOLOGY

## 2022-07-18 PROCEDURE — 99213 OFFICE O/P EST LOW 20 MIN: CPT | Mod: S$GLB,,, | Performed by: OTOLARYNGOLOGY

## 2022-07-18 PROCEDURE — 99213 PR OFFICE/OUTPT VISIT, EST, LEVL III, 20-29 MIN: ICD-10-PCS | Mod: S$GLB,,, | Performed by: OTOLARYNGOLOGY

## 2022-07-18 PROCEDURE — 99999 PR PBB SHADOW E&M-EST. PATIENT-LVL III: CPT | Mod: PBBFAC,,, | Performed by: OTOLARYNGOLOGY

## 2022-07-18 PROCEDURE — 3075F SYST BP GE 130 - 139MM HG: CPT | Mod: CPTII,S$GLB,, | Performed by: OTOLARYNGOLOGY

## 2022-07-18 PROCEDURE — 3079F PR MOST RECENT DIASTOLIC BLOOD PRESSURE 80-89 MM HG: ICD-10-PCS | Mod: CPTII,S$GLB,, | Performed by: OTOLARYNGOLOGY

## 2022-07-18 PROCEDURE — 3075F PR MOST RECENT SYSTOLIC BLOOD PRESS GE 130-139MM HG: ICD-10-PCS | Mod: CPTII,S$GLB,, | Performed by: OTOLARYNGOLOGY

## 2022-07-18 PROCEDURE — 4010F ACE/ARB THERAPY RXD/TAKEN: CPT | Mod: CPTII,S$GLB,, | Performed by: OTOLARYNGOLOGY

## 2022-07-18 RX ORDER — IBUPROFEN 800 MG/1
TABLET ORAL
COMMUNITY
Start: 2022-06-22

## 2022-07-18 RX ORDER — AMOXICILLIN 500 MG/1
500 CAPSULE ORAL 3 TIMES DAILY
COMMUNITY
Start: 2022-06-22

## 2022-07-18 RX ORDER — CIPROFLOXACIN 500 MG/1
500 TABLET ORAL 2 TIMES DAILY
COMMUNITY
Start: 2022-07-15 | End: 2022-08-19 | Stop reason: CLARIF
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
## 2022-07-21 PROBLEM — G89.28 OTHER CHRONIC POSTPROCEDURAL PAIN: Status: ACTIVE | Noted: 2022-07-21

## 2022-08-10 ENCOUNTER — TELEPHONE (OUTPATIENT)
Dept: HEMATOLOGY/ONCOLOGY | Facility: CLINIC | Age: 57
End: 2022-08-10
Payer: MEDICAID

## 2022-08-10 NOTE — TELEPHONE ENCOUNTER
Called pt and provided him with surgery schedule and pre op instructions.  Pt states that appointment dates and times were acceptable.  Pt request an appt with Dr Vidal to discuss surgery- states that he is worried about how he will look after surgery.  Appointment made to see Dr Vidal in clinic as per pt request.

## 2022-08-12 ENCOUNTER — TELEPHONE (OUTPATIENT)
Dept: HEMATOLOGY/ONCOLOGY | Facility: CLINIC | Age: 57
End: 2022-08-12
Payer: MEDICAID

## 2022-08-12 NOTE — TELEPHONE ENCOUNTER
Called pt to confirm appointment with Dr Vidal scheduled for Monday 8/15/22.  Pt stated that he needed to reschedule, assisted pt rescheduling appt with Dr Vidal.
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
## 2022-08-16 ENCOUNTER — OFFICE VISIT (OUTPATIENT)
Dept: HEMATOLOGY/ONCOLOGY | Facility: CLINIC | Age: 57
End: 2022-08-16
Payer: COMMERCIAL

## 2022-08-16 ENCOUNTER — DOCUMENTATION ONLY (OUTPATIENT)
Dept: INFUSION THERAPY | Facility: HOSPITAL | Age: 57
End: 2022-08-16
Payer: MEDICAID

## 2022-08-16 VITALS
RESPIRATION RATE: 18 BRPM | DIASTOLIC BLOOD PRESSURE: 86 MMHG | TEMPERATURE: 97 F | BODY MASS INDEX: 18.26 KG/M2 | OXYGEN SATURATION: 96 % | HEART RATE: 79 BPM | HEIGHT: 69 IN | WEIGHT: 123.25 LBS | SYSTOLIC BLOOD PRESSURE: 132 MMHG

## 2022-08-16 DIAGNOSIS — S01.00XS OPEN WOUND OF SCALP, UNSPECIFIED OPEN WOUND TYPE, SEQUELA: ICD-10-CM

## 2022-08-16 DIAGNOSIS — C44.320 SQUAMOUS CELL SKIN CANCER, FACE: ICD-10-CM

## 2022-08-16 DIAGNOSIS — C44.41 BASAL CELL CARCINOMA (BCC) OF LEFT SIDE OF NECK: Primary | ICD-10-CM

## 2022-08-16 PROCEDURE — 99212 PR OFFICE/OUTPT VISIT, EST, LEVL II, 10-19 MIN: ICD-10-PCS | Mod: S$GLB,,, | Performed by: OTOLARYNGOLOGY

## 2022-08-16 PROCEDURE — 3075F SYST BP GE 130 - 139MM HG: CPT | Mod: CPTII,S$GLB,, | Performed by: OTOLARYNGOLOGY

## 2022-08-16 PROCEDURE — 3075F PR MOST RECENT SYSTOLIC BLOOD PRESS GE 130-139MM HG: ICD-10-PCS | Mod: CPTII,S$GLB,, | Performed by: OTOLARYNGOLOGY

## 2022-08-16 PROCEDURE — 99212 OFFICE O/P EST SF 10 MIN: CPT | Mod: S$GLB,,, | Performed by: OTOLARYNGOLOGY

## 2022-08-16 PROCEDURE — 3008F PR BODY MASS INDEX (BMI) DOCUMENTED: ICD-10-PCS | Mod: CPTII,S$GLB,, | Performed by: OTOLARYNGOLOGY

## 2022-08-16 PROCEDURE — 3008F BODY MASS INDEX DOCD: CPT | Mod: CPTII,S$GLB,, | Performed by: OTOLARYNGOLOGY

## 2022-08-16 PROCEDURE — 3079F PR MOST RECENT DIASTOLIC BLOOD PRESSURE 80-89 MM HG: ICD-10-PCS | Mod: CPTII,S$GLB,, | Performed by: OTOLARYNGOLOGY

## 2022-08-16 PROCEDURE — 99999 PR PBB SHADOW E&M-EST. PATIENT-LVL V: ICD-10-PCS | Mod: PBBFAC,,, | Performed by: OTOLARYNGOLOGY

## 2022-08-16 PROCEDURE — 4010F ACE/ARB THERAPY RXD/TAKEN: CPT | Mod: CPTII,S$GLB,, | Performed by: OTOLARYNGOLOGY

## 2022-08-16 PROCEDURE — 3079F DIAST BP 80-89 MM HG: CPT | Mod: CPTII,S$GLB,, | Performed by: OTOLARYNGOLOGY

## 2022-08-16 PROCEDURE — 99999 PR PBB SHADOW E&M-EST. PATIENT-LVL V: CPT | Mod: PBBFAC,,, | Performed by: OTOLARYNGOLOGY

## 2022-08-16 PROCEDURE — 4010F PR ACE/ARB THEARPY RXD/TAKEN: ICD-10-PCS | Mod: CPTII,S$GLB,, | Performed by: OTOLARYNGOLOGY

## 2022-08-16 PROCEDURE — 1159F PR MEDICATION LIST DOCUMENTED IN MEDICAL RECORD: ICD-10-PCS | Mod: CPTII,S$GLB,, | Performed by: OTOLARYNGOLOGY

## 2022-08-16 PROCEDURE — 1159F MED LIST DOCD IN RCRD: CPT | Mod: CPTII,S$GLB,, | Performed by: OTOLARYNGOLOGY

## 2022-08-16 NOTE — PROGRESS NOTES
Oncology Nutrition   Chemotherapy Infusion Visit    Nutrition Follow Up   RD met w/ pt in integrative looking for ONS. Pt completed XRT in [place]. Here today for an appt w/ Dr. Vidal. Pt scheduled for surgery on 08/24/22 for removal of skin cancer on face and neck. Pt w/ continual weight loss. He states he has loss of appetite and fatigue. 8.8% weight loss in 5 months, BMI 18.2. RD provided pt w/ samples of ONS. RD recommends pt start an ONS BID for weight gain and increase nutrition status pre and post surgery.     Wt Readings from Last 10 Encounters:   08/16/22 55.9 kg (123 lb 3.8 oz)   07/18/22 57.1 kg (125 lb 14.1 oz)   03/24/22 61.2 kg (134 lb 14.7 oz)   03/22/22 61.5 kg (135 lb 9.6 oz)   12/10/21 65.8 kg (145 lb 1.6 oz)   11/29/21 74.8 kg (165 lb)   10/27/21 68.1 kg (150 lb 2.1 oz)   09/28/21 66.4 kg (146 lb 6.2 oz)   07/27/21 60.7 kg (133 lb 13.1 oz)   06/22/21 61.2 kg (134 lb 14.7 oz)       All other nutrition questions/concerns addressed as appropriate. Will continue to monitor prn throughout treatment.     Beti Gallegos RDN, LDN  08/16/2022  2:51 PM

## 2022-08-24 DIAGNOSIS — G89.18 POST-OP PAIN: Primary | ICD-10-CM

## 2022-08-24 RX ORDER — HYDROCODONE BITARTRATE AND ACETAMINOPHEN 10; 325 MG/1; MG/1
1 TABLET ORAL EVERY 6 HOURS PRN
Qty: 28 TABLET | Refills: 0 | Status: SHIPPED | OUTPATIENT
Start: 2022-08-24 | End: 2022-08-31

## 2022-09-01 ENCOUNTER — CLINICAL SUPPORT (OUTPATIENT)
Dept: HEMATOLOGY/ONCOLOGY | Facility: CLINIC | Age: 57
End: 2022-09-01
Payer: COMMERCIAL

## 2022-09-01 ENCOUNTER — TELEPHONE (OUTPATIENT)
Dept: HEMATOLOGY/ONCOLOGY | Facility: CLINIC | Age: 57
End: 2022-09-01
Payer: MEDICAID

## 2022-09-01 VITALS
RESPIRATION RATE: 14 BRPM | TEMPERATURE: 98 F | HEART RATE: 72 BPM | OXYGEN SATURATION: 99 % | DIASTOLIC BLOOD PRESSURE: 71 MMHG | SYSTOLIC BLOOD PRESSURE: 127 MMHG

## 2022-09-01 PROCEDURE — 99999 PR PBB SHADOW E&M-EST. PATIENT-LVL III: ICD-10-PCS | Mod: PBBFAC,,,

## 2022-09-01 PROCEDURE — 99999 PR PBB SHADOW E&M-EST. PATIENT-LVL III: CPT | Mod: PBBFAC,,,

## 2022-09-01 NOTE — TELEPHONE ENCOUNTER
Pt has post op appt today, did not show at scheduled time.  Called to assess pt, he states he had an emergency and can come in late or reschedule,  instructed pt to come in today for his post op.

## 2022-09-01 NOTE — PROGRESS NOTES
Pt s/p Biopsy nonhealing site scalp; Wide local excision of squamous cell carcinoma involving left inferior neck, defect closure measuring 12 cm (closed in 2 layers); wide local excision of basal cell carcinoma involving left superior neck, defect closure measuring 7 cm (closed in 2 layers); wide local excision squamous cell carcinoma right upper neck at mandible, closure of defect measuring 7 cm (closed in 2 layers) by Dr Vidal on 8/24/22.  Pt in today for nurse visit with suture removal.    Sutures to R mandible intact with edges approximated and free from s/s of infection.  Sutures removed without difficulty and aquaphor ointment applied to incision.    Incision to L superior neck with suture line not intact, edges approximated and free from s/s of infection.  Incision to L inferior neck with sutures intact, edges approximated and free from s/s of infection.  Sutures removed from both incisions, steri strips applied to incision sites.    Nonhealing scalp wound covered with tissue paper.  Tissue paper removed and wound cleansed with saline.  Wound pink with some granulation present, no s/s of infection.  Aquaphor ointment applied and covered with telfa.    Reviewed wound care instructions and next post op appointment with pt.

## 2022-09-06 ENCOUNTER — OFFICE VISIT (OUTPATIENT)
Dept: HEMATOLOGY/ONCOLOGY | Facility: CLINIC | Age: 57
End: 2022-09-06
Payer: COMMERCIAL

## 2022-09-06 VITALS
RESPIRATION RATE: 18 BRPM | TEMPERATURE: 98 F | SYSTOLIC BLOOD PRESSURE: 118 MMHG | BODY MASS INDEX: 18.38 KG/M2 | HEIGHT: 69 IN | OXYGEN SATURATION: 95 % | DIASTOLIC BLOOD PRESSURE: 81 MMHG | HEART RATE: 85 BPM | WEIGHT: 124.13 LBS

## 2022-09-06 DIAGNOSIS — D04.39 CARCINOMA IN SITU OF SKIN OF OTHER PART OF FACE: ICD-10-CM

## 2022-09-06 DIAGNOSIS — C44.41 BASAL CELL CARCINOMA (BCC) OF LEFT SIDE OF NECK: Primary | ICD-10-CM

## 2022-09-06 PROBLEM — D04.30 CARCINOMA IN SITU OF SKIN OF FACE: Status: ACTIVE | Noted: 2022-09-06

## 2022-09-06 PROCEDURE — 3008F BODY MASS INDEX DOCD: CPT | Mod: CPTII,S$GLB,, | Performed by: OTOLARYNGOLOGY

## 2022-09-06 PROCEDURE — 3074F PR MOST RECENT SYSTOLIC BLOOD PRESSURE < 130 MM HG: ICD-10-PCS | Mod: CPTII,S$GLB,, | Performed by: OTOLARYNGOLOGY

## 2022-09-06 PROCEDURE — 99999 PR PBB SHADOW E&M-EST. PATIENT-LVL V: CPT | Mod: PBBFAC,,, | Performed by: OTOLARYNGOLOGY

## 2022-09-06 PROCEDURE — 1159F PR MEDICATION LIST DOCUMENTED IN MEDICAL RECORD: ICD-10-PCS | Mod: CPTII,S$GLB,, | Performed by: OTOLARYNGOLOGY

## 2022-09-06 PROCEDURE — 1160F RVW MEDS BY RX/DR IN RCRD: CPT | Mod: CPTII,S$GLB,, | Performed by: OTOLARYNGOLOGY

## 2022-09-06 PROCEDURE — 4010F PR ACE/ARB THEARPY RXD/TAKEN: ICD-10-PCS | Mod: CPTII,S$GLB,, | Performed by: OTOLARYNGOLOGY

## 2022-09-06 PROCEDURE — 3079F DIAST BP 80-89 MM HG: CPT | Mod: CPTII,S$GLB,, | Performed by: OTOLARYNGOLOGY

## 2022-09-06 PROCEDURE — 99999 PR PBB SHADOW E&M-EST. PATIENT-LVL V: ICD-10-PCS | Mod: PBBFAC,,, | Performed by: OTOLARYNGOLOGY

## 2022-09-06 PROCEDURE — 1160F PR REVIEW ALL MEDS BY PRESCRIBER/CLIN PHARMACIST DOCUMENTED: ICD-10-PCS | Mod: CPTII,S$GLB,, | Performed by: OTOLARYNGOLOGY

## 2022-09-06 PROCEDURE — 4010F ACE/ARB THERAPY RXD/TAKEN: CPT | Mod: CPTII,S$GLB,, | Performed by: OTOLARYNGOLOGY

## 2022-09-06 PROCEDURE — 3074F SYST BP LT 130 MM HG: CPT | Mod: CPTII,S$GLB,, | Performed by: OTOLARYNGOLOGY

## 2022-09-06 PROCEDURE — 99024 PR POST-OP FOLLOW-UP VISIT: ICD-10-PCS | Mod: S$GLB,,, | Performed by: OTOLARYNGOLOGY

## 2022-09-06 PROCEDURE — 3079F PR MOST RECENT DIASTOLIC BLOOD PRESSURE 80-89 MM HG: ICD-10-PCS | Mod: CPTII,S$GLB,, | Performed by: OTOLARYNGOLOGY

## 2022-09-06 PROCEDURE — 3008F PR BODY MASS INDEX (BMI) DOCUMENTED: ICD-10-PCS | Mod: CPTII,S$GLB,, | Performed by: OTOLARYNGOLOGY

## 2022-09-06 PROCEDURE — 1159F MED LIST DOCD IN RCRD: CPT | Mod: CPTII,S$GLB,, | Performed by: OTOLARYNGOLOGY

## 2022-09-06 PROCEDURE — 99024 POSTOP FOLLOW-UP VISIT: CPT | Mod: S$GLB,,, | Performed by: OTOLARYNGOLOGY
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
## 2022-09-07 DIAGNOSIS — C44.41 BASAL CELL CARCINOMA OF SKIN OF SCALP AND NECK: Primary | ICD-10-CM

## 2022-09-29 ENCOUNTER — HOSPITAL ENCOUNTER (OUTPATIENT)
Dept: RADIOLOGY | Facility: HOSPITAL | Age: 57
Discharge: HOME | End: 2022-09-29
Attending: RADIOLOGY
Payer: MEDICAID

## 2022-09-29 ENCOUNTER — HOSPITAL ENCOUNTER (OUTPATIENT)
Dept: RADIOLOGY | Facility: HOSPITAL | Age: 57
Discharge: HOME | End: 2022-09-29
Attending: RADIOLOGY
Payer: COMMERCIAL

## 2022-09-29 DIAGNOSIS — C44.41 BASAL CELL CARCINOMA OF SKIN OF SCALP AND NECK: ICD-10-CM

## 2022-09-29 LAB
CREAT SERPL-MCNC: 1 MG/DL (ref 0.5–1.4)
SAMPLE: NORMAL

## 2022-09-29 PROCEDURE — 25500020 PHARM REV CODE 255: Mod: PO | Performed by: RADIOLOGY

## 2022-09-29 PROCEDURE — A9585 GADOBUTROL INJECTION: HCPCS | Mod: PO | Performed by: RADIOLOGY

## 2022-09-29 PROCEDURE — 70553 MRI BRAIN STEM W/O & W/DYE: CPT | Mod: TC,PO

## 2022-09-29 PROCEDURE — 82565 ASSAY OF CREATININE: CPT | Mod: PO

## 2022-09-29 PROCEDURE — 70491 CT SOFT TISSUE NECK W/DYE: CPT | Mod: TC,PO

## 2022-09-29 RX ORDER — GADOBUTROL 604.72 MG/ML
5.5 INJECTION INTRAVENOUS
Status: COMPLETED | OUTPATIENT
Start: 2022-09-29 | End: 2022-09-29

## 2022-09-29 RX ADMIN — IOHEXOL 100 ML: 350 INJECTION, SOLUTION INTRAVENOUS at 09:09

## 2022-09-29 RX ADMIN — GADOBUTROL 5.5 ML: 604.72 INJECTION INTRAVENOUS at 08:09

## 2022-09-30 ENCOUNTER — OFFICE VISIT (OUTPATIENT)
Dept: RADIATION ONCOLOGY | Facility: CLINIC | Age: 57
End: 2022-09-30
Payer: COMMERCIAL

## 2022-09-30 VITALS — BODY MASS INDEX: 18.58 KG/M2 | WEIGHT: 125.81 LBS

## 2022-09-30 DIAGNOSIS — C44.41 BASAL CELL CARCINOMA OF SKIN OF SCALP AND NECK: Primary | ICD-10-CM

## 2022-09-30 PROCEDURE — 3008F BODY MASS INDEX DOCD: CPT | Mod: CPTII,S$GLB,, | Performed by: RADIOLOGY

## 2022-09-30 PROCEDURE — 4010F PR ACE/ARB THEARPY RXD/TAKEN: ICD-10-PCS | Mod: CPTII,S$GLB,, | Performed by: RADIOLOGY

## 2022-09-30 PROCEDURE — 4010F ACE/ARB THERAPY RXD/TAKEN: CPT | Mod: CPTII,S$GLB,, | Performed by: RADIOLOGY

## 2022-09-30 PROCEDURE — 99214 PR OFFICE/OUTPT VISIT, EST, LEVL IV, 30-39 MIN: ICD-10-PCS | Mod: S$GLB,,, | Performed by: RADIOLOGY

## 2022-09-30 PROCEDURE — 3008F PR BODY MASS INDEX (BMI) DOCUMENTED: ICD-10-PCS | Mod: CPTII,S$GLB,, | Performed by: RADIOLOGY

## 2022-09-30 PROCEDURE — 99214 OFFICE O/P EST MOD 30 MIN: CPT | Mod: S$GLB,,, | Performed by: RADIOLOGY

## 2022-10-03 DIAGNOSIS — C44.41 BASAL CELL CARCINOMA OF SKIN OF SCALP AND NECK: Primary | ICD-10-CM

## 2023-01-17 ENCOUNTER — HOSPITAL ENCOUNTER (OUTPATIENT)
Dept: RADIOLOGY | Facility: HOSPITAL | Age: 58
Discharge: HOME | End: 2023-01-17
Attending: RADIOLOGY
Payer: MEDICAID

## 2023-01-17 DIAGNOSIS — C44.41 BASAL CELL CARCINOMA OF SKIN OF SCALP AND NECK: ICD-10-CM

## 2023-01-30 ENCOUNTER — HOSPITAL ENCOUNTER (OUTPATIENT)
Dept: RADIOLOGY | Facility: HOSPITAL | Age: 58
Discharge: HOME | End: 2023-01-30
Attending: RADIOLOGY
Payer: MEDICAID

## 2023-01-30 DIAGNOSIS — C44.41 BASAL CELL CARCINOMA OF SKIN OF SCALP AND NECK: ICD-10-CM

## 2023-01-30 LAB
CREAT SERPL-MCNC: 1.1 MG/DL (ref 0.5–1.4)
SAMPLE: NORMAL

## 2023-01-30 PROCEDURE — A9585 GADOBUTROL INJECTION: HCPCS | Mod: PO | Performed by: RADIOLOGY

## 2023-01-30 PROCEDURE — 25500020 PHARM REV CODE 255: Mod: PO | Performed by: RADIOLOGY

## 2023-01-30 PROCEDURE — 70553 MRI BRAIN STEM W/O & W/DYE: CPT | Mod: TC,PO

## 2023-01-30 PROCEDURE — 71260 CT THORAX DX C+: CPT | Mod: TC,PO

## 2023-01-30 RX ORDER — GADOBUTROL 604.72 MG/ML
5.5 INJECTION INTRAVENOUS
Status: COMPLETED | OUTPATIENT
Start: 2023-01-30 | End: 2023-01-30

## 2023-01-30 RX ADMIN — GADOBUTROL 5.5 ML: 604.72 INJECTION INTRAVENOUS at 03:01

## 2023-01-30 RX ADMIN — IOHEXOL 100 ML: 350 INJECTION, SOLUTION INTRAVENOUS at 03:01

## 2023-02-07 ENCOUNTER — OFFICE VISIT (OUTPATIENT)
Dept: RADIATION ONCOLOGY | Facility: CLINIC | Age: 58
End: 2023-02-07
Payer: MEDICAID

## 2023-02-07 VITALS
TEMPERATURE: 98 F | OXYGEN SATURATION: 100 % | DIASTOLIC BLOOD PRESSURE: 75 MMHG | WEIGHT: 127.63 LBS | BODY MASS INDEX: 18.84 KG/M2 | HEART RATE: 75 BPM | SYSTOLIC BLOOD PRESSURE: 114 MMHG | RESPIRATION RATE: 16 BRPM

## 2023-02-07 DIAGNOSIS — C44.41 BASAL CELL CARCINOMA OF SKIN OF SCALP AND NECK: Primary | ICD-10-CM

## 2023-02-07 PROCEDURE — 99215 OFFICE O/P EST HI 40 MIN: CPT | Mod: S$GLB,,, | Performed by: RADIOLOGY

## 2023-02-07 PROCEDURE — 3074F PR MOST RECENT SYSTOLIC BLOOD PRESSURE < 130 MM HG: ICD-10-PCS | Mod: CPTII,S$GLB,, | Performed by: RADIOLOGY

## 2023-02-07 PROCEDURE — 3008F BODY MASS INDEX DOCD: CPT | Mod: CPTII,S$GLB,, | Performed by: RADIOLOGY

## 2023-02-07 PROCEDURE — 3078F PR MOST RECENT DIASTOLIC BLOOD PRESSURE < 80 MM HG: ICD-10-PCS | Mod: CPTII,S$GLB,, | Performed by: RADIOLOGY

## 2023-02-07 PROCEDURE — 3078F DIAST BP <80 MM HG: CPT | Mod: CPTII,S$GLB,, | Performed by: RADIOLOGY

## 2023-02-07 PROCEDURE — 1159F PR MEDICATION LIST DOCUMENTED IN MEDICAL RECORD: ICD-10-PCS | Mod: CPTII,S$GLB,, | Performed by: RADIOLOGY

## 2023-02-07 PROCEDURE — 99215 PR OFFICE/OUTPT VISIT, EST, LEVL V, 40-54 MIN: ICD-10-PCS | Mod: S$GLB,,, | Performed by: RADIOLOGY

## 2023-02-07 PROCEDURE — 3008F PR BODY MASS INDEX (BMI) DOCUMENTED: ICD-10-PCS | Mod: CPTII,S$GLB,, | Performed by: RADIOLOGY

## 2023-02-07 PROCEDURE — 1159F MED LIST DOCD IN RCRD: CPT | Mod: CPTII,S$GLB,, | Performed by: RADIOLOGY

## 2023-02-07 PROCEDURE — 3074F SYST BP LT 130 MM HG: CPT | Mod: CPTII,S$GLB,, | Performed by: RADIOLOGY

## 2023-02-07 NOTE — PROGRESS NOTES
Margarito Mcnamara  [id number]  1965  2/7/2023    DIAGNOSIS:  Cancer Staging   Basal cell carcinoma (BCC) of left side of neck  Staging form: Melanoma of the Skin, AJCC 8th Edition  - Clinical: No stage assigned - Unsigned  Staging form: Cutaneous Carcinoma of the Head and Neck, AJCC 8th Edition  - Pathologic stage from 6/22/2021: pT4b, pN0 - Signed by Catalina Vidal MD on 6/22/2021      REASON FOR VISIT: Routine scheduled follow-up.     HISTORY OF PRESENT ILLNESS:   Mr. Mcnamara is a 57M who was referred to Dr. Vidal for evaluation and treatment of recurrent basal cell carcinoma of the right forehead and scalp for possible resection.  He was previously treated with a hedgehog inhibitor, Erivedge, in 2018 with good response.  However, patient discontinued this prematurely after 6 months and the tumor has recurred. The tumor then recurred locally and, per imaging, the tumor was larger and had invaded through cranium into superior sagittal sinus.     He then underwent WLE w/ mesh/graft closure by Ksenia Vidal, Lauren, and Norma on 3/3/21 folllowed by reexcisions for (+)SM on 3/5/21.  He then underwent adj scalp/cranial RT @ 6600 cGy in 33 fxns, completed 6/11/21.    INTERVAL HISTORY:     Since his last visit, he has continue to endure slow healing of his scalp desquamation w/o any concerning regression or infection/ulceration.  His CT and MRI imaging studies from Jan 2023 remain w/ CHAN.        REVIEW OF SYSTEMS:  A complete review of systems was performed and the patient denies any acute concerns/changes other than per HPI    Past Medical History:   Diagnosis Date    ADHD (attention deficit hyperactivity disorder)     Arthritis     Basal cell carcinoma (BCC) of left side of neck 08/2022    Chronic pain     Depression     Hydrocele in adult     Hyperlipidemia     Hypertension     Migraine     Squamous cell skin cancer, face 08/2022     Past Surgical History:   Procedure Laterality Date    BACK SURGERY  11/2020    CRANIOTOMY N/A  3/3/2021    Procedure: CRANIOTOMY, USING FRAMELESS STEREOTAXY, OPEN, BIFRONTAL;  Surgeon: Raheel Green MD;  Location: [facility] OR 2ND FLR;  Service: Neurosurgery;  Laterality: N/A;  TOR I  ASA I  TYPE & CROSS 2 UNITS  REGULAR BED  [facility]  CHILDRESS  STEALTH CT    EXCISION OF LESION Left 8/24/2022    Procedure: EXCISION, LESION basal cell CA left neck--2 separate lesions;  Surgeon: Catalina Vidal MD;  Location: [facility] OR;  Service: ENT;  Laterality: Left;    FLAP PROCEDURE N/A 3/3/2021    Procedure: CREATION, FREE FLAP;  Surgeon: Ayaan Aguilar MD;  Location: [facility] OR 2ND FLR;  Service: ENT;  Laterality: N/A;  Latissimus free flap. Will need bean bag and [place].     FLAP PROCEDURE Left 3/5/2021    Procedure: CREATION, FREE FLAP;  Surgeon: Ayaan Aguilar MD;  [facility];  Service: ENT;  Laterality: Left;    LAPAROSCOPIC REPAIR OF HERNIA      RHIZOTOMY W/ RADIOFREQUENCY ABLATION Bilateral     two procedures    SURGICAL REMOVAL OF LESION OF FACE Right 8/24/2022    Procedure: EXCISION, LESION, FACE ;  Surgeon: Catalina Vidal MD;  Location: [facility] OR;  Service: ENT;  Laterality: Right;    TENDON REPAIR Right     thumb     Social History     Socioeconomic History    Marital status: Single    Number of children: 1   Occupational History     Comment: [employer]   Tobacco Use    Smoking status: Every Day     Packs/day: 0.50     Years: 10.00     Pack years: 5.00     Types: Cigarettes    Smokeless tobacco: Former    Tobacco comments:     1/2 pack per day   Substance and Sexual Activity    Alcohol use: Yes     Comment: ocassioanl beer    Drug use: Not Currently    Sexual activity: Yes     Partners: Female   Social History Narrative    Lives in a house with 25 year old son     Family History   Problem Relation Age of Onset    Depression Mother     Early death Mother 40    Alcohol abuse Father     Depression Father     Hypertension Brother     Headaches Sister      Medication List with Changes/Refills    Current Medications    ACETAMINOPHEN (TYLENOL) 325 MG TABLET    Take 2 tablets (650 mg total) by mouth every 6 (six) hours as needed for Pain.    AMLODIPINE-BENAZEPRIL 10-20MG (LOTREL) 10-20 MG PER CAPSULE    Take 1 capsule by mouth once daily.    AMOXICILLIN (AMOXIL) 500 MG CAPSULE    Take 500 mg by mouth 3 (three) times daily.    ASPIRIN (ECOTRIN) 81 MG EC TABLET    TAKE 1 TABLET (81 MG TOTAL) BY MOUTH DAILY    ASPIRIN 81 MG CHEW    Take 81 mg by mouth once daily.    BACITRACIN 500 UNIT/GRAM OINTMENT    Apply topically 3 (three) times daily.    BACLOFEN (LIORESAL) 20 MG TABLET    Take 20 mg by mouth 3 (three) times daily as needed. Not taking    BISACODYL (DULCOLAX) 10 MG SUPP    Place 1 suppository (10 mg total) rectally daily as needed.    BUPROPION (WELLBUTRIN XL) 150 MG TB24 TABLET        BUTALBITAL-ACETAMINOPHEN-CAFFEINE -40 MG (FIORICET, ESGIC) -40 MG PER TABLET    Take 1 tablet by mouth 2 (two) times daily.    CALCIUM CARBONATE (TUMS) 200 MG CALCIUM (500 MG) CHEWABLE TABLET    Take 1 tablet (500 mg total) by mouth daily as needed.    DEXAMETHASONE (DECADRON) 2 MG TABLET    Take 1 tablet (2 mg total) by mouth 2 (two) times daily with meals.    DEXTROAMPHETAMINE-AMPHETAMINE (ADDERALL) 15 MG TABLET    Take 15 mg by mouth 2 (two) times daily.    DOCUSATE (COLACE) 50 MG/5 ML LIQUID    Take 10 mLs (100 mg total) by mouth once daily.    ENOXAPARIN (LOVENOX) 40 MG/0.4 ML SYRG    Inject 0.4 mLs (40 mg total) into the skin once daily.    GABAPENTIN (NEURONTIN) 300 MG CAPSULE    Take 600 mg by mouth 3 (three) times daily as needed.    IBUPROFEN (ADVIL,MOTRIN) 800 MG TABLET    TAKE 1 TABLET BY MOUTH EVERY 6 TO 8 HOURS AS NEEDED FOR PAIN    OXYCODONE (ROXICODONE) 10 MG TAB IMMEDIATE RELEASE TABLET    1 tablet as needed    QUETIAPINE (SEROQUEL) 25 MG TAB    Take 1 tablet (25 mg total) by mouth every evening.    ROSUVASTATIN (CRESTOR) 20 MG TABLET    1 tablet    SILDENAFIL (VIAGRA) 50 MG TABLET    Take 50 mg  by mouth daily as needed.    SILVER SULFADIAZINE 1% (SILVADENE) 1 % CREAM    Apply topically 2 (two) times daily.    TADALAFIL (CIALIS) 20 MG TAB    Take by mouth.    TRAMADOL (ULTRAM-ER) 100 MG TB24    Take 50 mg by mouth once daily.     ZOLPIDEM (AMBIEN) 10 MG TAB    1 tablet at bedtime as needed     Review of patient's allergies indicates:  No Known Allergies    QUALITY OF LIFE: 90%- Able to Carry on Normal Activity: Minor Symptoms of Disease    Vitals:    02/07/23 0856   BP: 114/75   Pulse: 75   Resp: 16   Temp: 98.1 °F (36.7 °C)   TempSrc: Oral   SpO2: 100%   Weight: 57.9 kg (127 lb 9.6 oz)   PainSc:   8   PainLoc: Back     Body mass index is 18.84 kg/m².    PHYSICAL EXAM:  GENERAL: alert; in no apparent distress.   HEAD: near complete resolution of desq of anterior midline scalp - no noted bleeding or active drainage, nor masses/ulceration/defect - right brow skin changes / keratoses unchanged w/ no change in brow ptosis  EYES: pupils are equal, round, reactive to light and accommodation. Sclera anicteric. Conjunctiva not injected.   NOSE/THROAT: no nasal erythema or rhinorrhea. Oropharynx pink, without erythema, ulcerations or thrush.   NECK: no cervical motion rigidity; supple with no masses; well-healed left neck longitudinal incision from recent WLE of other skin CA's by Dr. Vidal  CHEST: clear to auscultation bilaterally; no wheezes, crackles or rubs. Patient is speaking comfortably on room air with normal work of breathing without using accessory muscles of respiration.  CARDIOVASCULAR: regular rate and rhythm; no murmurs, rubs or gallops.  ABDOMEN: soft, nontender, nondistended. Bowel sounds present.   MUSCULOSKELETAL: no tenderness to palpation along the spine or scapulae. Normal range of motion.  NEUROLOGIC: cranial nerves II-XII intact bilaterally. Strength 5/5 in bilateral upper and lower extremities. No sensory deficits appreciated. Reflexes globally intact. No cerebellar signs. Normal  gait.      ASSESSMENT: Margarito Mcnamara is a 57 y.o. male with h/o recurrent BCC of the vertex scalp w/ invasion through cranium into superior sagittal sinus     PLAN:   - currently CHAN per all malignant sites mentioned above  - repeat CT neck/chest and brain MRI in 6m  - continue skin care as directed w/ aquaphor  - recommended discussion w/ ENT (appt 3/9/23) and/or NSG for possible revision of cranial hardware due to discomfort  - continue f/u w/ dermatology  - RTC in 6m after next scans  - offered to help w/ disabilty paperwork and/or refer to CSW  - refer to psychiatry for eval/tx    All questions answered and contact information provided. Patient understands free to call us anytime with any questions or concerns regarding radiation therapy.    I have personally seen and evaluated this patient. Greater than 50% of this time was spent discussing coordination of care and/or counseling.    PHYSICIAN: Carlos Cherry III, MD

## 2023-02-09 ENCOUNTER — TELEPHONE (OUTPATIENT)
Dept: RADIATION ONCOLOGY | Facility: CLINIC | Age: 58
End: 2023-02-09

## 2023-02-09 DIAGNOSIS — C44.41 BASAL CELL CARCINOMA OF SKIN OF SCALP AND NECK: Primary | ICD-10-CM

## 2023-02-09 DIAGNOSIS — F32.A DEPRESSION, UNSPECIFIED DEPRESSION TYPE: ICD-10-CM

## 2023-03-09 ENCOUNTER — OFFICE VISIT (OUTPATIENT)
Dept: HEMATOLOGY/ONCOLOGY | Facility: CLINIC | Age: 58
End: 2023-03-09
Payer: MEDICAID

## 2023-03-09 VITALS
TEMPERATURE: 98 F | OXYGEN SATURATION: 97 % | SYSTOLIC BLOOD PRESSURE: 141 MMHG | RESPIRATION RATE: 12 BRPM | HEART RATE: 59 BPM | DIASTOLIC BLOOD PRESSURE: 87 MMHG

## 2023-03-09 DIAGNOSIS — F32.A DEPRESSION, UNSPECIFIED DEPRESSION TYPE: ICD-10-CM

## 2023-03-09 DIAGNOSIS — F17.200 NEEDS SMOKING CESSATION EDUCATION: ICD-10-CM

## 2023-03-09 DIAGNOSIS — Z85.828 HISTORY OF SKIN CANCER: ICD-10-CM

## 2023-03-09 DIAGNOSIS — G89.28 OTHER CHRONIC POSTPROCEDURAL PAIN: ICD-10-CM

## 2023-03-09 DIAGNOSIS — C44.90 SKIN CANCER: Primary | ICD-10-CM

## 2023-03-09 PROCEDURE — 1159F PR MEDICATION LIST DOCUMENTED IN MEDICAL RECORD: ICD-10-PCS | Mod: CPTII,,, | Performed by: OTOLARYNGOLOGY

## 2023-03-09 PROCEDURE — 1160F PR REVIEW ALL MEDS BY PRESCRIBER/CLIN PHARMACIST DOCUMENTED: ICD-10-PCS | Mod: CPTII,,, | Performed by: OTOLARYNGOLOGY

## 2023-03-09 PROCEDURE — 1160F RVW MEDS BY RX/DR IN RCRD: CPT | Mod: CPTII,,, | Performed by: OTOLARYNGOLOGY

## 2023-03-09 PROCEDURE — 99215 OFFICE O/P EST HI 40 MIN: CPT | Mod: PBBFAC,PN | Performed by: OTOLARYNGOLOGY

## 2023-03-09 PROCEDURE — 3077F SYST BP >= 140 MM HG: CPT | Mod: CPTII,,, | Performed by: OTOLARYNGOLOGY

## 2023-03-09 PROCEDURE — 1159F MED LIST DOCD IN RCRD: CPT | Mod: CPTII,,, | Performed by: OTOLARYNGOLOGY

## 2023-03-09 PROCEDURE — 3079F PR MOST RECENT DIASTOLIC BLOOD PRESSURE 80-89 MM HG: ICD-10-PCS | Mod: CPTII,,, | Performed by: OTOLARYNGOLOGY

## 2023-03-09 PROCEDURE — 99213 PR OFFICE/OUTPT VISIT, EST, LEVL III, 20-29 MIN: ICD-10-PCS | Mod: S$PBB,,, | Performed by: OTOLARYNGOLOGY

## 2023-03-09 PROCEDURE — 3079F DIAST BP 80-89 MM HG: CPT | Mod: CPTII,,, | Performed by: OTOLARYNGOLOGY

## 2023-03-09 PROCEDURE — 3077F PR MOST RECENT SYSTOLIC BLOOD PRESSURE >= 140 MM HG: ICD-10-PCS | Mod: CPTII,,, | Performed by: OTOLARYNGOLOGY

## 2023-03-09 PROCEDURE — 99213 OFFICE O/P EST LOW 20 MIN: CPT | Mod: S$PBB,,, | Performed by: OTOLARYNGOLOGY

## 2023-03-09 PROCEDURE — 99999 PR PBB SHADOW E&M-EST. PATIENT-LVL V: CPT | Mod: PBBFAC,,, | Performed by: OTOLARYNGOLOGY

## 2023-03-09 PROCEDURE — 99999 PR PBB SHADOW E&M-EST. PATIENT-LVL V: ICD-10-PCS | Mod: PBBFAC,,, | Performed by: OTOLARYNGOLOGY

## 2023-03-09 RX ORDER — TRAMADOL HYDROCHLORIDE 50 MG/1
50 TABLET ORAL EVERY 8 HOURS PRN
Qty: 90 TABLET | Refills: 0 | Status: SHIPPED | OUTPATIENT
Start: 2023-03-09 | End: 2023-04-08

## 2023-03-09 RX ORDER — HYDROCODONE BITARTRATE AND ACETAMINOPHEN 10; 325 MG/1; MG/1
TABLET ORAL
Qty: 30 TABLET | Refills: 0 | OUTPATIENT
Start: 2023-03-09 | End: 2023-09-22
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
## 2023-03-10 ENCOUNTER — TELEPHONE (OUTPATIENT)
Dept: HEMATOLOGY/ONCOLOGY | Facility: CLINIC | Age: 58
End: 2023-03-10
Payer: MEDICAID

## 2023-03-10 NOTE — TELEPHONE ENCOUNTER
"Derm referal: Deferred: insurance; #2 phone call, but mailbox full; Spoke with emergency contact, brother Anish, and gave him information to have patient call insurance for providers. #2 portal message sent: Explained [facility] appointment not available for this referral due to insurance out of network and to contact insurance for in-network providers for call to schedule. If external referral needed to contact PCP. Anish Voices understanding and says will "get hold of him". Chart states patient BCC.  "

## 2023-03-10 NOTE — TELEPHONE ENCOUNTER
Returned call and discussed that the call he received was not from Dr Vidal's office.  Pt requesting pain medications.  Informed pt that his prescription was sent yesterday by Dr Vidal.  ----- Message from Thai Damon sent at 3/10/2023 10:16 AM CST -----  Contact: pt at [phone]  Type:  Patient Returning Call    Who Called:  pt  Who Left Message for Patient:  Kathrin  Does the patient know what this is regarding?:  yes  Best Call Back Number:  [phone]  Additional Information:  pt is calling the office returning a call back to Kathrin. Please call back and advise.

## 2023-03-13 ENCOUNTER — TELEPHONE (OUTPATIENT)
Dept: HEMATOLOGY/ONCOLOGY | Facility: CLINIC | Age: 58
End: 2023-03-13
Payer: MEDICAID

## 2023-03-13 NOTE — TELEPHONE ENCOUNTER
Called and spoke to pt about psych referral from Dr Vidal to Dr Levine.  Pt scheduled and confirmed appt date time and location.   Pt requested latest in the day appt, declined earlier appts.

## 2023-03-22 ENCOUNTER — TELEPHONE (OUTPATIENT)
Dept: PAIN MEDICINE | Facility: CLINIC | Age: 58
End: 2023-03-22

## 2023-03-22 NOTE — TELEPHONE ENCOUNTER
No, we do not see Medicaid for medication management. This patient was put on opioids from Dr. Zavala who puts all of his patients on opioids

## 2023-04-10 ENCOUNTER — OFFICE VISIT (OUTPATIENT)
Dept: PSYCHIATRY | Facility: CLINIC | Age: 58
End: 2023-04-10
Payer: MEDICAID

## 2023-04-10 DIAGNOSIS — F32.A DEPRESSION, UNSPECIFIED DEPRESSION TYPE: ICD-10-CM

## 2023-04-10 PROCEDURE — 90791 PR PSYCHIATRIC DIAGNOSTIC EVALUATION: ICD-10-PCS | Mod: AH,HB,, | Performed by: PSYCHOLOGIST

## 2023-04-10 PROCEDURE — 90791 PSYCH DIAGNOSTIC EVALUATION: CPT | Mod: AH,HB,, | Performed by: PSYCHOLOGIST

## 2023-04-10 NOTE — PROGRESS NOTES
PSYCHO-ONCOLOGY INTAKE    Diagnostic Interview - CPT 81103    Date: 4/10/2023  Site: [facility]    Evaluation Length (direct face-to-face time):  1 hour    This includes face to face time and non-face to face time preparing to see the patient, obtaining and/or reviewing separately obtained history, documenting clinical information in the electronic or other health record, independently interpreting results and communicating results to the patient/family/caregiver, or care coordinator.     Referral Source: Catalina Vidal MD   PCP: Primary Doctor No    Clinical status of patient: Outpatient    Margarito Mcnamara, a 57 y.o. male, seen for initial evaluation visit.    Margarito Mcnamara reviewed and agreed to informed consent and the limits of confidentiality.    Chief complaint/reason for encounter: adjustment to illness, depression and tx adherence    Medical/Surgical History:    Patient Active Problem List   Diagnosis    Basal cell carcinoma (BCC) of left side of neck    Bone erosion    Depression    HTN (hypertension)    Hyperlipidemia    Arthritis    Erectile dysfunction    Migraine    Encounter for weaning from ventilator    Acute blood loss anemia    Acute hypoxemic respiratory failure    Hypotension due to drugs    Agitation    Impaired functional mobility and endurance    Open wound of scalp    Squamous cell skin cancer, face    Strain of metacarpophalangeal joint of right hand    Right hand pain    Other chronic postprocedural pain    Carcinoma in situ of skin of face    History of skin cancer       Health Behaviors:       ETOH Use: No (past social)       Tobacco Use: Pack/day   Illicit Drug Use:  No     Prescription Misuse:No   Caffeine: minimal   Exercise:The patient engaged in regular activity prior to illness The patient is actively working to return to baseline exercise tolerance.   Firearms:  Denied   Advanced directives:No     Family History:   Psychiatric illness: Yes, son w/ hx of depression, use of mood  stabilizers     Alcohol/Drug Abuse: denied     Suicide: denied      Past Psychiatric History:   Inpatient treatment: No     Outpatient treatment: No     Prior substance abuse treatment: No     Suicide Attempts: No     Psychotropic Medications:  Current: Seroquel, Wellbutrin, and Ambien       Past: none    Current medications as per below, allergies reviewed in chart.    Current Outpatient Medications   Medication    acetaminophen (TYLENOL) 325 MG tablet    amlodipine-benazepril 10-20mg (LOTREL) 10-20 mg per capsule    amoxicillin (AMOXIL) 500 MG capsule    aspirin (ECOTRIN) 81 MG EC tablet    aspirin 81 MG Chew    bacitracin 500 unit/gram ointment    baclofen (LIORESAL) 20 MG tablet    bisacodyL (DULCOLAX) 10 mg Supp    buPROPion (WELLBUTRIN XL) 150 MG TB24 tablet    butalbital-acetaminophen-caffeine -40 mg (FIORICET, ESGIC) -40 mg per tablet    calcium carbonate (TUMS) 200 mg calcium (500 mg) chewable tablet    dexAMETHasone (DECADRON) 2 MG tablet    dextroamphetamine-amphetamine (ADDERALL) 15 mg tablet    docusate (COLACE) 50 mg/5 mL liquid    enoxaparin (LOVENOX) 40 mg/0.4 mL Syrg    gabapentin (NEURONTIN) 300 MG capsule    HYDROcodone-acetaminophen (NORCO)  mg per tablet    ibuprofen (ADVIL,MOTRIN) 800 MG tablet    QUEtiapine (SEROQUEL) 25 MG Tab    rosuvastatin (CRESTOR) 20 MG tablet    sildenafiL (VIAGRA) 50 MG tablet    silver sulfADIAZINE 1% (SILVADENE) 1 % cream    tadalafiL (CIALIS) 20 MG Tab    zolpidem (AMBIEN) 10 mg Tab     No current facility-administered medications for this visit.              Social situation/Stressors: Margarito Mcnamara lives with his 25 yo son in [place], [place].  He is currently on SSD, previously working as an xray tech at a [employer].    Margarito Mcnamara is currently single and has 1 son.   The patient reports fair social support.  Margarito Mcnamara's hobbies include being outdoors and problem solving/construction.  Additional stressors: body image, recurrent illness,  son w/ SMI    Strengths:Able to vocalize needs, Motivation, readiness for change, and Vocational interests, hobbies and/or talents  Liabilities: Complicated medical illness and Financial strain    Current Evaluation:     Mental Status Exam: Margarito Mcnamara arrived 10 minutes late for the assessment session.  The patient was cooperative (though tangential) throughout the interview and was an fair historian   Appearance: age appropriate, casually  dressed, adequately  groomed  Behavior/Cooperation: friendly and cooperative  Speech: hyperverbal  Mood: anxious  Affect: mood congruent  Thought Process: goal-directed, logical  Thought Content: normal, no suicidality, no homicidality, delusions, or paranoia;did not appear to be responding to internal stimuli during the interview.   Orientation: grossly intact  Memory: grossly intact  Attention Span/Concentration: Attends to interview without distraction; reports no difficulty  Fund of Knowledge: average  Estimate of Intelligence: average from verbal skills and history  Cognition: grossly intact  Insight: patient has awareness of illness; good insight into own behavior and behavior of others  Judgment: the patient's behavior is adequate to circumstances      History of present illness:    Oncology History   Basal cell carcinoma (BCC) of left side of neck   1/26/2021 Initial Diagnosis    Basal cell carcinoma (BCC) of scalp     1/28/2021 Tumor Conference    His case was discussed at the Multidisciplinary Head and Neck Team Planning Meeting.    Representatives from Medical Oncology, Radiation Oncology, Head and Neck Surgical Oncology, Psychosocial Oncology, and Speech and Language Pathology discussed the case with the following recommendations:    1) WLE with reconstruction  2) adjuvant radiation               2/4/2021 Tumor Conference    His case was discussed at the Multidisciplinary Head and Neck Team Planning Meeting.    Representatives from Medical Oncology, Radiation  Oncology, Head and Neck Surgical Oncology, Psychosocial Oncology, and Speech and Language Pathology discussed the case with the following recommendations:    1) MRI with and without contrast  2) surgery with flap reconstruction              3/3/2021 Surgery    1.  Wide local excision of basal cell carcinoma of the scalp measuring roughly 15 x 15 cm  2.  Right myocutaneous latissimus dorsi free flap with microvascular anastomosis to right superficial temporal vessels and right facial artery  3.  Sardis of right external jugular vein graft measuring 8 cm in length  4.  [place] of split-thickness skin graft from right thigh measuring 8 x 10 cm  5.   Bifrontal craniotomy for tumor.  6.  Stealth navigation.  7.  Cranioplasty greater than 6 cm.        3/5/2021 Surgery    1.  Preparation of scalp wound measuring 15 x 15 cm  2.  Left l myocutaneous latissimus dorsi free flap with microvascular anastomosis to left superficial temporal vessels  3.  Split-thickness skin graft from left thigh measuring approximately 8 x 10 cm     6/22/2021 Cancer Staged    Staging form: Cutaneous Carcinoma of the Head and Neck, AJCC 8th Edition  - Pathologic stage from 6/22/2021: pT4b, pN0             Margarito Mcnamara has adjusted to illness with significant difficulty primarily through active coping strategies, avoidance, and focus on family. He has engaged in limited information gathering.  The patient has fair family/friend support.  His support system is coping marginally with the diagnosis/treatment/prognosis and may benefit from additional psychotherapeutic support. Illness-related psychosocial stressors include financial strain , absence from work, difficulty meeting family responsibilities, and changes in ability to engage in leisure activities.  The patient has a excellent partnership with his [facility] treatment team. The patient reports the following barriers to cancer care:financial limitations and distance from the  [facility].     [facility] Distress thermometer:   DISTRESS SCREENING 3/9/2023 8/16/2022 3/24/2022   Distress Score 10 - Extreme Distress 10 - Extreme Distress 10   Practical Problems ;Work/School Insurance/Financial -   Family Problems - Dealing with Children -   Emotional Problems - Depression;Fears;Nervousness;Sadness;Worry -   Spiritual / Methodist Concerns - No No   Physical Problems Pain Pain;Sleep;Fatigue -        Symptoms:   Mood: depressed mood, fatigue, and worthlessness/guilt;  no prior; no SI/HI  Anxiety: Feeling nervous, anxious, or on edge; no prior  Substance abuse: denied  Cognitive functioning: denied  Health behaviors: over-compensation w/ interests/activities (fusion to prior self)  Sleep: interrupted sleep and sleep interrupted by pain , early AM awakening with return to sleep          Assessment - Diagnosis - Goals:       ICD-10-CM ICD-9-CM   1. Depression, unspecified depression type  F32.A 311         Plan:individual psychotherapy and medication management by physician    Summary and Recommendations  Margarito Mcnamara is a 57 y.o. male referred by Catalina Vidal MD for psychological evaluation and treatment.  Mr. Mcnamara appears to be having great difficulty coping with his diagnosis and proposed treatment course.  Patient was encouraged to continue to work w/ his primary care physician regarding psychotropic medication options. Mood protective strategies during cancer treatment were discussed, as well as, identity and parsimonious approach to care.  He is interested in individual therapy for cancer coping skills and will follow up with me for that purpose.    Return to clinic: 3 weeks    GOALS:   Adaptive coping  Personal limitations on expectations and hours of activity (fusion to prior self)  Home organization and self-care  Potential referral to psychiatry specific med management               Randall Levine Psy.D.  Clinical Psychologist  [place] License #[id number]  [place] License #[id number]

## 2023-05-04 ENCOUNTER — TELEPHONE (OUTPATIENT)
Dept: HEMATOLOGY/ONCOLOGY | Facility: CLINIC | Age: 58
End: 2023-05-04
Payer: MEDICAID

## 2023-05-04 NOTE — TELEPHONE ENCOUNTER
Instructed pt to call his insurance company to find a provider.  Pt verbalized understanding. ----- Message from Miguel Angel Andujar sent at 5/4/2023  8:59 AM CDT -----  Contact: Self  Type: Needs Medical Advice  Who Called:  Patient    Best Call Back Number: [phone]   Additional Information: Patient states he needs derm who takes mcaid. Please call to advise.

## 2023-06-06 ENCOUNTER — PATIENT MESSAGE (OUTPATIENT)
Dept: DERMATOLOGY | Facility: CLINIC | Age: 58
End: 2023-06-06
Payer: MEDICAID

## 2023-09-22 ENCOUNTER — HOSPITAL ENCOUNTER (EMERGENCY)
Facility: HOSPITAL | Age: 58
Discharge: HOME | End: 2023-09-22
Attending: STUDENT IN AN ORGANIZED HEALTH CARE EDUCATION/TRAINING PROGRAM

## 2023-09-22 VITALS
SYSTOLIC BLOOD PRESSURE: 144 MMHG | RESPIRATION RATE: 20 BRPM | HEART RATE: 60 BPM | BODY MASS INDEX: 21.48 KG/M2 | TEMPERATURE: 98 F | WEIGHT: 145 LBS | OXYGEN SATURATION: 100 % | HEIGHT: 69 IN | DIASTOLIC BLOOD PRESSURE: 83 MMHG

## 2023-09-22 DIAGNOSIS — S81.811A LACERATION OF RIGHT LOWER EXTREMITY, INITIAL ENCOUNTER: Primary | ICD-10-CM

## 2023-09-22 DIAGNOSIS — S91.019A LACERATION OF ANKLE: ICD-10-CM

## 2023-09-22 PROCEDURE — 99283 EMERGENCY DEPT VISIT LOW MDM: CPT

## 2023-09-22 PROCEDURE — 12002 RPR S/N/AX/GEN/TRNK2.6-7.5CM: CPT

## 2023-09-22 PROCEDURE — 25000003 PHARM REV CODE 250: Performed by: STUDENT IN AN ORGANIZED HEALTH CARE EDUCATION/TRAINING PROGRAM

## 2023-09-22 RX ORDER — HYDROCODONE BITARTRATE AND ACETAMINOPHEN 5; 325 MG/1; MG/1
1 TABLET ORAL EVERY 6 HOURS PRN
Qty: 5 TABLET | Refills: 0 | Status: SHIPPED | OUTPATIENT
Start: 2023-09-22 | End: 2023-09-24

## 2023-09-22 RX ORDER — HYDROCODONE BITARTRATE AND ACETAMINOPHEN 7.5; 325 MG/1; MG/1
1 TABLET ORAL
Status: COMPLETED | OUTPATIENT
Start: 2023-09-22 | End: 2023-09-22

## 2023-09-22 RX ORDER — LIDOCAINE HYDROCHLORIDE AND EPINEPHRINE 10; 10 MG/ML; UG/ML
10 INJECTION, SOLUTION INFILTRATION; PERINEURAL ONCE
Status: COMPLETED | OUTPATIENT
Start: 2023-09-22 | End: 2023-09-22

## 2023-09-22 RX ADMIN — HYDROCODONE BITARTRATE AND ACETAMINOPHEN 1 TABLET: 7.5; 325 TABLET ORAL at 12:09

## 2023-09-22 RX ADMIN — LIDOCAINE HYDROCHLORIDE AND EPINEPHRINE 10 ML: 10; 10 INJECTION, SOLUTION INFILTRATION; PERINEURAL at 01:09

## 2023-09-22 NOTE — ED PROVIDER NOTES
Encounter Date: 9/22/2023       History     Chief Complaint   Patient presents with    Laceration     Dropped glass on right foot     HPI    Patient is a 57-year-old man with history of squamous cell cancer presenting with right ankle laceration 1 hour prior to arrival.  Patient dropped glass that cut his right ankle.  Reported initial significant bleeding.  Denies any weakness, numbness.  Last tetanus shot 1 year ago.    Review of patient's allergies indicates:  No Known Allergies  Past Medical History:   Diagnosis Date    ADHD (attention deficit hyperactivity disorder)     Arthritis     Basal cell carcinoma (BCC) of left side of neck 08/2022    Chronic pain     Depression     Hydrocele in adult     Hyperlipidemia     Hypertension     Migraine     Squamous cell skin cancer, face 08/2022     Past Surgical History:   Procedure Laterality Date    BACK SURGERY  11/2020    CRANIOTOMY N/A 3/3/2021    Procedure: CRANIOTOMY, USING FRAMELESS STEREOTAXY, OPEN, BIFRONTAL;  Surgeon: Raheel Green MD;  Location: [address];  Service: Neurosurgery;  Laterality: N/A;  TOR I  ASA I  TYPE & CROSS 2 UNITS  REGULAR BED  [place] HEADREST  CHILDRESS  STEALTH CT    EXCISION OF LESION Left 8/24/2022    Procedure: EXCISION, LESION basal cell CA left neck--2 separate lesions;  Surgeon: Catalina Vidal MD;  Location: [facility];  Service: ENT;  Laterality: Left;    FLAP PROCEDURE N/A 3/3/2021    Procedure: CREATION, FREE FLAP;  Surgeon: Ayaan Aguilar MD;  Location: [address];  Service: ENT;  Laterality: N/A;  Latissimus free flap. Will need bean bag and Clark.     FLAP PROCEDURE Left 3/5/2021    Procedure: CREATION, FREE FLAP;  Surgeon: Ayaan Aguilar MD;  Location: [address];  Service: ENT;  Laterality: Left;    LAPAROSCOPIC REPAIR OF HERNIA      RHIZOTOMY W/ RADIOFREQUENCY ABLATION Bilateral     two procedures    SURGICAL REMOVAL OF LESION OF FACE Right 8/24/2022    Procedure: EXCISION, LESION, FACE ;  Surgeon:  Catalina Vidal MD;  Location: [facility];  Service: ENT;  Laterality: Right;    TENDON REPAIR Right     thumb     Family History   Problem Relation Age of Onset    Depression Mother     Early death Mother 40    Alcohol abuse Father     Depression Father     Hypertension Brother     Headaches Sister      Social History     Tobacco Use    Smoking status: Every Day     Current packs/day: 0.50     Average packs/day: 0.5 packs/day for 10.0 years (5.0 ttl pk-yrs)     Types: Cigarettes    Smokeless tobacco: Former    Tobacco comments:     1/2 pack per day   Substance Use Topics    Alcohol use: Yes     Comment: ocassioanl beer    Drug use: Not Currently     Review of Systems   Constitutional:  Negative for activity change and appetite change.   Skin:  Positive for wound. Negative for color change.   Neurological:  Negative for weakness and numbness.       Physical Exam     Initial Vitals [09/22/23 1141]   BP Pulse Resp Temp SpO2   98/71 67 (!) 24 97.5 °F (36.4 °C) 98 %      MAP       --         Physical Exam    Constitutional: He appears well-developed and well-nourished.   HENT:   Head: Normocephalic.   Squamous cell carcinoma with prior surgery to the right forehead and scalp   Abdominal: Abdomen is soft. He exhibits no distension. There is no abdominal tenderness.   Musculoskeletal:         General: Normal range of motion.     Skin: Skin is warm and dry. Capillary refill takes less than 2 seconds.   3.5 cm laceration to the right anterior ankle.  No active bleeding.  2+ pulse and normal sensation distally.  Full range of motion.  No evidence of tendon or ligament injury         ED Course   Lac Repair    Date/Time: 9/22/2023 1:08 PM    Performed by: Quang Shelley MD  Authorized by: Quang Shelley MD    Consent:     Consent obtained:  Verbal    Consent given by:  Patient    Risks discussed:  Infection, pain and poor cosmetic result  Anesthesia:     Anesthesia method:  Local infiltration    Local anesthetic:  Lidocaine 1%  WITH epi  Laceration details:     Location:  Foot    Foot location:  R ankle    Length (cm):  3.5    Depth (mm):  2  Exploration:     Imaging obtained: x-ray      Imaging outcome: foreign body not noted      Wound exploration: wound explored through full range of motion      Wound extent: no muscle damage noted, no nerve damage noted, no tendon damage noted and no vascular damage noted      Contaminated: no    Treatment:     Area cleansed with:  Saline    Amount of cleaning:  Standard    Irrigation solution:  Sterile saline    Irrigation volume:  1L    Debridement:  None    Undermining:  None  Skin repair:     Repair method:  Sutures    Suture size:  4-0    Suture material:  Nylon    Suture technique:  Simple interrupted and horizontal mattress    Number of sutures:  6  Approximation:     Approximation:  Close  Post-procedure details:     Dressing:  Non-adherent dressing    Procedure completion:  Tolerated well, no immediate complications    Labs Reviewed - No data to display       Imaging Results              X-Ray Ankle Complete Right (Final result)  Result time 09/22/23 12:45:12      Final result by Johnny Santana MD (09/22/23 12:45:12)                   Narrative:    CLINICAL HISTORY:  57 years (1965) Male Laceration (Dropped glass on right ankle    TECHNIQUE:  XR ANKLE 3 OR MORE VIEWS RIGHT. 3 view(s) obtained .    COMPARISON:  None available.    FINDINGS:  No acute fracture or dislocation. The ankle mortise and talar dome are congruent. There is moderate distal pretibial soft tissue swelling with a skin laceration/defect with trace subcutaneous emphysema there is an overlying bandage with no radiopaque foreign body identified.    IMPRESSION:  No acute osseous abnormality.                  .    Electronically signed by:  Johnny Santana MD  9/22/2023 12:45 PM CDT Workstation: [id number]                                     Medications   HYDROcodone-acetaminophen 7.5-325 mg per tablet 1 tablet (1  tablet Oral Given 9/22/23 1211)   LIDOcaine-EPINEPHrine 1%-1:100,000 injection 10 mL (10 mLs Intradermal Given by Provider 9/22/23 1305)     Medical Decision Making  57-year-old male with history of squamous cell carcinoma presenting with laceration to this right ankle.  Significant bleeding on scene however no active bleeding.  Normal strength, sensation and 2+ distal pulse.  Differential includes foreign body, tendon or ligament injury, nerve injury.  Tdap up-to-date.  X-ray without any evidence foreign body.  Wound was washed without any evidence of retained foreign body.  Repaired as above and tolerated well.  Advised to return to PCP in 7 days for wound recheck and suture removal.  Return precautions were discussed including any swelling, fever, discharge.  We will prescribe short course of pain medication.  Patient understands and agrees with this plan.    Quang Shelley MD  Emergency Medicine      Amount and/or Complexity of Data Reviewed  Radiology: ordered.    Risk  Prescription drug management.                               Clinical Impression:   Final diagnoses:  [S91.019A] Laceration of ankle  [S81.811A] Laceration of right lower extremity, initial encounter (Primary)        ED Disposition Condition    Discharge Stable          ED Prescriptions       Medication Sig Dispense Start Date End Date Auth. Provider    HYDROcodone-acetaminophen (NORCO) 5-325 mg per tablet Take 1 tablet by mouth every 6 (six) hours as needed for Pain. 5 tablet 9/22/2023 9/24/2023 Quang Shelley MD          Follow-up Information       Follow up With Specialties Details Why Contact Info Additional Information    [facility] Emergency Medicine  As needed, If symptoms worsen including swelling, fever, discharge, any other concerns [address] [facility] [phone] 1st floor    Jeanne Brizuela NP Family Medicine Go in 1 week For wound re-check and suture removal [address] [facility] [id number]  [phone]                Quang Shelley MD  09/22/23 7741

## 2024-05-18 ENCOUNTER — HOSPITAL ENCOUNTER (EMERGENCY)
Facility: HOSPITAL | Age: 59
Discharge: HOME | End: 2024-05-19
Attending: EMERGENCY MEDICINE

## 2024-05-18 DIAGNOSIS — L03.213 PERIORBITAL CELLULITIS OF RIGHT EYE: ICD-10-CM

## 2024-05-18 DIAGNOSIS — H57.11 ACUTE RIGHT EYE PAIN: Primary | ICD-10-CM

## 2024-05-18 LAB
ALBUMIN SERPL BCP-MCNC: 4.5 G/DL (ref 3.5–5.2)
ALP SERPL-CCNC: 74 U/L (ref 55–135)
ALT SERPL W/O P-5'-P-CCNC: 20 U/L (ref 10–44)
ANION GAP SERPL CALC-SCNC: 8 MMOL/L (ref 8–16)
AST SERPL-CCNC: 19 U/L (ref 10–40)
BASOPHILS # BLD AUTO: 0.11 K/UL (ref 0–0.2)
BASOPHILS NFR BLD: 1.2 % (ref 0–1.9)
BILIRUB SERPL-MCNC: 0.6 MG/DL (ref 0.1–1)
BUN SERPL-MCNC: 19 MG/DL (ref 6–20)
CALCIUM SERPL-MCNC: 9.2 MG/DL (ref 8.7–10.5)
CHLORIDE SERPL-SCNC: 104 MMOL/L (ref 95–110)
CO2 SERPL-SCNC: 26 MMOL/L (ref 23–29)
CREAT SERPL-MCNC: 1 MG/DL (ref 0.5–1.4)
DIFFERENTIAL METHOD BLD: ABNORMAL
EOSINOPHIL # BLD AUTO: 0.5 K/UL (ref 0–0.5)
EOSINOPHIL NFR BLD: 5.6 % (ref 0–8)
ERYTHROCYTE [DISTWIDTH] IN BLOOD BY AUTOMATED COUNT: 13.5 % (ref 11.5–14.5)
ERYTHROCYTE [SEDIMENTATION RATE] IN BLOOD BY WESTERGREN METHOD: 8 MM/HR (ref 0–10)
EST. GFR  (NO RACE VARIABLE): >60 ML/MIN/1.73 M^2
GLUCOSE SERPL-MCNC: 74 MG/DL (ref 70–110)
HCT VFR BLD AUTO: 40.8 % (ref 40–54)
HGB BLD-MCNC: 13.7 G/DL (ref 14–18)
IMM GRANULOCYTES # BLD AUTO: 0.02 K/UL (ref 0–0.04)
IMM GRANULOCYTES NFR BLD AUTO: 0.2 % (ref 0–0.5)
LYMPHOCYTES # BLD AUTO: 2.4 K/UL (ref 1–4.8)
LYMPHOCYTES NFR BLD: 25.9 % (ref 18–48)
MCH RBC QN AUTO: 29.1 PG (ref 27–31)
MCHC RBC AUTO-ENTMCNC: 33.6 G/DL (ref 32–36)
MCV RBC AUTO: 87 FL (ref 82–98)
MONOCYTES # BLD AUTO: 1.1 K/UL (ref 0.3–1)
MONOCYTES NFR BLD: 12.2 % (ref 4–15)
NEUTROPHILS # BLD AUTO: 5.1 K/UL (ref 1.8–7.7)
NEUTROPHILS NFR BLD: 54.9 % (ref 38–73)
NRBC BLD-RTO: 0 /100 WBC
PLATELET # BLD AUTO: 273 K/UL (ref 150–450)
PMV BLD AUTO: 8.8 FL (ref 9.2–12.9)
POTASSIUM SERPL-SCNC: 3.8 MMOL/L (ref 3.5–5.1)
PROT SERPL-MCNC: 7.1 G/DL (ref 6–8.4)
RBC # BLD AUTO: 4.7 M/UL (ref 4.6–6.2)
SODIUM SERPL-SCNC: 138 MMOL/L (ref 136–145)
WBC # BLD AUTO: 9.35 K/UL (ref 3.9–12.7)

## 2024-05-18 PROCEDURE — 63600175 PHARM REV CODE 636 W HCPCS: Performed by: EMERGENCY MEDICINE

## 2024-05-18 PROCEDURE — 85025 COMPLETE CBC W/AUTO DIFF WBC: CPT | Performed by: EMERGENCY MEDICINE

## 2024-05-18 PROCEDURE — 80053 COMPREHEN METABOLIC PANEL: CPT | Performed by: EMERGENCY MEDICINE

## 2024-05-18 PROCEDURE — 99285 EMERGENCY DEPT VISIT HI MDM: CPT | Mod: 25

## 2024-05-18 PROCEDURE — 96375 TX/PRO/DX INJ NEW DRUG ADDON: CPT

## 2024-05-18 PROCEDURE — 25500020 PHARM REV CODE 255: Performed by: EMERGENCY MEDICINE

## 2024-05-18 PROCEDURE — 85651 RBC SED RATE NONAUTOMATED: CPT | Performed by: EMERGENCY MEDICINE

## 2024-05-18 RX ORDER — MORPHINE SULFATE 4 MG/ML
4 INJECTION, SOLUTION INTRAMUSCULAR; INTRAVENOUS
Status: COMPLETED | OUTPATIENT
Start: 2024-05-18 | End: 2024-05-18

## 2024-05-18 RX ORDER — ONDANSETRON HYDROCHLORIDE 2 MG/ML
4 INJECTION, SOLUTION INTRAVENOUS
Status: COMPLETED | OUTPATIENT
Start: 2024-05-18 | End: 2024-05-18

## 2024-05-18 RX ADMIN — IOHEXOL 100 ML: 350 INJECTION, SOLUTION INTRAVENOUS at 11:05

## 2024-05-18 RX ADMIN — ONDANSETRON 4 MG: 2 INJECTION INTRAMUSCULAR; INTRAVENOUS at 10:05

## 2024-05-18 RX ADMIN — MORPHINE SULFATE 4 MG: 4 INJECTION, SOLUTION INTRAMUSCULAR; INTRAVENOUS at 10:05

## 2024-05-19 VITALS
DIASTOLIC BLOOD PRESSURE: 78 MMHG | SYSTOLIC BLOOD PRESSURE: 122 MMHG | TEMPERATURE: 98 F | RESPIRATION RATE: 20 BRPM | BODY MASS INDEX: 21.48 KG/M2 | HEIGHT: 69 IN | OXYGEN SATURATION: 96 % | WEIGHT: 145 LBS | HEART RATE: 64 BPM

## 2024-05-19 PROCEDURE — 96365 THER/PROPH/DIAG IV INF INIT: CPT

## 2024-05-19 PROCEDURE — 63600175 PHARM REV CODE 636 W HCPCS: Mod: JZ,JG | Performed by: EMERGENCY MEDICINE

## 2024-05-19 RX ORDER — CLINDAMYCIN PHOSPHATE 600 MG/50ML
600 INJECTION, SOLUTION INTRAVENOUS
Status: COMPLETED | OUTPATIENT
Start: 2024-05-19 | End: 2024-05-19

## 2024-05-19 RX ORDER — HYDROCODONE BITARTRATE AND ACETAMINOPHEN 10; 325 MG/1; MG/1
1 TABLET ORAL EVERY 6 HOURS PRN
Qty: 13 TABLET | Refills: 0 | Status: SHIPPED | OUTPATIENT
Start: 2024-05-19

## 2024-05-19 RX ORDER — AMOXICILLIN AND CLAVULANATE POTASSIUM 875; 125 MG/1; MG/1
1 TABLET, FILM COATED ORAL 2 TIMES DAILY
Qty: 20 TABLET | Refills: 0 | Status: SHIPPED | OUTPATIENT
Start: 2024-05-19 | End: 2024-05-29

## 2024-05-19 RX ADMIN — CLINDAMYCIN PHOSPHATE 600 MG: 600 INJECTION, SOLUTION INTRAVENOUS at 12:05

## 2024-05-19 NOTE — ED PROVIDER NOTES
Encounter Date: 5/18/2024       History     Chief Complaint   Patient presents with    Eye Problem     Patient c/o redness, pain, swelling to right eye extending into right cheek     HPI  Review of patient's allergies indicates:  No Known Allergies  Past Medical History:   Diagnosis Date    ADHD (attention deficit hyperactivity disorder)     Arthritis     Basal cell carcinoma (BCC) of left side of neck 08/2022    Chronic pain     Depression     Hydrocele in adult     Hyperlipidemia     Hypertension     Migraine     Squamous cell skin cancer, face 08/2022     Past Surgical History:   Procedure Laterality Date    BACK SURGERY  11/2020    CRANIOTOMY N/A 3/3/2021    Procedure: CRANIOTOMY, USING FRAMELESS STEREOTAXY, OPEN, BIFRONTAL;  Surgeon: Raheel Green MD;  Location: [facility] OR [address] [place] [address];  Service: Neurosurgery;  Laterality: N/A;  TOR I  ASA I  TYPE & CROSS 2 UNITS  REGULAR BED  [place] [facility]    EXCISION OF LESION Left 8/24/2022    Procedure: EXCISION, LESION basal cell CA left neck--2 separate lesions;  Surgeon: Catalina Vidal MD;  Location: [facility] OR;  Service: ENT;  Laterality: Left;    FLAP PROCEDURE N/A 3/3/2021    Procedure: CREATION, FREE FLAP;  Surgeon: Ayaan Aguilar MD;  Location: [facility] OR [address] [place] [address];  Service: ENT;  Laterality: N/A;  Latissimus free flap. Will need [facility] and [place].     FLAP PROCEDURE Left 3/5/2021    Procedure: CREATION, FREE FLAP;  Surgeon: Ayaan Aguilar MD;  Location: [facility] OR [address] [place] [address];  Service: ENT;  Laterality: Left;    LAPAROSCOPIC REPAIR OF HERNIA      RHIZOTOMY W/ RADIOFREQUENCY ABLATION Bilateral     two procedures    SURGICAL REMOVAL OF LESION OF FACE Right 8/24/2022    Procedure: EXCISION, LESION, FACE ;  Surgeon: Catalina Vidal MD;  Location: [facility] OR;  Service: ENT;  Laterality: Right;    TENDON REPAIR Right     thumb     Family History   Problem Relation Name Age of Onset    Depression Mother      Early death Mother  40    Alcohol abuse Father       Depression Father      Hypertension Brother      Headaches Sister       Social History     Tobacco Use    Smoking status: Every Day     Current packs/day: 0.50     Average packs/day: 0.5 packs/day for 10.0 years (5.0 ttl pk-yrs)     Types: Cigarettes    Smokeless tobacco: Former    Tobacco comments:     1/2 pack per day   Substance Use Topics    Alcohol use: Yes     Comment: ocassioanl beer    Drug use: Not Currently     Review of Systems   Eyes:  Positive for pain and visual disturbance.   All other systems reviewed and are negative.      Physical Exam     Initial Vitals [05/18/24 2142]   BP Pulse Resp Temp SpO2   (!) 134/96 78 18 98.2 °F (36.8 °C) 98 %      MAP       --         Physical Exam    Nursing note and vitals reviewed.  Constitutional: He appears well-developed and well-nourished. He is not diaphoretic. No distress.   HENT:   Head: Normocephalic and atraumatic.   Mouth/Throat: Oropharynx is clear and moist.   Eyes: Conjunctivae and EOM are normal. Pupils are equal, round, and reactive to light.       Neck: Neck supple. No thyromegaly present. No JVD present.   Normal range of motion.  Cardiovascular:  Normal rate, regular rhythm and normal heart sounds.     Exam reveals no gallop and no friction rub.       No murmur heard.  Pulmonary/Chest: Breath sounds normal. No respiratory distress. He has no wheezes. He exhibits no tenderness.   Abdominal: Abdomen is soft. Bowel sounds are normal. He exhibits no distension. There is no abdominal tenderness. There is no rebound and no guarding.   Musculoskeletal:         General: No tenderness or edema. Normal range of motion.      Cervical back: Normal range of motion and neck supple.     Neurological: He is alert and oriented to person, place, and time. He has normal strength. No cranial nerve deficit or sensory deficit.   Skin: Skin is warm and dry. Capillary refill takes less than 2 seconds. No rash noted. No erythema.   Psychiatric: He has a normal mood and  affect.         ED Course   Procedures  Labs Reviewed   CBC W/ AUTO DIFFERENTIAL - Abnormal; Notable for the following components:       Result Value    Hemoglobin 13.7 (*)     MPV 8.8 (*)     Mono # 1.1 (*)     All other components within normal limits   COMPREHENSIVE METABOLIC PANEL   SEDIMENTATION RATE          Imaging Results              CT Orbits Sella Post Fossa With Contrast (Final result)  Result time 05/19/24 00:12:35   Procedure changed from CT Orbits Sella Post Fossa Without Cont     Final result by Arvind Stein MD (05/19/24 00:12:35)                   Impression:      Asymmetric soft tissue thickening and enhancement involving the right inferior periorbital soft tissues may reflect preseptal cellulitis/infection in the appropriate clinical setting.  There is a small subcentimeter hypoattenuating focus centrally within the region of soft tissue induration which may reflect a tiny abscess as discussed above.  No evidence to suggest postseptal cellulitis.    Partially visualized postoperative changes of the frontal calvarium with mild nonspecific heterogeneity of the visualized inferior calvarium with overlying soft tissue thickening.  This may reflect post-treatment or postoperative change, however clinical correlation and appropriate follow-up is advised.    Mild paranasal sinus disease as above.      Electronically signed by: Arvind Stein MD  Date:    05/19/2024  Time:    00:12               Narrative:    EXAMINATION:  CT ORBITS SELLA POST FOSSA WITH CONTRAST    CLINICAL HISTORY:  Ocular pain;Orbital cellulitis suspected;    TECHNIQUE:  1 mm axial images were acquired of the orbital region following the administration of 100 cc Omnipaque 350 IV contrast.    COMPARISON:  CT soft tissue neck 01/30/2023    FINDINGS:  There is asymmetric soft tissue thickening and enhancement involving the right inferior periorbital soft tissues.  There is a small 0.4 x 0.4 cm hypoattenuating focus centrally  within the region of soft tissue induration which could reflect a tiny abscess (for example series 4, image 44).  There is no soft tissue gas present.    The globes demonstrate symmetric vitreal attenuation.  There is no abnormal retrobulbar inflammatory change or retrobulbar abscess identified to suggest postseptal cellulitis.    There are partially visualized postoperative changes of the frontal calvarium.  There is slight heterogeneity of the visualized inferior calvarium which may reflect posttherapy or postoperative change, however clinical correlation and appropriate follow-up is advised.  There is mild overlying soft tissue thickening.  No acute intracranial abnormality appreciated.  There is mild mucosal thickening of the anterior ethmoid air cells and bilateral maxillary sinuses.  No air-fluid levels present within the paranasal sinuses.  Mastoid air cells are well aerated.  No acute maxillofacial fracture identified.                                       Medications   morphine injection 4 mg (4 mg Intravenous Given 5/18/24 2206)   ondansetron injection 4 mg (4 mg Intravenous Given 5/18/24 2206)   iohexoL (OMNIPAQUE 350) injection 100 mL (100 mLs Intravenous Given 5/18/24 2323)   clindamycin in D5W 600 mg/50 mL IVPB 600 mg (0 mg Intravenous Stopped 5/19/24 0106)     Medical Decision Making  [age] year male initial assessment in mild to moderate distress secondary to eye pain and swelling.  Patient is alert and oriented x3, neurologically and neurovascular intact no focal deficits.  He is nontoxic-appearing and vitals stable at this time.    Differential diagnosis: Conjunctivitis, pre versus post orbital cellulitis, entrapment    Amount and/or Complexity of Data Reviewed  Labs: ordered. Decision-making details documented in ED Course.  Radiology: ordered. Decision-making details documented in ED Course.    Risk  Prescription drug management.  Risk Details: Patient has been reassessed noted to have no  acute changes in his condition.  CT report does show mild preseptal cellulitis patient given antibiotics while in the ED.  He will continue to use on outpatient basis while with primary care physician as needed.  Patient has remained stable while in the ED and discharged home stable condition with follow-up as discussed.  Mr. Mcnamara is aware of the plan and in agreement with discharge.    Patient's plan and diagnosis was discussed. All questions were answered and patient was comfortable with the plan. This patient was personally seen and personally examined by me and I personally performed the services described in this documentation.   Complexity of the visit is established by the note or I have spent at least the amount of time discussing findings, exam and/or radiographs or imaging studies.     MD uses EPIC and voice recognition software prone to occasional and minor errors that may persist in the medical record.                                        Clinical Impression:  Final diagnoses:  [H57.11] Acute right eye pain (Primary)  [L03.213] Periorbital cellulitis of right eye          ED Disposition Condition    Discharge Stable          ED Prescriptions       Medication Sig Dispense Start Date End Date Auth. Provider    amoxicillin-clavulanate 875-125mg (AUGMENTIN) 875-125 mg per tablet Take 1 tablet by mouth 2 (two) times daily. for 10 days 20 tablet 5/19/2024 5/29/2024 Giovani Raines MD    HYDROcodone-acetaminophen (NORCO)  mg per tablet Take 1 tablet by mouth every 6 (six) hours as needed for Pain. 13 tablet 5/19/2024 -- Giovani Raines MD          Follow-up Information       Follow up With Specialties Details Why Contact Info Additional Information    [facility] - Emergency Dept Emergency Medicine  As needed, If symptoms worsen [address] [facility] [id number] [phone] 1st floor    Jeanne Brizuela NP Family Medicine Schedule an appointment as soon as possible for a  visit  As needed, If symptoms worsen [address]  [facility] [address]  [phone]                Giovani Raines MD  05/19/24 0115

## 2024-06-05 ENCOUNTER — TELEPHONE (OUTPATIENT)
Dept: HEMATOLOGY/ONCOLOGY | Facility: CLINIC | Age: 59
End: 2024-06-05

## 2024-06-05 NOTE — TELEPHONE ENCOUNTER
Left msg on  requesting callback for clarification of needing H&N cancer surgeon appt. Awaiting a return call      ----- Message from Dana Doss, Patient Care Assistant sent at 6/4/2024  3:26 PM CDT -----  Regarding: jaye patient  Type: Needs Medical Advice  Who Called: Margarito   Best Call Back Number: [phone]    Additional Information: Margarito is needing to see keyonna lou for a gash on head , please call to further discuss thank you .

## 2024-08-03 ENCOUNTER — HOSPITAL ENCOUNTER (OUTPATIENT)
Facility: HOSPITAL | Age: 59
Discharge: LEFT BEFORE DISCHARGE | End: 2024-08-04
Attending: STUDENT IN AN ORGANIZED HEALTH CARE EDUCATION/TRAINING PROGRAM | Admitting: STUDENT IN AN ORGANIZED HEALTH CARE EDUCATION/TRAINING PROGRAM
Payer: MEDICARE

## 2024-08-03 DIAGNOSIS — M71.161 SEPTIC INFRAPATELLAR BURSITIS OF RIGHT KNEE: Primary | ICD-10-CM

## 2024-08-03 DIAGNOSIS — R51.9 ACUTE NONINTRACTABLE HEADACHE, UNSPECIFIED HEADACHE TYPE: ICD-10-CM

## 2024-08-03 DIAGNOSIS — R07.9 CHEST PAIN: ICD-10-CM

## 2024-08-03 DIAGNOSIS — B96.89 SEPTIC INFRAPATELLAR BURSITIS OF RIGHT KNEE: Primary | ICD-10-CM

## 2024-08-03 LAB
ALBUMIN SERPL BCP-MCNC: 4.5 G/DL (ref 3.5–5.2)
ALP SERPL-CCNC: 104 U/L (ref 55–135)
ALT SERPL W/O P-5'-P-CCNC: 31 U/L (ref 10–44)
ANION GAP SERPL CALC-SCNC: 9 MMOL/L (ref 8–16)
AST SERPL-CCNC: 26 U/L (ref 10–40)
BASOPHILS # BLD AUTO: 0.1 K/UL (ref 0–0.2)
BASOPHILS NFR BLD: 1 % (ref 0–1.9)
BILIRUB SERPL-MCNC: 0.6 MG/DL (ref 0.1–1)
BUN SERPL-MCNC: 26 MG/DL (ref 6–20)
CALCIUM SERPL-MCNC: 9.5 MG/DL (ref 8.7–10.5)
CHLORIDE SERPL-SCNC: 104 MMOL/L (ref 95–110)
CO2 SERPL-SCNC: 25 MMOL/L (ref 23–29)
CREAT SERPL-MCNC: 0.9 MG/DL (ref 0.5–1.4)
CRP SERPL-MCNC: 4.1 MG/DL
DIFFERENTIAL METHOD BLD: ABNORMAL
EOSINOPHIL # BLD AUTO: 0.3 K/UL (ref 0–0.5)
EOSINOPHIL NFR BLD: 3 % (ref 0–8)
ERYTHROCYTE [DISTWIDTH] IN BLOOD BY AUTOMATED COUNT: 13.5 % (ref 11.5–14.5)
ERYTHROCYTE [SEDIMENTATION RATE] IN BLOOD BY WESTERGREN METHOD: 35 MM/HR (ref 0–10)
EST. GFR  (NO RACE VARIABLE): >60 ML/MIN/1.73 M^2
GLUCOSE SERPL-MCNC: 73 MG/DL (ref 70–110)
HCT VFR BLD AUTO: 41.6 % (ref 40–54)
HGB BLD-MCNC: 13.5 G/DL (ref 14–18)
IMM GRANULOCYTES # BLD AUTO: 0.03 K/UL (ref 0–0.04)
IMM GRANULOCYTES NFR BLD AUTO: 0.3 % (ref 0–0.5)
LYMPHOCYTES # BLD AUTO: 2.5 K/UL (ref 1–4.8)
LYMPHOCYTES NFR BLD: 25.8 % (ref 18–48)
MCH RBC QN AUTO: 28.5 PG (ref 27–31)
MCHC RBC AUTO-ENTMCNC: 32.5 G/DL (ref 32–36)
MCV RBC AUTO: 88 FL (ref 82–98)
MONOCYTES # BLD AUTO: 1.1 K/UL (ref 0.3–1)
MONOCYTES NFR BLD: 11.3 % (ref 4–15)
NEUTROPHILS # BLD AUTO: 5.8 K/UL (ref 1.8–7.7)
NEUTROPHILS NFR BLD: 58.6 % (ref 38–73)
NRBC BLD-RTO: 0 /100 WBC
PLATELET # BLD AUTO: 291 K/UL (ref 150–450)
PMV BLD AUTO: 8.7 FL (ref 9.2–12.9)
POTASSIUM SERPL-SCNC: 4.3 MMOL/L (ref 3.5–5.1)
PROT SERPL-MCNC: 7.6 G/DL (ref 6–8.4)
RBC # BLD AUTO: 4.73 M/UL (ref 4.6–6.2)
SODIUM SERPL-SCNC: 138 MMOL/L (ref 136–145)
WBC # BLD AUTO: 9.82 K/UL (ref 3.9–12.7)

## 2024-08-03 PROCEDURE — 85651 RBC SED RATE NONAUTOMATED: CPT

## 2024-08-03 PROCEDURE — 86140 C-REACTIVE PROTEIN: CPT

## 2024-08-03 PROCEDURE — 63600175 PHARM REV CODE 636 W HCPCS

## 2024-08-03 PROCEDURE — 96375 TX/PRO/DX INJ NEW DRUG ADDON: CPT

## 2024-08-03 PROCEDURE — 25000003 PHARM REV CODE 250

## 2024-08-03 PROCEDURE — 25500020 PHARM REV CODE 255: Performed by: STUDENT IN AN ORGANIZED HEALTH CARE EDUCATION/TRAINING PROGRAM

## 2024-08-03 PROCEDURE — 99285 EMERGENCY DEPT VISIT HI MDM: CPT | Mod: 25

## 2024-08-03 PROCEDURE — 96361 HYDRATE IV INFUSION ADD-ON: CPT

## 2024-08-03 PROCEDURE — 85025 COMPLETE CBC W/AUTO DIFF WBC: CPT

## 2024-08-03 PROCEDURE — 80053 COMPREHEN METABOLIC PANEL: CPT

## 2024-08-03 PROCEDURE — 96374 THER/PROPH/DIAG INJ IV PUSH: CPT

## 2024-08-03 PROCEDURE — 83036 HEMOGLOBIN GLYCOSYLATED A1C: CPT

## 2024-08-03 RX ORDER — LIDOCAINE HYDROCHLORIDE 10 MG/ML
5 INJECTION, SOLUTION EPIDURAL; INFILTRATION; INTRACAUDAL; PERINEURAL
Status: COMPLETED | OUTPATIENT
Start: 2024-08-03 | End: 2024-08-03

## 2024-08-03 RX ORDER — HYDROMORPHONE HYDROCHLORIDE 1 MG/ML
0.5 INJECTION, SOLUTION INTRAMUSCULAR; INTRAVENOUS; SUBCUTANEOUS
Status: COMPLETED | OUTPATIENT
Start: 2024-08-04 | End: 2024-08-04

## 2024-08-03 RX ORDER — HYDROCODONE BITARTRATE AND ACETAMINOPHEN 7.5; 325 MG/1; MG/1
1 TABLET ORAL
Status: COMPLETED | OUTPATIENT
Start: 2024-08-03 | End: 2024-08-03

## 2024-08-03 RX ORDER — ACETAMINOPHEN 500 MG
1000 TABLET ORAL
Status: COMPLETED | OUTPATIENT
Start: 2024-08-03 | End: 2024-08-03

## 2024-08-03 RX ORDER — DROPERIDOL 2.5 MG/ML
0.62 INJECTION, SOLUTION INTRAMUSCULAR; INTRAVENOUS
Status: COMPLETED | OUTPATIENT
Start: 2024-08-03 | End: 2024-08-03

## 2024-08-03 RX ORDER — DIPHENHYDRAMINE HYDROCHLORIDE 50 MG/ML
12.5 INJECTION INTRAMUSCULAR; INTRAVENOUS
Status: COMPLETED | OUTPATIENT
Start: 2024-08-03 | End: 2024-08-03

## 2024-08-03 RX ADMIN — DIPHENHYDRAMINE HYDROCHLORIDE 12.5 MG: 50 INJECTION INTRAMUSCULAR; INTRAVENOUS at 09:08

## 2024-08-03 RX ADMIN — DROPERIDOL 0.62 MG: 2.5 INJECTION, SOLUTION INTRAMUSCULAR; INTRAVENOUS at 09:08

## 2024-08-03 RX ADMIN — ACETAMINOPHEN 1000 MG: 500 TABLET, FILM COATED ORAL at 09:08

## 2024-08-03 RX ADMIN — SODIUM CHLORIDE 1000 ML: 9 INJECTION, SOLUTION INTRAVENOUS at 09:08

## 2024-08-03 RX ADMIN — HYDROCODONE BITARTRATE AND ACETAMINOPHEN 1 TABLET: 7.5; 325 TABLET ORAL at 07:08

## 2024-08-03 RX ADMIN — LIDOCAINE HYDROCHLORIDE 50 MG: 10 INJECTION, SOLUTION EPIDURAL; INFILTRATION; INTRACAUDAL; PERINEURAL at 09:08

## 2024-08-03 RX ADMIN — IOHEXOL 100 ML: 350 INJECTION, SOLUTION INTRAVENOUS at 11:08

## 2024-08-04 ENCOUNTER — TELEPHONE (OUTPATIENT)
Dept: EMERGENCY MEDICINE | Facility: HOSPITAL | Age: 59
End: 2024-08-04

## 2024-08-04 VITALS
SYSTOLIC BLOOD PRESSURE: 142 MMHG | DIASTOLIC BLOOD PRESSURE: 82 MMHG | HEIGHT: 69 IN | OXYGEN SATURATION: 96 % | TEMPERATURE: 98 F | HEART RATE: 65 BPM | RESPIRATION RATE: 16 BRPM | WEIGHT: 140 LBS | BODY MASS INDEX: 20.73 KG/M2

## 2024-08-04 DIAGNOSIS — C44.41 BASAL CELL CARCINOMA (BCC) OF LEFT SIDE OF NECK: ICD-10-CM

## 2024-08-04 DIAGNOSIS — G62.9 NEUROPATHY: Primary | ICD-10-CM

## 2024-08-04 PROBLEM — Z87.891 SMOKING HISTORY: Status: ACTIVE | Noted: 2024-08-04

## 2024-08-04 PROBLEM — R51.9 SCALP PAIN: Status: ACTIVE | Noted: 2024-08-04

## 2024-08-04 PROBLEM — M70.51 INFRAPATELLAR BURSITIS OF RIGHT KNEE: Status: ACTIVE | Noted: 2024-08-04

## 2024-08-04 LAB
ESTIMATED AVG GLUCOSE: 123 MG/DL (ref 68–131)
HBA1C MFR BLD: 5.9 % (ref 4.5–6.2)

## 2024-08-04 PROCEDURE — 36415 COLL VENOUS BLD VENIPUNCTURE: CPT | Performed by: STUDENT IN AN ORGANIZED HEALTH CARE EDUCATION/TRAINING PROGRAM

## 2024-08-04 PROCEDURE — 87040 BLOOD CULTURE FOR BACTERIA: CPT | Mod: 59 | Performed by: STUDENT IN AN ORGANIZED HEALTH CARE EDUCATION/TRAINING PROGRAM

## 2024-08-04 PROCEDURE — G0378 HOSPITAL OBSERVATION PER HR: HCPCS

## 2024-08-04 PROCEDURE — 36415 COLL VENOUS BLD VENIPUNCTURE: CPT

## 2024-08-04 PROCEDURE — 96375 TX/PRO/DX INJ NEW DRUG ADDON: CPT

## 2024-08-04 PROCEDURE — 63600175 PHARM REV CODE 636 W HCPCS: Performed by: STUDENT IN AN ORGANIZED HEALTH CARE EDUCATION/TRAINING PROGRAM

## 2024-08-04 RX ORDER — GLUCAGON 1 MG
1 KIT INJECTION
Status: DISCONTINUED | OUTPATIENT
Start: 2024-08-04 | End: 2024-08-04 | Stop reason: HOSPADM

## 2024-08-04 RX ORDER — BUPROPION HYDROCHLORIDE 150 MG/1
150 TABLET ORAL DAILY
Status: DISCONTINUED | OUTPATIENT
Start: 2024-08-04 | End: 2024-08-04 | Stop reason: HOSPADM

## 2024-08-04 RX ORDER — AMLODIPINE AND BENAZEPRIL HYDROCHLORIDE 10; 20 MG/1; MG/1
1 CAPSULE ORAL DAILY
Status: DISCONTINUED | OUTPATIENT
Start: 2024-08-04 | End: 2024-08-04

## 2024-08-04 RX ORDER — NAPROXEN SODIUM 220 MG/1
81 TABLET, FILM COATED ORAL DAILY
Status: DISCONTINUED | OUTPATIENT
Start: 2024-08-04 | End: 2024-08-04 | Stop reason: HOSPADM

## 2024-08-04 RX ORDER — NALOXONE HCL 0.4 MG/ML
0.02 VIAL (ML) INJECTION
Status: DISCONTINUED | OUTPATIENT
Start: 2024-08-04 | End: 2024-08-04 | Stop reason: HOSPADM

## 2024-08-04 RX ORDER — IBUPROFEN 200 MG
16 TABLET ORAL
Status: DISCONTINUED | OUTPATIENT
Start: 2024-08-04 | End: 2024-08-04 | Stop reason: HOSPADM

## 2024-08-04 RX ORDER — ONDANSETRON HYDROCHLORIDE 2 MG/ML
4 INJECTION, SOLUTION INTRAVENOUS EVERY 8 HOURS PRN
Status: DISCONTINUED | OUTPATIENT
Start: 2024-08-04 | End: 2024-08-04 | Stop reason: HOSPADM

## 2024-08-04 RX ORDER — DEXTROAMPHETAMINE SACCHARATE, AMPHETAMINE ASPARTATE, DEXTROAMPHETAMINE SULFATE AND AMPHETAMINE SULFATE 3.75; 3.75; 3.75; 3.75 MG/1; MG/1; MG/1; MG/1
15 TABLET ORAL 2 TIMES DAILY
Status: DISCONTINUED | OUTPATIENT
Start: 2024-08-04 | End: 2024-08-04 | Stop reason: HOSPADM

## 2024-08-04 RX ORDER — ZOLPIDEM TARTRATE 5 MG/1
10 TABLET ORAL NIGHTLY PRN
Status: DISCONTINUED | OUTPATIENT
Start: 2024-08-04 | End: 2024-08-04 | Stop reason: HOSPADM

## 2024-08-04 RX ORDER — ENOXAPARIN SODIUM 100 MG/ML
40 INJECTION SUBCUTANEOUS EVERY 24 HOURS
Status: DISCONTINUED | OUTPATIENT
Start: 2024-08-04 | End: 2024-08-04 | Stop reason: HOSPADM

## 2024-08-04 RX ORDER — AMLODIPINE BESYLATE 5 MG/1
10 TABLET ORAL DAILY
Status: DISCONTINUED | OUTPATIENT
Start: 2024-08-04 | End: 2024-08-04 | Stop reason: HOSPADM

## 2024-08-04 RX ORDER — BENAZEPRIL HYDROCHLORIDE 20 MG/1
20 TABLET ORAL DAILY
Status: DISCONTINUED | OUTPATIENT
Start: 2024-08-04 | End: 2024-08-04 | Stop reason: HOSPADM

## 2024-08-04 RX ORDER — SODIUM CHLORIDE 0.9 % (FLUSH) 0.9 %
10 SYRINGE (ML) INJECTION EVERY 12 HOURS PRN
Status: DISCONTINUED | OUTPATIENT
Start: 2024-08-04 | End: 2024-08-04 | Stop reason: HOSPADM

## 2024-08-04 RX ORDER — GABAPENTIN 300 MG/1
600 CAPSULE ORAL 3 TIMES DAILY PRN
Status: DISCONTINUED | OUTPATIENT
Start: 2024-08-04 | End: 2024-08-04 | Stop reason: HOSPADM

## 2024-08-04 RX ORDER — BACLOFEN 10 MG/1
20 TABLET ORAL 3 TIMES DAILY PRN
Status: DISCONTINUED | OUTPATIENT
Start: 2024-08-04 | End: 2024-08-04 | Stop reason: HOSPADM

## 2024-08-04 RX ORDER — HYDROCODONE BITARTRATE AND ACETAMINOPHEN 10; 325 MG/1; MG/1
1 TABLET ORAL EVERY 6 HOURS PRN
Status: DISCONTINUED | OUTPATIENT
Start: 2024-08-04 | End: 2024-08-04 | Stop reason: HOSPADM

## 2024-08-04 RX ORDER — IBUPROFEN 200 MG
24 TABLET ORAL
Status: DISCONTINUED | OUTPATIENT
Start: 2024-08-04 | End: 2024-08-04 | Stop reason: HOSPADM

## 2024-08-04 RX ORDER — ATORVASTATIN CALCIUM 40 MG/1
80 TABLET, FILM COATED ORAL DAILY
Status: DISCONTINUED | OUTPATIENT
Start: 2024-08-04 | End: 2024-08-04 | Stop reason: HOSPADM

## 2024-08-04 RX ADMIN — HYDROMORPHONE HYDROCHLORIDE 0.5 MG: 0.5 INJECTION, SOLUTION INTRAMUSCULAR; INTRAVENOUS; SUBCUTANEOUS at 12:08

## 2024-08-05 ENCOUNTER — TELEPHONE (OUTPATIENT)
Dept: EMERGENCY MEDICINE | Facility: HOSPITAL | Age: 59
End: 2024-08-05

## 2024-08-05 ENCOUNTER — TELEPHONE (OUTPATIENT)
Dept: NEUROSURGERY | Facility: CLINIC | Age: 59
End: 2024-08-05

## 2024-08-05 DIAGNOSIS — M54.40 LUMBAGO WITH SCIATICA: Primary | ICD-10-CM

## 2024-08-05 RX ORDER — DOXYCYCLINE 100 MG/1
100 CAPSULE ORAL 2 TIMES DAILY
Qty: 20 CAPSULE | Refills: 0 | Status: ON HOLD | OUTPATIENT
Start: 2024-08-05 | End: 2024-08-08 | Stop reason: HOSPADM

## 2024-08-06 ENCOUNTER — HOSPITAL ENCOUNTER (INPATIENT)
Facility: HOSPITAL | Age: 59
LOS: 2 days | Discharge: HOME | DRG: 502 | End: 2024-08-08
Attending: EMERGENCY MEDICINE | Admitting: HOSPITALIST
Payer: MEDICARE

## 2024-08-06 DIAGNOSIS — R00.1 BRADYCARDIA: ICD-10-CM

## 2024-08-06 DIAGNOSIS — R07.9 CHEST PAIN: ICD-10-CM

## 2024-08-06 DIAGNOSIS — B96.89 SEPTIC INFRAPATELLAR BURSITIS OF RIGHT KNEE: Primary | ICD-10-CM

## 2024-08-06 DIAGNOSIS — M71.161 SEPTIC INFRAPATELLAR BURSITIS OF RIGHT KNEE: Primary | ICD-10-CM

## 2024-08-06 DIAGNOSIS — M71.10 SEPTIC BURSITIS: Primary | ICD-10-CM

## 2024-08-06 PROBLEM — G89.29 CHRONIC PAIN: Status: ACTIVE | Noted: 2024-08-06

## 2024-08-06 LAB
ALBUMIN SERPL BCP-MCNC: 4.6 G/DL (ref 3.5–5.2)
ALP SERPL-CCNC: 116 U/L (ref 55–135)
ALT SERPL W/O P-5'-P-CCNC: 28 U/L (ref 10–44)
ANION GAP SERPL CALC-SCNC: 6 MMOL/L (ref 8–16)
AST SERPL-CCNC: 22 U/L (ref 10–40)
BASOPHILS # BLD AUTO: 0.09 K/UL (ref 0–0.2)
BASOPHILS NFR BLD: 0.9 % (ref 0–1.9)
BILIRUB SERPL-MCNC: 0.8 MG/DL (ref 0.1–1)
BUN SERPL-MCNC: 18 MG/DL (ref 6–20)
CALCIUM SERPL-MCNC: 10 MG/DL (ref 8.7–10.5)
CHLORIDE SERPL-SCNC: 103 MMOL/L (ref 95–110)
CO2 SERPL-SCNC: 28 MMOL/L (ref 23–29)
CREAT SERPL-MCNC: 1 MG/DL (ref 0.5–1.4)
CRP SERPL-MCNC: 1.9 MG/DL
DIFFERENTIAL METHOD BLD: ABNORMAL
EOSINOPHIL # BLD AUTO: 0.3 K/UL (ref 0–0.5)
EOSINOPHIL NFR BLD: 2.5 % (ref 0–8)
ERYTHROCYTE [DISTWIDTH] IN BLOOD BY AUTOMATED COUNT: 13.5 % (ref 11.5–14.5)
ERYTHROCYTE [SEDIMENTATION RATE] IN BLOOD BY WESTERGREN METHOD: 25 MM/HR (ref 0–10)
EST. GFR  (NO RACE VARIABLE): >60 ML/MIN/1.73 M^2
GLUCOSE SERPL-MCNC: 74 MG/DL (ref 70–110)
HCT VFR BLD AUTO: 41.5 % (ref 40–54)
HGB BLD-MCNC: 13.6 G/DL (ref 14–18)
IMM GRANULOCYTES # BLD AUTO: 0.02 K/UL (ref 0–0.04)
IMM GRANULOCYTES NFR BLD AUTO: 0.2 % (ref 0–0.5)
LYMPHOCYTES # BLD AUTO: 2.3 K/UL (ref 1–4.8)
LYMPHOCYTES NFR BLD: 22.6 % (ref 18–48)
MCH RBC QN AUTO: 28.6 PG (ref 27–31)
MCHC RBC AUTO-ENTMCNC: 32.8 G/DL (ref 32–36)
MCV RBC AUTO: 87 FL (ref 82–98)
MONOCYTES # BLD AUTO: 1.1 K/UL (ref 0.3–1)
MONOCYTES NFR BLD: 11.2 % (ref 4–15)
NEUTROPHILS # BLD AUTO: 6.2 K/UL (ref 1.8–7.7)
NEUTROPHILS NFR BLD: 62.6 % (ref 38–73)
NRBC BLD-RTO: 0 /100 WBC
PLATELET # BLD AUTO: 347 K/UL (ref 150–450)
PMV BLD AUTO: 8.5 FL (ref 9.2–12.9)
POTASSIUM SERPL-SCNC: 4.4 MMOL/L (ref 3.5–5.1)
PROCALCITONIN SERPL IA-MCNC: <0.05 NG/ML (ref 0–0.5)
PROT SERPL-MCNC: 8.2 G/DL (ref 6–8.4)
RBC # BLD AUTO: 4.75 M/UL (ref 4.6–6.2)
SODIUM SERPL-SCNC: 137 MMOL/L (ref 136–145)
URATE SERPL-MCNC: 4.3 MG/DL (ref 3.4–7)
WBC # BLD AUTO: 9.97 K/UL (ref 3.9–12.7)

## 2024-08-06 PROCEDURE — 96367 TX/PROPH/DG ADDL SEQ IV INF: CPT

## 2024-08-06 PROCEDURE — 99223 1ST HOSP IP/OBS HIGH 75: CPT | Mod: ,,, | Performed by: INTERNAL MEDICINE

## 2024-08-06 PROCEDURE — 86140 C-REACTIVE PROTEIN: CPT

## 2024-08-06 PROCEDURE — 25000003 PHARM REV CODE 250: Performed by: HOSPITALIST

## 2024-08-06 PROCEDURE — 96365 THER/PROPH/DIAG IV INF INIT: CPT

## 2024-08-06 PROCEDURE — 96366 THER/PROPH/DIAG IV INF ADDON: CPT

## 2024-08-06 PROCEDURE — 12000002 HC ACUTE/MED SURGE SEMI-PRIVATE ROOM

## 2024-08-06 PROCEDURE — 80053 COMPREHEN METABOLIC PANEL: CPT

## 2024-08-06 PROCEDURE — 85651 RBC SED RATE NONAUTOMATED: CPT

## 2024-08-06 PROCEDURE — 84550 ASSAY OF BLOOD/URIC ACID: CPT | Mod: 91 | Performed by: HOSPITALIST

## 2024-08-06 PROCEDURE — 99285 EMERGENCY DEPT VISIT HI MDM: CPT | Mod: 25

## 2024-08-06 PROCEDURE — 96375 TX/PRO/DX INJ NEW DRUG ADDON: CPT

## 2024-08-06 PROCEDURE — 25000003 PHARM REV CODE 250

## 2024-08-06 PROCEDURE — 84145 PROCALCITONIN (PCT): CPT | Performed by: INTERNAL MEDICINE

## 2024-08-06 PROCEDURE — 36415 COLL VENOUS BLD VENIPUNCTURE: CPT | Performed by: INTERNAL MEDICINE

## 2024-08-06 PROCEDURE — 84550 ASSAY OF BLOOD/URIC ACID: CPT | Performed by: INTERNAL MEDICINE

## 2024-08-06 PROCEDURE — 63600175 PHARM REV CODE 636 W HCPCS: Performed by: HOSPITALIST

## 2024-08-06 PROCEDURE — 85025 COMPLETE CBC W/AUTO DIFF WBC: CPT

## 2024-08-06 PROCEDURE — 99223 1ST HOSP IP/OBS HIGH 75: CPT | Mod: 57,ICN,, | Performed by: ORTHOPAEDIC SURGERY

## 2024-08-06 PROCEDURE — 36415 COLL VENOUS BLD VENIPUNCTURE: CPT | Performed by: HOSPITALIST

## 2024-08-06 PROCEDURE — 63600175 PHARM REV CODE 636 W HCPCS

## 2024-08-06 RX ORDER — IBUPROFEN 200 MG
24 TABLET ORAL
Status: DISCONTINUED | OUTPATIENT
Start: 2024-08-06 | End: 2024-08-08 | Stop reason: HOSPADM

## 2024-08-06 RX ORDER — SODIUM CHLORIDE 0.9 % (FLUSH) 0.9 %
10 SYRINGE (ML) INJECTION
Status: DISCONTINUED | OUTPATIENT
Start: 2024-08-06 | End: 2024-08-07 | Stop reason: HOSPADM

## 2024-08-06 RX ORDER — AMLODIPINE AND BENAZEPRIL HYDROCHLORIDE 10; 20 MG/1; MG/1
1 CAPSULE ORAL DAILY
Status: DISCONTINUED | OUTPATIENT
Start: 2024-08-06 | End: 2024-08-06

## 2024-08-06 RX ORDER — HEPARIN SODIUM 5000 [USP'U]/ML
5000 INJECTION, SOLUTION INTRAVENOUS; SUBCUTANEOUS EVERY 8 HOURS
Status: DISCONTINUED | OUTPATIENT
Start: 2024-08-06 | End: 2024-08-08 | Stop reason: HOSPADM

## 2024-08-06 RX ORDER — LANOLIN ALCOHOL/MO/W.PET/CERES
800 CREAM (GRAM) TOPICAL
Status: DISCONTINUED | OUTPATIENT
Start: 2024-08-06 | End: 2024-08-08 | Stop reason: HOSPADM

## 2024-08-06 RX ORDER — AMLODIPINE BESYLATE 5 MG/1
10 TABLET ORAL DAILY
Status: DISCONTINUED | OUTPATIENT
Start: 2024-08-06 | End: 2024-08-06

## 2024-08-06 RX ORDER — ACETAMINOPHEN 325 MG/1
650 TABLET ORAL EVERY 8 HOURS PRN
Status: DISCONTINUED | OUTPATIENT
Start: 2024-08-06 | End: 2024-08-08 | Stop reason: HOSPADM

## 2024-08-06 RX ORDER — GABAPENTIN 300 MG/1
600 CAPSULE ORAL 3 TIMES DAILY PRN
Status: DISCONTINUED | OUTPATIENT
Start: 2024-08-06 | End: 2024-08-08 | Stop reason: HOSPADM

## 2024-08-06 RX ORDER — AMLODIPINE BESYLATE 5 MG/1
10 TABLET ORAL DAILY
Status: DISCONTINUED | OUTPATIENT
Start: 2024-08-07 | End: 2024-08-08 | Stop reason: HOSPADM

## 2024-08-06 RX ORDER — HYDROCODONE BITARTRATE AND ACETAMINOPHEN 5; 325 MG/1; MG/1
2 TABLET ORAL EVERY 6 HOURS PRN
Status: DISCONTINUED | OUTPATIENT
Start: 2024-08-06 | End: 2024-08-08 | Stop reason: HOSPADM

## 2024-08-06 RX ORDER — BENAZEPRIL HYDROCHLORIDE 20 MG/1
20 TABLET ORAL DAILY
Status: DISCONTINUED | OUTPATIENT
Start: 2024-08-06 | End: 2024-08-06

## 2024-08-06 RX ORDER — ALUMINUM HYDROXIDE, MAGNESIUM HYDROXIDE, AND SIMETHICONE 1200; 120; 1200 MG/30ML; MG/30ML; MG/30ML
30 SUSPENSION ORAL 4 TIMES DAILY PRN
Status: DISCONTINUED | OUTPATIENT
Start: 2024-08-06 | End: 2024-08-08 | Stop reason: HOSPADM

## 2024-08-06 RX ORDER — NALOXONE HCL 0.4 MG/ML
0.02 VIAL (ML) INJECTION
Status: DISCONTINUED | OUTPATIENT
Start: 2024-08-06 | End: 2024-08-08 | Stop reason: HOSPADM

## 2024-08-06 RX ORDER — ONDANSETRON HYDROCHLORIDE 2 MG/ML
4 INJECTION, SOLUTION INTRAVENOUS
Status: COMPLETED | OUTPATIENT
Start: 2024-08-06 | End: 2024-08-06

## 2024-08-06 RX ORDER — AMOXICILLIN 250 MG
1 CAPSULE ORAL 2 TIMES DAILY PRN
Status: DISCONTINUED | OUTPATIENT
Start: 2024-08-06 | End: 2024-08-08 | Stop reason: HOSPADM

## 2024-08-06 RX ORDER — TALC
6 POWDER (GRAM) TOPICAL NIGHTLY PRN
Status: DISCONTINUED | OUTPATIENT
Start: 2024-08-06 | End: 2024-08-08 | Stop reason: HOSPADM

## 2024-08-06 RX ORDER — HYDROMORPHONE HYDROCHLORIDE 1 MG/ML
0.5 INJECTION, SOLUTION INTRAMUSCULAR; INTRAVENOUS; SUBCUTANEOUS
Status: COMPLETED | OUTPATIENT
Start: 2024-08-06 | End: 2024-08-06

## 2024-08-06 RX ORDER — SODIUM,POTASSIUM PHOSPHATES 280-250MG
2 POWDER IN PACKET (EA) ORAL
Status: DISCONTINUED | OUTPATIENT
Start: 2024-08-06 | End: 2024-08-08 | Stop reason: HOSPADM

## 2024-08-06 RX ORDER — HYDROCODONE BITARTRATE AND ACETAMINOPHEN 5; 325 MG/1; MG/1
1 TABLET ORAL EVERY 6 HOURS PRN
Status: DISCONTINUED | OUTPATIENT
Start: 2024-08-06 | End: 2024-08-06

## 2024-08-06 RX ORDER — IBUPROFEN 200 MG
16 TABLET ORAL
Status: DISCONTINUED | OUTPATIENT
Start: 2024-08-06 | End: 2024-08-08 | Stop reason: HOSPADM

## 2024-08-06 RX ORDER — SODIUM CHLORIDE 0.9 % (FLUSH) 0.9 %
2 SYRINGE (ML) INJECTION EVERY 12 HOURS PRN
Status: DISCONTINUED | OUTPATIENT
Start: 2024-08-06 | End: 2024-08-08 | Stop reason: HOSPADM

## 2024-08-06 RX ORDER — BENAZEPRIL HYDROCHLORIDE 20 MG/1
20 TABLET ORAL DAILY
Status: DISCONTINUED | OUTPATIENT
Start: 2024-08-07 | End: 2024-08-08 | Stop reason: HOSPADM

## 2024-08-06 RX ORDER — ACETAMINOPHEN 325 MG/1
650 TABLET ORAL EVERY 4 HOURS PRN
Status: DISCONTINUED | OUTPATIENT
Start: 2024-08-06 | End: 2024-08-08 | Stop reason: HOSPADM

## 2024-08-06 RX ORDER — GLUCAGON 1 MG
1 KIT INJECTION
Status: DISCONTINUED | OUTPATIENT
Start: 2024-08-06 | End: 2024-08-08 | Stop reason: HOSPADM

## 2024-08-06 RX ORDER — MUPIROCIN 20 MG/G
OINTMENT TOPICAL
Status: DISCONTINUED | OUTPATIENT
Start: 2024-08-06 | End: 2024-08-07 | Stop reason: HOSPADM

## 2024-08-06 RX ORDER — ONDANSETRON HYDROCHLORIDE 2 MG/ML
4 INJECTION, SOLUTION INTRAVENOUS EVERY 6 HOURS PRN
Status: DISCONTINUED | OUTPATIENT
Start: 2024-08-06 | End: 2024-08-08 | Stop reason: HOSPADM

## 2024-08-06 RX ADMIN — HYDROMORPHONE HYDROCHLORIDE 0.5 MG: 0.5 INJECTION, SOLUTION INTRAMUSCULAR; INTRAVENOUS; SUBCUTANEOUS at 11:08

## 2024-08-06 RX ADMIN — PIPERACILLIN SODIUM AND TAZOBACTAM SODIUM 3.38 G: 3; .375 INJECTION, POWDER, LYOPHILIZED, FOR SOLUTION INTRAVENOUS at 11:08

## 2024-08-06 RX ADMIN — HEPARIN SODIUM 5000 UNITS: 5000 INJECTION INTRAVENOUS; SUBCUTANEOUS at 04:08

## 2024-08-06 RX ADMIN — ALUMINUM HYDROXIDE, MAGNESIUM HYDROXIDE, AND SIMETHICONE 30 ML: 200; 200; 20 SUSPENSION ORAL at 08:08

## 2024-08-06 RX ADMIN — HEPARIN SODIUM 5000 UNITS: 5000 INJECTION INTRAVENOUS; SUBCUTANEOUS at 10:08

## 2024-08-06 RX ADMIN — HYDROCODONE BITARTRATE AND ACETAMINOPHEN 2 TABLET: 5; 325 TABLET ORAL at 04:08

## 2024-08-06 RX ADMIN — VANCOMYCIN HYDROCHLORIDE 750 MG: 750 INJECTION, POWDER, LYOPHILIZED, FOR SOLUTION INTRAVENOUS at 11:08

## 2024-08-06 RX ADMIN — ONDANSETRON 4 MG: 2 INJECTION INTRAMUSCULAR; INTRAVENOUS at 11:08

## 2024-08-06 RX ADMIN — VANCOMYCIN HYDROCHLORIDE 1500 MG: 1.5 INJECTION, POWDER, LYOPHILIZED, FOR SOLUTION INTRAVENOUS at 11:08

## 2024-08-06 NOTE — CONSULTS
[facility]  Orthopedics  Consult Note    Patient Name: Margarito Mcnamara  MRN: [id number]  Admission Date: 8/6/2024  Hospital Length of Stay: 0 days  Attending Provider: Vesna Person MD  Primary Care Provider: Wilfredo Luna MD    Patient information was obtained from patient and ER records.     Inpatient consult to Orthopedic Surgery  Consult performed by: Blake Barton MD  Consult ordered by: Oneida Sams NP        Subjective:     Principal Problem:Infrapatellar bursitis of right knee    Chief Complaint:   Chief Complaint   Patient presents with    Knee Pain     C/o rt knee swelling. Pt states he was called back for antibiotics        HPI:  Pleasant 58-year-old male who presented to the emergency department over the weekend with chief complaint of right knee pain and swelling was found to have possible prepatellar septic bursitis.  He left AMA at that point against the recommendation of the ER docs.  He then came back to the emergency department this morning with some worsening pain and swelling of the right knee.  CT scan was done which again was consistent with prepatellar bursitis with possible rim enhancing fluid collection.  Denies any trauma or known trauma to the right knee.  States this has been going on progressively worsening for the past few weeks now.    Past Medical History:   Diagnosis Date    ADHD (attention deficit hyperactivity disorder)     Arthritis     Basal cell carcinoma (BCC) of left side of neck 08/2022    Chronic pain     Depression     Hydrocele in adult     Hyperlipidemia     Hypertension     Migraine     Squamous cell skin cancer, face 08/2022       Past Surgical History:   Procedure Laterality Date    BACK SURGERY  11/2020    CRANIOTOMY N/A 3/3/2021    Procedure: CRANIOTOMY, USING FRAMELESS STEREOTAXY, OPEN, BIFRONTAL;  Surgeon: Raheel Green MD;  Location: [facility] OR [address] [place] [address];  Service: Neurosurgery;  Laterality: N/A;  TOR I  ASA I  TYPE & CROSS 2  UNITS  REGULAR BED  [facility] CT    EXCISION OF LESION Left 8/24/2022    Procedure: EXCISION, LESION basal cell CA left neck--2 separate lesions;  Surgeon: Catalina Vidal MD;  Location: [facility] OR;  Service: ENT;  Laterality: Left;    FLAP PROCEDURE N/A 3/3/2021    Procedure: CREATION, FREE FLAP;  Surgeon: Ayaan Aguilar MD;  Location: [facility] OR 2ND FLR;  Service: ENT;  Laterality: N/A;  Latissimus free flap. Will need [facility] and [place].     FLAP PROCEDURE Left 3/5/2021    Procedure: CREATION, FREE FLAP;  Surgeon: Ayaan Aguilar MD;  Location: [facility] OR 2ND FLR;  Service: ENT;  Laterality: Left;    LAPAROSCOPIC REPAIR OF HERNIA      RHIZOTOMY W/ RADIOFREQUENCY ABLATION Bilateral     two procedures    SURGICAL REMOVAL OF LESION OF FACE Right 8/24/2022    Procedure: EXCISION, LESION, FACE ;  Surgeon: Catalina Vidal MD;  Location: [facility] OR;  Service: ENT;  Laterality: Right;    TENDON REPAIR Right     thumb       Review of patient's allergies indicates:  No Known Allergies    Current Facility-Administered Medications   Medication    acetaminophen tablet 650 mg    acetaminophen tablet 650 mg    aluminum-magnesium hydroxide-simethicone 200-200-20 mg/5 mL suspension 30 mL    [START ON 8/7/2024] amLODIPine tablet 10 mg    And    [START ON 8/7/2024] benazepriL tablet 20 mg    dextrose 50% injection 12.5 g    dextrose 50% injection 25 g    gabapentin capsule 600 mg    glucagon (human recombinant) injection 1 mg    glucose chewable tablet 16 g    glucose chewable tablet 24 g    heparin (porcine) injection 5,000 Units    HYDROcodone-acetaminophen 5-325 mg per tablet 2 tablet    magnesium oxide tablet 800 mg    magnesium oxide tablet 800 mg    melatonin tablet 6 mg    naloxone 0.4 mg/mL injection 0.02 mg    ondansetron injection 4 mg    potassium bicarbonate disintegrating tablet 35 mEq    potassium bicarbonate disintegrating tablet 50 mEq    potassium bicarbonate disintegrating tablet 60 mEq     "potassium, sodium phosphates 280-160-250 mg packet 2 packet    potassium, sodium phosphates 280-160-250 mg packet 2 packet    potassium, sodium phosphates 280-160-250 mg packet 2 packet    senna-docusate 8.6-50 mg per tablet 1 tablet    sodium chloride 0.9% flush 2 mL    vancomycin - pharmacy to dose    vancomycin 750 mg in dextrose 5 % 250 mL IVPB (ready to mix)     Family History       Problem Relation (Age of Onset)    Alcohol abuse Father    Depression Mother, Father    Early death Mother (40)    Headaches Sister    Hypertension Brother          Tobacco Use    Smoking status: Every Day     Current packs/day: 0.50     Average packs/day: 0.5 packs/day for 10.0 years (5.0 ttl pk-yrs)     Types: Cigarettes    Smokeless tobacco: Former    Tobacco comments:     1/2 pack per day   Substance and Sexual Activity    Alcohol use: Yes     Comment: ocassioanl beer    Drug use: Not Currently    Sexual activity: Yes     Partners: Female     ROS    Review of Systems:  Constitutional: no fever or chills  Eyes: no visual changes  ENT: no nasal congestion or sore throat  Respiratory: no cough or shortness of breath  Cardiovascular: no chest pain  Gastrointestinal: no nausea or vomiting, tolerating diet  Musculoskeletal: pain and soreness  All others negative   Objective:     Vital Signs (Most Recent):  Temp: 97.9 °F (36.6 °C) (08/06/24 1551)  Pulse: 61 (08/06/24 1551)  Resp: 18 (08/06/24 1551)  BP: 95/61 (08/06/24 1551)  SpO2: 98 % (08/06/24 1328) Vital Signs (24h Range):  Temp:  [97.9 °F (36.6 °C)-98.5 °F (36.9 °C)] 97.9 °F (36.6 °C)  Pulse:  [54-71] 61  Resp:  [17-20] 18  SpO2:  [98 %-100 %] 98 %  BP: ()/(61-98) 95/61     Weight: 63.5 kg (139 lb 15.9 oz)  Height: 5' 9" (175.3 cm)  Body mass index is 20.67 kg/m².      Intake/Output Summary (Last 24 hours) at 8/6/2024 1632  Last data filed at 8/6/2024 1312  Gross per 24 hour   Intake 350 ml   Output --   Net 350 ml       Ortho/SPM Exam  On exam he is alert and oriented.  He " has golf ball-sized soft tissue swelling of the inferior pole of the patella consistent with prepatellar bursitis or septic bursitis.  There is very little induration or erythema surrounding.  No streaking.  Full range of motion of the right knee.  No joint effusion.    Significant Labs: BMP:   Recent Labs   Lab 08/06/24  1013   GLU 74      K 4.4      CO2 28   BUN 18   CREATININE 1.0   CALCIUM 10.0     CBC:   Recent Labs   Lab 08/06/24  1013   WBC 9.97   HGB 13.6*   HCT 41.5        All pertinent labs within the past 24 hours have been reviewed.    Significant Imaging: CT: I have reviewed all pertinent results/findings and my personal findings are:  Focal rim enhancing fluid collection inferior bursa right knee    Assessment/Plan:     Active Diagnoses:    Diagnosis Date Noted POA    PRINCIPAL PROBLEM:  Infrapatellar bursitis of right knee [M70.51] 08/04/2024 Yes    Chronic pain [G89.29] 08/06/2024 Yes    HTN (hypertension) [I10] 03/02/2021 Yes      Problems Resolved During this Admission:     Treatment options were discussed with him in detail.  Clinically this could be prepatellar bursitis versus septic bursitis.  Some of his imaging is consistent with septic bursitis.  He also has elevated CRP and ESR.  Normal with normal-appearing her septic.  We discussed treatment options including IV antibiotics and warm compresses.  We also discussed surgical intervention including formal incision and drainage in the operating room.  He would prefer incision and drainage which is certainly reasonable based on imaging.  We will plan for I and D tomorrow.  NPO at midnight. hold any anticoagulation at midnight        Blake Barton MD  Orthopedics  [facility]

## 2024-08-06 NOTE — ASSESSMENT & PLAN NOTE
Chronic, controlled. Latest blood pressure and vitals reviewed-     Temp:  [98.5 °F (36.9 °C)]   Pulse:  [54-68]   Resp:  [17-20]   BP: (111-125)/(68-98)   SpO2:  [99 %-100 %] .   Home meds for hypertension were reviewed and noted below.   Hypertension Medications               amlodipine-benazepril 10-20mg (LOTREL) 10-20 mg per capsule Take 1 capsule by mouth once daily.            While in the hospital, will manage blood pressure as follows; Continue home antihypertensive regimen    Will utilize p.r.n. blood pressure medication only if patient's blood pressure greater than 160/100 and he develops symptoms such as worsening chest pain or shortness of breath.

## 2024-08-06 NOTE — CONSULTS
"Critical access hospital   Department of Infectious Disease  Consult Note        PATIENT NAME: Margarito Mcnamara  MRN: [id number]  TODAY'S DATE: 08/06/2024  ADMIT DATE: 8/6/2024  LOS: 0 days    CHIEF COMPLAINT: Knee Pain (C/o rt knee swelling. Pt states he was called back for antibiotics)      PRINCIPLE PROBLEM: Infrapatellar bursitis of right knee    REASON FOR CONSULT:     ASSESSMENT and PLAN      1. Right prepatellar bursitis.  This is presumed to be septic based on CT findings.  No fever or peripheral leukocytosis.  No other joint involved.  For I&D in OR tomorrow.  Continue empiric vancomycin for now and reassess with any culture results.    2. History of metastatic skin cancer of the scalp.  He was managed with resection and craniotomy followed by reconstruction with plate and STSG.    3. Bilateral Foot numbness.  This maybe peripheral neuropathy.  Management as per hospitalist and his PCP.      RECOMMENDATIONS:   Continue vancomycin   Send fluid and tissue for culture when I and D done.  Check uric acid level    Thank you for this consult. Please send [employer] secure chat with any questions.    Antibiotics (From admission, onward)      Start     Stop Route Frequency Ordered    08/06/24 2330  vancomycin 750 mg in dextrose 5 % 250 mL IVPB (ready to mix)         -- IV Every 12 hours (non-standard times) 08/06/24 1416    08/06/24 1040  vancomycin - pharmacy to dose  (vancomycin IVPB (PEDS and ADULTS))        Placed in "And" Linked Group    -- IV pharmacy to manage frequency 08/06/24 0942          Antifungals (From admission, onward)      None           Antivirals (From admission, onward)      None            HPI      Margarito Mcnamara is a 58 y.o. male with history of crepitation, hyperlipidemia, migraine, ADHD and arthritis.  He presents to the ER 08/03/2024 with right knee swelling.  X-ray and CT documented septic prepatellar bursitis.  CRC 4.1, ESR 35. He was admitted and placed on IV antibiotics.  Orthopedic surgery " was consulted.  However, he left AMA prior to being seen by Orthopedic surgery Service.      Back in ER today 08/06/2024 because he was not getting better with worsening pain.  Also it was now softer.  He has been seen by orthopedic surgeon with plan for I&D in OR tomorrow.  Current ESR 25, CRP 1.9.    He is disable from his cancer and is on SSI.  He does do a little work on this side.  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    Antibiotic history:    Vancomycin: 08/06/2024-  Zosyn:  08/06/2024-    Microbiology:    Blood culture 08/03/2024: NGTD    Social History  Marital Status: Single  Alcohol History:  reports current alcohol use.  Tobacco History:  reports that he has been smoking cigarettes. He has a 5 pack-year smoking history. He has quit using smokeless tobacco.  Drug History:  reports that he does not currently use drugs.    Outdoor activities:  He lives with his son.  He is disabled and is on disability.  Still dose of work on the side to make a little money.  Travel:  No recent travel  Implants:  None    Review of patient's allergies indicates:  No Known Allergies  Past Medical History:   Diagnosis Date    ADHD (attention deficit hyperactivity disorder)     Arthritis     Basal cell carcinoma (BCC) of left side of neck 08/2022    Chronic pain     Depression     Hydrocele in adult     Hyperlipidemia     Hypertension     Migraine     Squamous cell skin cancer, face 08/2022     Past Surgical History:   Procedure Laterality Date    BACK SURGERY  11/2020    CRANIOTOMY N/A 3/3/2021    Procedure: CRANIOTOMY, USING FRAMELESS STEREOTAXY, OPEN, BIFRONTAL;  Surgeon: Raheel Green MD;  Location: [facility] OR [address] [place] [address];  Service: Neurosurgery;  Laterality: N/A;  TOR I  ASA I  TYPE & CROSS 2 UNITS  REGULAR BED  [facility]    EXCISION OF LESION Left 8/24/2022    Procedure: EXCISION, LESION basal cell CA left neck--2 separate lesions;  Surgeon: Catalina EPPERSON  MD Young;  Location: [facility] OR;  Service: ENT;  Laterality: Left;    FLAP PROCEDURE N/A 3/3/2021    Procedure: CREATION, FREE FLAP;  Surgeon: Ayaan Aguilar MD;  Location: [facility] OR 2ND FLR;  Service: ENT;  Laterality: N/A;  Latissimus free flap. Will need bean bag and Porras.     FLAP PROCEDURE Left 3/5/2021    Procedure: CREATION, FREE FLAP;  Surgeon: Ayaan Aguilar MD;  Location: [facility] OR 2ND FLR;  Service: ENT;  Laterality: Left;    LAPAROSCOPIC REPAIR OF HERNIA      RHIZOTOMY W/ RADIOFREQUENCY ABLATION Bilateral     two procedures    SURGICAL REMOVAL OF LESION OF FACE Right 8/24/2022    Procedure: EXCISION, LESION, FACE ;  Surgeon: Caatlina Vidal MD;  Location: [facility] OR;  Service: ENT;  Laterality: Right;    TENDON REPAIR Right     thumb     Family History   Problem Relation Name Age of Onset    Depression Mother      Early death Mother  40    Alcohol abuse Father      Depression Father      Hypertension Brother      Headaches Sister         SUBJECTIVE     Review of systems: As in HPI.  Ten system review unremarkable     OBJECTIVE   Temp:  [97.7 °F (36.5 °C)-98.5 °F (36.9 °C)] 97.7 °F (36.5 °C)  Pulse:  [54-71] 56  Resp:  [17-20] 18  SpO2:  [97 %-100 %] 97 %  BP: ()/(57-98) 109/57  Temp:  [97.7 °F (36.5 °C)-98.5 °F (36.9 °C)]   Temp: 97.7 °F (36.5 °C) (08/06/24 1700)  Pulse: (!) 56 (08/06/24 1700)  Resp: 18 (08/06/24 1700)  BP: (!) 109/57 (08/06/24 1700)  SpO2: 97 % (08/06/24 1700)    Intake/Output Summary (Last 24 hours) at 8/6/2024 1716  Last data filed at 8/6/2024 1312  Gross per 24 hour   Intake 350 ml   Output --   Net 350 ml       Physical Exam  General:  Middle-aged man lying quietly in bed in no acute distress   Right knee: Fluctuant mass overlying the patella.  Mild erythema.  Nontender.  CVS: S1 and 2 heard, no murmurs appreciated   Respiratory: Clear to auscultation   Abdomen: Full, soft, nontender, no palpable organomegaly   Skin: No rash appreciated   CNS: No focal deficits  "  Musculoskeletal system: No joint or bony abnormalities appreciated              VAD:  PIV  ISOLATION:  None    Wounds:  None    Significant Labs: All pertinent labs within the past 24 hours have been reviewed.    CBC LAST 7  Recent Labs   Lab 08/03/24  1925 08/06/24  1013   WBC 9.82 9.97   RBC 4.73 4.75   HGB 13.5* 13.6*   HCT 41.6 41.5   MCV 88 87   MCH 28.5 28.6   MCHC 32.5 32.8   RDW 13.5 13.5    347   MPV 8.7* 8.5*   GRAN 58.6  5.8 62.6  6.2   LYMPH 25.8  2.5 22.6  2.3   MONO 11.3  1.1* 11.2  1.1*   BASO 0.10 0.09   NRBC 0 0       CHEMISTRY LAST 7  Recent Labs   Lab 08/03/24  1925 08/06/24  1013    137   K 4.3 4.4    103   CO2 25 28   ANIONGAP 9 6*   BUN 26* 18   CREATININE 0.9 1.0   GLU 73 74   CALCIUM 9.5 10.0   ALBUMIN 4.5 4.6   PROT 7.6 8.2   ALKPHOS 104 116   ALT 31 28   AST 26 22   BILITOT 0.6 0.8       Estimated Creatinine Clearance: 72.3 mL/min (based on SCr of 1 mg/dL).    INFLAMMATORY/PROCAL  LAST 7  Recent Labs   Lab 08/03/24 1925 08/06/24  1013   CRP 4.10* 1.90*     No results found for: "ESR"  CRP   Date Value Ref Range Status   08/06/2024 1.90 (H) <1.00 mg/dL Final     Comment:     CRP-Normal Application expected values:   <1.0        mg/dL   Normal Range  1.0 - 5.0  mg/dL   Indicates mild inflammation  5.0 - 10.0 mg/dL   Indicates severe inflammation  >10.0        mg/dL   Represents serious processes and   frequently         indicates the presence of a bacterial   infection.      08/03/2024 4.10 (H) <1.00 mg/dL Final     Comment:     CRP-Normal Application expected values:   <1.0        mg/dL   Normal Range  1.0 - 5.0  mg/dL   Indicates mild inflammation  5.0 - 10.0 mg/dL   Indicates severe inflammation  >10.0        mg/dL   Represents serious processes and   frequently         indicates the presence of a bacterial   infection.      03/18/2021 78.0 (H) 0.0 - 8.2 mg/L Final   03/15/2021 177.6 (H) 0.0 - 8.2 mg/L Final       PRIOR  MICROBIOLOGY:  Reviewed  No results found " for the last 90 days.        LAST 7 DAYS MICROBIOLOGY   Microbiology Results (last 7 days)       ** No results found for the last 168 hours. **          CURRENT/PREVIOUS VISIT EKG  Results for orders placed or performed during the hospital encounter of 03/03/21   EKG 12-lead    Collection Time: 03/11/21  9:32 AM    Narrative    Test Reason : C44.91,    Vent. Rate : 073 BPM     Atrial Rate : 073 BPM     P-R Int : 134 ms          QRS Dur : 080 ms      QT Int : 394 ms       P-R-T Axes : 037 013 048 degrees     QTc Int : 434 ms    Normal sinus rhythm  Normal ECG  When compared with ECG of 02-MAR-2021 16:22,  No significant change was found  Confirmed by MONIKA ZAPATA MD ([id number]) on 3/12/2021 12:17:27 AM    Referred By: BERNARD MARTINEZ           Confirmed By:MONIKA ZAPATA MD     Significant Imaging: I have reviewed all relevant and available imaging results/findings within the past 24 hours.    Gilbert Dietz MD  Date of Service: 08/06/2024      This note was created using [employer] voice recognition software that occasionally misinterpreted phrases or words.

## 2024-08-06 NOTE — HPI
59 yo male with PMH of squamous cell skin cancer, basal cell carcinoma, depression, chronic pain, HTN, HLP and right septic superficial infrapatellar knee bursitis who presented to the ER with worsening swelling, redness and pain of his right knee bursitis. Symptoms started 8 days ago with pain and swelling underneath his right knee.  Symptoms progressively got worse.  He noticed that the swelling underneath her right his right knee is no longer soft, but rather hard/tense to palpation, and it is tender to touch. Also erythema around the area.  He denied any fever or chills however.  Patient was seen at [facility] ED 2 days ago and was admitted for IV antibiotics but unfortunately left AMA prior to orthopedic surgeon intervention.  Now he is coming back with worsening symptoms, and is in agreement with admission.

## 2024-08-06 NOTE — H&P
[facility] - Emergency Dept  Hospital Medicine  History & Physical    Patient Name: Margarito Mcnamara  MRN: [id number]  Patient Class: IP- Inpatient  Admission Date: 8/6/2024  Attending Physician: Vesna Person MD  Primary Care Provider: Wilfredo Luna MD         Patient information was obtained from patient and ER records.     Subjective:     Principal Problem:Infrapatellar bursitis of right knee    Chief Complaint:   Chief Complaint   Patient presents with    Knee Pain     C/o rt knee swelling. Pt states he was called back for antibiotics        HPI: 57 yo male with PMH of squamous cell skin cancer, basal cell carcinoma, depression, chronic pain, HTN, HLP and right septic superficial infrapatellar knee bursitis who presented to the ER with worsening swelling, redness and pain of his right knee bursitis. Symptoms started 8 days ago with pain and swelling underneath his right knee.  Symptoms progressively got worse.  He noticed that the swelling underneath her right his right knee is no longer soft, but rather hard/tense to palpation, and it is tender to touch. Also erythema around the area.  He denied any fever or chills however.  Patient was seen at [facility] ED 2 days ago and was admitted for IV antibiotics but unfortunately left AMA prior to orthopedic surgeon intervention.  Now he is coming back with worsening symptoms, and is in agreement with admission.     Past Medical History:   Diagnosis Date    ADHD (attention deficit hyperactivity disorder)     Arthritis     Basal cell carcinoma (BCC) of left side of neck 08/2022    Chronic pain     Depression     Hydrocele in adult     Hyperlipidemia     Hypertension     Migraine     Squamous cell skin cancer, face 08/2022       Past Surgical History:   Procedure Laterality Date    BACK SURGERY  11/2020    CRANIOTOMY N/A 3/3/2021    Procedure: CRANIOTOMY, USING FRAMELESS STEREOTAXY, OPEN, BIFRONTAL;  Surgeon: Raheel Green MD;  Location: [facility]  OR 2ND FLR;  Service: Neurosurgery;  Laterality: N/A;  TOR I  ASA I  TYPE & CROSS 2 UNITS  REGULAR BED  [facility] HEADREST  [facility] CT    EXCISION OF LESION Left 8/24/2022    Procedure: EXCISION, LESION basal cell CA left neck--2 separate lesions;  Surgeon: Catalina Vidal MD;  Location: [facility] OR;  Service: ENT;  Laterality: Left;    FLAP PROCEDURE N/A 3/3/2021    Procedure: CREATION, FREE FLAP;  Surgeon: Ayaan Aguilar MD;  Location: [facility] OR 2ND FLR;  Service: ENT;  Laterality: N/A;  Latissimus free flap. Will need bean bag and Porras.     FLAP PROCEDURE Left 3/5/2021    Procedure: CREATION, FREE FLAP;  Surgeon: Ayaan Aguilar MD;  Location: [facility] OR 2ND FLR;  Service: ENT;  Laterality: Left;    LAPAROSCOPIC REPAIR OF HERNIA      RHIZOTOMY W/ RADIOFREQUENCY ABLATION Bilateral     two procedures    SURGICAL REMOVAL OF LESION OF FACE Right 8/24/2022    Procedure: EXCISION, LESION, FACE ;  Surgeon: Catalina Vidal MD;  Location: [facility] OR;  Service: ENT;  Laterality: Right;    TENDON REPAIR Right     thumb       Review of patient's allergies indicates:  No Known Allergies    No current facility-administered medications on file prior to encounter.     Current Outpatient Medications on File Prior to Encounter   Medication Sig    amlodipine-benazepril 10-20mg (LOTREL) 10-20 mg per capsule Take 1 capsule by mouth once daily.    doxycycline (VIBRAMYCIN) 100 MG Cap Take 1 capsule (100 mg total) by mouth 2 (two) times daily. for 10 days    gabapentin (NEURONTIN) 300 MG capsule Take 600 mg by mouth 3 (three) times daily as needed.    HYDROcodone-acetaminophen (NORCO)  mg per tablet Take 1 tablet by mouth every 6 (six) hours as needed for Pain.    butalbital-acetaminophen-caffeine -40 mg (FIORICET, ESGIC) -40 mg per tablet Take 1 tablet by mouth 2 (two) times daily.    sildenafiL (VIAGRA) 50 MG tablet Take 50 mg by mouth daily as needed.    tadalafiL (CIALIS) 20 MG Tab Take 20 mg by mouth As  instructed.    [DISCONTINUED] acetaminophen (TYLENOL) 325 MG tablet Take 2 tablets (650 mg total) by mouth every 6 (six) hours as needed for Pain.    [DISCONTINUED] amoxicillin (AMOXIL) 500 MG capsule Take 500 mg by mouth 3 (three) times daily.    [DISCONTINUED] aspirin (ECOTRIN) 81 MG EC tablet TAKE 1 TABLET (81 MG TOTAL) BY MOUTH DAILY    [DISCONTINUED] aspirin 81 MG Chew Take 81 mg by mouth once daily.    [DISCONTINUED] bacitracin 500 unit/gram ointment Apply topically 3 (three) times daily. (Patient not taking: Reported on 3/9/2023)    [DISCONTINUED] baclofen (LIORESAL) 20 MG tablet Take 20 mg by mouth 3 (three) times daily as needed. Not taking    [DISCONTINUED] bisacodyL (DULCOLAX) 10 mg Supp Place 1 suppository (10 mg total) rectally daily as needed. (Patient not taking: Reported on 3/9/2023)    [DISCONTINUED] buPROPion (WELLBUTRIN XL) 150 MG TB24 tablet     [DISCONTINUED] calcium carbonate (TUMS) 200 mg calcium (500 mg) chewable tablet Take 1 tablet (500 mg total) by mouth daily as needed.    [DISCONTINUED] dexAMETHasone (DECADRON) 2 MG tablet Take 1 tablet (2 mg total) by mouth 2 (two) times daily with meals. (Patient not taking: Reported on 2/7/2023)    [DISCONTINUED] dextroamphetamine-amphetamine (ADDERALL) 15 mg tablet Take 15 mg by mouth 2 (two) times daily.    [DISCONTINUED] docusate (COLACE) 50 mg/5 mL liquid Take 10 mLs (100 mg total) by mouth once daily. (Patient not taking: Reported on 2/7/2023)    [DISCONTINUED] enoxaparin (LOVENOX) 40 mg/0.4 mL Syrg Inject 0.4 mLs (40 mg total) into the skin once daily. (Patient not taking: Reported on 2/7/2023)    [DISCONTINUED] ibuprofen (ADVIL,MOTRIN) 800 MG tablet TAKE 1 TABLET BY MOUTH EVERY 6 TO 8 HOURS AS NEEDED FOR PAIN    [DISCONTINUED] QUEtiapine (SEROQUEL) 25 MG Tab Take 1 tablet (25 mg total) by mouth every evening. (Patient not taking: No sig reported)    [DISCONTINUED] rosuvastatin (CRESTOR) 20 MG tablet 1 tablet    [DISCONTINUED] silver  sulfADIAZINE 1% (SILVADENE) 1 % cream Apply topically 2 (two) times daily.    [DISCONTINUED] zolpidem (AMBIEN) 10 mg Tab 1 tablet at bedtime as needed     Family History       Problem Relation (Age of Onset)    Alcohol abuse Father    Depression Mother, Father    Early death Mother (40)    Headaches Sister    Hypertension Brother          Tobacco Use    Smoking status: Every Day     Current packs/day: 0.50     Average packs/day: 0.5 packs/day for 10.0 years (5.0 ttl pk-yrs)     Types: Cigarettes    Smokeless tobacco: Former    Tobacco comments:     1/2 pack per day   Substance and Sexual Activity    Alcohol use: Yes     Comment: ocassioanl beer    Drug use: Not Currently    Sexual activity: Yes     Partners: Female     Review of Systems   Constitutional:  Negative for chills and fever.   HENT:  Negative for sore throat.    Eyes:  Negative for visual disturbance.   Respiratory:  Negative for cough and shortness of breath.    Cardiovascular:  Negative for chest pain and palpitations.   Gastrointestinal:  Negative for abdominal pain, nausea and vomiting.   Genitourinary:  Negative for dysuria, frequency and urgency.   Musculoskeletal:         See above.    Skin:         Cellulitis about his infected right knee bursa.   Neurological:  Negative for syncope.   Psychiatric/Behavioral:  Negative for confusion.      Objective:     Vital Signs (Most Recent):  Temp: 98.5 °F (36.9 °C) (08/06/24 0943)  Pulse: 68 (08/06/24 1258)  Resp: 20 (08/06/24 1111)  BP: 111/68 (08/06/24 1258)  SpO2: 99 % (08/06/24 1258) Vital Signs (24h Range):  Temp:  [98.5 °F (36.9 °C)] 98.5 °F (36.9 °C)  Pulse:  [54-68] 68  Resp:  [17-20] 20  SpO2:  [99 %-100 %] 99 %  BP: (111-125)/(68-98) 111/68     Weight: 63.5 kg (140 lb)  Body mass index is 20.67 kg/m².     Physical Exam  Vitals reviewed.   Constitutional:       Appearance: Normal appearance.   HENT:      Head: Normocephalic and atraumatic.      Mouth/Throat:      Mouth: Mucous membranes are moist.    Eyes:      Extraocular Movements: Extraocular movements intact.      Conjunctiva/sclera: Conjunctivae normal.      Pupils: Pupils are equal, round, and reactive to light.   Cardiovascular:      Rate and Rhythm: Normal rate and regular rhythm.   Pulmonary:      Effort: Pulmonary effort is normal. No respiratory distress.      Breath sounds: Normal breath sounds.   Abdominal:      General: Abdomen is flat. Bowel sounds are normal. There is no distension.      Palpations: Abdomen is soft.   Musculoskeletal:      Cervical back: Normal range of motion and neck supple.      Comments: Swelling and redness underneath the right knee that is tender to palpation.    Skin:     Comments: Cellulitis underneath his right knee.    Neurological:      General: No focal deficit present.      Mental Status: He is alert and oriented to person, place, and time.   Psychiatric:         Mood and Affect: Mood normal.         Behavior: Behavior normal.              CRANIAL NERVES     CN III, IV, VI   Pupils are equal, round, and reactive to light.       Significant Labs: All pertinent labs within the past 24 hours have been reviewed.  Recent Lab Results         08/06/24  1013        Albumin 4.6              ALT 28       Anion Gap 6       AST 22       Baso # 0.09       Basophil % 0.9       BILIRUBIN TOTAL 0.8  Comment: For infants and newborns, interpretation of results should be based  on gestational age, weight and in agreement with clinical  observations.    Premature Infant recommended reference ranges:  Up to 24 hours.............<8.0 mg/dL  Up to 48 hours............<12.0 mg/dL  3-5 days..................<15.0 mg/dL  6-29 days.................<15.0 mg/dL         BUN 18       Calcium 10.0       Chloride 103       CO2 28       Creatinine 1.0       CRP 1.90  Comment: CRP-Normal Application expected values:   <1.0        mg/dL   Normal Range  1.0 - 5.0  mg/dL   Indicates mild inflammation  5.0 - 10.0 mg/dL   Indicates severe  inflammation  >10.0        mg/dL   Represents serious processes and   frequently         indicates the presence of a bacterial   infection.          Differential Method Automated       eGFR >60.0       Eos # 0.3       Eos % 2.5       Glucose 74       Gran # (ANC) 6.2       Gran % 62.6       Hematocrit 41.5       Hemoglobin 13.6       Immature Grans (Abs) 0.02  Comment: Mild elevation in immature granulocytes is non specific and   can be seen in a variety of conditions including stress response,   acute inflammation, trauma and pregnancy. Correlation with other   laboratory and clinical findings is essential.         Immature Granulocytes 0.2       Lymph # 2.3       Lymph % 22.6       MCH 28.6       MCHC 32.8       MCV 87       Mono # 1.1       Mono % 11.2       MPV 8.5       nRBC 0       Platelet Count 347       Potassium 4.4       PROTEIN TOTAL 8.2       RBC 4.75       RDW 13.5       Sed Rate 25       Sodium 137       WBC 9.97               Significant Imaging: I have reviewed all pertinent imaging results/findings within the past 24 hours.  Assessment/Plan:     * Infrapatellar bursitis of right knee  Ortho has been consulted by the ER provider. Plan is for OR intervention tomorrow. NPO after midnight.  Pt is not septic. He was started zosyn and vanco. Will continue.   Will appreciate if ortho team send fluid for cx.      HTN (hypertension)  Chronic, controlled. Latest blood pressure and vitals reviewed-     Temp:  [98.5 °F (36.9 °C)]   Pulse:  [54-68]   Resp:  [17-20]   BP: (111-125)/(68-98)   SpO2:  [99 %-100 %] .   Home meds for hypertension were reviewed and noted below.   Hypertension Medications               amlodipine-benazepril 10-20mg (LOTREL) 10-20 mg per capsule Take 1 capsule by mouth once daily.            While in the hospital, will manage blood pressure as follows; Continue home antihypertensive regimen    Will utilize p.r.n. blood pressure medication only if patient's blood pressure greater than  160/100 and he develops symptoms such as worsening chest pain or shortness of breath.    Chronic pain  Resume gabapentin and norco.         VTE Risk Mitigation (From admission, onward)           Ordered     heparin (porcine) injection 5,000 Units  Every 8 hours         08/06/24 1327     IP VTE HIGH RISK PATIENT  Once         08/06/24 1327     Place sequential compression device  Until discontinued         08/06/24 1327                                    Vesna Person MD  Department of Hospital Medicine  [facility] - Emergency Dept

## 2024-08-06 NOTE — PROGRESS NOTES
"Pharmacokinetic Initial Assessment: IV Vancomycin    Assessment/Plan:    Initiate intravenous vancomycin with loading dose of 1500 mg once followed by a maintenance dose of vancomycin 750 mg IV every 12 hours  Desired empiric serum trough concentration is 15 to 20 mcg/mL  Draw vancomycin trough level 60 min prior to 5th dose on 8/8 at approximately 1030  Pharmacy will continue to follow and monitor vancomycin.      Please contact pharmacy at extension [phone] with any questions regarding this assessment.     Thank you for the consult,   Ajay Teri       Patient brief summary:  Margarito Mcnamara is a 58 y.o. male initiated on antimicrobial therapy with IV Vancomycin for treatment of suspected bone/joint infection    Drug Allergies:   Review of patient's allergies indicates:  No Known Allergies    Actual Body Weight:   63.5 kg    Renal Function:   Estimated Creatinine Clearance: 72.3 mL/min (based on SCr of 1 mg/dL).,     Dialysis Method (if applicable):  N/A    CBC (last 72 hours):  Recent Labs   Lab Result Units 08/03/24  1925 08/06/24  1013   WBC K/uL 9.82 9.97   Hemoglobin g/dL 13.5* 13.6*   Hematocrit % 41.6 41.5   Platelets K/uL 291 347   Gran % % 58.6 62.6   Lymph % % 25.8 22.6   Mono % % 11.3 11.2   Eosinophil % % 3.0 2.5   Basophil % % 1.0 0.9   Differential Method  Automated Automated       Metabolic Panel (last 72 hours):  Recent Labs   Lab Result Units 08/03/24  1925 08/06/24  1013   Sodium mmol/L 138 137   Potassium mmol/L 4.3 4.4   Chloride mmol/L 104 103   CO2 mmol/L 25 28   Glucose mg/dL 73 74   BUN mg/dL 26* 18   Creatinine mg/dL 0.9 1.0   Albumin g/dL 4.5 4.6   Total Bilirubin mg/dL 0.6 0.8   Alkaline Phosphatase U/L 104 116   AST U/L 26 22   ALT U/L 31 28       Drug levels (last 3 results):  No results for input(s): "VANCOMYCINRA", "VANCORANDOM", "VANCOMYCINPE", "VANCOPEAK", "VANCOMYCINTR", "VANCOTROUGH" in the last 72 hours.    Microbiologic Results:  Microbiology Results (last 7 days)       ** No " results found for the last 168 hours. **

## 2024-08-06 NOTE — NURSING
Nurses Note -- 4 Eyes      8/6/2024   4:48 PM      Skin assessed during: Admit      [] No Altered Skin Integrity Present    []Prevention Measures Documented      [x] Yes- Altered Skin Integrity Present or Discovered   [] LDA Added if Not in Epic (Describe Wound)   [] New Altered Skin Integrity was Present on Admit and Documented in LDA   [x] Wound Image Taken    Wound Care Consulted? Yes    Attending Nurse:  Daphne RENTERIA    Second RN/Staff Member:   sudhir RENTERIA

## 2024-08-06 NOTE — ED PROVIDER NOTES
Encounter Date: 8/6/2024       History     Chief Complaint   Patient presents with    Knee Pain     C/o rt knee swelling. Pt states he was called back for antibiotics     Patient is a 58 y.o. male with past medical history of hypertension, hyperlipidemia, squamous cell skin cancer, basal cell carcinoma, depression, chronic pain who presents to ED via self for concern for knee swelling which began 8 day(s) ago.  Patient reports he has had swelling and redness underneath his right knee for about 8 days now.  Patient denies fever.  Patient was seen at [facility] ED 2 days ago and was admitted for IV antibiotics and patient left against medical advice as he had to attend to some things at home.  Patient re-presented to the emergency department today for re-evaluation and is in agreement with admission.  Patient is awake and alert in no acute distress.             Review of patient's allergies indicates:  No Known Allergies  Past Medical History:   Diagnosis Date    ADHD (attention deficit hyperactivity disorder)     Arthritis     Basal cell carcinoma (BCC) of left side of neck 08/2022    Chronic pain     Depression     Hydrocele in adult     Hyperlipidemia     Hypertension     Migraine     Squamous cell skin cancer, face 08/2022     Past Surgical History:   Procedure Laterality Date    BACK SURGERY  11/2020    CRANIOTOMY N/A 3/3/2021    Procedure: CRANIOTOMY, USING FRAMELESS STEREOTAXY, OPEN, BIFRONTAL;  Surgeon: Raheel Green MD;  Location: [facility] OR [address] [place] [address];  Service: Neurosurgery;  Laterality: N/A;  TOR I  ASA I  TYPE & CROSS 2 UNITS  REGULAR BED  [place] [facility]  [facility]    EXCISION OF LESION Left 8/24/2022    Procedure: EXCISION, LESION basal cell CA left neck--2 separate lesions;  Surgeon: Catalina Vidal MD;  Location: [facility] OR;  Service: ENT;  Laterality: Left;    FLAP PROCEDURE N/A 3/3/2021    Procedure: CREATION, FREE FLAP;  Surgeon: Aayan Aguilar MD;  Location: [facility] OR [address] [place] [address];  Service:  ENT;  Laterality: N/A;  Latissimus free flap. Will need bean bag and [place].     FLAP PROCEDURE Left 3/5/2021    Procedure: CREATION, FREE FLAP;  Surgeon: Ayaan Aguilar MD;  Location: [facility] OR [address] [place] [address];  Service: ENT;  Laterality: Left;    LAPAROSCOPIC REPAIR OF HERNIA      RHIZOTOMY W/ RADIOFREQUENCY ABLATION Bilateral     two procedures    SURGICAL REMOVAL OF LESION OF FACE Right 8/24/2022    Procedure: EXCISION, LESION, FACE ;  Surgeon: Catalina Vidal MD;  Location: [facility] OR;  Service: ENT;  Laterality: Right;    TENDON REPAIR Right     thumb     Family History   Problem Relation Name Age of Onset    Depression Mother      Early death Mother  40    Alcohol abuse Father      Depression Father      Hypertension Brother      Headaches Sister       Social History     Tobacco Use    Smoking status: Every Day     Current packs/day: 0.50     Average packs/day: 0.5 packs/day for 10.0 years (5.0 ttl pk-yrs)     Types: Cigarettes    Smokeless tobacco: Former    Tobacco comments:     1/2 pack per day   Substance Use Topics    Alcohol use: Yes     Comment: ocassioanl beer    Drug use: Not Currently     Review of Systems   Constitutional: Negative.  Negative for fever.   HENT: Negative.     Respiratory: Negative.  Negative for shortness of breath.    Cardiovascular: Negative.  Negative for chest pain.   Gastrointestinal: Negative.  Negative for nausea and vomiting.   Musculoskeletal:  Positive for joint swelling. Negative for back pain and gait problem.   Skin:  Negative for color change, pallor and rash.   Neurological:  Negative for weakness.   Hematological:  Does not bruise/bleed easily.   Psychiatric/Behavioral: Negative.         Physical Exam     Initial Vitals [08/06/24 0943]   BP Pulse Resp Temp SpO2   (!) 124/98 67 17 98.5 °F (36.9 °C) 99 %      MAP       --         Physical Exam    Nursing note and vitals reviewed.  Constitutional: He appears well-developed and well-nourished. He is not diaphoretic. No distress.    HENT:   Head: Normocephalic and atraumatic.   Right Ear: External ear normal.   Left Ear: External ear normal.   Eyes: EOM are normal.   Neck:   Normal range of motion.  Cardiovascular:  Normal rate, regular rhythm, normal heart sounds and intact distal pulses.     Exam reveals no gallop and no friction rub.       No murmur heard.  Pulmonary/Chest: Breath sounds normal. No respiratory distress. He has no wheezes. He has no rhonchi. He has no rales. He exhibits no tenderness.   Musculoskeletal:         General: Normal range of motion.      Cervical back: Normal range of motion.      Right knee: Swelling and erythema present. No deformity, ecchymosis, lacerations, bony tenderness or crepitus. Normal range of motion. Tenderness present. Normal alignment.      Right foot: Normal.        Legs:      Neurological: He is alert and oriented to person, place, and time. He has normal strength. GCS score is 15. GCS eye subscore is 4. GCS verbal subscore is 5. GCS motor subscore is 6.   Skin: Skin is warm and dry. Capillary refill takes less than 2 seconds.   Psychiatric: He has a normal mood and affect. His behavior is normal. Judgment and thought content normal.         ED Course   Procedures  Labs Reviewed   CBC W/ AUTO DIFFERENTIAL - Abnormal       Result Value    WBC 9.97      RBC 4.75      Hemoglobin 13.6 (*)     Hematocrit 41.5      MCV 87      MCH 28.6      MCHC 32.8      RDW 13.5      Platelets 347      MPV 8.5 (*)     Immature Granulocytes 0.2      Gran # (ANC) 6.2      Immature Grans (Abs) 0.02      Lymph # 2.3      Mono # 1.1 (*)     Eos # 0.3      Baso # 0.09      nRBC 0      Gran % 62.6      Lymph % 22.6      Mono % 11.2      Eosinophil % 2.5      Basophil % 0.9      Differential Method Automated     COMPREHENSIVE METABOLIC PANEL - Abnormal    Sodium 137      Potassium 4.4      Chloride 103      CO2 28      Glucose 74      BUN 18      Creatinine 1.0      Calcium 10.0      Total Protein 8.2      Albumin 4.6       Total Bilirubin 0.8      Alkaline Phosphatase 116      AST 22      ALT 28      eGFR >60.0      Anion Gap 6 (*)    C-REACTIVE PROTEIN - Abnormal    CRP 1.90 (*)    SEDIMENTATION RATE - Abnormal    Sed Rate 25 (*)           Imaging Results    None          Medications   vancomycin - pharmacy to dose (has no administration in time range)   gabapentin capsule 600 mg (has no administration in time range)   sodium chloride 0.9% flush 2 mL (has no administration in time range)   melatonin tablet 6 mg (has no administration in time range)   ondansetron injection 4 mg (has no administration in time range)   senna-docusate 8.6-50 mg per tablet 1 tablet (has no administration in time range)   acetaminophen tablet 650 mg (has no administration in time range)   aluminum-magnesium hydroxide-simethicone 200-200-20 mg/5 mL suspension 30 mL (30 mLs Oral Given 8/7/24 0104)   acetaminophen tablet 650 mg (has no administration in time range)   naloxone 0.4 mg/mL injection 0.02 mg (has no administration in time range)   potassium bicarbonate disintegrating tablet 50 mEq (has no administration in time range)   potassium bicarbonate disintegrating tablet 35 mEq (has no administration in time range)   potassium bicarbonate disintegrating tablet 60 mEq (has no administration in time range)   magnesium oxide tablet 800 mg (has no administration in time range)   magnesium oxide tablet 800 mg (has no administration in time range)   potassium, sodium phosphates 280-160-250 mg packet 2 packet (has no administration in time range)   potassium, sodium phosphates 280-160-250 mg packet 2 packet (has no administration in time range)   potassium, sodium phosphates 280-160-250 mg packet 2 packet (has no administration in time range)   glucose chewable tablet 16 g (has no administration in time range)   glucose chewable tablet 24 g (has no administration in time range)   dextrose 50% injection 12.5 g (has no administration in time range)   dextrose 50%  injection 25 g (has no administration in time range)   glucagon (human recombinant) injection 1 mg (has no administration in time range)   heparin (porcine) injection 5,000 Units (0 Units Subcutaneous Hold 8/7/24 0600)   amLODIPine tablet 10 mg (10 mg Oral Not Given 8/7/24 0900)     And   benazepriL tablet 20 mg (20 mg Oral Not Given 8/7/24 0900)   HYDROcodone-acetaminophen 5-325 mg per tablet 2 tablet (2 tablets Oral Given 8/7/24 0059)   vancomycin 750 mg in dextrose 5 % 250 mL IVPB (ready to mix) (750 mg Intravenous Trough Due As Scheduled Before Dose 8/8/24 1030)   sodium chloride 0.9% flush 10 mL (has no administration in time range)   mupirocin 2 % ointment (has no administration in time range)   piperacillin-tazobactam 3.375 g in dextrose 5 % 100 mL IVPB (ready to mix) (0 g Intravenous Stopped 8/6/24 1136)   vancomycin 1.5 g in dextrose 5 % 250 mL IVPB (ready to mix) (0 mg Intravenous Stopped 8/6/24 1312)   HYDROmorphone injection 0.5 mg (0.5 mg Intravenous Given 8/6/24 1111)   ondansetron injection 4 mg (4 mg Intravenous Given 8/6/24 1110)     Medical Decision Making  MDM    Patient presents for emergent evaluation of acute leg swelling that poses a possible threat to life and/or bodily function.    Differential diagnosis included but not limited to cellulitis, osteomyelitis, septic bursitis, sepsis, bacteremia.  In the ED patient found to have acute clear lung sounds bilaterally no increased work of breathing.  Patient has redness and swelling noted to the proximal shin the base of the knee.  Patient has fluctuance and pain to palpation in his area.  Patient has normal pulses, cap refill, and sensation distally.    I saw this patient was in the emergency department 2 days prior and patient left AMA after being admitted to the hospital.  I had called patient multiple times and told him he needed to return to the emergency department for IV antibiotics and further evaluation by Orthopedics.  Will consult  [facility] Medicine for admission..    Lab work significant for elevated sed rate and CRP.  CMP largely within normal limits.  CBC significant for WBC 9.97.      Admit MDM  I discussed the patient presentation labs, findings with ED attending Dr. Noriega who individually evaluated the patient.    I discussed the patient presentation labs,  findings with the consultant Dr. Person (hospital medicine) and Dr. Barton (orthopedics).  Patient was managed in the ED with IV vancomycin and Zosyn.    The response to treatment was good.    Patient was admitted in stable condition.     NP uses Epic and voice recognition software prone to occasional and minor errors that may persist in the medical record.     Amount and/or Complexity of Data Reviewed  External Data Reviewed: labs, radiology and notes.  Labs: ordered. Decision-making details documented in ED Course.    Risk  Prescription drug management.  Decision regarding hospitalization.              Attending Attestation:     Physician Attestation Statement for NP/PA:   I personally made/approved the management plan and take responsibility for the patient management.    Other NP/PA Attestation Additions:    History of Present Illness: 58-year-old male presents with swelling just below the patellar aspect of the knee.   Physical Exam: Swelling, erythema, and tenderness noted to just below the patellar aspect of the right knee.   Medical Decision Making: Initial differential diagnosis included but not limited to septic bursitis, inflammatory bursitis, and abscess.  The patient likely has a septic bursitis.  His labs were significant for elevated inflammatory markers.  He will be admitted for IV antibiotics and orthopedic consultation.  I am in agreement with the nurse practitioner's assessment, treatment, and plan of care.             ED Course as of 08/07/24 0939   Tue Aug 06, 2024   1048 WBC: 9.97 [MP]   1048 Sed Rate(!): 25 [MP]   1048 CRP(!): 1.90 [MP]   1048 Anion Gap(!): 6  [MP]      ED Course User Index  [MP] Oneida Sams NP                           Clinical Impression:  Final diagnoses:  [M71.161, B96.89] Septic infrapatellar bursitis of right knee (Primary)          ED Disposition Condition    Admit Stable                Oneida Sams NP  08/06/24 4063       Nacho Noriega MD  08/07/24 2697

## 2024-08-06 NOTE — ASSESSMENT & PLAN NOTE
Ortho has been consulted by the ER provider. Plan is for OR intervention tomorrow. NPO after midnight.  Pt is not septic. He was started zosyn and vanco. Will continue.   Will appreciate if ortho team send fluid for cx.

## 2024-08-06 NOTE — SUBJECTIVE & OBJECTIVE
Past Medical History:   Diagnosis Date    ADHD (attention deficit hyperactivity disorder)     Arthritis     Basal cell carcinoma (BCC) of left side of neck 08/2022    Chronic pain     Depression     Hydrocele in adult     Hyperlipidemia     Hypertension     Migraine     Squamous cell skin cancer, face 08/2022       Past Surgical History:   Procedure Laterality Date    BACK SURGERY  11/2020    CRANIOTOMY N/A 3/3/2021    Procedure: CRANIOTOMY, USING FRAMELESS STEREOTAXY, OPEN, BIFRONTAL;  Surgeon: Raheel Green MD;  Location: [facility] OR [address] [place] [address];  Service: Neurosurgery;  Laterality: N/A;  TOR I  ASA I  TYPE & CROSS 2 UNITS  REGULAR BED  [place] [facility]  CHILDRESS  [facility]    EXCISION OF LESION Left 8/24/2022    Procedure: EXCISION, LESION basal cell CA left neck--2 separate lesions;  Surgeon: Catalina Vidal MD;  Location: [facility] OR;  Service: ENT;  Laterality: Left;    FLAP PROCEDURE N/A 3/3/2021    Procedure: CREATION, FREE FLAP;  Surgeon: Ayaan Aguilar MD;  Location: [facility] OR [address] [place] [address];  Service: ENT;  Laterality: N/A;  Latissimus free flap. Will need [facility] and [place].     FLAP PROCEDURE Left 3/5/2021    Procedure: CREATION, FREE FLAP;  Surgeon: Ayaan Aguilar MD;  Location: [facility] OR [address] [place] [address];  Service: ENT;  Laterality: Left;    LAPAROSCOPIC REPAIR OF HERNIA      RHIZOTOMY W/ RADIOFREQUENCY ABLATION Bilateral     two procedures    SURGICAL REMOVAL OF LESION OF FACE Right 8/24/2022    Procedure: EXCISION, LESION, FACE ;  Surgeon: Catalina Vidal MD;  Location: [facility] OR;  Service: ENT;  Laterality: Right;    TENDON REPAIR Right     thumb       Review of patient's allergies indicates:  No Known Allergies    No current facility-administered medications on file prior to encounter.     Current Outpatient Medications on File Prior to Encounter   Medication Sig    amlodipine-benazepril 10-20mg (LOTREL) 10-20 mg per capsule Take 1 capsule by mouth once daily.    doxycycline (VIBRAMYCIN) 100 MG Cap Take 1 capsule (100 mg  total) by mouth 2 (two) times daily. for 10 days    gabapentin (NEURONTIN) 300 MG capsule Take 600 mg by mouth 3 (three) times daily as needed.    HYDROcodone-acetaminophen (NORCO)  mg per tablet Take 1 tablet by mouth every 6 (six) hours as needed for Pain.    butalbital-acetaminophen-caffeine -40 mg (FIORICET, ESGIC) -40 mg per tablet Take 1 tablet by mouth 2 (two) times daily.    sildenafiL (VIAGRA) 50 MG tablet Take 50 mg by mouth daily as needed.    tadalafiL (CIALIS) 20 MG Tab Take 20 mg by mouth As instructed.    [DISCONTINUED] acetaminophen (TYLENOL) 325 MG tablet Take 2 tablets (650 mg total) by mouth every 6 (six) hours as needed for Pain.    [DISCONTINUED] amoxicillin (AMOXIL) 500 MG capsule Take 500 mg by mouth 3 (three) times daily.    [DISCONTINUED] aspirin (ECOTRIN) 81 MG EC tablet TAKE 1 TABLET (81 MG TOTAL) BY MOUTH DAILY    [DISCONTINUED] aspirin 81 MG Chew Take 81 mg by mouth once daily.    [DISCONTINUED] bacitracin 500 unit/gram ointment Apply topically 3 (three) times daily. (Patient not taking: Reported on 3/9/2023)    [DISCONTINUED] baclofen (LIORESAL) 20 MG tablet Take 20 mg by mouth 3 (three) times daily as needed. Not taking    [DISCONTINUED] bisacodyL (DULCOLAX) 10 mg Supp Place 1 suppository (10 mg total) rectally daily as needed. (Patient not taking: Reported on 3/9/2023)    [DISCONTINUED] buPROPion (WELLBUTRIN XL) 150 MG TB24 tablet     [DISCONTINUED] calcium carbonate (TUMS) 200 mg calcium (500 mg) chewable tablet Take 1 tablet (500 mg total) by mouth daily as needed.    [DISCONTINUED] dexAMETHasone (DECADRON) 2 MG tablet Take 1 tablet (2 mg total) by mouth 2 (two) times daily with meals. (Patient not taking: Reported on 2/7/2023)    [DISCONTINUED] dextroamphetamine-amphetamine (ADDERALL) 15 mg tablet Take 15 mg by mouth 2 (two) times daily.    [DISCONTINUED] docusate (COLACE) 50 mg/5 mL liquid Take 10 mLs (100 mg total) by mouth once daily. (Patient not taking:  Reported on 2/7/2023)    [DISCONTINUED] enoxaparin (LOVENOX) 40 mg/0.4 mL Syrg Inject 0.4 mLs (40 mg total) into the skin once daily. (Patient not taking: Reported on 2/7/2023)    [DISCONTINUED] ibuprofen (ADVIL,MOTRIN) 800 MG tablet TAKE 1 TABLET BY MOUTH EVERY 6 TO 8 HOURS AS NEEDED FOR PAIN    [DISCONTINUED] QUEtiapine (SEROQUEL) 25 MG Tab Take 1 tablet (25 mg total) by mouth every evening. (Patient not taking: No sig reported)    [DISCONTINUED] rosuvastatin (CRESTOR) 20 MG tablet 1 tablet    [DISCONTINUED] silver sulfADIAZINE 1% (SILVADENE) 1 % cream Apply topically 2 (two) times daily.    [DISCONTINUED] zolpidem (AMBIEN) 10 mg Tab 1 tablet at bedtime as needed     Family History       Problem Relation (Age of Onset)    Alcohol abuse Father    Depression Mother, Father    Early death Mother (40)    Headaches Sister    Hypertension Brother          Tobacco Use    Smoking status: Every Day     Current packs/day: 0.50     Average packs/day: 0.5 packs/day for 10.0 years (5.0 ttl pk-yrs)     Types: Cigarettes    Smokeless tobacco: Former    Tobacco comments:     1/2 pack per day   Substance and Sexual Activity    Alcohol use: Yes     Comment: ocassioanl beer    Drug use: Not Currently    Sexual activity: Yes     Partners: Female     Review of Systems   Constitutional:  Negative for chills and fever.   HENT:  Negative for sore throat.    Eyes:  Negative for visual disturbance.   Respiratory:  Negative for cough and shortness of breath.    Cardiovascular:  Negative for chest pain and palpitations.   Gastrointestinal:  Negative for abdominal pain, nausea and vomiting.   Genitourinary:  Negative for dysuria, frequency and urgency.   Musculoskeletal:         See above.    Skin:         Cellulitis about his infected right knee bursa.   Neurological:  Negative for syncope.   Psychiatric/Behavioral:  Negative for confusion.      Objective:     Vital Signs (Most Recent):  Temp: 98.5 °F (36.9 °C) (08/06/24 0943)  Pulse: 68  (08/06/24 1258)  Resp: 20 (08/06/24 1111)  BP: 111/68 (08/06/24 1258)  SpO2: 99 % (08/06/24 1258) Vital Signs (24h Range):  Temp:  [98.5 °F (36.9 °C)] 98.5 °F (36.9 °C)  Pulse:  [54-68] 68  Resp:  [17-20] 20  SpO2:  [99 %-100 %] 99 %  BP: (111-125)/(68-98) 111/68     Weight: 63.5 kg (140 lb)  Body mass index is 20.67 kg/m².     Physical Exam  Vitals reviewed.   Constitutional:       Appearance: Normal appearance.   HENT:      Head: Normocephalic and atraumatic.      Mouth/Throat:      Mouth: Mucous membranes are moist.   Eyes:      Extraocular Movements: Extraocular movements intact.      Conjunctiva/sclera: Conjunctivae normal.      Pupils: Pupils are equal, round, and reactive to light.   Cardiovascular:      Rate and Rhythm: Normal rate and regular rhythm.   Pulmonary:      Effort: Pulmonary effort is normal. No respiratory distress.      Breath sounds: Normal breath sounds.   Abdominal:      General: Abdomen is flat. Bowel sounds are normal. There is no distension.      Palpations: Abdomen is soft.   Musculoskeletal:      Cervical back: Normal range of motion and neck supple.      Comments: Swelling and redness underneath the right knee that is tender to palpation.    Skin:     Comments: Cellulitis underneath his right knee.    Neurological:      General: No focal deficit present.      Mental Status: He is alert and oriented to person, place, and time.   Psychiatric:         Mood and Affect: Mood normal.         Behavior: Behavior normal.              CRANIAL NERVES     CN III, IV, VI   Pupils are equal, round, and reactive to light.       Significant Labs: All pertinent labs within the past 24 hours have been reviewed.  Recent Lab Results         08/06/24  1013        Albumin 4.6              ALT 28       Anion Gap 6       AST 22       Baso # 0.09       Basophil % 0.9       BILIRUBIN TOTAL 0.8  Comment: For infants and newborns, interpretation of results should be based  on gestational age, weight and in  agreement with clinical  observations.    Premature Infant recommended reference ranges:  Up to 24 hours.............<8.0 mg/dL  Up to 48 hours............<12.0 mg/dL  3-5 days..................<15.0 mg/dL  6-29 days.................<15.0 mg/dL         BUN 18       Calcium 10.0       Chloride 103       CO2 28       Creatinine 1.0       CRP 1.90  Comment: CRP-Normal Application expected values:   <1.0        mg/dL   Normal Range  1.0 - 5.0  mg/dL   Indicates mild inflammation  5.0 - 10.0 mg/dL   Indicates severe inflammation  >10.0        mg/dL   Represents serious processes and   frequently         indicates the presence of a bacterial   infection.          Differential Method Automated       eGFR >60.0       Eos # 0.3       Eos % 2.5       Glucose 74       Gran # (ANC) 6.2       Gran % 62.6       Hematocrit 41.5       Hemoglobin 13.6       Immature Grans (Abs) 0.02  Comment: Mild elevation in immature granulocytes is non specific and   can be seen in a variety of conditions including stress response,   acute inflammation, trauma and pregnancy. Correlation with other   laboratory and clinical findings is essential.         Immature Granulocytes 0.2       Lymph # 2.3       Lymph % 22.6       MCH 28.6       MCHC 32.8       MCV 87       Mono # 1.1       Mono % 11.2       MPV 8.5       nRBC 0       Platelet Count 347       Potassium 4.4       PROTEIN TOTAL 8.2       RBC 4.75       RDW 13.5       Sed Rate 25       Sodium 137       WBC 9.97               Significant Imaging: I have reviewed all pertinent imaging results/findings within the past 24 hours.

## 2024-08-07 ENCOUNTER — ANESTHESIA EVENT (OUTPATIENT)
Dept: SURGERY | Facility: HOSPITAL | Age: 59
End: 2024-08-07
Payer: MEDICARE

## 2024-08-07 ENCOUNTER — ANESTHESIA (OUTPATIENT)
Dept: SURGERY | Facility: HOSPITAL | Age: 59
End: 2024-08-07
Payer: MEDICARE

## 2024-08-07 LAB
ALBUMIN SERPL BCP-MCNC: 3.9 G/DL (ref 3.5–5.2)
ALP SERPL-CCNC: 92 U/L (ref 55–135)
ALT SERPL W/O P-5'-P-CCNC: 22 U/L (ref 10–44)
ANION GAP SERPL CALC-SCNC: 4 MMOL/L (ref 8–16)
AST SERPL-CCNC: 16 U/L (ref 10–40)
BASOPHILS # BLD AUTO: 0.07 K/UL (ref 0–0.2)
BASOPHILS NFR BLD: 0.8 % (ref 0–1.9)
BILIRUB SERPL-MCNC: 0.6 MG/DL (ref 0.1–1)
BUN SERPL-MCNC: 20 MG/DL (ref 6–20)
CALCIUM SERPL-MCNC: 9.3 MG/DL (ref 8.7–10.5)
CHLORIDE SERPL-SCNC: 104 MMOL/L (ref 95–110)
CO2 SERPL-SCNC: 30 MMOL/L (ref 23–29)
CREAT SERPL-MCNC: 1 MG/DL (ref 0.5–1.4)
DIFFERENTIAL METHOD BLD: ABNORMAL
EOSINOPHIL # BLD AUTO: 0.3 K/UL (ref 0–0.5)
EOSINOPHIL NFR BLD: 3 % (ref 0–8)
ERYTHROCYTE [DISTWIDTH] IN BLOOD BY AUTOMATED COUNT: 13.7 % (ref 11.5–14.5)
EST. GFR  (NO RACE VARIABLE): >60 ML/MIN/1.73 M^2
GLUCOSE SERPL-MCNC: 87 MG/DL (ref 70–110)
HCT VFR BLD AUTO: 39.8 % (ref 40–54)
HGB BLD-MCNC: 13 G/DL (ref 14–18)
IMM GRANULOCYTES # BLD AUTO: 0.02 K/UL (ref 0–0.04)
IMM GRANULOCYTES NFR BLD AUTO: 0.2 % (ref 0–0.5)
LYMPHOCYTES # BLD AUTO: 2.7 K/UL (ref 1–4.8)
LYMPHOCYTES NFR BLD: 30.6 % (ref 18–48)
MAGNESIUM SERPL-MCNC: 2.3 MG/DL (ref 1.6–2.6)
MCH RBC QN AUTO: 29.1 PG (ref 27–31)
MCHC RBC AUTO-ENTMCNC: 32.7 G/DL (ref 32–36)
MCV RBC AUTO: 89 FL (ref 82–98)
MONOCYTES # BLD AUTO: 1 K/UL (ref 0.3–1)
MONOCYTES NFR BLD: 11.8 % (ref 4–15)
NEUTROPHILS # BLD AUTO: 4.7 K/UL (ref 1.8–7.7)
NEUTROPHILS NFR BLD: 53.6 % (ref 38–73)
NRBC BLD-RTO: 0 /100 WBC
OHS QRS DURATION: 82 MS
OHS QTC CALCULATION: 424 MS
PLATELET # BLD AUTO: 293 K/UL (ref 150–450)
PMV BLD AUTO: 8.6 FL (ref 9.2–12.9)
POTASSIUM SERPL-SCNC: 4.8 MMOL/L (ref 3.5–5.1)
PROT SERPL-MCNC: 6.9 G/DL (ref 6–8.4)
RBC # BLD AUTO: 4.46 M/UL (ref 4.6–6.2)
SODIUM SERPL-SCNC: 138 MMOL/L (ref 136–145)
URATE SERPL-MCNC: 4.3 MG/DL (ref 3.4–7)
WBC # BLD AUTO: 8.79 K/UL (ref 3.9–12.7)

## 2024-08-07 PROCEDURE — 87206 SMEAR FLUORESCENT/ACID STAI: CPT | Performed by: ORTHOPAEDIC SURGERY

## 2024-08-07 PROCEDURE — 87116 MYCOBACTERIA CULTURE: CPT | Performed by: ORTHOPAEDIC SURGERY

## 2024-08-07 PROCEDURE — 36415 COLL VENOUS BLD VENIPUNCTURE: CPT | Performed by: HOSPITALIST

## 2024-08-07 PROCEDURE — 87205 SMEAR GRAM STAIN: CPT | Performed by: ORTHOPAEDIC SURGERY

## 2024-08-07 PROCEDURE — 25000003 PHARM REV CODE 250: Performed by: STUDENT IN AN ORGANIZED HEALTH CARE EDUCATION/TRAINING PROGRAM

## 2024-08-07 PROCEDURE — 71000033 HC RECOVERY, INTIAL HOUR: Performed by: ORTHOPAEDIC SURGERY

## 2024-08-07 PROCEDURE — 93010 ELECTROCARDIOGRAM REPORT: CPT | Mod: ,,, | Performed by: GENERAL PRACTICE

## 2024-08-07 PROCEDURE — 63600175 PHARM REV CODE 636 W HCPCS: Performed by: STUDENT IN AN ORGANIZED HEALTH CARE EDUCATION/TRAINING PROGRAM

## 2024-08-07 PROCEDURE — 99233 SBSQ HOSP IP/OBS HIGH 50: CPT | Mod: ,,, | Performed by: INTERNAL MEDICINE

## 2024-08-07 PROCEDURE — 85025 COMPLETE CBC W/AUTO DIFF WBC: CPT | Performed by: HOSPITALIST

## 2024-08-07 PROCEDURE — 36000704 HC OR TIME LEV I 1ST 15 MIN: Performed by: ORTHOPAEDIC SURGERY

## 2024-08-07 PROCEDURE — 63600175 PHARM REV CODE 636 W HCPCS: Performed by: NURSE ANESTHETIST, CERTIFIED REGISTERED

## 2024-08-07 PROCEDURE — 27301 DRAIN THIGH/KNEE LESION: CPT | Mod: RT,,, | Performed by: ORTHOPAEDIC SURGERY

## 2024-08-07 PROCEDURE — 37000008 HC ANESTHESIA 1ST 15 MINUTES: Performed by: ORTHOPAEDIC SURGERY

## 2024-08-07 PROCEDURE — 83735 ASSAY OF MAGNESIUM: CPT | Performed by: HOSPITALIST

## 2024-08-07 PROCEDURE — 87070 CULTURE OTHR SPECIMN AEROBIC: CPT | Performed by: ORTHOPAEDIC SURGERY

## 2024-08-07 PROCEDURE — 36000705 HC OR TIME LEV I EA ADD 15 MIN: Performed by: ORTHOPAEDIC SURGERY

## 2024-08-07 PROCEDURE — 80053 COMPREHEN METABOLIC PANEL: CPT | Performed by: HOSPITALIST

## 2024-08-07 PROCEDURE — 12000002 HC ACUTE/MED SURGE SEMI-PRIVATE ROOM

## 2024-08-07 PROCEDURE — 63600175 PHARM REV CODE 636 W HCPCS: Performed by: HOSPITALIST

## 2024-08-07 PROCEDURE — 25000003 PHARM REV CODE 250: Performed by: HOSPITALIST

## 2024-08-07 PROCEDURE — 71000039 HC RECOVERY, EACH ADD'L HOUR: Performed by: ORTHOPAEDIC SURGERY

## 2024-08-07 PROCEDURE — 0M9N0ZZ DRAINAGE OF RIGHT KNEE BURSA AND LIGAMENT, OPEN APPROACH: ICD-10-PCS | Performed by: ORTHOPAEDIC SURGERY

## 2024-08-07 PROCEDURE — 93005 ELECTROCARDIOGRAM TRACING: CPT | Performed by: GENERAL PRACTICE

## 2024-08-07 PROCEDURE — 37000009 HC ANESTHESIA EA ADD 15 MINS: Performed by: ORTHOPAEDIC SURGERY

## 2024-08-07 PROCEDURE — 25000003 PHARM REV CODE 250: Performed by: NURSE ANESTHETIST, CERTIFIED REGISTERED

## 2024-08-07 PROCEDURE — 94761 N-INVAS EAR/PLS OXIMETRY MLT: CPT

## 2024-08-07 PROCEDURE — 87102 FUNGUS ISOLATION CULTURE: CPT | Performed by: ORTHOPAEDIC SURGERY

## 2024-08-07 PROCEDURE — 87075 CULTR BACTERIA EXCEPT BLOOD: CPT | Performed by: ORTHOPAEDIC SURGERY

## 2024-08-07 RX ORDER — ONDANSETRON HYDROCHLORIDE 2 MG/ML
4 INJECTION, SOLUTION INTRAVENOUS DAILY PRN
Status: DISCONTINUED | OUTPATIENT
Start: 2024-08-07 | End: 2024-08-07 | Stop reason: HOSPADM

## 2024-08-07 RX ORDER — OXYCODONE HYDROCHLORIDE 5 MG/1
5 TABLET ORAL
Status: DISCONTINUED | OUTPATIENT
Start: 2024-08-07 | End: 2024-08-07 | Stop reason: HOSPADM

## 2024-08-07 RX ORDER — SODIUM CHLORIDE, SODIUM LACTATE, POTASSIUM CHLORIDE, CALCIUM CHLORIDE 600; 310; 30; 20 MG/100ML; MG/100ML; MG/100ML; MG/100ML
INJECTION, SOLUTION INTRAVENOUS CONTINUOUS PRN
Status: DISCONTINUED | OUTPATIENT
Start: 2024-08-07 | End: 2024-08-07

## 2024-08-07 RX ORDER — FAMOTIDINE 10 MG/ML
INJECTION INTRAVENOUS
Status: DISCONTINUED | OUTPATIENT
Start: 2024-08-07 | End: 2024-08-07

## 2024-08-07 RX ORDER — DIPHENHYDRAMINE HYDROCHLORIDE 50 MG/ML
12.5 INJECTION INTRAMUSCULAR; INTRAVENOUS
Status: DISCONTINUED | OUTPATIENT
Start: 2024-08-07 | End: 2024-08-07 | Stop reason: HOSPADM

## 2024-08-07 RX ORDER — PROPOFOL 10 MG/ML
INJECTION, EMULSION INTRAVENOUS
Status: DISCONTINUED | OUTPATIENT
Start: 2024-08-07 | End: 2024-08-07

## 2024-08-07 RX ORDER — MEPERIDINE HYDROCHLORIDE 50 MG/ML
12.5 INJECTION INTRAMUSCULAR; INTRAVENOUS; SUBCUTANEOUS EVERY 10 MIN PRN
Status: DISCONTINUED | OUTPATIENT
Start: 2024-08-07 | End: 2024-08-07 | Stop reason: HOSPADM

## 2024-08-07 RX ORDER — GLUCAGON 1 MG
1 KIT INJECTION
Status: DISCONTINUED | OUTPATIENT
Start: 2024-08-07 | End: 2024-08-07 | Stop reason: HOSPADM

## 2024-08-07 RX ORDER — HYDROMORPHONE HYDROCHLORIDE 1 MG/ML
0.2 INJECTION, SOLUTION INTRAMUSCULAR; INTRAVENOUS; SUBCUTANEOUS EVERY 5 MIN PRN
Status: DISCONTINUED | OUTPATIENT
Start: 2024-08-07 | End: 2024-08-07 | Stop reason: HOSPADM

## 2024-08-07 RX ORDER — LIDOCAINE HYDROCHLORIDE 20 MG/ML
INJECTION INTRAVENOUS
Status: DISCONTINUED | OUTPATIENT
Start: 2024-08-07 | End: 2024-08-07

## 2024-08-07 RX ORDER — ONDANSETRON HYDROCHLORIDE 2 MG/ML
INJECTION, SOLUTION INTRAVENOUS
Status: DISCONTINUED | OUTPATIENT
Start: 2024-08-07 | End: 2024-08-07

## 2024-08-07 RX ORDER — GLYCOPYRROLATE 0.2 MG/ML
0.2 INJECTION INTRAMUSCULAR; INTRAVENOUS ONCE
Status: COMPLETED | OUTPATIENT
Start: 2024-08-07 | End: 2024-08-07

## 2024-08-07 RX ORDER — MIDAZOLAM HYDROCHLORIDE 1 MG/ML
INJECTION INTRAMUSCULAR; INTRAVENOUS
Status: DISCONTINUED | OUTPATIENT
Start: 2024-08-07 | End: 2024-08-07

## 2024-08-07 RX ADMIN — ALUMINUM HYDROXIDE, MAGNESIUM HYDROXIDE, AND SIMETHICONE 30 ML: 200; 200; 20 SUSPENSION ORAL at 01:08

## 2024-08-07 RX ADMIN — HYDROMORPHONE HYDROCHLORIDE 0.2 MG: 1 INJECTION, SOLUTION INTRAMUSCULAR; INTRAVENOUS; SUBCUTANEOUS at 03:08

## 2024-08-07 RX ADMIN — OXYCODONE HYDROCHLORIDE 5 MG: 5 TABLET ORAL at 03:08

## 2024-08-07 RX ADMIN — GLYCOPYRROLATE 0.2 MG: 0.2 INJECTION INTRAMUSCULAR; INTRAVENOUS at 04:08

## 2024-08-07 RX ADMIN — SODIUM CHLORIDE, SODIUM LACTATE, POTASSIUM CHLORIDE, AND CALCIUM CHLORIDE: .6; .31; .03; .02 INJECTION, SOLUTION INTRAVENOUS at 02:08

## 2024-08-07 RX ADMIN — HYDROCODONE BITARTRATE AND ACETAMINOPHEN 2 TABLET: 5; 325 TABLET ORAL at 11:08

## 2024-08-07 RX ADMIN — HYDROCODONE BITARTRATE AND ACETAMINOPHEN 2 TABLET: 5; 325 TABLET ORAL at 09:08

## 2024-08-07 RX ADMIN — MIDAZOLAM HYDROCHLORIDE 2 MG: 1 INJECTION, SOLUTION INTRAMUSCULAR; INTRAVENOUS at 02:08

## 2024-08-07 RX ADMIN — LIDOCAINE HYDROCHLORIDE 80 MG: 20 INJECTION, SOLUTION INTRAVENOUS at 02:08

## 2024-08-07 RX ADMIN — VANCOMYCIN HYDROCHLORIDE 750 MG: 750 INJECTION, POWDER, LYOPHILIZED, FOR SOLUTION INTRAVENOUS at 11:08

## 2024-08-07 RX ADMIN — FAMOTIDINE 20 MG: 10 INJECTION, SOLUTION INTRAVENOUS at 02:08

## 2024-08-07 RX ADMIN — PROPOFOL 70 MG: 10 INJECTION, EMULSION INTRAVENOUS at 02:08

## 2024-08-07 RX ADMIN — HEPARIN SODIUM 5000 UNITS: 5000 INJECTION INTRAVENOUS; SUBCUTANEOUS at 09:08

## 2024-08-07 RX ADMIN — ONDANSETRON 4 MG: 2 INJECTION INTRAMUSCULAR; INTRAVENOUS at 02:08

## 2024-08-07 RX ADMIN — HYDROCODONE BITARTRATE AND ACETAMINOPHEN 2 TABLET: 5; 325 TABLET ORAL at 12:08

## 2024-08-07 NOTE — SUBJECTIVE & OBJECTIVE
Interval History: redness on the right knee is improved. Patient is afebrile. Denies any pain.     Review of Systems  Objective:     Vital Signs (Most Recent):  Temp: 97.4 °F (36.3 °C) (08/07/24 0729)  Pulse: (!) 54 (08/07/24 0729)  Resp: 16 (08/07/24 1108)  BP: 101/69 (08/07/24 0729)  SpO2: 98 % (08/07/24 0729) Vital Signs (24h Range):  Temp:  [97.4 °F (36.3 °C)-97.9 °F (36.6 °C)] 97.4 °F (36.3 °C)  Pulse:  [54-71] 54  Resp:  [16-20] 16  SpO2:  [97 %-100 %] 98 %  BP: ()/(57-76) 101/69     Weight: 63.5 kg (139 lb 15.9 oz)  Body mass index is 20.67 kg/m².    Intake/Output Summary (Last 24 hours) at 8/7/2024 1132  Last data filed at 8/7/2024 1022  Gross per 24 hour   Intake 350 ml   Output --   Net 350 ml         Physical Exam  Vitals reviewed.   Constitutional:       Appearance: Normal appearance.   HENT:      Head: Normocephalic and atraumatic.      Mouth/Throat:      Mouth: Mucous membranes are moist.   Eyes:      Extraocular Movements: Extraocular movements intact.      Conjunctiva/sclera: Conjunctivae normal.      Pupils: Pupils are equal, round, and reactive to light.   Cardiovascular:      Rate and Rhythm: Normal rate and regular rhythm.   Pulmonary:      Effort: Pulmonary effort is normal. No respiratory distress.      Breath sounds: Normal breath sounds.   Abdominal:      General: Abdomen is flat. Bowel sounds are normal. There is no distension.      Palpations: Abdomen is soft.   Musculoskeletal:      Cervical back: Normal range of motion and neck supple.      Comments: Swelling and redness below the right knee cap  Skin:     Comments: mild redness over the swelling area.   Neurological:      General: No focal deficit present.      Mental Status: He is alert and oriented to person, place, and time.   Psychiatric:         Mood and Affect: Mood normal.         Behavior: Behavior normal.         Significant Labs: All pertinent labs within the past 24 hours have been reviewed.  CBC:   Recent Labs   Lab  08/06/24  1013 08/07/24  0414   WBC 9.97 8.79   HGB 13.6* 13.0*   HCT 41.5 39.8*    293     CMP:   Recent Labs   Lab 08/06/24  1013 08/07/24  0414    138   K 4.4 4.8    104   CO2 28 30*   GLU 74 87   BUN 18 20   CREATININE 1.0 1.0   CALCIUM 10.0 9.3   PROT 8.2 6.9   ALBUMIN 4.6 3.9   BILITOT 0.8 0.6   ALKPHOS 116 92   AST 22 16   ALT 28 22   ANIONGAP 6* 4*       Significant Imaging: I have reviewed all pertinent imaging results/findings within the past 24 hours.

## 2024-08-07 NOTE — ASSESSMENT & PLAN NOTE
- Ortho consulted for I&D today  - ID consulted for antibiotics management: On Vancomycin and Zosyn

## 2024-08-07 NOTE — BRIEF OP NOTE
[facility]  Brief Operative Note    SUMMARY     Surgery Date: 8/7/2024     Surgeons and Role:     * Blake Barton MD - Primary    Assisting Surgeon: None    Pre-op Diagnosis:  Septic infrapatellar bursitis of right knee [M71.161, B96.89]    Post-op Diagnosis:  Post-Op Diagnosis Codes:     * Septic infrapatellar bursitis of right knee [M71.161, B96.89]    Procedure(s) (LRB):  INCISION AND DRAINAGE, LOWER EXTREMITY (right knee) (Right)    Anesthesia: General    Implants:  * No implants in log *    Operative Findings:  Large prepatellar bursa, no obvious infectious signs.  Did send fluid for culture    Estimated Blood Loss: * No values recorded between 8/7/2024  2:37 PM and 8/7/2024  2:51 PM *    Estimated Blood Loss has been documented.         Specimens:   Specimen (24h ago, onward)      None            [id number]

## 2024-08-07 NOTE — HOSPITAL COURSE
Patient is a 58 year old male history of basal cell carcinoma, s/p craniotomy few years ago, admitted with right inferior patellar bursitis. Patient was started on IV Vancomycin. ID and ortho were consulted. Patient planned for I&D 8/7 by orthopedics  Patient underwent I&D, culture so far negative. Discussed with ID, they would like the patient to stay one more day until cultures finalized but if he insist on going home, recommended 10 days of PO Linezolid.

## 2024-08-07 NOTE — ASSESSMENT & PLAN NOTE
Chronic, controlled. Latest blood pressure and vitals reviewed-     Home meds for hypertension were reviewed and noted below.   Hypertension Medications               amlodipine-benazepril 10-20mg (LOTREL) 10-20 mg per capsule Take 1 capsule by mouth once daily.            While in the hospital, will manage blood pressure as follows; Continue home antihypertensive regimen    Will utilize p.r.n. blood pressure medication only if patient's blood pressure greater than 160/100 and he develops symptoms such as worsening chest pain or shortness of breath.

## 2024-08-07 NOTE — PLAN OF CARE
"[facility]  Initial Discharge Assessment       Primary Care Provider: Wilfredo Luna MD    Admission Diagnosis: Septic infrapatellar bursitis of right knee [M71.161, B96.89]    Admission Date: 8/6/2024  Expected Discharge Date:      met with Pt at bedside to complete discharge assessment. Pt AAOx4s. Demographics, PCP, and insurance verified. No home health. No dialysis. Pt reports ability to complete ADLs without assistance. Pt verbalized plan to discharge home via family transport. Pt has no other needs to be addressed at this time.    Transition of Care Barriers: None    Payor: MEDICARE / Plan: MEDICARE PART A & B / Product Type: Government /     Extended Emergency Contact Information  Primary Emergency Contact: Anish Mcnamara  Home Phone: [phone]  Mobile Phone: [phone]  Relation: Brother   needed? No  Secondary Emergency Contact: Sree Wright "[place]"  Address: [address] [place] [address] [address] [place] [id number] [place] [address]  Home Phone: [phone]  Mobile Phone: [phone]  Relation: Step parent  Preferred language: English   needed? No    Discharge Plan A: Home with family  Discharge Plan B: Home with family      [id number] - [place] - [id number]  [address] [address]  Phone: [phone] Fax: [phone]      Initial Assessment (most recent)       Adult Discharge Assessment - 08/07/24 1013          Discharge Assessment    Assessment Type Discharge Planning Assessment     Confirmed/corrected address, phone number and insurance Yes     Confirmed Demographics Correct on Facesheet     Source of Information patient     Communicated TAYLER with patient/caregiver Date not available/Unable to determine     Reason For Admission Infrapatellar bursitis of right knee     People in Home child(laney), adult     Do you expect to return to your current living situation? Yes     Do you have help at home or someone to help " you manage your care at home? Yes     Prior to hospitilization cognitive status: Alert/Oriented     Current cognitive status: Alert/Oriented     Walking or Climbing Stairs Difficulty no     Dressing/Bathing Difficulty no     Home Accessibility wheelchair accessible     Home Layout Able to live on 1st floor     Equipment Currently Used at Home none     Readmission within 30 days? Yes     Patient currently being followed by outpatient case management? No     Do you currently have service(s) that help you manage your care at home? No     Do you take prescription medications? Yes     Do you have prescription coverage? Yes     Coverage Payor: MEDICARE - MEDICARE PART A & B -     Do you have any problems affording any of your prescribed medications? No     Is the patient taking medications as prescribed? yes     How do you get to doctors appointments? car, drives self     Are you on dialysis? No     Do you take coumadin? No     Discharge Plan A Home with family     Discharge Plan B Home with family     DME Needed Upon Discharge  none     Discharge Plan discussed with: Patient     Transition of Care Barriers None

## 2024-08-07 NOTE — OP NOTE
08/07/2024    PREOPERATIVE DIAGNOSIS:      Prepatellar bursitis right knee    POSTOPERATIVE DIAGNOSIS: Same    PROCEDURE:      Incision and drainage right knee     SURGEON: Blake Barton MD    ASSISTANT:  None    ANESTHESIA:  General     ESTIMATED BLOOD LOSS:  10 mL    INDICATIONS:  Margarito Mcnamara is a 58 y.o. year-old male who presented to the emergency department with a chief complaint of right knee pain and swelling. CT scan revealed a large prepatellar fluid collection.  He was admitted for medical clearance as well as orthopedic consultation.  Treatment options were discussed in detail with the patient and we elected to proceed with open incision and drainage.     Risks and complications were discussed including, but not limited to risks of anesthetic complications, infections, wound healing complications, instability, DVT, PE. The patient elected to proceed.     COMPONENTS USED:      * No implants in log *      DESCRIPTION OF PROCEDURE:  The patient was taken to the Operating Room where anesthesia was administered by the Anesthesia Department. He was then placed in the supine  position and all superficial neurovascular structures were well padded.  The right lower extremity  was then sterilely prepped and draped in the normal fashion.  Preoperative antibiotics were administered.  A time-out was performed verifying the procedure and laterality, all agreed and elected to proceed as planned.    We did not use a tourniquet for this case.  We made a 3 cm longitudinal incision over the tibial tubercle at the site of the fluid collection.  Dissection was carried down to the bursal sac.  Blunt dissection was then made into the bursal sac using a hemostat.  There was a rush of serous sanguinous fluid.  This was cultured.  We sent this for aerobic and anaerobic and fungal culture.  There was no obvious gross purulence.  The tissue was somewhat hemorrhagic.  We used a curette and rongeur to remove any loose  nonviable tissue and bursal tissue.  We then copiously irrigated with normal saline.  Wounds and was then closed with 3-0 nylon interrupted.  Sterile dressing and compression dressing was then applied.  He overall tolerated the procedure well    Disposition:      He will be weight-bearing as tolerated.  Follow up cultures.  Follow up 2 weeks my office for wound check and suture removal    Blake Barton MD

## 2024-08-07 NOTE — PROGRESS NOTES
[facility] Medicine  Progress Note    Patient Name: Margarito Mcnamara  MRN: [id number]  Patient Class: IP- Inpatient   Admission Date: 8/6/2024  Length of Stay: 1 days  Attending Physician: Haider Diallo MD  Primary Care Provider: Wilfredo Luna MD        Subjective:     Principal Problem:Infrapatellar bursitis of right knee        HPI:  57 yo male with PMH of squamous cell skin cancer, basal cell carcinoma, depression, chronic pain, HTN, HLP and right septic superficial infrapatellar knee bursitis who presented to the ER with worsening swelling, redness and pain of his right knee bursitis. Symptoms started 8 days ago with pain and swelling underneath his right knee.  Symptoms progressively got worse.  He noticed that the swelling underneath her right his right knee is no longer soft, but rather hard/tense to palpation, and it is tender to touch. Also erythema around the area.  He denied any fever or chills however.  Patient was [place] 2 days ago and was admitted for IV antibiotics but unfortunately left AMA prior to orthopedic surgeon intervention.  Now he is coming back with worsening symptoms, and is in agreement with admission.     Overview/Hospital Course:  Patient is a 58 year old male history of basal cell carcinoma, s/p craniotomy few years ago, admitted with right inferior patellar bursitis. Patient was started on IV Vancomycin. ID and ortho were consulted. Patient planned for I&D 8/7 by orthopedics     Interval History: redness on the right knee is improved. Patient is afebrile. Denies any pain.     Review of Systems  Objective:     Vital Signs (Most Recent):  Temp: 97.4 °F (36.3 °C) (08/07/24 0729)  Pulse: (!) 54 (08/07/24 0729)  Resp: 16 (08/07/24 1108)  BP: 101/69 (08/07/24 0729)  SpO2: 98 % (08/07/24 0729) Vital Signs (24h Range):  Temp:  [97.4 °F (36.3 °C)-97.9 °F (36.6 °C)] 97.4 °F (36.3 °C)  Pulse:  [54-71] 54  Resp:  [16-20] 16  SpO2:  [97  %-100 %] 98 %  BP: ()/(57-76) 101/69     Weight: 63.5 kg (139 lb 15.9 oz)  Body mass index is 20.67 kg/m².    Intake/Output Summary (Last 24 hours) at 8/7/2024 1132  Last data filed at 8/7/2024 1022  Gross per 24 hour   Intake 350 ml   Output --   Net 350 ml         Physical Exam  Vitals reviewed.   Constitutional:       Appearance: Normal appearance.   HENT:      Head: Normocephalic and atraumatic.      Mouth/Throat:      Mouth: Mucous membranes are moist.   Eyes:      Extraocular Movements: Extraocular movements intact.      Conjunctiva/sclera: Conjunctivae normal.      Pupils: Pupils are equal, round, and reactive to light.   Cardiovascular:      Rate and Rhythm: Normal rate and regular rhythm.   Pulmonary:      Effort: Pulmonary effort is normal. No respiratory distress.      Breath sounds: Normal breath sounds.   Abdominal:      General: Abdomen is flat. Bowel sounds are normal. There is no distension.      Palpations: Abdomen is soft.   Musculoskeletal:      Cervical back: Normal range of motion and neck supple.      Comments: Swelling and redness below the right knee cap  Skin:     Comments: mild redness over the swelling area.   Neurological:      General: No focal deficit present.      Mental Status: He is alert and oriented to person, place, and time.   Psychiatric:         Mood and Affect: Mood normal.         Behavior: Behavior normal.         Significant Labs: All pertinent labs within the past 24 hours have been reviewed.  CBC:   Recent Labs   Lab 08/06/24  1013 08/07/24  0414   WBC 9.97 8.79   HGB 13.6* 13.0*   HCT 41.5 39.8*    293     CMP:   Recent Labs   Lab 08/06/24  1013 08/07/24  0414    138   K 4.4 4.8    104   CO2 28 30*   GLU 74 87   BUN 18 20   CREATININE 1.0 1.0   CALCIUM 10.0 9.3   PROT 8.2 6.9   ALBUMIN 4.6 3.9   BILITOT 0.8 0.6   ALKPHOS 116 92   AST 22 16   ALT 28 22   ANIONGAP 6* 4*       Significant Imaging: I have reviewed all pertinent imaging  results/findings within the past 24 hours.    Assessment/Plan:      * Infrapatellar bursitis of right knee  - Ortho consulted for I&D today  - ID consulted for antibiotics management: On Vancomycin and Zosyn       Chronic pain  Resume gabapentin and norco.       HTN (hypertension)  Chronic, controlled. Latest blood pressure and vitals reviewed-     Home meds for hypertension were reviewed and noted below.   Hypertension Medications               amlodipine-benazepril 10-20mg (LOTREL) 10-20 mg per capsule Take 1 capsule by mouth once daily.            While in the hospital, will manage blood pressure as follows; Continue home antihypertensive regimen    Will utilize p.r.n. blood pressure medication only if patient's blood pressure greater than 160/100 and he develops symptoms such as worsening chest pain or shortness of breath.      VTE Risk Mitigation (From admission, onward)           Ordered     heparin (porcine) injection 5,000 Units  Every 8 hours         08/06/24 1327     IP VTE HIGH RISK PATIENT  Once         08/06/24 1327     Place sequential compression device  Until discontinued         08/06/24 1327                    Discharge Planning   TAYLER: 8/8/2024     Code Status: Full Code   Is the patient medically ready for discharge?:     Reason for patient still in hospital (select all that apply): Treatment  Discharge Plan A: Home with family                  Haider Diallo MD  Department of Hospital Medicine   [facility]

## 2024-08-07 NOTE — PROGRESS NOTES
"[facility]   Department of Infectious Disease  Progress Note        PATIENT NAME: Margarito Mcnamara  MRN: [id number]  TODAY'S DATE: 08/07/2024  ADMIT DATE: 8/6/2024  LOS: 1 days    CHIEF COMPLAINT: Knee Pain (C/o rt knee swelling. Pt states he was called back for antibiotics)      PRINCIPLE PROBLEM: Infrapatellar bursitis of right knee    INTERVAL HISTORY      08/07/2024: Seen and evaluated at bedside.  He is about the same.  Complaining of right lower quadrant pain which he says has been intermittent for many months.  Pending I&D of right prepatellar bursa.      Antibiotics (From admission, onward)      Start     Stop Route Frequency Ordered    08/06/24 2330  vancomycin 750 mg in dextrose 5 % 250 mL IVPB (ready to mix)         -- IV Every 12 hours (non-standard times) 08/06/24 1416    08/06/24 2019  mupirocin 2 % ointment         -- [facility] On Call Procedure 08/06/24 2019 08/06/24 1040  vancomycin - pharmacy to dose  (vancomycin IVPB (PEDS and ADULTS))        Placed in "And" Linked Group    -- IV pharmacy to manage frequency 08/06/24 0942          Antifungals (From admission, onward)      None           Antivirals (From admission, onward)      None            ASSESSMENT and PLAN      1. Right prepatellar bursitis.  This is presumed to be septic based on CT findings.  No fever or peripheral leukocytosis.  No other joint involved.  For I&D in OR tomorrow.  Continue empiric vancomycin for now and reassess with any culture results.     2. History of metastatic skin cancer of the scalp.  He was managed with resection and craniotomy followed by reconstruction with plate and STSG.     3. Bilateral Foot numbness.  This maybe peripheral neuropathy.  Management as per hospitalist and his PCP.    4. Abdominal pain.  Evaluation and management by hospitalist in this patient with known cancer.    RECOMMENDATIONS:    Continue vancomycin   Reassess antibiotics with culture results  Hospitalist to evaluate right lower " quadrant pain    Please send Epic secure chat with any questions.      SUBJECTIVE    Margarito Mcnamara is a 58 y.o. male with history of crepitation, hyperlipidemia, migraine, ADHD and arthritis.  He presents to the ER 08/03/2024 with right knee swelling.  X-ray and CT documented septic prepatellar bursitis.  CRC 4.1, ESR 35. He was admitted and placed on IV antibiotics.  Orthopedic surgery was consulted.  However, he left AMA prior to being seen by Orthopedic surgery Service.       Back in ER today 08/06/2024 because he was not getting better with worsening pain.  Also it was now softer.  He has been seen by orthopedic surgeon with plan for I&D in OR tomorrow.  Current ESR 25, CRP 1.9.     He is disable from his cancer and is on SSI.  He does do a little work on this side.  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     Antibiotic history:    Vancomycin: 08/06/2024-  Zosyn:  08/06/2024-     Microbiology:    Blood culture 08/03/2024: NGTD    Review of Systems  Negative except as stated above in Interval History     OBJECTIVE   Temp:  [97.4 °F (36.3 °C)-98.5 °F (36.9 °C)] 97.4 °F (36.3 °C)  Pulse:  [54-71] 54  Resp:  [17-20] 17  SpO2:  [97 %-100 %] 98 %  BP: ()/(57-98) 101/69  Temp:  [97.4 °F (36.3 °C)-98.5 °F (36.9 °C)]   Temp: 97.4 °F (36.3 °C) (08/07/24 0729)  Pulse: (!) 54 (08/07/24 0729)  Resp: 17 (08/07/24 0729)  BP: 101/69 (08/07/24 0729)  SpO2: 98 % (08/07/24 0729)    Intake/Output Summary (Last 24 hours) at 8/7/2024 0741  Last data filed at 8/6/2024 1312  Gross per 24 hour   Intake 350 ml   Output --   Net 350 ml       Physical Exam    General:  Middle-aged man lying quietly in bed in no acute distress   Right knee: Fluctuant mass overlying the patella.  Mild erythema.  Nontender.  CVS: S1 and 2 heard, no murmurs appreciated   Respiratory: Clear to auscultation   Abdomen: Full, soft, nontender, no palpable organomegaly   Skin: No rash appreciated   CNS: No  "focal deficits   Musculoskeletal system: No joint or bony abnormalities appreciated    VAD:  PIV  ISOLATION:  None    WOUNDS:  Dry right forehead and left periorbital wound    Significant Labs: All pertinent labs within the past 24 hours have been reviewed.    CBC LAST 7 DAYS  Recent Labs   Lab 08/03/24 1925 08/06/24  1013 08/07/24  0414   WBC 9.82 9.97 8.79   RBC 4.73 4.75 4.46*   HGB 13.5* 13.6* 13.0*   HCT 41.6 41.5 39.8*   MCV 88 87 89   MCH 28.5 28.6 29.1   MCHC 32.5 32.8 32.7   RDW 13.5 13.5 13.7    347 293   MPV 8.7* 8.5* 8.6*   GRAN 58.6  5.8 62.6  6.2 53.6  4.7   LYMPH 25.8  2.5 22.6  2.3 30.6  2.7   MONO 11.3  1.1* 11.2  1.1* 11.8  1.0   BASO 0.10 0.09 0.07   NRBC 0 0 0       CHEMISTRY LAST 7 DAYS  Recent Labs   Lab 08/03/24 1925 08/06/24  1013 08/07/24  0414    137 138   K 4.3 4.4 4.8    103 104   CO2 25 28 30*   ANIONGAP 9 6* 4*   BUN 26* 18 20   CREATININE 0.9 1.0 1.0   GLU 73 74 87   CALCIUM 9.5 10.0 9.3   MG  --   --  2.3   ALBUMIN 4.5 4.6 3.9   PROT 7.6 8.2 6.9   ALKPHOS 104 116 92   ALT 31 28 22   AST 26 22 16   BILITOT 0.6 0.8 0.6       Estimated Creatinine Clearance: 72.3 mL/min (based on SCr of 1 mg/dL).    INFLAMMATORY/PROCAL  LAST 7 DAYS  Recent Labs   Lab 08/03/24 1925 08/06/24  1013 08/06/24  2101   PROCAL  --   --  <0.050   CRP 4.10* 1.90*  --      No results found for: "ESR"  CRP   Date Value Ref Range Status   08/06/2024 1.90 (H) <1.00 mg/dL Final     Comment:     CRP-Normal Application expected values:   <1.0        mg/dL   Normal Range  1.0 - 5.0  mg/dL   Indicates mild inflammation  5.0 - 10.0 mg/dL   Indicates severe inflammation  >10.0        mg/dL   Represents serious processes and   frequently         indicates the presence of a bacterial   infection.      08/03/2024 4.10 (H) <1.00 mg/dL Final     Comment:     CRP-Normal Application expected values:   <1.0        mg/dL   Normal Range  1.0 - 5.0  mg/dL   Indicates mild inflammation  5.0 - 10.0 mg/dL   " Indicates severe inflammation  >10.0        mg/dL   Represents serious processes and   frequently         indicates the presence of a bacterial   infection.      03/18/2021 78.0 (H) 0.0 - 8.2 mg/L Final   03/15/2021 177.6 (H) 0.0 - 8.2 mg/L Final       PRIOR  MICROBIOLOGY:    No results found for the last 90 days.      LAST 7 DAYS MICROBIOLOGY   Microbiology Results (last 7 days)       Procedure Component Value Units Date/Time    Culture, Anaerobe [[id number]]     Order Status: No result Specimen: Abscess     Aerobic culture [[id number]]     Order Status: No result Specimen: Abscess           CURRENT/PREVIOUS VISIT EKG  Results for orders placed or performed during the hospital encounter of 03/03/21   EKG 12-lead    Collection Time: 03/11/21  9:32 AM    Narrative    Test Reason : C44.91,    Vent. Rate : 073 BPM     Atrial Rate : 073 BPM     P-R Int : 134 ms          QRS Dur : 080 ms      QT Int : 394 ms       P-R-T Axes : 037 013 048 degrees     QTc Int : 434 ms    Normal sinus rhythm  Normal ECG  When compared with ECG of 02-MAR-2021 16:22,  No significant change was found  Confirmed by MONIKA ZAPATA MD ([id number]) on 3/12/2021 12:17:27 AM    Referred By: BERNARD MARTINEZ           Confirmed By:MONIKA ZAPATA MD     Significant Imaging: I have reviewed all relevant and available imaging results/findings within the past 24 hours.    Gilbert Dietz MD  Date of Service: 08/07/2024      This note was created using [employer] voice recognition software that occasionally misinterpreted phrases or words.

## 2024-08-08 VITALS
BODY MASS INDEX: 20.73 KG/M2 | WEIGHT: 140 LBS | OXYGEN SATURATION: 97 % | DIASTOLIC BLOOD PRESSURE: 62 MMHG | TEMPERATURE: 98 F | HEART RATE: 58 BPM | HEIGHT: 69 IN | SYSTOLIC BLOOD PRESSURE: 130 MMHG | RESPIRATION RATE: 16 BRPM

## 2024-08-08 LAB
ALBUMIN SERPL BCP-MCNC: 3.8 G/DL (ref 3.5–5.2)
ALP SERPL-CCNC: 83 U/L (ref 55–135)
ALT SERPL W/O P-5'-P-CCNC: 17 U/L (ref 10–44)
ANION GAP SERPL CALC-SCNC: 6 MMOL/L (ref 8–16)
AST SERPL-CCNC: 12 U/L (ref 10–40)
BASOPHILS # BLD AUTO: 0.06 K/UL (ref 0–0.2)
BASOPHILS NFR BLD: 0.8 % (ref 0–1.9)
BILIRUB SERPL-MCNC: 0.4 MG/DL (ref 0.1–1)
BUN SERPL-MCNC: 19 MG/DL (ref 6–20)
CALCIUM SERPL-MCNC: 9 MG/DL (ref 8.7–10.5)
CHLORIDE SERPL-SCNC: 103 MMOL/L (ref 95–110)
CO2 SERPL-SCNC: 30 MMOL/L (ref 23–29)
CREAT SERPL-MCNC: 1.2 MG/DL (ref 0.5–1.4)
DIFFERENTIAL METHOD BLD: ABNORMAL
EOSINOPHIL # BLD AUTO: 0.2 K/UL (ref 0–0.5)
EOSINOPHIL NFR BLD: 2.5 % (ref 0–8)
ERYTHROCYTE [DISTWIDTH] IN BLOOD BY AUTOMATED COUNT: 13.6 % (ref 11.5–14.5)
EST. GFR  (NO RACE VARIABLE): >60 ML/MIN/1.73 M^2
GLUCOSE SERPL-MCNC: 79 MG/DL (ref 70–110)
GRAM STN SPEC: NORMAL
GRAM STN SPEC: NORMAL
HCT VFR BLD AUTO: 37.4 % (ref 40–54)
HGB BLD-MCNC: 12.1 G/DL (ref 14–18)
IMM GRANULOCYTES # BLD AUTO: 0.02 K/UL (ref 0–0.04)
IMM GRANULOCYTES NFR BLD AUTO: 0.3 % (ref 0–0.5)
LYMPHOCYTES # BLD AUTO: 2.2 K/UL (ref 1–4.8)
LYMPHOCYTES NFR BLD: 27.5 % (ref 18–48)
MAGNESIUM SERPL-MCNC: 2.2 MG/DL (ref 1.6–2.6)
MCH RBC QN AUTO: 28.7 PG (ref 27–31)
MCHC RBC AUTO-ENTMCNC: 32.4 G/DL (ref 32–36)
MCV RBC AUTO: 89 FL (ref 82–98)
MONOCYTES # BLD AUTO: 0.8 K/UL (ref 0.3–1)
MONOCYTES NFR BLD: 9.6 % (ref 4–15)
NEUTROPHILS # BLD AUTO: 4.7 K/UL (ref 1.8–7.7)
NEUTROPHILS NFR BLD: 59.3 % (ref 38–73)
NRBC BLD-RTO: 0 /100 WBC
PLATELET # BLD AUTO: 279 K/UL (ref 150–450)
PMV BLD AUTO: 8.7 FL (ref 9.2–12.9)
POTASSIUM SERPL-SCNC: 4.5 MMOL/L (ref 3.5–5.1)
PROT SERPL-MCNC: 6.5 G/DL (ref 6–8.4)
RBC # BLD AUTO: 4.21 M/UL (ref 4.6–6.2)
SODIUM SERPL-SCNC: 139 MMOL/L (ref 136–145)
VANCOMYCIN TROUGH SERPL-MCNC: 12.9 UG/ML
WBC # BLD AUTO: 7.95 K/UL (ref 3.9–12.7)

## 2024-08-08 PROCEDURE — 63600175 PHARM REV CODE 636 W HCPCS: Performed by: HOSPITALIST

## 2024-08-08 PROCEDURE — 83735 ASSAY OF MAGNESIUM: CPT | Performed by: HOSPITALIST

## 2024-08-08 PROCEDURE — 85025 COMPLETE CBC W/AUTO DIFF WBC: CPT | Performed by: HOSPITALIST

## 2024-08-08 PROCEDURE — 25000003 PHARM REV CODE 250: Performed by: HOSPITALIST

## 2024-08-08 PROCEDURE — 99233 SBSQ HOSP IP/OBS HIGH 50: CPT | Mod: ,,, | Performed by: INTERNAL MEDICINE

## 2024-08-08 PROCEDURE — 80202 ASSAY OF VANCOMYCIN: CPT | Performed by: HOSPITALIST

## 2024-08-08 PROCEDURE — 80053 COMPREHEN METABOLIC PANEL: CPT | Performed by: HOSPITALIST

## 2024-08-08 PROCEDURE — 36415 COLL VENOUS BLD VENIPUNCTURE: CPT | Performed by: HOSPITALIST

## 2024-08-08 PROCEDURE — 25000003 PHARM REV CODE 250: Performed by: INTERNAL MEDICINE

## 2024-08-08 RX ORDER — LINEZOLID 600 MG/1
600 TABLET, FILM COATED ORAL EVERY 12 HOURS
Qty: 20 TABLET | Refills: 0 | Status: SHIPPED | OUTPATIENT
Start: 2024-08-08 | End: 2024-08-18

## 2024-08-08 RX ORDER — HYDROCODONE BITARTRATE AND ACETAMINOPHEN 5; 325 MG/1; MG/1
2 TABLET ORAL EVERY 6 HOURS PRN
Qty: 15 TABLET | Refills: 0 | Status: SHIPPED | OUTPATIENT
Start: 2024-08-08

## 2024-08-08 RX ORDER — DIPHENHYDRAMINE HCL 25 MG
25 CAPSULE ORAL ONCE
Status: COMPLETED | OUTPATIENT
Start: 2024-08-08 | End: 2024-08-08

## 2024-08-08 RX ADMIN — VANCOMYCIN HYDROCHLORIDE 750 MG: 750 INJECTION, POWDER, LYOPHILIZED, FOR SOLUTION INTRAVENOUS at 12:08

## 2024-08-08 RX ADMIN — DIPHENHYDRAMINE HYDROCHLORIDE 25 MG: 25 CAPSULE ORAL at 12:08

## 2024-08-08 RX ADMIN — AMLODIPINE BESYLATE 10 MG: 5 TABLET ORAL at 08:08

## 2024-08-08 RX ADMIN — HYDROCODONE BITARTRATE AND ACETAMINOPHEN 2 TABLET: 5; 325 TABLET ORAL at 02:08

## 2024-08-08 RX ADMIN — HYDROCODONE BITARTRATE AND ACETAMINOPHEN 2 TABLET: 5; 325 TABLET ORAL at 08:08

## 2024-08-08 RX ADMIN — BENAZEPRIL HYDROCHLORIDE 20 MG: 20 TABLET ORAL at 08:08

## 2024-08-08 RX ADMIN — HEPARIN SODIUM 5000 UNITS: 5000 INJECTION INTRAVENOUS; SUBCUTANEOUS at 05:08

## 2024-08-08 NOTE — RESPIRATORY THERAPY
"  RT Evaluation of Margarito Mcnamara.      Per RT Protocol, will discontinue the Pulse Ox - Patient has maintained or exceeded SaO2 according to parameters. and           Oxygen - Patient has maintained or exceeded SaO2 according to parameters. Patient does not currently have home O2 ordered. " Discontinue when Sp02 is greater than or equal to 95% or equal to Preop Sp02 "    Should RT services/therapy become required, services will be restarted.      Mabel Chow, RRT       08/07/24 3468   Patient Assessment/Suction   Level of Consciousness (AVPU) alert   Respiratory Effort Normal;Unlabored   Expansion/Accessory Muscles/Retractions no retractions;no use of accessory muscles   Rhythm/Pattern, Respiratory depth regular;pattern regular;unlabored   Cough Frequency no cough   PRE-TX-O2   Device (Oxygen Therapy) room air   SpO2 99 %   Pulse Oximetry Type Intermittent   $ Pulse Oximetry - Multiple Charge Pulse Oximetry - Multiple   Pulse (!) 53   Resp 17       "

## 2024-08-08 NOTE — PROGRESS NOTES
"[facility]   Department of Infectious Disease  Progress Note        PATIENT NAME: Margarito Mcnamara  MRN: [id number]  TODAY'S DATE: 08/08/2024  ADMIT DATE: 8/6/2024  LOS: 2 days    CHIEF COMPLAINT: Knee Pain (C/o rt knee swelling. Pt states he was called back for antibiotics)      PRINCIPLE PROBLEM: Infrapatellar bursitis of right knee    INTERVAL HISTORY      08/07/2024: Seen and evaluated at bedside.  He is about the same.  Complaining of right lower quadrant pain which he says has been intermittent for many months.  Pending I&D of right prepatellar bursa.      08/08/2024:  He had I and D of bursa in OR yesterday.  Serous fluid was drained.  Cultures are in progress.  Unfortunately, fluid appears to have reaccumulated.  Patient very anxious and eager to be discharged home today.    Antibiotics (From admission, onward)      Start     Stop Route Frequency Ordered    08/06/24 2330  vancomycin 750 mg in dextrose 5 % 250 mL IVPB (ready to mix)         -- IV Every 12 hours (non-standard times) 08/06/24 1416    08/06/24 1040  vancomycin - pharmacy to dose  (vancomycin IVPB (PEDS and ADULTS))        Placed in "And" Linked Group    -- IV pharmacy to manage frequency 08/06/24 0942          Antifungals (From admission, onward)      None           Antivirals (From admission, onward)      None            ASSESSMENT and PLAN      1. Right prepatellar bursitis.  He had I&D 08/07/2024.  Unclear if this is septic bursitis or just plain not infected traumatic bursitis.  Ideally will wait for culture results prior to disposition.  However, patient more inclined to be discharged today.  Can use linezolid 600 mg b.i.d. times 10 days.  PCP and Ortho to monitor culture results.  Reaccumulated fluid maybe either relapse or hematoma.  Defer to orthopedic surgeon.      2. History of metastatic skin cancer of the scalp.  He was managed with resection and craniotomy followed by reconstruction with plate and STSG.     3. Bilateral " Foot numbness.  This maybe peripheral neuropathy.  Management as per hospitalist and his PCP.    4. Abdominal pain.  Evaluation and management by hospitalist in this patient with known cancer.    RECOMMENDATIONS:    Follow up fluid culture   Continue current antibiotics   If patient is discharged prior to finalization of antibiotics can use linezolid 600 mg b.i.d. p.o. times 10 days to complete therapy  He will need outpatient follow up    Thank you for involving ID in the care of this patient.  Discussed with hospitalist.  Please send Epic secure chat with any questions.      SUBJECTIVE    Margarito Mcnamara is a 58 y.o. male with history of crepitation, hyperlipidemia, migraine, ADHD and arthritis.  He presents to the ER 08/03/2024 with right knee swelling.  X-ray and CT documented septic prepatellar bursitis.  CRC 4.1, ESR 35. He was admitted and placed on IV antibiotics.  Orthopedic surgery was consulted.  However, he left AMA prior to being seen by Orthopedic surgery Service.       Back in ER today 08/06/2024 because he was not getting better with worsening pain.  Also it was now softer.  He has been seen by orthopedic surgeon with plan for I&D in OR tomorrow.  Current ESR 25, CRP 1.9.     He is disable from his cancer and is on SSI.  He does do a little work on this side.  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     Antibiotic history:    Vancomycin: 08/06/2024-  Zosyn:  08/06/2024-     Microbiology:    Blood culture 08/03/2024: NGTD    Review of Systems  Negative except as stated above in Interval History     OBJECTIVE   Temp:  [96.5 °F (35.8 °C)-98 °F (36.7 °C)] 97.5 °F (36.4 °C)  Pulse:  [40-60] 40  Resp:  [12-38] 17  SpO2:  [96 %-100 %] 97 %  BP: ()/(51-79) 116/53  Temp:  [96.5 °F (35.8 °C)-98 °F (36.7 °C)]   Temp: 97.5 °F (36.4 °C) (08/08/24 0418)  Pulse: (!) 40 (08/08/24 0418)  Resp: 17 (08/08/24 0418)  BP: (!) 116/53 (08/08/24 0418)  SpO2: 97 %  (08/08/24 0418)    Intake/Output Summary (Last 24 hours) at 8/8/2024 0655  Last data filed at 8/8/2024 0421  Gross per 24 hour   Intake 1340 ml   Output 1 ml   Net 1339 ml       Physical Exam    General:  Middle-aged man lying quietly in bed in no acute distress   Right knee: Fluctuant mass overlying the patella.  Mild erythema.  Nontender.  CVS: S1 and 2 heard, no murmurs appreciated   Respiratory: Clear to auscultation   Abdomen: Full, soft, nontender, no palpable organomegaly   Skin: No rash appreciated   CNS: No focal deficits   Musculoskeletal system: No joint or bony abnormalities appreciated    VAD:  PIV  ISOLATION:  None    WOUNDS:  Dry right forehead and left periorbital wound    Significant Labs: All pertinent labs within the past 24 hours have been reviewed.    CBC LAST 7 DAYS  Recent Labs   Lab 08/03/24 1925 08/06/24 1013 08/07/24 0414 08/08/24 0524   WBC 9.82 9.97 8.79 7.95   RBC 4.73 4.75 4.46* 4.21*   HGB 13.5* 13.6* 13.0* 12.1*   HCT 41.6 41.5 39.8* 37.4*   MCV 88 87 89 89   MCH 28.5 28.6 29.1 28.7   MCHC 32.5 32.8 32.7 32.4   RDW 13.5 13.5 13.7 13.6    347 293 279   MPV 8.7* 8.5* 8.6* 8.7*   GRAN 58.6  5.8 62.6  6.2 53.6  4.7 59.3  4.7   LYMPH 25.8  2.5 22.6  2.3 30.6  2.7 27.5  2.2   MONO 11.3  1.1* 11.2  1.1* 11.8  1.0 9.6  0.8   BASO 0.10 0.09 0.07 0.06   NRBC 0 0 0 0       CHEMISTRY LAST 7 DAYS  Recent Labs   Lab 08/03/24 1925 08/06/24  1013 08/07/24 0414    137 138   K 4.3 4.4 4.8    103 104   CO2 25 28 30*   ANIONGAP 9 6* 4*   BUN 26* 18 20   CREATININE 0.9 1.0 1.0   GLU 73 74 87   CALCIUM 9.5 10.0 9.3   MG  --   --  2.3   ALBUMIN 4.5 4.6 3.9   PROT 7.6 8.2 6.9   ALKPHOS 104 116 92   ALT 31 28 22   AST 26 22 16   BILITOT 0.6 0.8 0.6       Estimated Creatinine Clearance: 72.3 mL/min (based on SCr of 1 mg/dL).    INFLAMMATORY/PROCAL  LAST 7 DAYS  Recent Labs   Lab 08/03/24  1925 08/06/24  1013 08/06/24  2101   PROCAL  --   --  <0.050   CRP 4.10* 1.90*  --   "    No results found for: "ESR"  CRP   Date Value Ref Range Status   08/06/2024 1.90 (H) <1.00 mg/dL Final     Comment:     CRP-Normal Application expected values:   <1.0        mg/dL   Normal Range  1.0 - 5.0  mg/dL   Indicates mild inflammation  5.0 - 10.0 mg/dL   Indicates severe inflammation  >10.0        mg/dL   Represents serious processes and   frequently         indicates the presence of a bacterial   infection.      08/03/2024 4.10 (H) <1.00 mg/dL Final     Comment:     CRP-Normal Application expected values:   <1.0        mg/dL   Normal Range  1.0 - 5.0  mg/dL   Indicates mild inflammation  5.0 - 10.0 mg/dL   Indicates severe inflammation  >10.0        mg/dL   Represents serious processes and   frequently         indicates the presence of a bacterial   infection.      03/18/2021 78.0 (H) 0.0 - 8.2 mg/L Final   03/15/2021 177.6 (H) 0.0 - 8.2 mg/L Final       PRIOR  MICROBIOLOGY:    No results found for the last 90 days.      LAST 7 DAYS MICROBIOLOGY   Microbiology Results (last 7 days)       Procedure Component Value Units Date/Time    Gram stain [[id number]] Collected: 08/07/24 1448    Order Status: Completed Specimen: Incision site from Knee, Right Updated: 08/07/24 1620     Gram Stain Result Few WBC's      No organisms seen    Narrative:      Knee, Right Incision Swab    Culture, Anaerobe [[id number]] Collected: 08/07/24 1448    Order Status: Sent Specimen: Incision site from Knee, Right Updated: 08/07/24 1521    Aerobic culture [[id number]] Collected: 08/07/24 1448    Order Status: Sent Specimen: Incision site from Knee, Right Updated: 08/07/24 1520    AFB Culture & Smear [[id number]] Collected: 08/07/24 1448    Order Status: Sent Specimen: Incision site from Knee, Right Updated: 08/07/24 1520    Fungus culture [[id number]] Collected: 08/07/24 1448    Order Status: Sent Specimen: Incision site from Knee, Right Updated: 08/07/24 1519    Aerobic culture [[id number]] Collected: 08/07/24 1458    Order " Status: Sent Specimen: Abscess from Knee, Right Updated: 08/07/24 1501    Culture, Anaerobe [[id number]] Collected: 08/07/24 1458    Order Status: Sent Specimen: Abscess from Knee, Right Updated: 08/07/24 1501          CURRENT/PREVIOUS VISIT EKG  Results for orders placed or performed during the hospital encounter of 08/06/24   EKG 12-lead    Collection Time: 08/07/24 12:16 PM   Result Value Ref Range    QRS Duration 82 ms    OHS QTC Calculation 424 ms    Narrative    Test Reason : R00.1,    Vent. Rate : 052 BPM     Atrial Rate : 052 BPM     P-R Int : 164 ms          QRS Dur : 082 ms      QT Int : 456 ms       P-R-T Axes : 065 027 058 degrees     QTc Int : 424 ms    Sinus bradycardia with sinus arrhythmia  Otherwise normal ECG  When compared with ECG of 11-MAR-2021 09:32,  No significant change was found  Confirmed by Ana COLLINS, Christiano KENT ([id number]) on 8/7/2024 9:42:41 PM    Referred By: [email or web address]   SELF           Confirmed By:Christiano Perez MD     Significant Imaging: I have reviewed all relevant and available imaging results/findings within the past 24 hours.    Gilbert Dietz MD  Date of Service: 08/08/2024      This note was created using [employer] voice recognition software that occasionally misinterpreted phrases or words.

## 2024-08-08 NOTE — PLAN OF CARE
Problem: Wound  Goal: Optimal Functional Ability  Outcome: Progressing  Intervention: Optimize Functional Ability  Flowsheets (Taken 8/8/2024 5217)  Activity Management: Ambulated in room - L4  Goal: Absence of Infection Signs and Symptoms  Outcome: Progressing   Ambulating well independently. Wound to right knee shows no S/S of infection

## 2024-08-08 NOTE — PLAN OF CARE
Patient cleared for discharge from case management standpoint.    Follow up appointments scheduled and added to AVS.    Chart and discharge orders reviewed.  Patient discharged home with no further case management needs.     08/08/24 1347   Final Note   Assessment Type Final Discharge Note   Anticipated Discharge Disposition Home   Post-Acute Status   Coverage Payor: MEDICARE - MEDICARE PART A & B -   Discharge Delays None known at this time

## 2024-08-08 NOTE — PLAN OF CARE
Met with pt to discuss discharge appointment with PCP. Pt states that he has questions regarding Medicare plans that he can choose from and given [place] [facility] for  Mike information/phone number.   States he has family that can pick him up if they have to and if able, can drive self home.

## 2024-08-08 NOTE — PROGRESS NOTES
Pharmacokinetic Assessment Follow Up: IV Vancomycin    Vancomycin serum concentration assessment(s):    The trough level was drawn correctly and can be used to guide therapy at this time. The measurement is below the desired definitive target range of 15 to 20 mcg/mL.    Vancomycin Regimen Plan:    Continue regimen to Vancomycin 750 mg IV every 12 hours with next serum trough concentration measured at 1030 prior to 3rd dose on 8-9-24. Monitor trend in Scr.    Drug levels (last 3 results):  Recent Labs   Lab Result Units 08/08/24  1054   Vancomycin-Trough ug/mL 12.9       Pharmacy will continue to follow and monitor vancomycin.    Please contact pharmacy at extension [phone] for questions regarding this assessment.    Thank you for the consult,   Satish Rosado

## 2024-08-08 NOTE — DISCHARGE SUMMARY
[facility]  Discharge Summary      Patient Name: Margarito Mcnamara  MRN: [id number]  DEREK: [id number]  Patient Class: IP- Inpatient  Admission Date: 8/6/2024  Hospital Length of Stay: 2 days  Discharge Date and Time:  08/08/2024 12:34 PM  Attending Physician: Haider Diallo MD   Discharging Provider: Haider Diallo MD  Primary Care Provider: Wilfredo Luna MD    Primary Care Team: Networked reference to record PCT     HPI:   59 yo male with PMH of squamous cell skin cancer, basal cell carcinoma, depression, chronic pain, HTN, HLP and right septic superficial infrapatellar knee bursitis who presented to the ER with worsening swelling, redness and pain of his right knee bursitis. Symptoms started 8 days ago with pain and swelling underneath his right knee.  Symptoms progressively got worse.  He noticed that the swelling underneath her right his right knee is no longer soft, but rather hard/tense to palpation, and it is tender to touch. Also erythema around the area.  He denied any fever or chills however.  Patient was seen at [facility] ED 2 days ago and was admitted for IV antibiotics but unfortunately left AMA prior to orthopedic surgeon intervention.  Now he is coming back with worsening symptoms, and is in agreement with admission.     Procedure(s) (LRB):  INCISION AND DRAINAGE, LOWER EXTREMITY (Right)      Hospital Course:   Patient is a 58 year old male history of basal cell carcinoma, s/p craniotomy few years ago, admitted with right inferior patellar bursitis. Patient was started on IV Vancomycin. ID and ortho were consulted. Patient planned for I&D 8/7 by orthopedics  Patient underwent I&D, culture so far negative. Discussed with ID, they would like the patient to stay one more day until cultures finalized but if he insist on going home, recommended 10 days of PO Linezolid.      Goals of Care Treatment Preferences:  Code Status: Full Code    Physical  "Exam  Vitals reviewed.   Constitutional:       Appearance: Normal appearance.   HENT:      Head: Normocephalic and atraumatic.      Mouth/Throat:      Mouth: Mucous membranes are moist.   Eyes:      Extraocular Movements: Extraocular movements intact.      Conjunctiva/sclera: Conjunctivae normal.      Pupils: Pupils are equal, round, and reactive to light.   Cardiovascular:      Rate and Rhythm: Normal rate and regular rhythm.   Pulmonary:      Effort: Pulmonary effort is normal. No respiratory distress.      Breath sounds: Normal breath sounds.   Abdominal:      General: Abdomen is flat. Bowel sounds are normal. There is no distension.      Palpations: Abdomen is soft.   Musculoskeletal:      Cervical back: Normal range of motion and neck supple.      Comments: Swelling and redness below the right knee cap, sutures on, there is still some swelling post procedure   Neurological:      General: No focal deficit present.      Mental Status: He is alert and oriented to person, place, and time.   Psychiatric:         Mood and Affect: Mood normal.         Behavior: Behavior normal.      Consults:   Consults (From admission, onward)          Status Ordering Provider     Inpatient consult to Infectious Diseases  Once        Provider:  Jenna Dial MD    Completed NEIL MAN     Inpatient consult to Orthopedic Surgery  Once        Provider:  Blake Barton MD    Completed MARZENA MANTILLA A     Pharmacy to dose Vancomycin consult  Once        Provider:  (Not yet assigned)   Placed in "And" Linked Group    Acknowledged MARZENA MANTILLA A            Neuro  Chronic pain  Resume gabapentin and norco.       Cardiac/Vascular  HTN (hypertension)  Chronic, controlled. Latest blood pressure and vitals reviewed-     Home meds for hypertension were reviewed and noted below.   Hypertension Medications               amlodipine-benazepril 10-20mg (LOTREL) 10-20 mg per capsule Take 1 capsule by mouth once daily.      "       Resume home meds     Orthopedic  * Infrapatellar bursitis of right knee  S/p I&D   Fluid cultures so far no growth, final culture pending   Blood culture no growth   Received Vancomycin and Zosyn, discharged with 10 days of Linezolid         Final Active Diagnoses:    Diagnosis Date Noted POA    PRINCIPAL PROBLEM:  Infrapatellar bursitis of right knee [M70.51] 08/04/2024 Yes    Chronic pain [G89.29] 08/06/2024 Yes    HTN (hypertension) [I10] 03/02/2021 Yes      Problems Resolved During this Admission:       Discharged Condition: good    Disposition: Home or Self Care    Follow Up:   Follow-up Information       Wilfredo Luna MD Follow up in 1 week(s).    Specialties: Family Medicine, Urgent Care  Contact information:  [address] [place] [address]  [phone]               Catalina Vidal MD .    Specialties: Otolaryngology, Surgical Oncology  Contact information:  [address]  [place] [address]  [phone]               Blake Barton MD Follow up in 2 week(s).    Specialty: Orthopedic Surgery  Contact information:  [address] [place] [address] [address] [place] [address]  [phone]                           Patient Instructions:   No discharge procedures on file.    Significant Diagnostic Studies: Labs: CMP   Recent Labs   Lab 08/07/24  0414 08/08/24  0524    139   K 4.8 4.5    103   CO2 30* 30*   GLU 87 79   BUN 20 19   CREATININE 1.0 1.2   CALCIUM 9.3 9.0   PROT 6.9 6.5   ALBUMIN 3.9 3.8   BILITOT 0.6 0.4   ALKPHOS 92 83   AST 16 12   ALT 22 17   ANIONGAP 4* 6*    and CBC   Recent Labs   Lab 08/07/24  0414 08/08/24  0524   WBC 8.79 7.95   HGB 13.0* 12.1*   HCT 39.8* 37.4*    279       Pending Diagnostic Studies:       None           Medications:  Reconciled Home Medications:      Medication List        START taking these medications      linezolid 600 mg Tab  Commonly known as: ZYVOX  Take 1 tablet (600 mg total) by mouth every 12 (twelve) hours. for 10 days            CONTINUE  taking these medications      amlodipine-benazepril 10-20mg 10-20 mg per capsule  Commonly known as: LOTREL  Take 1 capsule by mouth once daily.     butalbital-acetaminophen-caffeine -40 mg -40 mg per tablet  Commonly known as: FIORICET, ESGIC  Take 1 tablet by mouth 2 (two) times daily.     gabapentin 300 MG capsule  Commonly known as: NEURONTIN  Take 600 mg by mouth 3 (three) times daily as needed.     HYDROcodone-acetaminophen  mg per tablet  Commonly known as: NORCO  Take 1 tablet by mouth every 6 (six) hours as needed for Pain.     sildenafiL 50 MG tablet  Commonly known as: VIAGRA  Take 50 mg by mouth daily as needed.     tadalafiL 20 MG Tab  Commonly known as: CIALIS  Take 20 mg by mouth As instructed.            STOP taking these medications      doxycycline 100 MG Cap  Commonly known as: VIBRAMYCIN              Indwelling Lines/Drains at time of discharge:   Lines/Drains/Airways       None                   Time spent on the discharge of patient: 40 minutes         Haider Diallo MD  Department of Hospital Medicine  formerly [facility]

## 2024-08-08 NOTE — ASSESSMENT & PLAN NOTE
S/p I&D   Fluid cultures so far no growth, final culture pending   Blood culture no growth   Received Vancomycin and Zosyn, discharged with 10 days of Linezolid

## 2024-08-08 NOTE — PROGRESS NOTES
Notifed Dr. Bridges of tele reading SB with a new low of 37 while pt awake, asymptomatic. All other VS stable. Only c/o at this time is itching. Benadryl ordered. Cardiac monitoring continued.

## 2024-08-08 NOTE — NURSING
[facility]  [facility] Eyes      8/7/2024       Skin assessed on: Q Shift      [x] No Pressure Injuries Present    [x]Prevention Measures Documented    [] Yes LDA  for Pressure Injury Previously documented     [] Yes New Pressure Injury Discovered   [] LDA for New Pressure Injury Added      Attending RN:  Mayte Green RN     Second RN:  Kelli Briones RN

## 2024-08-08 NOTE — ASSESSMENT & PLAN NOTE
Chronic, controlled. Latest blood pressure and vitals reviewed-     Home meds for hypertension were reviewed and noted below.   Hypertension Medications               amlodipine-benazepril 10-20mg (LOTREL) 10-20 mg per capsule Take 1 capsule by mouth once daily.            Resume home meds

## 2024-08-09 LAB
ACID FAST MOD KINY STN SPEC: NORMAL
BACTERIA BLD CULT: NORMAL
BACTERIA BLD CULT: NORMAL
BACTERIA SPEC ANAEROBE CULT: NORMAL

## 2024-08-11 LAB — BACTERIA SPEC AEROBE CULT: NO GROWTH

## 2024-08-15 LAB — FUNGUS SPEC CULT: NORMAL

## 2024-08-19 ENCOUNTER — HOSPITAL ENCOUNTER (EMERGENCY)
Facility: HOSPITAL | Age: 59
Discharge: LEFT BEFORE DISCHARGE | End: 2024-08-19
Payer: MEDICARE

## 2024-08-19 ENCOUNTER — HOSPITAL ENCOUNTER (OUTPATIENT)
Dept: RADIOLOGY | Facility: HOSPITAL | Age: 59
Discharge: HOME | End: 2024-08-19
Attending: STUDENT IN AN ORGANIZED HEALTH CARE EDUCATION/TRAINING PROGRAM
Payer: MEDICARE

## 2024-08-19 VITALS
TEMPERATURE: 98 F | HEART RATE: 79 BPM | SYSTOLIC BLOOD PRESSURE: 127 MMHG | BODY MASS INDEX: 21.41 KG/M2 | OXYGEN SATURATION: 97 % | RESPIRATION RATE: 18 BRPM | DIASTOLIC BLOOD PRESSURE: 92 MMHG | WEIGHT: 145 LBS

## 2024-08-19 DIAGNOSIS — C44.41 BASAL CELL CARCINOMA (BCC) OF LEFT SIDE OF NECK: ICD-10-CM

## 2024-08-19 PROCEDURE — 99281 EMR DPT VST MAYX REQ PHY/QHP: CPT | Mod: 25

## 2024-08-19 PROCEDURE — 70553 MRI BRAIN STEM W/O & W/DYE: CPT | Mod: 26,,, | Performed by: RADIOLOGY

## 2024-08-19 PROCEDURE — A9585 GADOBUTROL INJECTION: HCPCS | Mod: PO | Performed by: STUDENT IN AN ORGANIZED HEALTH CARE EDUCATION/TRAINING PROGRAM

## 2024-08-19 PROCEDURE — 70553 MRI BRAIN STEM W/O & W/DYE: CPT | Mod: TC,PO

## 2024-08-19 PROCEDURE — 25500020 PHARM REV CODE 255: Mod: PO | Performed by: STUDENT IN AN ORGANIZED HEALTH CARE EDUCATION/TRAINING PROGRAM

## 2024-08-19 RX ORDER — GADOBUTROL 604.72 MG/ML
6.5 INJECTION INTRAVENOUS
Status: COMPLETED | OUTPATIENT
Start: 2024-08-19 | End: 2024-08-19

## 2024-08-19 RX ADMIN — GADOBUTROL 6.5 ML: 604.72 INJECTION INTRAVENOUS at 09:08

## 2024-08-19 NOTE — FIRST PROVIDER EVALUATION
" Emergency Department TeleTriage Encounter Note      CHIEF COMPLAINT    Chief Complaint   Patient presents with    Foot Pain     States that bottom of both feet feel like they are burning (like walking on hot coals)  - pt takes gabapentin    Suture / Staple Removal     Pt also needs suture removed on right knee       VITAL SIGNS   Initial Vitals [08/19/24 1635]   BP Pulse Resp Temp SpO2   (!) 127/92 79 18 97.8 °F (36.6 °C) 97 %      MAP       --            ALLERGIES    Review of patient's allergies indicates:  No Known Allergies    PROVIDER TRIAGE NOTE  TeleTriage Note: Margarito Mcnamara, a nontoxic/well appearing, 58 y.o. male, presented to the ED with c/o burning to bilateral feet for "years" and he can't tolerate it anymore. PT also states he has stitches that need to be removed from his right knee.      All ED beds are full at present; patient notified of this status.  Patient seen and medically screened by Nurse Practitioner via teletriage. Orders initiated at triage to expedite care.  Patient is stable to return to the waiting room and will be placed in an ED bed when available.  Care will be transferred to an alternate provider when patient has been placed in an Exam Room from the [facility] for physical exam, additional orders, and disposition.  4:44 PM Ariella Mora DNP, FNP-C        ORDERS  Labs Reviewed - No data to display    ED Orders (720h ago, onward)      None              Virtual Visit Note: The provider triage portion of this emergency department evaluation and documentation was performed via [employer], a HIPAA-compliant telemedicine application, in concert with a tele-presenter in the room. A face to face patient evaluation with one of my colleagues will occur once the patient is placed in an emergency department room.      DISCLAIMER: This note was prepared with [employer] voice recognition transcription software. Garbled syntax, mangled pronouns, and other bizarre constructions may be attributed to " that software system.

## 2024-08-20 ENCOUNTER — TELEPHONE (OUTPATIENT)
Dept: NEUROSURGERY | Facility: CLINIC | Age: 59
End: 2024-08-20

## 2024-08-22 DIAGNOSIS — M70.51 INFRAPATELLAR BURSITIS OF RIGHT KNEE: Primary | ICD-10-CM

## 2024-08-26 ENCOUNTER — TELEPHONE (OUTPATIENT)
Dept: HEMATOLOGY/ONCOLOGY | Facility: CLINIC | Age: 59
End: 2024-08-26
Payer: MEDICARE

## 2024-08-26 NOTE — TELEPHONE ENCOUNTER
Called and spoke with patient to schedule an appointment with Dr Ely. Patient agreed to 9/19/24 at 9:30. Patient also request to get on a waiting list for a sooner appointment. Patient will call if he gets notation of a sooner appointment.

## 2024-08-27 ENCOUNTER — TELEPHONE (OUTPATIENT)
Dept: HEMATOLOGY/ONCOLOGY | Facility: CLINIC | Age: 59
End: 2024-08-27
Payer: MEDICARE

## 2024-08-27 NOTE — TELEPHONE ENCOUNTER
See previous note    ----- Message from Cindi Thomas sent at 8/26/2024  3:58 PM CDT -----  Type: Needs Medical Advice  Who Called:  Patient   Symptoms (please be specific):    How long has patient had these symptoms:    Pharmacy name and phone #:    Best Call Back Number: [phone]  Additional Information: Patient is requesting a call back to schedule a NP appt. with Dr. Jhoana BENAVIDES. Patient was EP with Dr. Vidal.

## 2024-08-29 ENCOUNTER — OFFICE VISIT (OUTPATIENT)
Dept: NEUROSURGERY | Facility: CLINIC | Age: 59
End: 2024-08-29
Attending: STUDENT IN AN ORGANIZED HEALTH CARE EDUCATION/TRAINING PROGRAM
Payer: MEDICARE

## 2024-08-29 DIAGNOSIS — C44.41 BASAL CELL CARCINOMA (BCC) OF LEFT SIDE OF NECK: Primary | ICD-10-CM

## 2024-08-29 PROCEDURE — 99024 POSTOP FOLLOW-UP VISIT: CPT | Mod: 95,POP,, | Performed by: STUDENT IN AN ORGANIZED HEALTH CARE EDUCATION/TRAINING PROGRAM

## 2024-08-29 NOTE — PROGRESS NOTES
Spoke to patient by phone regarding MRI results from 8/19/24. There is nonspecific enhancement involving the meninges that looks pretty similar to MRI from January of 2023.  I will attempt to get the patient back into multidisciplinary Neuro-Oncology clinic.  All questions were answered by telephone.    Johnny Chery  Neurosurgery

## 2024-09-18 NOTE — PROGRESS NOTES
Note to patients: In accordance with the  Century Cures Act, patients are now granted immediate electronic access to their medical records. This note is primarily intended for communication among medical professionals. As a result, it may incorporate medical terminology, abbreviations, or language that could appear blunt or unfamiliar. If you have questions about this document, we encourage you to discuss it with your physician.      [facility]  Head & Neck Surgical Oncology [facility]    Patient: Margarito Mcnamara    : 1965    MRN: [id number]  Primary Care Provider: Wilfredo Luna MD  Referring Provider: No ref. provider found   Rad Onc: Dr. Cherry  Derm: Kd Scales MD   Rad Onc: Dr. Cherry   Date of Encounter: 2024    DIAGNOSIS:    Cancer Staging   Basal cell carcinoma (BCC) of left side of neck  Staging form: Melanoma of the Skin, AJCC 8th Edition  - Clinical: No stage assigned - Unsigned  Staging form: Cutaneous Carcinoma of the Head and Neck, AJCC 8th Edition  - Pathologic stage from 2021: pT4b, pN0 - Signed by Catalina Vidal MD on 2021      CC:   Chief Complaint   Patient presents with    Skin cancer       HPI:   Margarito Mcnamara is a 58 y.o. male with a past medical history significant for HTN, BCC, malanoma who is being seen today for concerns of cancer re occurrence on his forehead.     Patient of Dr. Vidal who was last seen 3/2023. He has a history of recurrent basal cell carcinoma of the right forehead and scalp. Patient was previously treated with a hedgehog inhibitor, Erivedge, in 2018 with good response. However, patient discontinued this prematurely after 6 months and the tumor has recurred. It recurred over 2 years year ago. Erivedge was again prescribed but denied by the insurance company X 2 but was finally started at the time he last saw Dr. Vidal in 3/2023.     He is having a lot of pain in his back and also on his head at the area of his  insurance. He has not been taking any pain medication for the last 2 years. He first started with an open sore around 1.5 years ago on his right forehead that has gotten worse. He has been having drainage from the site on his forehead. He has not been seen yet for this area of concern. He has not been seen by derm for a couple of years. He has been having watery eyes for the last 2 years. He has needle sensation in both eyes. He has blurred vision in the right eye for the last 2 years and some in the left eye. He has not followed up with an eye doctor as well.     Patient denies pain, fever, chills, night sweats, unintentional weight loss, neck mass, enlarged lymph nodes outside of the head or neck, odynophagia, dysphagia, globus sensation, voice changes, cough, hemoptysis, shortness of breath, or new or concerning skin lesions. Patient denies personal or family history of head and neck cancer. Patient denies history of radiation exposure.    Patient lives in [place]    TREATMENT HISTORY:    2018: treated with a hedgehog inhibitor, Erivedge, in 2018 with good response and again in 2023    3/2021:   1.  Bifrontal craniotomy for tumor.  2.  Stealth navigation.  3.  Cranioplasty greater than 6 cm.   1.  Wide local excision of basal cell carcinoma of the scalp measuring roughly 15 x 15 cm  2.  Right myocutaneous latissimus dorsi free flap with microvascular anastomosis to right superficial temporal vessels and right facial artery  3.  Collins of right external jugular vein graft measuring 8 cm in length  4.  Collins of split-thickness skin graft from right thigh measuring 8 x 10 cm  5.  Indocyanine green angiography    2021: radiation     2022: excision of 3 carcinomas involving his left neck and right jawline     SUBSTANCE USE:  Smokinppd [id number]  Alcohol: occ beer  Denies hookah, chewing tobacco, marijuana, betel nut, illicit drug, heavy cigar, vape use.    ALLERGIES:  Review of patient's allergies  indicates:  No Known Allergies      MEDICATIONS:    Current Outpatient Medications:     acetaminophen (TYLENOL) 325 MG tablet, Take 325 mg by mouth every 4 (four) hours as needed., Disp: , Rfl:     amlodipine-benazepril 10-20mg (LOTREL) 10-20 mg per capsule, Take 1 capsule by mouth once daily., Disp: , Rfl:     aspirin (ECOTRIN) 81 MG EC tablet, Take 81 mg by mouth., Disp: , Rfl:     atorvastatin (LIPITOR) 10 MG tablet, Take 10 mg by mouth., Disp: , Rfl:     azelastine (ASTELIN) 137 mcg (0.1 %) nasal spray, 1 puff in each nostril Nasally Twice a day for 30 days, Disp: , Rfl:     buPROPion (WELLBUTRIN XL) 150 MG TB24 tablet, Take by mouth., Disp: , Rfl:     butalbital-acetaminophen-caffeine -40 mg (FIORICET, ESGIC) -40 mg per tablet, Take 1 tablet by mouth 2 (two) times daily., Disp: , Rfl:     fluticasone-umeclidin-vilanter (TRELEGY ELLIPTA) 100-62.5-25 mcg DsDv, 1 puff., Disp: , Rfl:     gabapentin (NEURONTIN) 600 MG tablet, Take 600 mg by mouth., Disp: , Rfl:     ibuprofen (ADVIL,MOTRIN) 800 MG tablet, Take 800 mg by mouth every 8 (eight) hours as needed., Disp: , Rfl:     pantoprazole (PROTONIX) 40 MG tablet, Take 40 mg by mouth., Disp: , Rfl:     rosuvastatin (CRESTOR) 20 MG tablet, , Disp: , Rfl:     sildenafiL (VIAGRA) 50 MG tablet, Take 50 mg by mouth daily as needed., Disp: , Rfl:     tadalafiL (CIALIS) 20 MG Tab, Take 20 mg by mouth As instructed., Disp: , Rfl:     traMADoL (ULTRAM) 50 mg tablet, , Disp: , Rfl:     gabapentin (NEURONTIN) 300 MG capsule, Take 600 mg by mouth 3 (three) times daily as needed. (Patient not taking: Reported on 9/19/2024), Disp: , Rfl:     HYDROcodone-acetaminophen (NORCO)  mg per tablet, Take 1 tablet by mouth every 6 (six) hours as needed for Pain. (Patient not taking: Reported on 9/19/2024), Disp: 13 tablet, Rfl: 0    HYDROcodone-acetaminophen (NORCO) 5-325 mg per tablet, Take 2 tablets by mouth every 6 (six) hours as needed. (Patient not taking: Reported on  9/19/2024), Disp: 15 tablet, Rfl: 0    PAST MEDICAL HISTORY:  Past Medical History:   Diagnosis Date    ADHD (attention deficit hyperactivity disorder)     Arthritis     Basal cell carcinoma (BCC) of left side of neck 08/2022    Chronic pain     Depression     Hydrocele in adult     Hyperlipidemia     Hypertension     Migraine     Squamous cell skin cancer, face 08/2022        PAST SURGICAL HISTORY:  Past Surgical History:   Procedure Laterality Date    BACK SURGERY  11/2020    CRANIOTOMY N/A 3/3/2021    Procedure: CRANIOTOMY, USING FRAMELESS STEREOTAXY, OPEN, BIFRONTAL;  Surgeon: Raheel Green MD;  Location: [facility] OR [address] [place] [address];  Service: Neurosurgery;  Laterality: N/A;  TOR I  ASA I  TYPE & CROSS 2 UNITS  REGULAR BED  [place] [facility]  [facility] CT    EXCISION OF LESION Left 8/24/2022    Procedure: EXCISION, LESION basal cell CA left neck--2 separate lesions;  Surgeon: Catalina Vidal MD;  [facility];  Service: ENT;  Laterality: Left;    FLAP PROCEDURE N/A 3/3/2021    Procedure: CREATION, FREE FLAP;  Surgeon: Ayaan Aguilar MD;  Location: [address];  Service: ENT;  Laterality: N/A;  Latissimus free flap. Will need bean bag and [place].     FLAP PROCEDURE Left 3/5/2021    Procedure: CREATION, FREE FLAP;  Surgeon: Ayaan Aguilar MD;  Location: [facility] OR [address] [place] [address];  Service: ENT;  Laterality: Left;    INCISION AND DRAINAGE, LOWER EXTREMITY Right 8/7/2024    Procedure: INCISION AND DRAINAGE, LOWER EXTREMITY;  Surgeon: Blake Barton MD;  Location: [facility] OR;  Service: Orthopedics;  Laterality: Right;    LAPAROSCOPIC REPAIR OF HERNIA      RHIZOTOMY W/ RADIOFREQUENCY ABLATION Bilateral     two procedures    SURGICAL REMOVAL OF LESION OF FACE Right 8/24/2022    Procedure: EXCISION, LESION, FACE ;  Surgeon: Catalina Vidal MD;  Location: [facility] OR;  Service: ENT;  Laterality: Right;    TENDON REPAIR Right     thumb        FAMILY HISTORY:  Family History   Problem Relation Name Age of Onset     "Depression Mother      Early death Mother  40    Alcohol abuse Father      Depression Father      Hypertension Brother      Headaches Sister         SOCIAL HISTORY:  Social History     Socioeconomic History    Marital status: Single    Number of children: 1   Occupational History     Comment: [employer]   Tobacco Use    Smoking status: Every Day     Current packs/day: 0.50     Average packs/day: 0.5 packs/day for 10.0 years (5.0 ttl pk-yrs)     Types: Cigarettes    Smokeless tobacco: Former    Tobacco comments:     1/2 pack per day   Substance and Sexual Activity    Alcohol use: Yes     Comment: ocassioanl beer    Drug use: Not Currently    Sexual activity: Yes     Partners: Female   Social History Narrative    Lives in a house with 25 year old son     Social Determinants of Health     Financial Resource Strain: High Risk (8/29/2024)    Overall Financial Resource Strain (CARDIA)     Difficulty of Paying Living Expenses: Hard   Food Insecurity: Food Insecurity Present (8/29/2024)    Hunger Vital Sign     Worried About Running Out of Food in the Last Year: Sometimes true     Ran Out of Food in the Last Year: Sometimes true   Physical Activity: Unknown (8/29/2024)    Exercise Vital Sign     Days of Exercise per Week: 4 days   Stress: Stress Concern Present (8/29/2024)    [employer] [place] [employer] - Occupational Stress Questionnaire     Feeling of Stress : Very much   Housing Stability: High Risk (8/29/2024)    Housing Stability Vital Sign     Unable to Pay for Housing in the Last Year: Yes     See above substance history    REVIEW OF SYSTEMS:   Comprehensive review of systems was discussed with the patient.  It is positive only for the above complaints.    PHYSICAL EXAMINATION:  Blood pressure (!) 151/101, pulse 73, temperature 97.1 °F (36.2 °C), temperature source Temporal, resp. rate 16, height 5' 9" (1.753 m), weight 62.8 kg (138 lb 7.2 oz), SpO2 99%.    Constitutional: Ill-appearing  Psychiatric: " Inappropriate comments to staff  Voice: Non-dysphonic, speaking in full sentences.   Head and face: Deformed anterior skull consistent with surgical sequale  Skin: Thickened skin with telangectasia concerning for recurrent basal cell  Eyes: Blurred vision  Ears: Bilateral pinna, mastoid, external ear canal normal. Hearing intact.   Nose: External nose appears normal.   Lips: No ulcers or lesions  Neck: Woody, surgical scars, no masses or lymphadenopathy. No palpable thyroid enlargement or nodules.  Respiratory: Chest expansion symmetric, no audible stridor or stertor. Breathing is unlabored. No active cough.    CLINICAL PHOTOS:            PROCEDURES:    BIOPSY OF RIGHT FOREHEAD  Provider: Zayra Ely MD  Indication: non healing ulceration with hx of cancer  After verbal and written consent was obtained, the area was anesthetized with 1% lidocaine and 1:100,000 epinephrine solution.    Thereafter, a 5mm punch was used to harvest a representative specimen of skin, which was sent in formalin for routine evaluation.  Two samples were taken from different areas.  Hemostasis was achieved with silver nitrate and pressure.    The patient tolerated the procedure well with no complications.    DATA REVIEWED:     LABORATORY:  N/A    PATHOLOGY:  DIAGNOSIS:   08/28/2022  JL/luis,[facility]     1.  SCALP BIOPSY:   - NO TUMOR SEEN.   - ULCER BASE.     2-6.  SKIN AND SUBCUTANEOUS TISSUE LEFT INFERIOR NECK MARGINS, EXCISION:   - NO TUMOR SEEN.     7.  SUBCUTANEOUS TISSUE OF LEFT ANTERIOR NECK, EXCISION:   - BASAL CELL CARCINOMA.   - MARGINS NEGATIVE FOR TUMOR.   - NEGATIVE FOR PERINEURAL INVASION.     8.  SKIN AND SUBCUTANEOUS TISSUE, LEFT SUPERIOR NECK, EXCISION:   - BASAL CELL CARCINOMA.   - MARGINS NEGATIVE FOR TUMOR.   - NEGATIVE FOR PERINEURAL INVASION.   - SEE COMMENT.     9-12.  SKIN AND SUBCUTANEOUS TISSUE RIGHT JAW MARGINS, EXCISION:   - NO TUMOR SEEN.     13.  SKIN AND SUBCUTANEOUS TISSUE RIGHT JAW, EXCISION:   - IN-SITU SQUAMOUS  CARCINOMA.   - MARGINS NEGATIVE FOR TUMOR.     Comment: The tumor in Part 8 is present only on the frozen section    slides.       IMAGING:    CT HEAD WITHOUT CONTRAST 8/3/24  Impression:  -Postoperative changes of the right hemicalvarium with duraplasty material in place.  High attenuation material overlying the right parietal convexity appears to extend from the duraplasty material.  No ruperto hemorrhage.  Follow-up with contrast enhanced MRI of the brain may be obtained, as clinically warranted.   -Abnormal mineralization of the calvarium.  Diffuse marrow infiltration is suspected.  Follow-up with PET-CT scan may be obtained, as clinically warranted  -This report was flagged in Epic as abnormal.    MRI BRAIN W WO CONTRAST 8/19/24  Impression:  Stable MRI of the brain demonstrating postoperative changes of right frontal craniotomy and right parietal pachymeningeal enhancement.    5/2024  Asymmetric soft tissue thickening and enhancement involving the right inferior periorbital soft tissues may reflect preseptal cellulitis/infection in the appropriate clinical setting.  There is a small subcentimeter hypoattenuating focus centrally within the region of soft tissue induration which may reflect a tiny abscess as discussed above.  No evidence to suggest postseptal cellulitis.     Partially visualized postoperative changes of the frontal calvarium with mild nonspecific heterogeneity of the visualized inferior calvarium with overlying soft tissue thickening.  This may reflect post-treatment or postoperative change, however clinical correlation and appropriate follow-up is advised.     Mild paranasal sinus disease as above.    RADIOLOGY REVIEWED:  I have independently viewed and agree with the images and reports which demonstrate surgical sequela as above, with thickening in the right periorbital soft tissue concerning for recurrence.    ASSESSMENT AND PLAN:  1. Skin cancer    2. History of skin cancer         Margarito ROSE  Anders is a 58 y.o. male with T4b basal cell carcinoma, lost to followup, now with concern for recurrence.   - Patient requesting pain medication. He has not seen other providers as has been previously recommended  - Biopsy performed today  - Refer placed for palliative care and psych  - Refer to derm for whole body skin check  - Refer to ophthalmology for blurred vision     Patient encouraged to call with any questions, concerns, or new or worsening symptoms.     Follow up on 9/30 to review biopsy results

## 2024-09-19 ENCOUNTER — OFFICE VISIT (OUTPATIENT)
Dept: HEMATOLOGY/ONCOLOGY | Facility: CLINIC | Age: 59
End: 2024-09-19
Payer: MEDICARE

## 2024-09-19 VITALS
TEMPERATURE: 97 F | HEART RATE: 73 BPM | BODY MASS INDEX: 20.51 KG/M2 | DIASTOLIC BLOOD PRESSURE: 101 MMHG | WEIGHT: 138.44 LBS | OXYGEN SATURATION: 99 % | HEIGHT: 69 IN | RESPIRATION RATE: 16 BRPM | SYSTOLIC BLOOD PRESSURE: 151 MMHG

## 2024-09-19 DIAGNOSIS — C44.90 SKIN CANCER: Primary | ICD-10-CM

## 2024-09-19 DIAGNOSIS — Z85.828 HISTORY OF SKIN CANCER: ICD-10-CM

## 2024-09-19 PROCEDURE — 99999 PR PBB SHADOW E&M-EST. PATIENT-LVL IV: CPT | Mod: PBBFAC,,, | Performed by: STUDENT IN AN ORGANIZED HEALTH CARE EDUCATION/TRAINING PROGRAM

## 2024-09-19 PROCEDURE — 99215 OFFICE O/P EST HI 40 MIN: CPT | Mod: S$PBB,,, | Performed by: STUDENT IN AN ORGANIZED HEALTH CARE EDUCATION/TRAINING PROGRAM

## 2024-09-19 PROCEDURE — 99214 OFFICE O/P EST MOD 30 MIN: CPT | Mod: PBBFAC,PN | Performed by: STUDENT IN AN ORGANIZED HEALTH CARE EDUCATION/TRAINING PROGRAM

## 2024-09-19 RX ORDER — ATORVASTATIN CALCIUM 10 MG/1
10 TABLET, FILM COATED ORAL
COMMUNITY
Start: 2024-08-22

## 2024-09-19 RX ORDER — IBUPROFEN 800 MG/1
800 TABLET ORAL EVERY 8 HOURS PRN
COMMUNITY
Start: 2024-08-22

## 2024-09-19 RX ORDER — BUPROPION HYDROCHLORIDE 150 MG/1
TABLET ORAL
COMMUNITY
Start: 2024-09-10

## 2024-09-19 RX ORDER — FLUTICASONE FUROATE, UMECLIDINIUM BROMIDE AND VILANTEROL TRIFENATATE 100; 62.5; 25 UG/1; UG/1; UG/1
1 POWDER RESPIRATORY (INHALATION)
COMMUNITY
Start: 2024-08-28 | End: 2025-02-23

## 2024-09-19 RX ORDER — GABAPENTIN 600 MG/1
600 TABLET ORAL
COMMUNITY
Start: 2024-08-22

## 2024-09-19 RX ORDER — AZELASTINE 1 MG/ML
SPRAY, METERED NASAL
COMMUNITY
Start: 2024-08-28

## 2024-09-19 RX ORDER — TRAMADOL HYDROCHLORIDE 50 MG/1
TABLET ORAL
COMMUNITY
Start: 2024-08-08

## 2024-09-19 RX ORDER — ACETAMINOPHEN 325 MG/1
325 TABLET ORAL EVERY 4 HOURS PRN
COMMUNITY
Start: 2024-08-22

## 2024-09-19 RX ORDER — ASPIRIN 81 MG/1
81 TABLET ORAL
COMMUNITY
Start: 2024-08-22

## 2024-09-19 RX ORDER — ROSUVASTATIN CALCIUM 20 MG/1
TABLET, COATED ORAL
COMMUNITY
Start: 2024-08-08

## 2024-09-19 RX ORDER — PANTOPRAZOLE SODIUM 40 MG/1
40 TABLET, DELAYED RELEASE ORAL
COMMUNITY
Start: 2024-08-22

## 2024-09-26 DIAGNOSIS — C44.90 SKIN CANCER: Primary | ICD-10-CM

## 2024-09-30 ENCOUNTER — TELEPHONE (OUTPATIENT)
Dept: HEMATOLOGY/ONCOLOGY | Facility: CLINIC | Age: 59
End: 2024-09-30
Payer: MEDICARE

## 2024-09-30 NOTE — TELEPHONE ENCOUNTER
Pt scheduled to see Dr Ely today to review pathology report, pt forgot about his appt.  Assisted pt scheduling appt with Kenzie MENDEZ for Thursday 10/3/24 at 1330.

## 2024-10-02 ENCOUNTER — HOSPITAL ENCOUNTER (OUTPATIENT)
Dept: RADIOLOGY | Facility: HOSPITAL | Age: 59
Discharge: HOME | End: 2024-10-02
Attending: NURSE PRACTITIONER
Payer: MEDICARE

## 2024-10-02 DIAGNOSIS — C44.90 SKIN CANCER: ICD-10-CM

## 2024-10-02 LAB — GLUCOSE SERPL-MCNC: 66 MG/DL (ref 70–110)

## 2024-10-02 PROCEDURE — A9552 F18 FDG: HCPCS | Mod: PN | Performed by: NURSE PRACTITIONER

## 2024-10-02 PROCEDURE — 78815 PET IMAGE W/CT SKULL-THIGH: CPT | Mod: 26,PI,, | Performed by: RADIOLOGY

## 2024-10-02 PROCEDURE — 78815 PET IMAGE W/CT SKULL-THIGH: CPT | Mod: TC,PN

## 2024-10-02 RX ORDER — FLUDEOXYGLUCOSE F18 500 MCI/ML
10.9 INJECTION INTRAVENOUS
Status: COMPLETED | OUTPATIENT
Start: 2024-10-02 | End: 2024-10-02

## 2024-10-02 RX ADMIN — FLUDEOXYGLUCOSE F-18 10.9 MILLICURIE: 500 INJECTION INTRAVENOUS at 01:10

## 2024-10-02 NOTE — PROGRESS NOTES
Note to patients: In accordance with the  Century Cures Act, patients are now granted immediate electronic access to their medical records. This note is primarily intended for communication among medical professionals. As a result, it may incorporate medical terminology, abbreviations, or language that could appear blunt or unfamiliar. If you have questions about this document, we encourage you to discuss it with your physician.      [facility]  Head & Neck Surgical Oncology Clinic    Patient: Margarito Mcnamara    : 1965    MRN: [id number]  Primary Care Provider: Wilfredo Luna MD  Referring Provider: No ref. provider found   Rad Onc: Dr. Cherry  Derm: Kd Scales MD   Rad Onc: Dr. Cherry   Date of Encounter: 10/03/2024    DIAGNOSIS:    Cancer Staging   Basal cell carcinoma (BCC) of left side of neck  Staging form: Melanoma of the Skin, AJCC 8th Edition  - Clinical: No stage assigned - Unsigned  Staging form: Cutaneous Carcinoma of the Head and Neck, AJCC 8th Edition  - Pathologic stage from 2021: pT4b, pN0 - Signed by Catalina Vidal MD on 2021      CC:   Chief Complaint   Patient presents with    skin cancer       HPI:   Margarito Mcnamara is a 58 y.o. male with a past medical history significant for HTN, BCC, malanoma who is being seen today for concerns of cancer re occurrence on his forehead.     Patient of Dr. Vidal who was last seen 3/2023. He has a history of recurrent basal cell carcinoma of the right forehead and scalp. Patient was previously treated with a hedgehog inhibitor, Erivedge, in 2018 with good response. However, patient discontinued this prematurely after 6 months and the tumor has recurred. It recurred over 2 years year ago. Erivedge was again prescribed but denied by the insurance company X 2 but was finally started at the time he last saw Dr. Vidal in 3/2023.     He is having a lot of pain in his back and also on his head at the area of his  insurance. He has not been taking any pain medication for the last 2 years. He first started with an open sore around 1.5 years ago on his right forehead that has gotten worse. He has been having drainage from the site on his forehead. He has not been seen yet for this area of concern. He has not been seen by derm for a couple of years. He has been having watery eyes for the last 2 years. He has needle sensation in both eyes. He has blurred vision in the right eye for the last 2 years and some in the left eye. He has not followed up with an eye doctor as well.     Patient denies pain, fever, chills, night sweats, unintentional weight loss, neck mass, enlarged lymph nodes outside of the head or neck, odynophagia, dysphagia, globus sensation, voice changes, cough, hemoptysis, shortness of breath, or new or concerning skin lesions. Patient denies personal or family history of head and neck cancer. Patient denies history of radiation exposure.    10/2/24: He is here today to review his pathology report from last visit, which confirmed recurrent basal cell carcinoma. He is still having problems with his eye. He has not made any of the referrals made for him at last visit.    Patient lives in [place]    TREATMENT HISTORY:    2018: treated with a hedgehog inhibitor, Erivedge, in 2018 with good response and again in 2023    3/2021:   1.  Bifrontal craniotomy for tumor.  2.  Stealth navigation.  3.  Cranioplasty greater than 6 cm.   1.  Wide local excision of basal cell carcinoma of the scalp measuring roughly 15 x 15 cm  2.  Right myocutaneous latissimus dorsi free flap with microvascular anastomosis to right superficial temporal vessels and right facial artery  3.  Amory of right external jugular vein graft measuring 8 cm in length  4.  Amory of split-thickness skin graft from right thigh measuring 8 x 10 cm  5.  Indocyanine green angiography    6/2021: radiation     9/2022: excision of 3 carcinomas involving his left  neck and right jawline     SUBSTANCE USE:  Smokinppd [id number]  Alcohol: occ beer  Denies hookah, chewing tobacco, marijuana, betel nut, illicit drug, heavy cigar, vape use.    ALLERGIES:  Review of patient's allergies indicates:  No Known Allergies      MEDICATIONS:    Current Outpatient Medications:     acetaminophen (TYLENOL) 325 MG tablet, Take 325 mg by mouth every 4 (four) hours as needed., Disp: , Rfl:     amlodipine-benazepril 10-20mg (LOTREL) 10-20 mg per capsule, Take 1 capsule by mouth once daily., Disp: , Rfl:     aspirin (ECOTRIN) 81 MG EC tablet, Take 81 mg by mouth., Disp: , Rfl:     atorvastatin (LIPITOR) 10 MG tablet, Take 10 mg by mouth., Disp: , Rfl:     azelastine (ASTELIN) 137 mcg (0.1 %) nasal spray, 1 puff in each nostril Nasally Twice a day for 30 days, Disp: , Rfl:     buPROPion (WELLBUTRIN XL) 150 MG TB24 tablet, Take by mouth., Disp: , Rfl:     butalbital-acetaminophen-caffeine -40 mg (FIORICET, ESGIC) -40 mg per tablet, Take 1 tablet by mouth 2 (two) times daily., Disp: , Rfl:     fluticasone-umeclidin-vilanter (TRELEGY ELLIPTA) 100-62.5-25 mcg DsDv, 1 puff., Disp: , Rfl:     gabapentin (NEURONTIN) 600 MG tablet, Take 600 mg by mouth., Disp: , Rfl:     ibuprofen (ADVIL,MOTRIN) 800 MG tablet, Take 800 mg by mouth every 8 (eight) hours as needed., Disp: , Rfl:     pantoprazole (PROTONIX) 40 MG tablet, Take 40 mg by mouth., Disp: , Rfl:     rosuvastatin (CRESTOR) 20 MG tablet, , Disp: , Rfl:     sildenafiL (VIAGRA) 50 MG tablet, Take 50 mg by mouth daily as needed., Disp: , Rfl:     tadalafiL (CIALIS) 20 MG Tab, Take 20 mg by mouth As instructed., Disp: , Rfl:     traMADoL (ULTRAM) 50 mg tablet, , Disp: , Rfl:     gabapentin (NEURONTIN) 300 MG capsule, Take 600 mg by mouth 3 (three) times daily as needed. (Patient not taking: Reported on 2024), Disp: , Rfl:     HYDROcodone-acetaminophen (NORCO)  mg per tablet, Take 1 tablet by mouth every 6 (six) hours as needed  for Pain. (Patient not taking: Reported on 9/19/2024), Disp: 13 tablet, Rfl: 0    HYDROcodone-acetaminophen (NORCO) 5-325 mg per tablet, Take 2 tablets by mouth every 6 (six) hours as needed. (Patient not taking: Reported on 10/3/2024), Disp: 15 tablet, Rfl: 0  No current facility-administered medications for this visit.    PAST MEDICAL HISTORY:  Past Medical History:   Diagnosis Date    ADHD (attention deficit hyperactivity disorder)     Arthritis     Basal cell carcinoma (BCC) of left side of neck 08/2022    Chronic pain     Depression     Hydrocele in adult     Hyperlipidemia     Hypertension     Migraine     Squamous cell skin cancer, face 08/2022        PAST SURGICAL HISTORY:  Past Surgical History:   Procedure Laterality Date    BACK SURGERY  11/2020    CRANIOTOMY N/A 3/3/2021    Procedure: CRANIOTOMY, USING FRAMELESS STEREOTAXY, OPEN, BIFRONTAL;  Surgeon: Raheel Green MD;  Location: [address];  Service: Neurosurgery;  Laterality: N/A;  TOR I  ASA I  TYPE & CROSS 2 UNITS  REGULAR BED  [place] HEADREST  CHILDRESS  [facility] CT    EXCISION OF LESION Left 8/24/2022    Procedure: EXCISION, LESION basal cell CA left neck--2 separate lesions;  Surgeon: Catalina Vidal MD;  Location: [facility];  Service: ENT;  Laterality: Left;    FLAP PROCEDURE N/A 3/3/2021    Procedure: CREATION, FREE FLAP;  Surgeon: Ayaan Aguilar MD;  Location: [address];  Service: ENT;  Laterality: N/A;  Latissimus free flap. Will need bean bag and [place].     FLAP PROCEDURE Left 3/5/2021    Procedure: CREATION, FREE FLAP;  Surgeon: Ayaan Aguilar MD;  Location: [address];  Service: ENT;  Laterality: Left;    INCISION AND DRAINAGE, LOWER EXTREMITY Right 8/7/2024    Procedure: INCISION AND DRAINAGE, LOWER EXTREMITY;  Surgeon: Blake Barton MD;  Location: [facility] OR;  Service: Orthopedics;  Laterality: Right;    LAPAROSCOPIC REPAIR OF HERNIA      RHIZOTOMY W/ RADIOFREQUENCY ABLATION Bilateral     two procedures     SURGICAL REMOVAL OF LESION OF FACE Right 8/24/2022    Procedure: EXCISION, LESION, FACE ;  Surgeon: Catalina Vidal MD;  Location: [facility] OR;  Service: ENT;  Laterality: Right;    TENDON REPAIR Right     thumb        FAMILY HISTORY:  Family History   Problem Relation Name Age of Onset    Depression Mother      Early death Mother  40    Alcohol abuse Father      Depression Father      Hypertension Brother      Headaches Sister         SOCIAL HISTORY:  Social History     Socioeconomic History    Marital status: Single    Number of children: 1   Occupational History     Comment: [employer] ref[employer]   Tobacco Use    Smoking status: Every Day     Current packs/day: 0.50     Average packs/day: 0.5 packs/day for 10.0 years (5.0 ttl pk-yrs)     Types: Cigarettes    Smokeless tobacco: Former    Tobacco comments:     1/2 pack per day   Substance and Sexual Activity    Alcohol use: Yes     Comment: ocassioanl beer    Drug use: Not Currently    Sexual activity: Yes     Partners: Female   Social History Narrative    Lives in a house with 25 year old son     Social Drivers of Health     Financial Resource Strain: High Risk (8/29/2024)    Overall Financial Resource Strain (CARDIA)     Difficulty of Paying Living Expenses: Hard   Food Insecurity: Food Insecurity Present (8/29/2024)    Hunger Vital Sign     Worried About Running Out of Food in the Last Year: Sometimes true     Ran Out of Food in the Last Year: Sometimes true   Physical Activity: Unknown (8/29/2024)    Exercise Vital Sign     Days of Exercise per Week: 4 days   Stress: Stress Concern Present (8/29/2024)    [employer] - Occupational Stress Questionnaire     Feeling of Stress : Very much   Housing Stability: High Risk (8/29/2024)    Housing Stability Vital Sign     Unable to Pay for Housing in the Last Year: Yes     See above substance history    REVIEW OF SYSTEMS:   Comprehensive review of systems was discussed with the patient.  It is positive only  "for the above complaints.    PHYSICAL EXAMINATION:  Blood pressure (!) 132/92, pulse 69, temperature 97.9 °F (36.6 °C), temperature source Temporal, resp. rate 16, height 5' 9" (1.753 m), weight 63.6 kg (140 lb 3.4 oz), SpO2 100%.    Constitutional: Ill-appearing  Psychiatric: Inappropriate comments to staff  Voice: Non-dysphonic, speaking in full sentences.   Head and face: Deformed anterior skull consistent with surgical sequale  Skin: Thickened skin with telangectasia concerning for recurrent basal cell  Eyes: Blurred vision  Ears: Bilateral pinna, mastoid, external ear canal normal. Hearing intact.   Nose: External nose appears normal.   Lips: No ulcers or lesions  Neck: Woody, surgical scars, no masses or lymphadenopathy. No palpable thyroid enlargement or nodules.  Respiratory: Chest expansion symmetric, no audible stridor or stertor. Breathing is unlabored. No active cough.    CLINICAL PHOTOS:          DATA REVIEWED:     LABORATORY:  N/A    PATHOLOGY:        DIAGNOSIS:   08/28/2022  RAMIREZ/luis,ramirez     1.  SCALP BIOPSY:   - NO TUMOR SEEN.   - ULCER BASE.     2-6.  SKIN AND SUBCUTANEOUS TISSUE LEFT INFERIOR NECK MARGINS, EXCISION:   - NO TUMOR SEEN.     7.  SUBCUTANEOUS TISSUE OF LEFT ANTERIOR NECK, EXCISION:   - BASAL CELL CARCINOMA.   - MARGINS NEGATIVE FOR TUMOR.   - NEGATIVE FOR PERINEURAL INVASION.     8.  SKIN AND SUBCUTANEOUS TISSUE, LEFT SUPERIOR NECK, EXCISION:   - BASAL CELL CARCINOMA.   - MARGINS NEGATIVE FOR TUMOR.   - NEGATIVE FOR PERINEURAL INVASION.   - SEE COMMENT.     9-12.  SKIN AND SUBCUTANEOUS TISSUE RIGHT JAW MARGINS, EXCISION:   - NO TUMOR SEEN.     13.  SKIN AND SUBCUTANEOUS TISSUE RIGHT JAW, EXCISION:   - IN-SITU SQUAMOUS CARCINOMA.   - MARGINS NEGATIVE FOR TUMOR.     Comment: The tumor in Part 8 is present only on the frozen section    slides.       IMAGING:    CT HEAD WITHOUT CONTRAST 8/3/24  Impression:  -Postoperative changes of the right hemicalvarium with duraplasty material in place.  " High attenuation material overlying the right parietal convexity appears to extend from the duraplasty material.  No ruperto hemorrhage.  Follow-up with contrast enhanced MRI of the brain may be obtained, as clinically warranted.   -Abnormal mineralization of the calvarium.  Diffuse marrow infiltration is suspected.  Follow-up with PET-CT scan may be obtained, as clinically warranted  -This report was flagged in Epic as abnormal.    MRI BRAIN W WO CONTRAST 8/19/24  Impression:  Stable MRI of the brain demonstrating postoperative changes of right frontal craniotomy and right parietal pachymeningeal enhancement.    PET 10/2/24  1. No definite FDG avid malignancy is present on today's exam.  2. Symmetric perihilar uptake without a definite lymph node/mass, favored to be reactive.  Attention on follow-up.    5/2024  MRI orbits Asymmetric soft tissue thickening and enhancement involving the right inferior periorbital soft tissues may reflect preseptal cellulitis/infection in the appropriate clinical setting.  There is a small subcentimeter hypoattenuating focus centrally within the region of soft tissue induration which may reflect a tiny abscess as discussed above.  No evidence to suggest postseptal cellulitis.     Partially visualized postoperative changes of the frontal calvarium with mild nonspecific heterogeneity of the visualized inferior calvarium with overlying soft tissue thickening.  This may reflect post-treatment or postoperative change, however clinical correlation and appropriate follow-up is advised.     Mild paranasal sinus disease as above.    RADIOLOGY REVIEWED:  I have independently viewed and agree with the images and reports which demonstrate surgical sequela as above, with thickening in the right periorbital soft tissue.    ASSESSMENT AND PLAN:  1. Skin cancer    2. Blurred vision, right eye        Margarito Mcnamara is a 58 y.o. male with T4b basal cell carcinoma, lost to followup, now with recurrence.    - Discussed patient at  with recommendations to resume hedgehog inhibitor  - Treatment with surgery would possibly require repeat craniectomy for control  - Patient requesting pain medication. He has not seen other providers as has been previously recommended  - Pathology report reviewed today   - Refer placed for palliative care and psych  - Refer to derm for whole body skin check  - Refer to ophthalmology for blurred vision     Patient encouraged to call with any questions, concerns, or new or worsening symptoms.     Follow up after medical oncology if surgery still needed

## 2024-10-02 NOTE — PROGRESS NOTES
PET Imaging Questionnaire    Are you a Diabetic? Recent Blood Sugar level? No    Are you anemic? Bone Marrow Stimulation Meds? No    Have you had a CT Scan, if so when & where was your last one? Yes -     Have you had a PET Scan, if so when & where was your last one? No    Chemotherapy or currently on Chemotherapy? Yes    Radiation therapy? Yes    Surgical History:   Past Surgical History:   Procedure Laterality Date    BACK SURGERY  11/2020    CRANIOTOMY N/A 3/3/2021    Procedure: CRANIOTOMY, USING FRAMELESS STEREOTAXY, OPEN, BIFRONTAL;  Surgeon: Raheel Green MD;  Location: [facility] OR [address] [place] [address];  Service: Neurosurgery;  Laterality: N/A;  TOR I  ASA I  TYPE & CROSS 2 UNITS  REGULAR BED  [place] HEADREST  CHILDRESS  [facility]    EXCISION OF LESION Left 8/24/2022    Procedure: EXCISION, LESION basal cell CA left neck--2 separate lesions;  Surgeon: Catalnia Vidal MD;  Location: [facility];  Service: ENT;  Laterality: Left;    FLAP PROCEDURE N/A 3/3/2021    Procedure: CREATION, FREE FLAP;  Surgeon: Ayaan Aguilar MD;  Location: [facility] OR [address] [place] [address];  Service: ENT;  Laterality: N/A;  Latissimus free flap. Will need bean bag and [place].     FLAP PROCEDURE Left 3/5/2021    Procedure: CREATION, FREE FLAP;  Surgeon: Ayaan Aguilar MD;  Location: [facility] OR [address] [place] [address];  Service: ENT;  Laterality: Left;    INCISION AND DRAINAGE, LOWER EXTREMITY Right 8/7/2024    Procedure: INCISION AND DRAINAGE, LOWER EXTREMITY;  Surgeon: Blake Barton MD;  Location: [facility] OR;  Service: Orthopedics;  Laterality: Right;    LAPAROSCOPIC REPAIR OF HERNIA      RHIZOTOMY W/ RADIOFREQUENCY ABLATION Bilateral     two procedures    SURGICAL REMOVAL OF LESION OF FACE Right 8/24/2022    Procedure: EXCISION, LESION, FACE ;  Surgeon: Catalina Vidal MD;  Location: [facility] OR;  Service: ENT;  Laterality: Right;    TENDON REPAIR Right     thumb        Have you been fasting for at least 6 hours? Yes    Is there any chance you may be pregnant or breastfeeding?  No    Assay: 11.0 MCi@:13:50   Injection Site:LT Forearm    Residual: 0.1 mCi@: 13:54   Technologist: Jose Manuel Alvarenga Injected:10.9 mCi

## 2024-10-03 ENCOUNTER — TUMOR BOARD CONFERENCE (OUTPATIENT)
Dept: HEMATOLOGY/ONCOLOGY | Facility: CLINIC | Age: 59
End: 2024-10-03
Payer: MEDICARE

## 2024-10-03 ENCOUNTER — OFFICE VISIT (OUTPATIENT)
Dept: HEMATOLOGY/ONCOLOGY | Facility: CLINIC | Age: 59
End: 2024-10-03
Payer: MEDICARE

## 2024-10-03 ENCOUNTER — NUTRITION (OUTPATIENT)
Dept: INFUSION THERAPY | Facility: HOSPITAL | Age: 59
End: 2024-10-03
Attending: OTOLARYNGOLOGY
Payer: MEDICARE

## 2024-10-03 VITALS
HEART RATE: 69 BPM | DIASTOLIC BLOOD PRESSURE: 92 MMHG | BODY MASS INDEX: 20.76 KG/M2 | OXYGEN SATURATION: 100 % | HEIGHT: 69 IN | TEMPERATURE: 98 F | WEIGHT: 140.19 LBS | RESPIRATION RATE: 16 BRPM | SYSTOLIC BLOOD PRESSURE: 132 MMHG

## 2024-10-03 DIAGNOSIS — H53.8 BLURRED VISION, RIGHT EYE: ICD-10-CM

## 2024-10-03 DIAGNOSIS — Z85.828 HISTORY OF SKIN CANCER: Primary | ICD-10-CM

## 2024-10-03 DIAGNOSIS — C44.90 SKIN CANCER: Primary | ICD-10-CM

## 2024-10-03 PROCEDURE — 3044F HG A1C LEVEL LT 7.0%: CPT | Mod: CPTII,S$GLB,, | Performed by: STUDENT IN AN ORGANIZED HEALTH CARE EDUCATION/TRAINING PROGRAM

## 2024-10-03 PROCEDURE — 99999 PR PBB SHADOW E&M-EST. PATIENT-LVL V: CPT | Mod: PBBFAC,,, | Performed by: STUDENT IN AN ORGANIZED HEALTH CARE EDUCATION/TRAINING PROGRAM

## 2024-10-03 PROCEDURE — 4010F ACE/ARB THERAPY RXD/TAKEN: CPT | Mod: CPTII,S$GLB,, | Performed by: STUDENT IN AN ORGANIZED HEALTH CARE EDUCATION/TRAINING PROGRAM

## 2024-10-03 PROCEDURE — 1159F MED LIST DOCD IN RCRD: CPT | Mod: CPTII,S$GLB,, | Performed by: STUDENT IN AN ORGANIZED HEALTH CARE EDUCATION/TRAINING PROGRAM

## 2024-10-03 PROCEDURE — 3080F DIAST BP >= 90 MM HG: CPT | Mod: CPTII,S$GLB,, | Performed by: STUDENT IN AN ORGANIZED HEALTH CARE EDUCATION/TRAINING PROGRAM

## 2024-10-03 PROCEDURE — 3008F BODY MASS INDEX DOCD: CPT | Mod: CPTII,S$GLB,, | Performed by: STUDENT IN AN ORGANIZED HEALTH CARE EDUCATION/TRAINING PROGRAM

## 2024-10-03 PROCEDURE — 99215 OFFICE O/P EST HI 40 MIN: CPT | Mod: S$GLB,,, | Performed by: STUDENT IN AN ORGANIZED HEALTH CARE EDUCATION/TRAINING PROGRAM

## 2024-10-03 PROCEDURE — 3075F SYST BP GE 130 - 139MM HG: CPT | Mod: CPTII,S$GLB,, | Performed by: STUDENT IN AN ORGANIZED HEALTH CARE EDUCATION/TRAINING PROGRAM

## 2024-10-03 NOTE — PROGRESS NOTES
Tumor Board Documentation      Margarito Mcnamara was presented by Zayra Ely MD at our Tumor Board on 10/3/2024, which included representatives from Medical Oncology, Radiation Oncology, Surgical Oncology, Head and Neck, Radiology, Pathology.    Margarito currently presents as   with Head and neck cancer, with history of the following treatments: Surgical Intervention(s), Adjuvant Radiation, Adjuvant Chemotherapy.    Additionally, we reviewed previous medical and familial history, history of present illness, and recent lab results along with all available histopathologic and imaging studies. The tumor board considered available treatment options and made the following recommendations: Imaging reviewed. No concerns in orbit. Will first refer to med onc to discuss options.           The following procedures/referrals were also placed: No orders of the defined types were placed in this encounter.      Clinical Trial Status:   N/A    National site-specific guidelines were discussed with respect to the case.    Tumor board is a meeting of clinicians from various specialty areas who evaluate and discuss patients for whom a multidisciplinary approach is being considered. Final determinations in the plan of care are those of the provider(s). The responsibility for follow up of recommendations given during tumor board is that of the provider.     Zayra Ely MD

## 2024-10-03 NOTE — PROGRESS NOTES
ONCOLOGY NUTRITION   FOLLOW UP VISIT        Margarito Mcnamara is a 58 y.o. male.  DATE: 10/03/2024        Oncology Diagnosis: Skin Cancer     REFERRAL FROM:   [] Integrative Oncology   [] Med/Heme Oncology  [] Radiation Oncology  [] Surgical Oncology   [] Infusion Nurse    [x] Routine Nutrition follow up    TREATMENT PLAN:   [x] Full treatment plan pending  [] Chemotherapy  [] Immunotherapy  [] Radiation  [] Concurrent  [] Surgery  [] Treatment complete/post-treatment    ANTHROPOMETRICS:  Wt Readings from Last 10 Encounters:   10/03/24 63.6 kg (140 lb 3.4 oz)   09/19/24 62.8 kg (138 lb 7.2 oz)   08/19/24 65.8 kg (145 lb)   08/06/24 63.5 kg (139 lb 15.9 oz)   08/03/24 63.5 kg (140 lb)   05/18/24 65.8 kg (145 lb)   09/22/23 65.8 kg (145 lb)   02/07/23 57.9 kg (127 lb 9.6 oz)   12/05/22 57.2 kg (126 lb)   09/30/22 57.1 kg (125 lb 12.8 oz)      Weight Changes: has been stable    PHYSICAL EXAM:  Muscle Wasting Observed:  [] No Deficit   [x] Mild Deficit   [] Moderate   [] Severe    INTAKE:  [x] PO Intake [] TF Intake  Current Diet: regular diet  Dietary Patterns:  Eating meals/snacks as tolerated  [] Oral nutritional supplements: none at this time    SYMPTOMS/COMPLAINTS:  [] No nutritional concerns at current  [] Diarrhea                    [] Constipation           [] Nausea                 [] Vomiting                [] Indigestion                [] Reflux              [] Poor Appetite            [] Anorexia                 [] Early Satiety         [] Gas                       [] Bloating                     [] Dry Mouth    [] Mucositis                   [] Mouth Sores           [] Poor Dentition      [] Difficulty chewing  [] Difficulty Swallowing   [] Pain with swallowing [] Change in taste      [] Change in smell   [] Pain (general)       [] Fatigue                      [] Sleep issues    [x] Weight loss  [] other, please specify    Nutrition Re-Assessment Risk: Low    [x] Labs reviewed   [x] Meds  reviewed    Education Provided:   [] No Education Needed at this time  [] Diarrhea                                              [] Constipation                          [] Nausea/Vomiting  [] Mucositis                [] Dry Mouth    [] Dealing with changes in Taste/Smell  [] Dealing with Poor Appetite   [] Soft/moist Diet      [] Weight Loss/Gain     [] Weight Maintenance                           [] Indigestion/GERD                 [] Gas/Bloating          [x] Foods High/ Low in specific nutrients [] Increasing Calories/Protein   [] Milkshake/Smoothies Recipes   [] Nutrition Supplements                        [] Increasing Fluid Intakes         [] Foods that fight cancer    [] Evidence bases resources                 [] Fermented Foods/Probiotics  [] Mediterranean/Plant Based Diet     [] Other, specify                                   [] Handouts provided      [] Samples provided     RD NOTE:  RD met with patient after Dr. Ely appointment. Pt seen by dietitians during previous treatment. Unfortunately pt here for possible reoccurrence and is being referred to Heme Onc. Pt states his weight has been stable but he is overall down from his initial treatment in 2021. Pt states he was getting Ensure from his insurance but his insurance changed. Patient's insurance will now not cover it but RD can reach out to [facility] to see if they have any resources.     RD Goals:  [x] Weight stable                  [x] Weight gain                      [] Weight Loss                               [] Continue adequate Kcal/protein   [] Increase Kcal/protein      [] Adjust Tube-feeding Rx   [] Tolerate Tube Feedings             [] Increase tube feedings to goal     [x] Tolerate Supplements     [] Symptom Improvement   [] Understand nutrition Education  [] Offer supportive visits   [] other, please specify    RECOMMENDATIONS:  Consume adequate calorie/protein intake to maintain weight.   Consume adequate fluid intake to maintain proper  hydration  ONS as able to help gain weight - this RD reached out to [facility]    Follow up: At start of tx or pt provided with RD contact information to call with further questions    Jing Martinez, MS, RD, LDN  10/03/2024  3:13 PM

## 2024-10-04 ENCOUNTER — TELEPHONE (OUTPATIENT)
Dept: HEMATOLOGY/ONCOLOGY | Facility: CLINIC | Age: 59
End: 2024-10-04
Payer: MEDICARE

## 2024-10-10 ENCOUNTER — OFFICE VISIT (OUTPATIENT)
Dept: PALLIATIVE MEDICINE | Facility: CLINIC | Age: 59
End: 2024-10-10
Payer: MEDICARE

## 2024-10-10 VITALS
HEART RATE: 83 BPM | SYSTOLIC BLOOD PRESSURE: 112 MMHG | RESPIRATION RATE: 16 BRPM | TEMPERATURE: 98 F | DIASTOLIC BLOOD PRESSURE: 64 MMHG | WEIGHT: 137.81 LBS | HEIGHT: 69 IN | BODY MASS INDEX: 20.41 KG/M2 | OXYGEN SATURATION: 98 %

## 2024-10-10 DIAGNOSIS — G89.3 CANCER RELATED PAIN: ICD-10-CM

## 2024-10-10 DIAGNOSIS — C44.320 SQUAMOUS CELL SKIN CANCER, FACE: Primary | ICD-10-CM

## 2024-10-10 DIAGNOSIS — Z59.9 FINANCIAL DIFFICULTY: ICD-10-CM

## 2024-10-10 DIAGNOSIS — Z71.89 ACP (ADVANCE CARE PLANNING): ICD-10-CM

## 2024-10-10 DIAGNOSIS — Z51.5 PALLIATIVE CARE BY SPECIALIST: ICD-10-CM

## 2024-10-10 DIAGNOSIS — C44.90 SKIN CANCER: ICD-10-CM

## 2024-10-10 PROCEDURE — 4010F ACE/ARB THERAPY RXD/TAKEN: CPT | Mod: CPTII,S$GLB,, | Performed by: NURSE PRACTITIONER

## 2024-10-10 PROCEDURE — 99205 OFFICE O/P NEW HI 60 MIN: CPT | Mod: S$GLB,,, | Performed by: NURSE PRACTITIONER

## 2024-10-10 PROCEDURE — 3008F BODY MASS INDEX DOCD: CPT | Mod: CPTII,S$GLB,, | Performed by: NURSE PRACTITIONER

## 2024-10-10 PROCEDURE — 3078F DIAST BP <80 MM HG: CPT | Mod: CPTII,S$GLB,, | Performed by: NURSE PRACTITIONER

## 2024-10-10 PROCEDURE — 99999 PR PBB SHADOW E&M-EST. PATIENT-LVL V: CPT | Mod: PBBFAC,,, | Performed by: NURSE PRACTITIONER

## 2024-10-10 PROCEDURE — 3074F SYST BP LT 130 MM HG: CPT | Mod: CPTII,S$GLB,, | Performed by: NURSE PRACTITIONER

## 2024-10-10 PROCEDURE — 3044F HG A1C LEVEL LT 7.0%: CPT | Mod: CPTII,S$GLB,, | Performed by: NURSE PRACTITIONER

## 2024-10-10 RX ORDER — SENNOSIDES 8.6 MG/1
2 TABLET ORAL DAILY
Qty: 60 TABLET | Refills: 0 | Status: SHIPPED | OUTPATIENT
Start: 2024-10-10 | End: 2024-10-15 | Stop reason: ALTCHOICE

## 2024-10-10 RX ORDER — HYDROCODONE BITARTRATE AND ACETAMINOPHEN 10; 325 MG/1; MG/1
1 TABLET ORAL EVERY 12 HOURS PRN
Qty: 60 TABLET | Refills: 0 | Status: SHIPPED | OUTPATIENT
Start: 2024-10-10 | End: 2024-11-09

## 2024-10-10 RX ORDER — NALOXONE HYDROCHLORIDE 4 MG/.1ML
1 SPRAY NASAL ONCE
Qty: 1 EACH | Refills: 0 | Status: SHIPPED | OUTPATIENT
Start: 2024-10-10 | End: 2024-10-10

## 2024-10-10 SDOH — SOCIAL DETERMINANTS OF HEALTH (SDOH): PROBLEM RELATED TO HOUSING AND ECONOMIC CIRCUMSTANCES, UNSPECIFIED: Z59.9

## 2024-10-10 NOTE — PROGRESS NOTES
Office Visit  [facility]      Consult Requested By: Dr. Blanca Loera  Reason for Consult: cancer related pain      ASSESSMENT/PLAN:     Plan/Recommendations:  Margarito was seen today for palliative care.    Diagnoses and all orders for this visit:    Squamous cell skin cancer, face  Skin cancer  Re-occurrence of disease, continue to follow with Oncology for treatment plan    Palliative care by specialist  Introduced the role of Palliative Care to provide support for patients with serious illness  Patient is independent ADLS, no in home needs identified today  Provided with the following handouts:  -Tips for better sleep  - Tips to manage SOB  -Tips to manage constipation    ECOG 0  pHQ9 score- mild     Cancer related pain  -     HYDROcodone-acetaminophen (NORCO)  mg per tablet; Take 1 tablet by mouth every 12 (twelve) hours as needed for Pain.  -     senna (SENOKOT) 8.6 mg tablet; Take 2 tablets by mouth once daily. (Patient not taking: Reported on 10/14/2024)  -     naloxone (NARCAN) 4 mg/actuation Spry; 1 spray (4 mg total) by Nasal route once. for 1 dose              Moderate risk ORT score    ACP (advance care planning)  Living Will and HCPOA provided for review    Financial difficulty   referral    Understanding of illness: patient understands his cancer is treatable    Prognosis: fair    Goals of care: pursue treatment     Follow up: 1 month        SUBJECTIVE:     History of Present Illness:  Patient is a 58 y.o. year old male with h/o HTN, BCC, and melanoma . Patient of Dr. Vidal who was last seen 3/2023, now with recurrent basal cell carcinoma of the right forehead and scalp . He is referred by Dr Ely for Cancer related pain     Palliative Discussion:  Introduced the role of Palliative Care . Per patient he was initially diagnosed in 3 years ago-, had surgery with Dr Vdial,  went in remission. Cancer reoccurred 1 year ago , now seeing Dr. Ely, he admits he was lost to  "follow up due to lack of insurance  - referred to ophthalmology-  for right eye draining. Has to decide on "chemo pill" vs surgery. Did not like surgery and recovery, he also did not like side effects of pills, he is willing to try again.  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 He needs to be alive to care for him.  Patient is unemployed- worked as Xray tech at a [employer]  Struggling financially    ACP  Discussed the importance of establishing a HCPOA as well as documenting his wishes for the type of care that is unacceptable for him in the future.   Patient has siblings he is close to, documents provided for review    Overall Assessment of Bodily functions     Pain  Pain to right side of face  and top head  Pain described as mostly aching , constant , has gotten worst in the past year  Has been on oxycodone in the past after initial surgery, has also tried hydrocodone- this seems to work best  Currently not on any medication- ran out and had no insurance  Gabapentin 600 mg tid-  for his feet, - feels like burning, follows with Dr Zavala  Ibuprofen and tylenol does not help  Pain score 10/10, comes down 6/10 with Hydrocodone- he feels he needs it twice per day  Has chronic back pain- s/p rhizotomy      Appetite  -overall stable, no excessive weight gain or loss  -currently not needing appetite stimulant     Sleep  -normal sleep pattern     Mood  -as expected, does have adjustments with mood, with current oncology diagnosis and treatment   h/o depression- started Wellbutrin, may help with fatigue      Bowel Movement  -has been overall normal bowel movement pattern     Urination  -able to have normal urination       ROS:  Review of Systems   Constitutional:  Positive for fatigue.   Neurological:  Positive for headaches.       Past Medical History:   Diagnosis Date    ADHD (attention deficit hyperactivity disorder)     Arthritis     Basal cell carcinoma (BCC) of left side of " neck 08/2022    Chronic pain     Depression     Hydrocele in adult     Hyperlipidemia     Hypertension     Migraine     Squamous cell skin cancer, face 08/2022     Past Surgical History:   Procedure Laterality Date    BACK SURGERY  11/2020    CRANIOTOMY N/A 3/3/2021    Procedure: CRANIOTOMY, USING FRAMELESS STEREOTAXY, OPEN, BIFRONTAL;  Surgeon: Raheel Green MD;  Location: [facility] OR [address] [place] [address];  Service: Neurosurgery;  Laterality: N/A;  TOR I  ASA I  TYPE & CROSS 2 UNITS  REGULAR BED  [place] [facility]    EXCISION OF LESION Left 8/24/2022    Procedure: EXCISION, LESION basal cell CA left neck--2 separate lesions;  Surgeon: Catalina Vidal MD;  Location: [facility] OR;  Service: ENT;  Laterality: Left;    FLAP PROCEDURE N/A 3/3/2021    Procedure: CREATION, FREE FLAP;  Surgeon: Ayaan Aguilar MD;  Location: [facility] OR [address] [place] [address];  Service: ENT;  Laterality: N/A;  Latissimus free flap. Will need bean bag and [place].     FLAP PROCEDURE Left 3/5/2021    Procedure: CREATION, FREE FLAP;  Surgeon: Ayaan Aguilar MD;  [facility];  Service: ENT;  Laterality: Left;    INCISION AND DRAINAGE, LOWER EXTREMITY Right 8/7/2024    Procedure: INCISION AND DRAINAGE, LOWER EXTREMITY;  Surgeon: Blake Barton MD;  Location: [facility] OR;  Service: Orthopedics;  Laterality: Right;    LAPAROSCOPIC REPAIR OF HERNIA      RHIZOTOMY W/ RADIOFREQUENCY ABLATION Bilateral     two procedures    SURGICAL REMOVAL OF LESION OF FACE Right 8/24/2022    Procedure: EXCISION, LESION, FACE ;  Surgeon: Catalina Vidal MD;  Location: [facility] OR;  Service: ENT;  Laterality: Right;    TENDON REPAIR Right     thumb     Family History   Problem Relation Name Age of Onset    Depression Mother      Early death Mother  40    Alcohol abuse Father      Depression Father      Hypertension Brother      Headaches Sister       Review of patient's allergies indicates:  No Known Allergies  Social Drivers of Health     Tobacco Use: High Risk  (10/14/2024)    Patient History     Smoking Tobacco Use: Every Day     Smokeless Tobacco Use: Former     Passive Exposure: Past   Alcohol Use: Not At Risk (8/29/2024)    AUDIT-C     Frequency of Alcohol Consumption: 2-4 times a month     Average Number of Drinks: 1 or 2     Frequency of Binge Drinking: Never   Financial Resource Strain: High Risk (8/29/2024)    Overall Financial Resource Strain (CARDIA)     Difficulty of Paying Living Expenses: Hard   Food Insecurity: Food Insecurity Present (8/29/2024)    Hunger Vital Sign     Worried About Running Out of Food in the Last Year: Sometimes true     Ran Out of Food in the Last Year: Sometimes true   Transportation Needs: Not on file   Physical Activity: Unknown (8/29/2024)    Exercise Vital Sign     Days of Exercise per Week: 4 days     Minutes of Exercise per Session: Not on file   Stress: Stress Concern Present (8/29/2024)    [employer] - Occupational Stress Questionnaire     Feeling of Stress : Very much   Housing Stability: High Risk (8/29/2024)    Housing Stability Vital Sign     Unable to Pay for Housing in the Last Year: Yes     Homeless in the Last Year: Not on file   Depression: High Risk (10/10/2024)    Depression     Last PHQ-4: Flowsheet Data: 9   Utilities: At Risk (8/29/2024)    [facility] Utilities     Threatened with loss of utilities: Yes   Health Literacy: Inadequate Health Literacy (8/29/2024)     Health Literacy     Frequency of need for help with medical instructions: Sometimes   Social Isolation: Not on file            OBJECTIVE:     Physical Exam:  Vitals: Temp: 97.9 °F (36.6 °C) (10/10/24 1516)  Pulse: 83 (10/10/24 1516)  Resp: 16 (10/10/24 1516)  BP: 112/64 (10/10/24 1516)  SpO2: 98 % (10/10/24 1516)  Physical Exam  HENT:      Head: Normocephalic.      Comments: Right sided facial and head scars noted     Mouth/Throat:      Mouth: Mucous membranes are moist.   Cardiovascular:      Rate and Rhythm: Regular rhythm.    Pulmonary:      Effort: Pulmonary effort is normal. No respiratory distress.   Abdominal:      General: There is no distension.      Tenderness: There is no abdominal tenderness.   Musculoskeletal:         General: Normal range of motion.      Cervical back: Normal range of motion.   Skin:     General: Skin is warm and dry.   Neurological:      Mental Status: He is alert and oriented to person, place, and time.   Psychiatric:         Mood and Affect: Mood normal.           Review of Symptoms      Symptom Assessment (ESAS 0-10 Scale)  Pain:  0  Dyspnea:  0  Anxiety:  0  Nausea:  0  Depression:  0  Anorexia:  0  Fatigue:  8  Insomnia:  0  Restlessness:  0  Agitation:  0         ECOG Performance Status ndGndrndanddndend:nd nd2nd Living Arrangements:  Lives with family    Psychosocial/Cultural:   See Palliative Psychosocial Note: Yes  **Primary  to Follow**  Palliative Care  Consult: No      Advance Care Planning   Advance Directives:   Living Will: No    La[place]: No      Decision Making:  Patient answered questions  Goals of Care: The patient endorses that what is most important right now is to focus on symptom/pain control and curative/life-prolongation (regardless of treatment burdens)    Accordingly, we have decided that the best plan to meet the patient's goals includes continuing with treatment          LA  reviewed and summarized:      Scheduled medications for symptom control:    PRN medications for symptom control:      Medications:    Current Outpatient Medications:     acetaminophen (TYLENOL) 325 MG tablet, Take 325 mg by mouth every 4 (four) hours as needed., Disp: , Rfl:     amlodipine-benazepril 10-20mg (LOTREL) 10-20 mg per capsule, Take 1 capsule by mouth once daily., Disp: , Rfl:     aspirin (ECOTRIN) 81 MG EC tablet, Take 81 mg by mouth., Disp: , Rfl:     atorvastatin (LIPITOR) 10 MG tablet, Take 10 mg by mouth., Disp: , Rfl:     azelastine (ASTELIN) 137 mcg (0.1 %) nasal spray, 1 puff  in each nostril Nasally Twice a day for 30 days, Disp: , Rfl:     buPROPion (WELLBUTRIN XL) 150 MG TB24 tablet, Take by mouth., Disp: , Rfl:     butalbital-acetaminophen-caffeine -40 mg (FIORICET, ESGIC) -40 mg per tablet, Take 1 tablet by mouth 2 (two) times daily. (Patient not taking: Reported on 10/14/2024), Disp: , Rfl:     fluticasone-umeclidin-vilanter (TRELEGY ELLIPTA) 100-62.5-25 mcg DsDv, 1 puff. (Patient not taking: Reported on 10/14/2024), Disp: , Rfl:     gabapentin (NEURONTIN) 600 MG tablet, Take 600 mg by mouth., Disp: , Rfl:     ibuprofen (ADVIL,MOTRIN) 800 MG tablet, Take 800 mg by mouth every 8 (eight) hours as needed., Disp: , Rfl:     pantoprazole (PROTONIX) 40 MG tablet, Take 40 mg by mouth., Disp: , Rfl:     rosuvastatin (CRESTOR) 20 MG tablet, , Disp: , Rfl:     sildenafiL (VIAGRA) 50 MG tablet, Take 50 mg by mouth daily as needed., Disp: , Rfl:     tadalafiL (CIALIS) 20 MG Tab, Take 20 mg by mouth As instructed., Disp: , Rfl:     HYDROcodone-acetaminophen (NORCO)  mg per tablet, Take 1 tablet by mouth every 12 (twelve) hours as needed for Pain., Disp: 60 tablet, Rfl: 0    naloxone (NARCAN) 4 mg/actuation Spry, 1 spray. (Patient not taking: Reported on 10/14/2024), Disp: , Rfl:     senna (SENOKOT) 8.6 mg tablet, Take 2 tablets by mouth once daily. (Patient not taking: Reported on 10/14/2024), Disp: 60 tablet, Rfl: 0    vismodegib (ERIVEDGE) 150 mg Cap, Take 150 mg by mouth once daily., Disp: 30 capsule, Rfl: 3    Labs:  CBC:   WBC   Date Value Ref Range Status   08/08/2024 7.95 3.90 - 12.70 K/uL Final     Hemoglobin   Date Value Ref Range Status   08/08/2024 12.1 (L) 14.0 - 18.0 g/dL Final     POC Hematocrit   Date Value Ref Range Status   03/03/2021 29 (L) 36 - 54 %PCV Final     Hematocrit   Date Value Ref Range Status   08/08/2024 37.4 (L) 40.0 - 54.0 % Final     MCV   Date Value Ref Range Status   08/08/2024 89 82 - 98 fL Final     Platelets   Date Value Ref Range Status    08/08/2024 279 150 - 450 K/uL Final       LFT:   Lab Results   Component Value Date    AST 12 08/08/2024    ALKPHOS 83 08/08/2024    BILITOT 0.4 08/08/2024       Albumin:   Albumin   Date Value Ref Range Status   08/08/2024 3.8 3.5 - 5.2 g/dL Final     Protein:   Total Protein   Date Value Ref Range Status   08/08/2024 6.5 6.0 - 8.4 g/dL Final       Radiology:None      60 minutes of total time spent on the encounter, which includes face to face time and non-face to face time preparing to see the patient (eg, review of tests), Obtaining and/or reviewing separately obtained history, Documenting clinical information in the electronic or other health record, Independently interpreting results if documented above (not separately reported) and communicating results to the patient/family/caregiver, or Care coordination (not separately reported).    20 minutes spent in discussing ACP    Signature: Kimberly Silver NP

## 2024-10-14 ENCOUNTER — TELEPHONE (OUTPATIENT)
Dept: PSYCHIATRY | Facility: CLINIC | Age: 59
End: 2024-10-14
Payer: MEDICARE

## 2024-10-14 ENCOUNTER — OFFICE VISIT (OUTPATIENT)
Dept: HEMATOLOGY/ONCOLOGY | Facility: CLINIC | Age: 59
End: 2024-10-14
Payer: MEDICARE

## 2024-10-14 ENCOUNTER — LAB VISIT (OUTPATIENT)
Dept: LAB | Facility: HOSPITAL | Age: 59
End: 2024-10-14
Attending: INTERNAL MEDICINE
Payer: MEDICARE

## 2024-10-14 VITALS
HEIGHT: 69 IN | OXYGEN SATURATION: 99 % | WEIGHT: 137.13 LBS | BODY MASS INDEX: 20.31 KG/M2 | RESPIRATION RATE: 15 BRPM | TEMPERATURE: 98 F | DIASTOLIC BLOOD PRESSURE: 91 MMHG | SYSTOLIC BLOOD PRESSURE: 149 MMHG | HEART RATE: 65 BPM

## 2024-10-14 DIAGNOSIS — R20.2 PARESTHESIA OF SKIN: ICD-10-CM

## 2024-10-14 DIAGNOSIS — E78.2 MIXED HYPERLIPIDEMIA: ICD-10-CM

## 2024-10-14 DIAGNOSIS — C44.41 BASAL CELL CARCINOMA (BCC) OF LEFT SIDE OF NECK: Primary | ICD-10-CM

## 2024-10-14 DIAGNOSIS — Z87.891 SMOKING HISTORY: ICD-10-CM

## 2024-10-14 DIAGNOSIS — F34.1 PERSISTENT DEPRESSIVE DISORDER: ICD-10-CM

## 2024-10-14 DIAGNOSIS — C44.90 SKIN CANCER: ICD-10-CM

## 2024-10-14 DIAGNOSIS — D64.9 NORMOCYTIC ANEMIA: ICD-10-CM

## 2024-10-14 DIAGNOSIS — G89.3 CANCER ASSOCIATED PAIN: ICD-10-CM

## 2024-10-14 PROBLEM — D04.30 CARCINOMA IN SITU OF SKIN OF FACE: Status: RESOLVED | Noted: 2022-09-06 | Resolved: 2024-10-14

## 2024-10-14 PROBLEM — Z85.828 HISTORY OF SKIN CANCER: Status: RESOLVED | Noted: 2023-03-09 | Resolved: 2024-10-14

## 2024-10-14 LAB
FERRITIN SERPL-MCNC: 110 NG/ML (ref 20–300)
IRON SERPL-MCNC: 82 UG/DL (ref 45–160)
SATURATED IRON: 21 % (ref 20–50)
TOTAL IRON BINDING CAPACITY: 394 UG/DL (ref 250–450)
TRANSFERRIN SERPL-MCNC: 266 MG/DL (ref 200–375)
TSH SERPL DL<=0.005 MIU/L-ACNC: 0.72 UIU/ML (ref 0.4–4)
VIT B12 SERPL-MCNC: 564 PG/ML (ref 210–950)

## 2024-10-14 PROCEDURE — 3077F SYST BP >= 140 MM HG: CPT | Mod: CPTII,S$GLB,, | Performed by: INTERNAL MEDICINE

## 2024-10-14 PROCEDURE — 83540 ASSAY OF IRON: CPT | Performed by: INTERNAL MEDICINE

## 2024-10-14 PROCEDURE — G2211 COMPLEX E/M VISIT ADD ON: HCPCS | Mod: S$GLB,,, | Performed by: INTERNAL MEDICINE

## 2024-10-14 PROCEDURE — 84443 ASSAY THYROID STIM HORMONE: CPT | Performed by: INTERNAL MEDICINE

## 2024-10-14 PROCEDURE — 3080F DIAST BP >= 90 MM HG: CPT | Mod: CPTII,S$GLB,, | Performed by: INTERNAL MEDICINE

## 2024-10-14 PROCEDURE — 36415 COLL VENOUS BLD VENIPUNCTURE: CPT | Mod: PN | Performed by: INTERNAL MEDICINE

## 2024-10-14 PROCEDURE — 3044F HG A1C LEVEL LT 7.0%: CPT | Mod: CPTII,S$GLB,, | Performed by: INTERNAL MEDICINE

## 2024-10-14 PROCEDURE — 4010F ACE/ARB THERAPY RXD/TAKEN: CPT | Mod: CPTII,S$GLB,, | Performed by: INTERNAL MEDICINE

## 2024-10-14 PROCEDURE — 82607 VITAMIN B-12: CPT | Performed by: INTERNAL MEDICINE

## 2024-10-14 PROCEDURE — 82728 ASSAY OF FERRITIN: CPT | Performed by: INTERNAL MEDICINE

## 2024-10-14 PROCEDURE — 3008F BODY MASS INDEX DOCD: CPT | Mod: CPTII,S$GLB,, | Performed by: INTERNAL MEDICINE

## 2024-10-14 PROCEDURE — 99999 PR PBB SHADOW E&M-EST. PATIENT-LVL V: CPT | Mod: PBBFAC,,, | Performed by: INTERNAL MEDICINE

## 2024-10-14 PROCEDURE — 99205 OFFICE O/P NEW HI 60 MIN: CPT | Mod: S$GLB,,, | Performed by: INTERNAL MEDICINE

## 2024-10-14 RX ORDER — NALOXONE HYDROCHLORIDE 4 MG/.1ML
1 SPRAY NASAL
COMMUNITY
Start: 2024-10-10 | End: 2024-10-15 | Stop reason: ALTCHOICE

## 2024-10-14 NOTE — TELEPHONE ENCOUNTER
----- Message from Sofie Grady sent at 10/14/2024  3:02 PM CDT -----  Regarding: FW: rtn call    ----- Message -----  From: Dana Doss, Patient Care Assistant  Sent: 10/14/2024   2:57 PM CDT  To: [facility]  Subject: rtn call                                         Type:  Patient Returning Call    Who Called:  kerri  Who Left Message for Patient:  sabra  Does the patient know what this is regarding?:  ?  Best Call Back Number:  [phone]    Additional Information:  kerri  is returning  call from sabra, please call back thank you .

## 2024-10-14 NOTE — PROGRESS NOTES
Subjective:       Name: Margarito Mcnamara  : 1965  MRN: [id number]    Chief Complaint   Patient presents with    Recurrent basal cell cancer        Patient is in clinic by himself.    HPI: Margarito Mcnamara is a 59 y.o. male presents for evaluation of his recurrent Recurrent basal cell cancer      He has been having watery eyes for the last 2 years. He has needle sensation in both eyes. He has blurred vision in the right eye for the last 2 years and some in the left eye. He has not followed up with an eye doctor as well.  He is feeling depressed and overwhelmed by the progression of his disease.  He is also reporting pain at the site of his recurrent surgeries.    He underwent a PET scan on 2024 that shows no signs of distant metastatic disease.      The patient denies CP, cough, SOB, abdominal pain, nausea, vomiting, constipation.  The patient denies fever, chills, night sweats, weight loss, new lumps or bumps, easy bruising or bleeding.    Sister had breast cane at the age of 43 yo  Father had bone cancer.    ONCOLOGIC HISTORY:  Patient of Dr. Vidal who was last seen 3/2023. He has a history of recurrent basal cell carcinoma of the right forehead and scalp. Patient was previously treated with a hedgehog inhibitor, Erivedge, in 2018 with good response. However, patient discontinued this prematurely after 6 months and the tumor has recurred. It recurred over 2 years year ago. Erivedge was again prescribed but denied by the insurance company X [employer] but was finally started at the time he last saw Dr. Vidal in 3/2023.      He is having a lot of pain in his back and also on his head at the area of the recurrence. He has not been taking any pain medication for the last 2 years. He first started with an open sore around 1.5 years ago on his right forehead that has gotten worse. He has been having drainage from the site on his forehead. He has not been seen yet for this area of concern. He has not been seen by  derm for a couple of years.      TREATMENT HISTORY:  2018: treated with a hedgehog inhibitor, Erivedge, in 2018 with good response and again in 2023     3/2021:   1.  Bifrontal craniotomy for tumor.  2.  Stealth navigation.  3.  Cranioplasty greater than 6 cm.   1.  Wide local excision of basal cell carcinoma of the scalp measuring roughly 15 x 15 cm  2.  Right myocutaneous latissimus dorsi free flap with microvascular anastomosis to right superficial temporal vessels and right facial artery  3.  [place] of right external jugular vein graft measuring 8 cm in length  4.  [place] of split-thickness skin graft from right thigh measuring 8 x 10 cm  5.  Indocyanine green angiography     6/2021: radiation      9/2022: excision of 3 carcinomas involving his left neck and right jawline     Oncology History   Basal cell carcinoma (BCC) of left side of neck   1/26/2021 Initial Diagnosis    Basal cell carcinoma (BCC) of scalp     1/28/2021 Tumor Conference    His case was discussed at the Multidisciplinary Head and Neck Team Planning Meeting.    Representatives from Medical Oncology, Radiation Oncology, Head and Neck Surgical Oncology, Psychosocial Oncology, and Speech and Language Pathology discussed the case with the following recommendations:    1) WLE with reconstruction  2) adjuvant radiation               2/4/2021 Tumor Conference    His case was discussed at the Multidisciplinary Head and Neck Team Planning Meeting.    Representatives from Medical Oncology, Radiation Oncology, Head and Neck Surgical Oncology, Psychosocial Oncology, and Speech and Language Pathology discussed the case with the following recommendations:    1) MRI with and without contrast  2) surgery with flap reconstruction              3/3/2021 Surgery    1.  Wide local excision of basal cell carcinoma of the scalp measuring roughly 15 x 15 cm  2.  Right myocutaneous latissimus dorsi free flap with microvascular anastomosis to right superficial  temporal vessels and right facial artery  3.  [place] of right external jugular vein graft measuring 8 cm in length  4.  [place] of split-thickness skin graft from right thigh measuring 8 x 10 cm  5.   Bifrontal craniotomy for tumor.  6.  Stealth navigation.  7.  Cranioplasty greater than 6 cm.        3/5/2021 Surgery    1.  Preparation of scalp wound measuring 15 x 15 cm  2.  Left l myocutaneous latissimus dorsi free flap with microvascular anastomosis to left superficial temporal vessels  3.  Split-thickness skin graft from left thigh measuring approximately 8 x 10 cm     6/22/2021 Cancer Staged    Staging form: Cutaneous Carcinoma of the Head and Neck, AJCC 8th Edition  - Pathologic stage from 6/22/2021: pT4b, pN0          Past Medical History:   Diagnosis Date    ADHD (attention deficit hyperactivity disorder)     Arthritis     Basal cell carcinoma (BCC) of left side of neck 08/2022    Chronic pain     Depression     Hydrocele in adult     Hyperlipidemia     Hypertension     Migraine     Squamous cell skin cancer, face 08/2022       Past Surgical History:   Procedure Laterality Date    BACK SURGERY  11/2020    CRANIOTOMY N/A 3/3/2021    Procedure: CRANIOTOMY, USING FRAMELESS STEREOTAXY, OPEN, BIFRONTAL;  Surgeon: Raheel Green MD;  Location: [address];  Service: Neurosurgery;  Laterality: N/A;  TOR I  ASA I  TYPE & CROSS 2 UNITS  REGULAR BED  [place] [facility]  [facility] CT    EXCISION OF LESION Left 8/24/2022    Procedure: EXCISION, LESION basal cell CA left neck--2 separate lesions;  Surgeon: Catalina Vidal MD;  Location: [facility];  Service: ENT;  Laterality: Left;    FLAP PROCEDURE N/A 3/3/2021    Procedure: CREATION, FREE FLAP;  Surgeon: Ayaan Aguilar MD;  Location: [address];  Service: ENT;  Laterality: N/A;  Latissimus free flap. Will need [facility].     FLAP PROCEDURE Left 3/5/2021    Procedure: CREATION, FREE FLAP;  Surgeon: Ayaan Aguilar MD;  Location: [facility] OR  2ND FLR;  Service: ENT;  Laterality: Left;    INCISION AND DRAINAGE, LOWER EXTREMITY Right 8/7/2024    Procedure: INCISION AND DRAINAGE, LOWER EXTREMITY;  Surgeon: Blake Barton MD;  Location: [facility] OR;  Service: Orthopedics;  Laterality: Right;    LAPAROSCOPIC REPAIR OF HERNIA      RHIZOTOMY W/ RADIOFREQUENCY ABLATION Bilateral     two procedures    SURGICAL REMOVAL OF LESION OF FACE Right 8/24/2022    Procedure: EXCISION, LESION, FACE ;  Surgeon: Catalina Vidal MD;  Location: [facility] OR;  Service: ENT;  Laterality: Right;    TENDON REPAIR Right     thumb       Family History   Problem Relation Name Age of Onset    Depression Mother      Early death Mother  40    Alcohol abuse Father      Depression Father      No Known Problems Sister      Headaches Sister      Hypertension Brother      No Known Problems Maternal Aunt      No Known Problems Maternal Uncle      No Known Problems Paternal Aunt      No Known Problems Paternal Uncle      No Known Problems Maternal Grandmother      No Known Problems Maternal Grandfather      No Known Problems Paternal Grandmother      No Known Problems Paternal Grandfather      No Known Problems Other         Social History     Socioeconomic History    Marital status: Single    Number of children: 1   Occupational History     Comment: [employer]   Tobacco Use    Smoking status: Every Day     Current packs/day: 0.50     Average packs/day: 0.5 packs/day for 10.0 years (5.0 ttl pk-yrs)     Types: Cigarettes     Passive exposure: Past    Smokeless tobacco: Former    Tobacco comments:     1/2 pack per day   Substance and Sexual Activity    Alcohol use: Yes     Comment: ocassioanl beer    Drug use: Not Currently    Sexual activity: Yes     Partners: Female   Social History Narrative    Lives in a house with 25 year old son     Social Drivers of Health     Financial Resource Strain: High Risk (8/29/2024)    Overall Financial Resource Strain (CARDIA)     Difficulty of Paying Living Expenses:  "Hard   Food Insecurity: Food Insecurity Present (8/29/2024)    Hunger Vital Sign     Worried About Running Out of Food in the Last Year: Sometimes true     Ran Out of Food in the Last Year: Sometimes true   Physical Activity: Unknown (8/29/2024)    Exercise Vital Sign     Days of Exercise per Week: 4 days   Stress: Stress Concern Present (8/29/2024)    [employer] - Occupational Stress Questionnaire     Feeling of Stress : Very much   Housing Stability: High Risk (8/29/2024)    Housing Stability Vital Sign     Unable to Pay for Housing in the Last Year: Yes       Review of patient's allergies indicates:  No Known Allergies    Review of Systems   Constitutional:  Positive for fatigue. Negative for fever and unexpected weight change.   HENT:  Negative for mouth sores and sore throat.    Eyes:  Negative for photophobia and visual disturbance.   Respiratory:  Negative for cough and shortness of breath.    Cardiovascular:  Negative for chest pain.   Gastrointestinal:  Negative for abdominal pain, constipation and diarrhea.   Genitourinary:  Negative for dysuria and hematuria.   Musculoskeletal:  Positive for arthralgias. Negative for joint swelling.   Neurological:  Positive for headaches. Negative for dizziness, weakness and light-headedness.   Hematological:  Does not bruise/bleed easily.   Psychiatric/Behavioral:  Negative for sleep disturbance. The patient is nervous/anxious.             Objective:     Vitals:    10/14/24 0921   BP: (!) 149/91   Pulse: 65   Resp: 15   Temp: 97.8 °F (36.6 °C)   TempSrc: Temporal   SpO2: 99%   Weight: 62.2 kg (137 lb 2 oz)   Height: 5' 9" (1.753 m)        Physical Exam  Vitals reviewed.   Constitutional:       Appearance: He is not ill-appearing.   Eyes:      General: No scleral icterus.     Pupils: Pupils are equal, round, and reactive to light.   Cardiovascular:      Rate and Rhythm: Normal rate and regular rhythm.      Pulses: Normal pulses.      Heart " sounds: Normal heart sounds.   Pulmonary:      Effort: Pulmonary effort is normal.      Breath sounds: Normal breath sounds.   Abdominal:      General: Bowel sounds are normal. There is no distension.   Musculoskeletal:         General: No swelling.   Lymphadenopathy:      Cervical: No cervical adenopathy.   Skin:     General: Skin is warm.      Findings: Lesion (affecting the fontal scalp that is open with no signs of active infection.) present.   Neurological:      General: No focal deficit present.      Mental Status: He is alert.      Cranial Nerves: No cranial nerve deficit.      Motor: No weakness.   Psychiatric:         Mood and Affect: Mood normal.         Behavior: Behavior normal.                Current Outpatient Medications on File Prior to Visit   Medication Sig    acetaminophen (TYLENOL) 325 MG tablet Take 325 mg by mouth every 4 (four) hours as needed.    amlodipine-benazepril 10-20mg (LOTREL) 10-20 mg per capsule Take 1 capsule by mouth once daily.    aspirin (ECOTRIN) 81 MG EC tablet Take 81 mg by mouth.    atorvastatin (LIPITOR) 10 MG tablet Take 10 mg by mouth.    azelastine (ASTELIN) 137 mcg (0.1 %) nasal spray 1 puff in each nostril Nasally Twice a day for 30 days    buPROPion (WELLBUTRIN XL) 150 MG TB24 tablet Take by mouth.    gabapentin (NEURONTIN) 600 MG tablet Take 600 mg by mouth.    ibuprofen (ADVIL,MOTRIN) 800 MG tablet Take 800 mg by mouth every 8 (eight) hours as needed.    pantoprazole (PROTONIX) 40 MG tablet Take 40 mg by mouth.    sildenafiL (VIAGRA) 50 MG tablet Take 50 mg by mouth daily as needed.    tadalafiL (CIALIS) 20 MG Tab Take 20 mg by mouth As instructed.     No current facility-administered medications on file prior to visit.       CBC:  Lab Results   Component Value Date    WBC 9.17 11/14/2024    HGB 13.8 (L) 11/14/2024    HCT 43.5 11/14/2024    MCV 91 11/14/2024     11/14/2024         CMP:  Sodium   Date Value Ref Range Status   11/14/2024 139 136 - 145 mmol/L  Final     Potassium   Date Value Ref Range Status   11/14/2024 4.5 3.5 - 5.1 mmol/L Final     Chloride   Date Value Ref Range Status   11/14/2024 104 95 - 110 mmol/L Final     CO2   Date Value Ref Range Status   11/14/2024 28 23 - 29 mmol/L Final     Glucose   Date Value Ref Range Status   11/14/2024 69 (L) 70 - 110 mg/dL Final     BUN   Date Value Ref Range Status   11/14/2024 26 (H) 6 - 20 mg/dL Final     Creatinine   Date Value Ref Range Status   11/14/2024 1.0 0.5 - 1.4 mg/dL Final     Calcium   Date Value Ref Range Status   11/14/2024 10.1 8.7 - 10.5 mg/dL Final     Total Protein   Date Value Ref Range Status   11/14/2024 7.1 6.0 - 8.4 g/dL Final     Albumin   Date Value Ref Range Status   11/14/2024 4.5 3.5 - 5.2 g/dL Final     Total Bilirubin   Date Value Ref Range Status   11/14/2024 0.7 0.1 - 1.0 mg/dL Final     Comment:     For infants and newborns, interpretation of results should be based  on gestational age, weight and in agreement with clinical  observations.    Premature Infant recommended reference ranges:  Up to 24 hours.............<8.0 mg/dL  Up to 48 hours............<12.0 mg/dL  3-5 days..................<15.0 mg/dL  6-29 days.................<15.0 mg/dL       Alkaline Phosphatase   Date Value Ref Range Status   11/14/2024 74 55 - 135 U/L Final     AST   Date Value Ref Range Status   11/14/2024 26 10 - 40 U/L Final     ALT   Date Value Ref Range Status   11/14/2024 34 10 - 44 U/L Final     Anion Gap   Date Value Ref Range Status   11/14/2024 7 (L) 8 - 16 mmol/L Final     eGFR if    Date Value Ref Range Status   03/18/2021 >60.0 >60 mL/min/1.73 m^2 Final     eGFR if non    Date Value Ref Range Status   03/18/2021 >60.0 >60 mL/min/1.73 m^2 Final     Comment:     Calculation used to obtain the estimated glomerular filtration  rate (eGFR) is the CKD-EPI equation.          External Photography - OU - Both Eyes  On exam, the patient has recurrence of the basal cell  carcinoma along the   right forehead and scalp. There is visualization of a plate. He has full   extraocular motility. He has 2+ inferior exposure keratopathy on the right   side. He has a 2 mm x 2 mm left lower eyelid margin papilloma.        ECOG SCORE    1 - Restricted in strenuous activity-ambulatory and able to carry out work of a light nature              Assessment/Plan:     Basal cell carcinoma:   Cancer Staging   Basal cell carcinoma (BCC) of left side of neck  Staging form: Melanoma of the Skin, AJCC 8th Edition  - Clinical: No stage assigned - Unsigned  Staging form: Cutaneous Carcinoma of the Head and Neck, AJCC 8th Edition  - Pathologic stage from 6/22/2021: pT4b, pN0 - Signed by Catalina Vidal MD on 6/22/2021    I reviewed independently the patient medical record including his laboratory pathologic and radiologic findings.    The patient clinical stage prognosis and plan of care was reviewed at length.    In view of his recurrent basal cell carcinoma not an amenable for further surgical resection he will require to be restarted on hedgehog inhibitor .  He will be started on Erivedge at a dose of 150 mg daily.  The chemotherapy schedule and the side effects were discussed at length and this include but not restricted to nausea vomiting fever constipation , decreased appetite, diarrhea, dysgeusia, fatigue , arthralgia, muscle spasm.  He will be scheduled to receive chemotherapy education before starting his treatment.      Cancer pain.    The patient is currently being seen and evaluated by the palliative care team    Normocytic anemia.    Labs on August 8, 2024 showed a hemoglobin of 12.5 with an MCV of 37.4.  This will be further evaluated with blood workup her CBC CMP iron studies TSH free T4 vitamin B12.  Depending on the results further recommendation will be given.      Depression.    The patient will be referred to be assessed by our psychologist      Dyslipidemia:  On atorvastatin      Tobacco  abuse             Med Onc Chart Routing      Follow up with physician 1 month. with repeat CBC CMP- he will have to day for CBC CMP iron studies TSH Vitamin B12.. referral to navigation and chemo education. Referral to pshychiatry Oncology   Follow up with JODI    Infusion scheduling note    Injection scheduling note    Labs    Imaging    Pharmacy appointment    Other referrals              65 minutes of total time spent on the encounter, which includes face to face time and non-face to face time preparing to see the patient (eg, review of tests), obtaining and/or reviewing separately obtained history, documenting clinical information in the electronic or other health record, independently interpreting results (not separately reported) and communicating results to the patient/family/caregiver, or care coordination (not separately reported).     Plan was discussed with the patient at length, and he verbalized understanding. Margarito was given an opportunity to ask questions that were answered to his satisfaction, and he was advised to call in the interval if any problems or questions arise.  Signed:  Blanca Loera MD   Hematology and Oncology  [facility] - HEMATOLOGY ONCOLOGY  [facility], [place]

## 2024-10-15 ENCOUNTER — TELEPHONE (OUTPATIENT)
Dept: HEMATOLOGY/ONCOLOGY | Facility: CLINIC | Age: 59
End: 2024-10-15
Payer: MEDICARE

## 2024-10-15 DIAGNOSIS — C44.41 BASAL CELL CARCINOMA (BCC) OF LEFT SIDE OF NECK: Primary | ICD-10-CM

## 2024-10-15 NOTE — NURSING
Spoke with patient regarding chemo education for Erivedge. He reports the pharmacy informed him the drug will be delivered on Thursday 10/17/24. Reviewed appointment date, time and location for chemo ed and instructed him to hold off on beginning until he sees the NP for education. Provided my contact info and he verbalized understanding

## 2024-10-22 ENCOUNTER — CLINICAL SUPPORT (OUTPATIENT)
Dept: HEMATOLOGY/ONCOLOGY | Facility: CLINIC | Age: 59
End: 2024-10-22
Payer: MEDICARE

## 2024-10-22 ENCOUNTER — OFFICE VISIT (OUTPATIENT)
Dept: PSYCHIATRY | Facility: CLINIC | Age: 59
End: 2024-10-22
Payer: MEDICARE

## 2024-10-22 ENCOUNTER — DOCUMENTATION ONLY (OUTPATIENT)
Dept: INFUSION THERAPY | Facility: HOSPITAL | Age: 59
End: 2024-10-22
Payer: MEDICARE

## 2024-10-22 VITALS
BODY MASS INDEX: 20.73 KG/M2 | HEART RATE: 60 BPM | DIASTOLIC BLOOD PRESSURE: 91 MMHG | RESPIRATION RATE: 16 BRPM | WEIGHT: 140 LBS | HEIGHT: 69 IN | OXYGEN SATURATION: 98 % | SYSTOLIC BLOOD PRESSURE: 143 MMHG | TEMPERATURE: 98 F

## 2024-10-22 DIAGNOSIS — C44.90 SKIN CANCER: ICD-10-CM

## 2024-10-22 DIAGNOSIS — C44.41 BASAL CELL CARCINOMA (BCC) OF LEFT SIDE OF NECK: Primary | ICD-10-CM

## 2024-10-22 DIAGNOSIS — F06.4 ANXIETY DISORDER DUE TO MEDICAL CONDITION: Primary | ICD-10-CM

## 2024-10-22 DIAGNOSIS — C44.41 BASAL CELL CARCINOMA (BCC) OF LEFT SIDE OF NECK: ICD-10-CM

## 2024-10-22 DIAGNOSIS — F32.A DEPRESSION, UNSPECIFIED DEPRESSION TYPE: ICD-10-CM

## 2024-10-22 DIAGNOSIS — Z59.9 FINANCIAL DIFFICULTY: ICD-10-CM

## 2024-10-22 PROCEDURE — 99999 PR PBB SHADOW E&M-EST. PATIENT-LVL I: CPT | Mod: PBBFAC,,, | Performed by: COUNSELOR

## 2024-10-22 PROCEDURE — 99999 PR PBB SHADOW E&M-EST. PATIENT-LVL V: CPT | Mod: PBBFAC,,,

## 2024-10-22 PROCEDURE — 99215 OFFICE O/P EST HI 40 MIN: CPT | Mod: S$GLB,,,

## 2024-10-22 SDOH — SOCIAL DETERMINANTS OF HEALTH (SDOH): PROBLEM RELATED TO HOUSING AND ECONOMIC CIRCUMSTANCES, UNSPECIFIED: Z59.9

## 2024-10-22 NOTE — PROGRESS NOTES
Oncology Nutrition   New Patient Education  Margarito Mcnamara  1965    Nutrition Education   This is a 63 y.o.female with a medical diagnosis of multiple myeloma.     Met w/ pt to discuss current nutritional status and nutrition as it relates to cancer and cancer treatment. Pt currently low nutrition risk. Pt known to RD from previous treatments. Pt is starting oral chemotherapy that he has been on before. Pt states it causes pain but does not really affect him nutritionally.  RD has helped pt get set up with [employer] and free Boost in the past which patient states is helpful. Encouraged pt to reach out it more is needed.    Offered TFP however patient declined need at this time.    Wt Readings from Last 10 Encounters:   10/22/24 63.5 kg (139 lb 15.9 oz)   10/14/24 62.2 kg (137 lb 2 oz)   10/10/24 62.5 kg (137 lb 12.6 oz)   10/03/24 63.6 kg (140 lb 3.4 oz)   09/19/24 62.8 kg (138 lb 7.2 oz)   08/19/24 65.8 kg (145 lb)   08/06/24 63.5 kg (139 lb 15.9 oz)   08/03/24 63.5 kg (140 lb)   05/18/24 65.8 kg (145 lb)   09/22/23 65.8 kg (145 lb)        [x] PMHx reviewed  [x] Labs reviewed    Educated on food safety and common nutrition impact symptoms associated with chemotherapy treatment. Reinforced the importance of good hydration. Handouts provided.    Answered all nutrition related questions.     Patient provided with dietitian contact number and advised to call with questions or make future appointment if further intervention is needed. RD to follow throughout tx PRN.    Jing Martinez, MS, RD, LDN  10/22/2024  3:47 PM

## 2024-10-22 NOTE — PROGRESS NOTES
Subjective:       Name: Margarito Mcnamara  : 1965  MRN: [id number]    Chief Complaint   Patient presents with    Skin Cancer          HPI: Margarito Mcnamara is a 58 y.o. male presents for evaluation of Skin Cancer        Oncology History   Basal cell carcinoma (BCC) of left side of neck   2021 Initial Diagnosis    Basal cell carcinoma (BCC) of scalp     2021 Tumor Conference    His case was discussed at the Multidisciplinary Head and Neck Team Planning Meeting.    Representatives from Medical Oncology, Radiation Oncology, Head and Neck Surgical Oncology, Psychosocial Oncology, and Speech and Language Pathology discussed the case with the following recommendations:    1) WLE with reconstruction  2) adjuvant radiation               2021 Tumor Conference    His case was discussed at the Multidisciplinary Head and Neck Team Planning Meeting.    Representatives from Medical Oncology, Radiation Oncology, Head and Neck Surgical Oncology, Psychosocial Oncology, and Speech and Language Pathology discussed the case with the following recommendations:    1) MRI with and without contrast  2) surgery with flap reconstruction              3/3/2021 Surgery    1.  Wide local excision of basal cell carcinoma of the scalp measuring roughly 15 x 15 cm  2.  Right myocutaneous latissimus dorsi free flap with microvascular anastomosis to right superficial temporal vessels and right facial artery  3.  [place] of right external jugular vein graft measuring 8 cm in length  4.  [place] of split-thickness skin graft from right thigh measuring 8 x 10 cm  5.   Bifrontal craniotomy for tumor.  6.  Stealth navigation.  7.  Cranioplasty greater than 6 cm.        3/5/2021 Surgery    1.  Preparation of scalp wound measuring 15 x 15 cm  2.  Left l myocutaneous latissimus dorsi free flap with microvascular anastomosis to left superficial temporal vessels  3.  Split-thickness skin graft from left thigh measuring approximately 8 x 10  cm     6/22/2021 Cancer Staged    Staging form: Cutaneous Carcinoma of the Head and Neck, AJCC 8th Edition  - Pathologic stage from 6/22/2021: pT4b, pN0          Past Medical History:   Diagnosis Date    ADHD (attention deficit hyperactivity disorder)     Arthritis     Basal cell carcinoma (BCC) of left side of neck 08/2022    Chronic pain     Depression     Hydrocele in adult     Hyperlipidemia     Hypertension     Migraine     Squamous cell skin cancer, face 08/2022       Past Surgical History:   Procedure Laterality Date    BACK SURGERY  11/2020    CRANIOTOMY N/A 3/3/2021    Procedure: CRANIOTOMY, USING FRAMELESS STEREOTAXY, OPEN, BIFRONTAL;  Surgeon: Raheel Green MD;  Location: [facility] OR [address] [place] [address];  Service: Neurosurgery;  Laterality: N/A;  TOR I  ASA I  TYPE & CROSS 2 UNITS  REGULAR BED  [facility]  [facility]    EXCISION OF LESION Left 8/24/2022    Procedure: EXCISION, LESION basal cell CA left neck--2 separate lesions;  Surgeon: Catalina Vidal MD;  Location[email or web address] OR;  Service: ENT;  Laterality: Left;    FLAP PROCEDURE N/A 3/3/2021    Procedure: CREATION, FREE FLAP;  Surgeon: Ayaan Aguilar MD;  Location: [facility] OR [address] [place] [address];  Service: ENT;  Laterality: N/A;  Latissimus free flap. Will need [facility] and [facility].     FLAP PROCEDURE Left 3/5/2021    Procedure: CREATION, FREE FLAP;  Surgeon: Ayaan Aguilar MD;  Location: [facility] OR [address] [place] [address];  Service: ENT;  Laterality: Left;    INCISION AND DRAINAGE, LOWER EXTREMITY Right 8/7/2024    Procedure: INCISION AND DRAINAGE, LOWER EXTREMITY;  Surgeon: Blake Barton MD;  Location: [facility] OR;  Service: Orthopedics;  Laterality: Right;    LAPAROSCOPIC REPAIR OF HERNIA      RHIZOTOMY W/ RADIOFREQUENCY ABLATION Bilateral     two procedures    SURGICAL REMOVAL OF LESION OF FACE Right 8/24/2022    Procedure: EXCISION, LESION, FACE ;  Surgeon: Catalina Vidal MD;  Location: [email or web address] OR;  Service: ENT;  Laterality: Right;    TENDON REPAIR Right     thumb       Family  History   Problem Relation Name Age of Onset    Depression Mother      Early death Mother  40    Alcohol abuse Father      Depression Father      Hypertension Brother      Headaches Sister         Social History     Socioeconomic History    Marital status: Single    Number of children: 1   Occupational History     Comment: [employer]   Tobacco Use    Smoking status: Every Day     Current packs/day: 0.50     Average packs/day: 0.5 packs/day for 10.0 years (5.0 ttl pk-yrs)     Types: Cigarettes     Passive exposure: Past    Smokeless tobacco: Former    Tobacco comments:     1/2 pack per day   Substance and Sexual Activity    Alcohol use: Yes     Comment: ocassioanl beer    Drug use: Not Currently    Sexual activity: Yes     Partners: Female   Social History Narrative    Lives in a house with 25 year old son     Social Drivers of Health     Financial Resource Strain: High Risk (8/29/2024)    Overall Financial Resource Strain (CARDIA)     Difficulty of Paying Living Expenses: Hard   Food Insecurity: Food Insecurity Present (8/29/2024)    Hunger Vital Sign     Worried About Running Out of Food in the Last Year: Sometimes true     Ran Out of Food in the Last Year: Sometimes true   Physical Activity: Unknown (8/29/2024)    Exercise Vital Sign     Days of Exercise per Week: 4 days   Stress: Stress Concern Present (8/29/2024)    [employer] - Occupational Stress Questionnaire     Feeling of Stress : Very much   Housing Stability: High Risk (8/29/2024)    Housing Stability Vital Sign     Unable to Pay for Housing in the Last Year: Yes       Review of patient's allergies indicates:  No Known Allergies    Review of Systems   Constitutional:  Negative for chills and fever.   Respiratory:  Negative for shortness of breath.    Cardiovascular:  Negative for leg swelling.   Gastrointestinal:  Negative for abdominal distention and abdominal pain.   Genitourinary:  Negative for dysuria.   Integumentary:   "       itching            Objective:     Vitals:    10/22/24 1334   BP: (!) 143/91   BP Location: Left arm   Patient Position: Sitting   Pulse: 60   Resp: 16   Temp: 97.8 °F (36.6 °C)   TempSrc: Temporal   SpO2: 98%   Weight: 63.5 kg (139 lb 15.9 oz)   Height: 5' 9" (1.753 m)        Physical Exam  Vitals reviewed.   Pulmonary:      Effort: Pulmonary effort is normal.   Skin:     General: Skin is warm and dry.   Neurological:      Mental Status: He is alert and oriented to person, place, and time.   Psychiatric:         Mood and Affect: Mood normal.                Current Outpatient Medications on File Prior to Visit   Medication Sig    acetaminophen (TYLENOL) 325 MG tablet Take 325 mg by mouth every 4 (four) hours as needed.    amlodipine-benazepril 10-20mg (LOTREL) 10-20 mg per capsule Take 1 capsule by mouth once daily.    aspirin (ECOTRIN) 81 MG EC tablet Take 81 mg by mouth.    atorvastatin (LIPITOR) 10 MG tablet Take 10 mg by mouth.    azelastine (ASTELIN) 137 mcg (0.1 %) nasal spray 1 puff in each nostril Nasally Twice a day for 30 days    buPROPion (WELLBUTRIN XL) 150 MG TB24 tablet Take by mouth.    gabapentin (NEURONTIN) 600 MG tablet Take 600 mg by mouth.    HYDROcodone-acetaminophen (NORCO)  mg per tablet Take 1 tablet by mouth every 12 (twelve) hours as needed for Pain.    ibuprofen (ADVIL,MOTRIN) 800 MG tablet Take 800 mg by mouth every 8 (eight) hours as needed.    pantoprazole (PROTONIX) 40 MG tablet Take 40 mg by mouth.    sildenafiL (VIAGRA) 50 MG tablet Take 50 mg by mouth daily as needed.    tadalafiL (CIALIS) 20 MG Tab Take 20 mg by mouth As instructed.    vismodegib (ERIVEDGE) 150 mg Cap Take 1 capsule (150 mg) by mouth once daily.     No current facility-administered medications on file prior to visit.       CBC:  Lab Results   Component Value Date    WBC 7.95 08/08/2024    HGB 12.1 (L) 08/08/2024    HCT 37.4 (L) 08/08/2024    MCV 89 08/08/2024     08/08/2024         CMP:  Sodium "   Date Value Ref Range Status   08/08/2024 139 136 - 145 mmol/L Final     Potassium   Date Value Ref Range Status   08/08/2024 4.5 3.5 - 5.1 mmol/L Final     Chloride   Date Value Ref Range Status   08/08/2024 103 95 - 110 mmol/L Final     CO2   Date Value Ref Range Status   08/08/2024 30 (H) 23 - 29 mmol/L Final     Glucose   Date Value Ref Range Status   08/08/2024 79 70 - 110 mg/dL Final     BUN   Date Value Ref Range Status   08/08/2024 19 6 - 20 mg/dL Final     Creatinine   Date Value Ref Range Status   08/08/2024 1.2 0.5 - 1.4 mg/dL Final     Calcium   Date Value Ref Range Status   08/08/2024 9.0 8.7 - 10.5 mg/dL Final     Total Protein   Date Value Ref Range Status   08/08/2024 6.5 6.0 - 8.4 g/dL Final     Albumin   Date Value Ref Range Status   08/08/2024 3.8 3.5 - 5.2 g/dL Final     Total Bilirubin   Date Value Ref Range Status   08/08/2024 0.4 0.1 - 1.0 mg/dL Final     Comment:     For infants and newborns, interpretation of results should be based  on gestational age, weight and in agreement with clinical  observations.    Premature Infant recommended reference ranges:  Up to 24 hours.............<8.0 mg/dL  Up to 48 hours............<12.0 mg/dL  3-5 days..................<15.0 mg/dL  6-29 days.................<15.0 mg/dL       Alkaline Phosphatase   Date Value Ref Range Status   08/08/2024 83 55 - 135 U/L Final     AST   Date Value Ref Range Status   08/08/2024 12 10 - 40 U/L Final     ALT   Date Value Ref Range Status   08/08/2024 17 10 - 44 U/L Final     Anion Gap   Date Value Ref Range Status   08/08/2024 6 (L) 8 - 16 mmol/L Final     eGFR if    Date Value Ref Range Status   03/18/2021 >60.0 >60 mL/min/1.73 m^2 Final     eGFR if non    Date Value Ref Range Status   03/18/2021 >60.0 >60 mL/min/1.73 m^2 Final     Comment:     Calculation used to obtain the estimated glomerular filtration  rate (eGFR) is the CKD-EPI equation.          NM PET CT FDG Skull Base to Mid  Thigh  Narrative: EXAMINATION:  NM PET CT FDG SKULL BASE TO MID THIGH    CLINICAL HISTORY:  Head/neck cancer, assess treatment response; Unspecified malignant neoplasm of skin, unspecified    TECHNIQUE:  10.9 mCi F18-FDG was administered intravenously via the left arm.  Approximately 60 minutes after radiotracer distribution, PET images were acquired from the skull vertex to the mid thigh. Transmission images were acquired to correct for attenuation using a whole body low-dose CT scan without contrast. Glycemia at the time of injection was 66 mg/dL.    COMPARISON:  None    FINDINGS:  Head and Neck:    Brain demonstrates normal metabolism.  However, FDG-PET has an approximate 60% sensitivity for brain metastases which are best detected by MRI with gadolinium.  Right frontal craniectomy changes are present.  Symmetric physiologic uptake is in the palatine tonsils.  No enlarged or FDG avid lymph nodes are in the neck.    Chest:    No FDG avid pulmonary lesions are present. Mild perihilar uptake is present bilaterally without a focal lymph node identified on noncontrast CT.  Max SUV of 3.0 on the right and 3.0 on the left.    Abdomen and Pelvis:    Physiologic uptake is in the liver, spleen, urinary system and bowel. No enlarged or FDG avid lymph nodes are in the abdomen or pelvis. Adrenal glands are normal.    Musculoskeletal:    No FDG avid osseous lesions are present.  Impression: 1. No definite FDG avid malignancy is present on today's exam.  2. Symmetric perihilar uptake without a definite lymph node/mass, favored to be reactive.  Attention on follow-up.    Electronically signed by: Nicolas Stone MD  Date:    10/02/2024  Time:    15:36       ECOG SCORE    0 - Fully active-able to carry on all pre-disease performance without restriction              Assessment:       1. Basal cell carcinoma (BCC) of left side of neck    2. Skin cancer    3. Financial difficulty         Plan:   1. Basal cell carcinoma (BCC) of left  side of neck  -     Ambulatory referral/consult to [facility]  -     Ambulatory referral/consult to Hematology/Oncology/Nutrition    2. Skin cancer  -     Ambulatory referral/consult to [facility]    3. Financial difficulty  -     Ambulatory referral/consult to Oncology Social Work         - Ambulatory referral/consult to [facility]  -Decline chemocare      1. Treatment plan of oral daily discussed with patient.  2. Handouts provided on chemotherapy agents. Premeds, supportive meds explained.  3. Discussed the mechanism of action, potential side effects of this treatment as well as ways to manage them at home.   4. Dietary modifications discussed. Detail instructions to be provided by dietician.   5. Chemotherapy portfolio supplied with contact information.   6. Discussed follow-up with the physician for toxicity monitoring throughout treatment.    7. Scripts were escribed prior and explained for home use.   8. Consented the patient to the treatment plan and the patient was educated on the planned duration of the treatment and schedule of the treatment administration.  9. Start now, f/u Dr. Loera in one month.  ; patient has home prescriptions.  10. Encouraged to call office - phone number provided - to assist with any concerns.    Plan was discussed with the patient at length, and he verbalized understanding. Margarito was given an opportunity to ask questions that were answered to his satisfaction, and he was advised to call in the interval if any problems or questions arise.    Signed:  Yesenia Rodriguez NP   Hematology and Oncology  [facility] - HEMATOLOGY ONCOLOGY  [facility], [place]

## 2024-10-22 NOTE — PROGRESS NOTES
PSYCHO-ONCOLOGY INTAKE    Diagnostic Interview - CPT 12990    Date: 10/22/2024  Site: [facility]    Evaluation Length (direct face-to-face time):  1 hour    This includes face to face time and non-face to face time preparing to see the patient, obtaining and/or reviewing separately obtained history, documenting clinical information in the electronic or other health record, independently interpreting results and communicating results to the patient/family/caregiver, or care coordinator.     Referral Source: Blanca Loera MD   Oncologist:   PCP: Wilfredo Luna MD    Clinical status of patient: Outpatient    Margarito Mcnamara, a 58 y.o. male, seen for initial evaluation visit.    Margarito Mcnamara reviewed and agreed to informed consent and the limits of confidentiality.    Chief complaint/reason for encounter: adjustment to illness, anxiety    History of Present Illness:       Medical/Surgical History:    Patient Active Problem List   Diagnosis    Basal cell carcinoma (BCC) of left side of neck    Bone erosion    Depression    HTN (hypertension)    Hyperlipidemia    Arthritis    Erectile dysfunction    Migraine    Acute blood loss anemia    Hypotension due to drugs    Agitation    Impaired functional mobility and endurance    Open wound of scalp    Squamous cell skin cancer, face    Strain of metacarpophalangeal joint of right hand    Right hand pain    Other chronic postprocedural pain    Infrapatellar bursitis of right knee    Smoking history    Scalp pain    Chronic pain    Skin cancer       Health Behaviors:       ETOH Use: Occasional, 1 drink when he drinks        Tobacco Use: Endorsed: 1/2 pack of cigarettes per day    Illicit Drug Use:  Pt denied      Prescription Misuse: Pt denied    Caffeine: minimal   Firearms: Endorsed    Advanced directives: Pt denied     Family History:   Psychiatric illness: Yes - son w/ hx of bipolar d/o and schizophrenia      Alcohol/Drug Abuse: denied      Suicide: Denied        Past Psychiatric History:   Inpatient treatment: denied    Outpatient treatment: Saw Dr. Levine in the cancer center for one appointment in April 2023     Prior substance abuse treatment: Denied      Suicide Attempts: Denied     Psychotropic Medications: Currently prescribed Wellbutrin, but has not started taking it on a consistent basis.        Past: Seroquel and Wellbutrin and Ambien    Current medications as per below, allergies reviewed in chart.    Current Outpatient Medications   Medication    acetaminophen (TYLENOL) 325 MG tablet    amlodipine-benazepril 10-20mg (LOTREL) 10-20 mg per capsule    aspirin (ECOTRIN) 81 MG EC tablet    atorvastatin (LIPITOR) 10 MG tablet    azelastine (ASTELIN) 137 mcg (0.1 %) nasal spray    buPROPion (WELLBUTRIN XL) 150 MG TB24 tablet    gabapentin (NEURONTIN) 600 MG tablet    HYDROcodone-acetaminophen (NORCO)  mg per tablet    ibuprofen (ADVIL,MOTRIN) 800 MG tablet    pantoprazole (PROTONIX) 40 MG tablet    sildenafiL (VIAGRA) 50 MG tablet    tadalafiL (CIALIS) 20 MG Tab    vismodegib (ERIVEDGE) 150 mg Cap     No current facility-administered medications for this visit.        Social situation/Stressors: Margarito Mcnamara lives with his son in [place], [place].  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  He indicated financial strain since cancer recurrence and treatment recovery. Margarito Mcnamara is single with one son.  The patient reports adequate social support, but prefers to be alone at times.  Margarito Mcnamara's hobbies include problem solving and puzzles; however, he noticed changes in his cognition since his surgery.      Strengths:Setting and pursuing goals, hopes, dreams, aspirations and Vocational interests, hobbies and/or talents  Liabilities: Complicated medical illness and Financial strain    Current Evaluation:     Mental Status Exam: Margarito Mcnamara arrived  promptly for the assessment session.  The patient was fully cooperative throughout the  interview and was an adequate historian   Appearance: age appropriate, appropriately  dressed, adequately  groomed  Behavior/Cooperation: friendly and cooperative  Speech: hyperverbal  Mood: anxious  Affect: mood congruent  Thought Process: goal-directed, tangential   Thought Content: normal, no suicidality, no homicidality, delusions, or paranoia;did not appear to be responding to internal stimuli during the interview.   Orientation: grossly intact  Memory: grossly intact  Attention Span/Concentration: Attends to interview without distraction; reports subjective difficulty  Fund of Knowledge: average  Estimate of Intelligence: average from verbal skills and history  Cognition: grossly intact  Insight: patient has awareness of illness; good insight into own behavior and behavior of others  Judgment: the patient's behavior is adequate to circumstances      Adjustment Assessment to Current Illness:    Oncology History   Basal cell carcinoma (BCC) of left side of neck   1/26/2021 Initial Diagnosis    Basal cell carcinoma (BCC) of scalp     1/28/2021 Tumor Conference    His case was discussed at the Multidisciplinary Head and Neck Team Planning Meeting.    Representatives from Medical Oncology, Radiation Oncology, Head and Neck Surgical Oncology, Psychosocial Oncology, and Speech and Language Pathology discussed the case with the following recommendations:    1) WLE with reconstruction  2) adjuvant radiation               2/4/2021 Tumor Conference    His case was discussed at the Multidisciplinary Head and Neck Team Planning Meeting.    Representatives from Medical Oncology, Radiation Oncology, Head and Neck Surgical Oncology, Psychosocial Oncology, and Speech and Language Pathology discussed the case with the following recommendations:    1) MRI with and without contrast  2) surgery with flap reconstruction              3/3/2021 Surgery    1.  Wide local excision of basal cell carcinoma of the scalp measuring roughly  15 x 15 cm  2.  Right myocutaneous latissimus dorsi free flap with microvascular anastomosis to right superficial temporal vessels and right facial artery  3.  [place] of right external jugular vein graft measuring 8 cm in length  4.  [place] of split-thickness skin graft from right thigh measuring 8 x 10 cm  5.   Bifrontal craniotomy for tumor.  6.  Stealth navigation.  7.  Cranioplasty greater than 6 cm.        3/5/2021 Surgery    1.  Preparation of scalp wound measuring 15 x 15 cm  2.  Left l myocutaneous latissimus dorsi free flap with microvascular anastomosis to left superficial temporal vessels  3.  Split-thickness skin graft from left thigh measuring approximately 8 x 10 cm     6/22/2021 Cancer Staged    Staging form: Cutaneous Carcinoma of the Head and Neck, AJCC 8th Edition  - Pathologic stage from 6/22/2021: pT4b, pN0           Margarito Mcnamara has adjusted to illness with moderate difficulty primarily through active coping strategies, focus on work, and focus on family. He has engaged in limited information gathering.  The patient has fair family/friend support.  His support system is coping adequately with the diagnosis/treatment/prognosis. Illness-related psychosocial stressors include financial strain  and absence from work.  The patient has a good partnership with his [facility] treatment team. The patient reports the following barriers to cancer care:financial limitations.     NCCN Distress thermometer:       10/22/2024     1:36 PM 10/14/2024     9:24 AM 10/10/2024     3:21 PM 10/3/2024     1:01 PM 9/19/2024     9:56 AM 3/9/2023     9:19 AM 8/16/2022     1:18 PM   DISTRESS SCREENING   Distress Score 10 - Extreme Distress 10 - Extreme Distress  10 - Extreme Distress 0 - No Distress 10 - Extreme Distress 10 - Extreme Distress   Practical Concerns Finances;Insurance;Housing;Transportation Finances Taking care of others;Finances;Insurance;Transportation;Treatment decisions Treatment  decisions;Finances;Insurance;Taking care of others;Work None of these ;Work/School Insurance/Financial   Social Concerns None of these None of these Relationship with children Relationship with children None of these  Dealing with Children   Emotional Concerns Worry or anxiety Worry or anxiety;Sadness or depression Worry or anxiety;Sadness or depression;Loss of interest or enjoyment;Grief or loss;Fear;Loneliness;Anger;Changes in appearance;Feelings of worthlessness or being a burden Worry or anxiety None of these  Depression;Fears;Nervousness;Sadness;Worry   Retire Spiritual or Latter-day Concerns       No   Spiritual or Latter-day Concerns None of these None of these None of these None of these None of these     Physical Concerns Pain  Pain;Sleep;Fatigue;Tobacco use;Memory or concentration;Sexual health;Loss or change of physical abilities Pain;Sleep None of these Pain Pain;Sleep;Fatigue        Symptoms:   Mood: depressed mood, fatigue, and poor concentration;  prior depression:while adjusting to his diagnosis ; no SI/HI  Anxiety: Feeling nervous, anxious, or on edge, Difficulty relaxing, Restlessness, and Fear of unknown; prior anxiety:while adjusting to his diagnosis  Substance abuse: denied  Cognitive functioning: More effort related to problem solving and low frustration tolerance   Pain: Head and back pain 10/10 daily. Some relief with prescription medication.      Assessment - Diagnosis - Goals:       ICD-10-CM ICD-9-CM   1. Anxiety disorder due to medical condition  F06.4 293.84   2. Depression, unspecified depression type  F32.A 311   3. Basal cell carcinoma (BCC) of left side of neck  C44.41 173.41         Plan:individual psychotherapy and medication management by physician    Summary and Recommendations  Margarito Mcnamara is a 58 y.o. male referred by Blanca Loera MD for psychological evaluation and treatment.  Mr. Mcnamara appears to be moderate difficulty coping with his diagnosis and proposed  treatment course. The patient reported increased anxiety related to his cancer, uncertainty about prognosis, and concern for his son's well-being due to potential changes in his ability to function or death. He was encouraged to consult his PCP about mood medications. We discussed values and identity in the context of his diagnosis. He expressed interest in individual therapy for cancer coping skills and will follow up for that purpose.    Return to clinic: 2 weeks    GOALS:   Adaptive coping   Consider advanced directives   Self-care and acceptance of personal limitations     Bri Albarran Psy.D.   Clinical Health Psychology Fellow

## 2024-10-23 ENCOUNTER — TELEPHONE (OUTPATIENT)
Dept: OPHTHALMOLOGY | Facility: CLINIC | Age: 59
End: 2024-10-23
Payer: MEDICARE

## 2024-10-23 ENCOUNTER — DOCUMENTATION ONLY (OUTPATIENT)
Dept: INFUSION THERAPY | Facility: HOSPITAL | Age: 59
End: 2024-10-23
Payer: MEDICARE

## 2024-10-23 NOTE — PROGRESS NOTES
VEDA and HERMINIA met with Mr. Mcnamara for chemo school. He was informed of the supports offered. He denied any transportation or insurance difficulties at this time. He has a history of head and neck cancer, and is now being treated for skin cancer. Mr. Mcnamara has one adult son who needs his assistance due to mental health difficulties. The patient will be treated with oral chemo, but the  encouraged him to reach out if any other needs arise.

## 2024-10-23 NOTE — TELEPHONE ENCOUNTER
"----- Message from Tech Jacquelyn sent at 10/23/2024  1:41 PM CDT -----  Regarding: FW: Appointment    ----- Message -----  From: Gracie Parker MA  Sent: 10/23/2024   1:09 PM CDT  To: [facility] Staff  Subject: FW: Appointment                                  Pt had appt today with  for BCC. It was rescheduled but with .  ----- Message -----  From: Tomeka Childs  Sent: 10/23/2024  12:38 PM CDT  To: [facility] Clinical Support Staff  Subject: Appointment                                      "Type:  Patient Call Back    Who Called:PT    What is the reqeust in detail:Pt was scheduled today, but something came up and needs to reschedule. Please advise    Can the clinic reply by [facility]?no     Best Call Back Number:   [phone]    Additional Information:  "

## 2024-11-08 ENCOUNTER — OFFICE VISIT (OUTPATIENT)
Dept: OPTOMETRY | Facility: CLINIC | Age: 59
End: 2024-11-08
Payer: MEDICARE

## 2024-11-08 DIAGNOSIS — H04.123 DRY EYE SYNDROME OF BILATERAL LACRIMAL GLANDS: ICD-10-CM

## 2024-11-08 DIAGNOSIS — H52.203 MYOPIA WITH ASTIGMATISM AND PRESBYOPIA, BILATERAL: ICD-10-CM

## 2024-11-08 DIAGNOSIS — H25.13 AGE-RELATED NUCLEAR CATARACT, BILATERAL: Primary | ICD-10-CM

## 2024-11-08 DIAGNOSIS — C44.90 SKIN CANCER: ICD-10-CM

## 2024-11-08 DIAGNOSIS — H52.13 MYOPIA WITH ASTIGMATISM AND PRESBYOPIA, BILATERAL: ICD-10-CM

## 2024-11-08 DIAGNOSIS — H52.4 MYOPIA WITH ASTIGMATISM AND PRESBYOPIA, BILATERAL: ICD-10-CM

## 2024-11-08 DIAGNOSIS — H53.8 BLURRED VISION, RIGHT EYE: ICD-10-CM

## 2024-11-08 PROCEDURE — 99999 PR PBB SHADOW E&M-EST. PATIENT-LVL III: CPT | Mod: PBBFAC,,,

## 2024-11-08 NOTE — PROGRESS NOTES
HPI    Pt states : here for OD very watery for about a year now . Feels gritty &   trinity. Uses afts prn . EWA 2 years ago at [facility] [employer] . Has basal cell   carcinoma on head & had melanoma on jaw in 2021 had treatment . Wants to   make sure OD is not affected by cancer  . Does not wear distance   correction , will wear OTC readers for near .   Denies Flashes or Floaters . Has high bp but is controlled w meds per pt .   No hsty of eye disease or trauma per pt   Last edited by Addi Wlikinson on 11/8/2024  9:25 AM.            Assessment /Plan     For exam results, see Encounter Report.    Age-related nuclear cataract, bilateral    Dry eye syndrome of bilateral lacrimal glands    Blurred vision, right eye  -     Ambulatory referral/consult to Ophthalmology    Skin cancer  -     Ambulatory referral/consult to Ophthalmology    Myopia with astigmatism and presbyopia, bilateral      Very mild, not yet visually significant. Surgery not recommended at this time. Monitor yearly for changes.    2-3. Pt states OD is constantly irritated with foreign body sensation and epiphora, leading to blurry vision. 2+ SPK present on examination today. Discussed findings with pt and ed pt on the nature/chronicity of dry eye. Pt has not consistently tried any artificial tears. Gave pt OTC artificial tear recommendations to use BID-QID OU daily for relief along with gel drop/everett QHS. Discussed need for more long-term treatment with Restasis or punctal plugs if no relief. Ed pt to RTC if any worsening signs or symptoms.    4. Pt being treated for recurrent basal cell carcinoma of the right forehead and scalp. All fundus exam findings WNL with no choroidal nevi or melanoma OD, OS. Pt has appt with oculoplastics next week. Discussed findings with pt and advised pt keep appt with Dr. Quick.    5. Discussed spec options with pt and pt does not desire to wear specs, prefers OTC readers. Advised pt that blur OD likely secondary to dry eye, but may  also need rx for 20/20 vision. Monitor yearly for changes, sooner prn.    RTC: 1 year for comprehensive exam or sooner prn

## 2024-11-13 ENCOUNTER — OFFICE VISIT (OUTPATIENT)
Dept: OPHTHALMOLOGY | Facility: CLINIC | Age: 59
End: 2024-11-13
Payer: MEDICARE

## 2024-11-13 DIAGNOSIS — H02.9 LESION OF LEFT LOWER EYELID: ICD-10-CM

## 2024-11-13 DIAGNOSIS — H04.123 DRY EYES: Primary | ICD-10-CM

## 2024-11-13 PROCEDURE — 92285 EXTERNAL OCULAR PHOTOGRAPHY: CPT | Mod: S$GLB,,, | Performed by: OPHTHALMOLOGY

## 2024-11-13 PROCEDURE — 68840 EXPLORE/IRRIGATE TEAR DUCTS: CPT | Mod: 50,S$GLB,, | Performed by: OPHTHALMOLOGY

## 2024-11-13 PROCEDURE — 99999 PR PBB SHADOW E&M-EST. PATIENT-LVL III: CPT | Mod: PBBFAC,,, | Performed by: OPHTHALMOLOGY

## 2024-11-13 PROCEDURE — 99204 OFFICE O/P NEW MOD 45 MIN: CPT | Mod: 25,S$GLB,, | Performed by: OPHTHALMOLOGY

## 2024-11-13 PROCEDURE — 3044F HG A1C LEVEL LT 7.0%: CPT | Mod: CPTII,S$GLB,, | Performed by: OPHTHALMOLOGY

## 2024-11-13 PROCEDURE — 1160F RVW MEDS BY RX/DR IN RCRD: CPT | Mod: CPTII,S$GLB,, | Performed by: OPHTHALMOLOGY

## 2024-11-13 PROCEDURE — 1159F MED LIST DOCD IN RCRD: CPT | Mod: CPTII,S$GLB,, | Performed by: OPHTHALMOLOGY

## 2024-11-13 PROCEDURE — 4010F ACE/ARB THERAPY RXD/TAKEN: CPT | Mod: CPTII,S$GLB,, | Performed by: OPHTHALMOLOGY

## 2024-11-13 RX ORDER — PREDNISOLONE ACETATE 10 MG/ML
SUSPENSION/ DROPS OPHTHALMIC
Qty: 5 ML | Refills: 0 | Status: SHIPPED | OUTPATIENT
Start: 2024-11-13 | End: 2024-12-11

## 2024-11-13 NOTE — PROGRESS NOTES
ASTRID Mcnamara is a/an 59 y.o. male here for BCC right upper brow / lesion   left lower lid  evaluation.   Referred by:   Eye Meds:   REFRESH PRN   Patient states lesion has been present on LLL for about 5+ years. There is   no pain, irritation, or bleeding. (YES BCC SINCE AROUND 2001) history of   skin cancer. Patient would like to discuss removal today.     Last edited by Ivette Carlos on 11/13/2024  2:20 PM.            Assessment /Plan     For exam results, see Encounter Report.    Dry eyes  -     External Photography - OU - Both Eyes  -     prednisoLONE acetate (PRED FORTE) 1 % DrpS; Place 1 drop into the right eye 4 (four) times daily for 7 days, THEN 1 drop 3 (three) times daily for 7 days, THEN 1 drop 2 (two) times daily for 7 days, THEN 1 drop Daily for 7 days.  Dispense: 5 mL; Refill: 0    Lesion of left lower eyelid        The patient is a pleasant 59-year-old male here for evaluation of chronic tearing and irritation of the right eye along with basal cell carcinoma of his right forehead and scalp.  The patient is referred by my colleague Dr. Ely.  The patient has a history of extensive craniotomy and scalp surgery for basal cell carcinoma in 2021.  He has been subsequently lost to follow-up.  The patient presents with a recurrence of the right forehead and scalp basal cell carcinoma.  He is currently taking vismodegib at this time.  He continues to have daily pain to his forehead and also right back.      On exam, the patient has recurrence of the basal cell carcinoma along the right forehead and scalp.  There is visualization of a plate.  He has full extraocular motility.  He has 2+ inferior exposure keratopathy on the right side.  He has a 2 mm x 2 mm left lower eyelid margin papilloma.    Irrigation:  OD:patent lower punctum, canaliculus with mild stenosis, and nld with 80 % flow to the nose and 20% reflux  OS:patent lower punctum, canaliculus, and nld with 100% flow to the nose      These findings were discussed with the patient.    Patient has tried artificial tears with some improvement.     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  Start Pred Forte q.i.d. for 1 week then t.i.d. for 1 week then b.i.d. for 1 week and then once daily for 1 week and then stop.  At the same time, start Pataday 1 drop to the right eye during this time.  In addition, recommend the patient use preservative-free artificial tears at least twice daily to help with the dryness.      I reiterated the importance of continuing vismodegib.  The patient is hesitant to proceed with any type of extensive surgical reconstruction at this time.  If the patient does not have significant improvement of his tearing on the right side, consider placement of mini [place] stent.         Return to see me in 2 months any worsening.

## 2024-11-14 ENCOUNTER — OFFICE VISIT (OUTPATIENT)
Dept: PALLIATIVE MEDICINE | Facility: CLINIC | Age: 59
End: 2024-11-14
Payer: MEDICARE

## 2024-11-14 ENCOUNTER — LAB VISIT (OUTPATIENT)
Dept: LAB | Facility: HOSPITAL | Age: 59
End: 2024-11-14
Attending: INTERNAL MEDICINE
Payer: MEDICARE

## 2024-11-14 DIAGNOSIS — G89.3 CANCER RELATED PAIN: Primary | ICD-10-CM

## 2024-11-14 DIAGNOSIS — C44.320 SQUAMOUS CELL SKIN CANCER, FACE: ICD-10-CM

## 2024-11-14 DIAGNOSIS — D64.9 NORMOCYTIC ANEMIA: ICD-10-CM

## 2024-11-14 DIAGNOSIS — Z51.5 PALLIATIVE CARE BY SPECIALIST: ICD-10-CM

## 2024-11-14 DIAGNOSIS — C44.90 SKIN CANCER: ICD-10-CM

## 2024-11-14 LAB
ALBUMIN SERPL BCP-MCNC: 4.5 G/DL (ref 3.5–5.2)
ALP SERPL-CCNC: 74 U/L (ref 55–135)
ALT SERPL W/O P-5'-P-CCNC: 34 U/L (ref 10–44)
ANION GAP SERPL CALC-SCNC: 7 MMOL/L (ref 8–16)
AST SERPL-CCNC: 26 U/L (ref 10–40)
BASOPHILS # BLD AUTO: 0.09 K/UL (ref 0–0.2)
BASOPHILS NFR BLD: 1 % (ref 0–1.9)
BILIRUB SERPL-MCNC: 0.7 MG/DL (ref 0.1–1)
BUN SERPL-MCNC: 26 MG/DL (ref 6–20)
CALCIUM SERPL-MCNC: 10.1 MG/DL (ref 8.7–10.5)
CHLORIDE SERPL-SCNC: 104 MMOL/L (ref 95–110)
CO2 SERPL-SCNC: 28 MMOL/L (ref 23–29)
CREAT SERPL-MCNC: 1 MG/DL (ref 0.5–1.4)
DIFFERENTIAL METHOD BLD: ABNORMAL
EOSINOPHIL # BLD AUTO: 0.2 K/UL (ref 0–0.5)
EOSINOPHIL NFR BLD: 2.5 % (ref 0–8)
ERYTHROCYTE [DISTWIDTH] IN BLOOD BY AUTOMATED COUNT: 14.3 % (ref 11.5–14.5)
EST. GFR  (NO RACE VARIABLE): >60 ML/MIN/1.73 M^2
GLUCOSE SERPL-MCNC: 69 MG/DL (ref 70–110)
HCT VFR BLD AUTO: 43.5 % (ref 40–54)
HGB BLD-MCNC: 13.8 G/DL (ref 14–18)
IMM GRANULOCYTES # BLD AUTO: 0.01 K/UL (ref 0–0.04)
IMM GRANULOCYTES NFR BLD AUTO: 0.1 % (ref 0–0.5)
LYMPHOCYTES # BLD AUTO: 2 K/UL (ref 1–4.8)
LYMPHOCYTES NFR BLD: 21.8 % (ref 18–48)
MCH RBC QN AUTO: 28.9 PG (ref 27–31)
MCHC RBC AUTO-ENTMCNC: 31.7 G/DL (ref 32–36)
MCV RBC AUTO: 91 FL (ref 82–98)
MONOCYTES # BLD AUTO: 0.8 K/UL (ref 0.3–1)
MONOCYTES NFR BLD: 8.9 % (ref 4–15)
NEUTROPHILS # BLD AUTO: 6 K/UL (ref 1.8–7.7)
NEUTROPHILS NFR BLD: 65.7 % (ref 38–73)
NRBC BLD-RTO: 0 /100 WBC
PLATELET # BLD AUTO: 278 K/UL (ref 150–450)
PMV BLD AUTO: 9 FL (ref 9.2–12.9)
POTASSIUM SERPL-SCNC: 4.5 MMOL/L (ref 3.5–5.1)
PROT SERPL-MCNC: 7.1 G/DL (ref 6–8.4)
RBC # BLD AUTO: 4.78 M/UL (ref 4.6–6.2)
SODIUM SERPL-SCNC: 139 MMOL/L (ref 136–145)
WBC # BLD AUTO: 9.17 K/UL (ref 3.9–12.7)

## 2024-11-14 PROCEDURE — 3044F HG A1C LEVEL LT 7.0%: CPT | Mod: CPTII,95,, | Performed by: NURSE PRACTITIONER

## 2024-11-14 PROCEDURE — 99214 OFFICE O/P EST MOD 30 MIN: CPT | Mod: 95,,, | Performed by: NURSE PRACTITIONER

## 2024-11-14 PROCEDURE — 85025 COMPLETE CBC W/AUTO DIFF WBC: CPT | Performed by: INTERNAL MEDICINE

## 2024-11-14 PROCEDURE — 80053 COMPREHEN METABOLIC PANEL: CPT | Performed by: INTERNAL MEDICINE

## 2024-11-14 PROCEDURE — 36415 COLL VENOUS BLD VENIPUNCTURE: CPT | Performed by: INTERNAL MEDICINE

## 2024-11-14 PROCEDURE — 4010F ACE/ARB THERAPY RXD/TAKEN: CPT | Mod: CPTII,95,, | Performed by: NURSE PRACTITIONER

## 2024-11-14 RX ORDER — HYDROCODONE BITARTRATE AND ACETAMINOPHEN 10; 325 MG/1; MG/1
1 TABLET ORAL EVERY 8 HOURS PRN
Qty: 90 TABLET | Refills: 0 | Status: SHIPPED | OUTPATIENT
Start: 2024-11-14 | End: 2024-12-14

## 2024-11-14 NOTE — PROGRESS NOTES
The patient location is: [place]  The chief complaint leading to consultation is: cancer related pain    Visit type: audiovisual    Face to Face time with patient: 15  30 minutes of total time spent on the encounter, which includes face to face time and non-face to face time preparing to see the patient (eg, review of tests), Obtaining and/or reviewing separately obtained history, Documenting clinical information in the electronic or other health record, Independently interpreting results (not separately reported) and communicating results to the patient/family/caregiver, or Care coordination (not separately reported).         Each patient to whom he or she provides medical services by telemedicine is:  (1) informed of the relationship between the physician and patient and the respective role of any other health care provider with respect to management of the patient; and (2) notified that he or she may decline to receive medical services by telemedicine and may withdraw from such care at any time.    Notes:     Office Visit  [facility]      ASSESSMENT/PLAN:     Plan/Recommendations:  Margarito was seen today for palliative care.    Diagnoses and all orders for this visit:    Squamous cell skin cancer, face  Skin cancer  Re-occurrence of disease, continue to follow with Oncology for treatment plan    Palliative care by specialist  Patient doing fair, denies any in home needs    Cancer related pain  -   Continue  HYDROcodone-acetaminophen (NORCO)  mg per tablet; Take 1 tablet by mouth every 12 (twelve) hours as needed for Pain.  -     Continue senna (SENOKOT) 8.6 mg tablet; Take 2 tablets by mouth once daily.  Follow up: 1 month        SUBJECTIVE:     History of Present Illness:  Patient is a 59 y.o. year old male with h/o HTN, BCC, and melanoma . Patient of Dr. Vidal who was last seen 3/2023, now with recurrent basal cell carcinoma of the right forehead and scalp . He is referred by Dr Ely  "for Cancer related pain     Palliative Discussion:  Follow up pain evaluation, Norco helping , he is using about 1-2 per day. Denies any constipation.Since last seen he met with Ophthalmology- he reports having his "tear duct clean out".  Eyes feel better, less drainage. He continues to have challenges with his son- who is being seen at [facility]- he is coping the best he can  Patient will meet with Dr Loera tomorrow to discuss lab work        From initial visit  Pain  Pain to right side of face  and top head  Pain described as mostly aching , constant , has gotten worst in the past year  Has been on oxycodone in the past after initial surgery, has also tried hydrocodone- this seems to work best  Currently not on any medication- ran out and had no insurance  Gabapentin 600 mg tid-  for his feet, - feels like burning, follows with Dr Zavala  Ibuprofen and tylenol does not help  Pain score 10/10, comes down 6/10 with Hydrocodone- he feels he needs it twice per day  Has chronic back pain- s/p rhizotomy       ROS:  Review of Systems   Constitutional:  Positive for appetite change, fatigue and unexpected weight change.   HENT:  Negative for mouth sores.    Eyes:  Positive for visual disturbance.   Respiratory:  Positive for shortness of breath. Negative for cough.    Cardiovascular:  Positive for chest pain.   Gastrointestinal:  Negative for abdominal pain and diarrhea.   Genitourinary:  Positive for frequency.   Musculoskeletal:  Positive for back pain.   Skin:  Negative for rash.   Neurological:  Positive for headaches.   Hematological:  Negative for adenopathy.   Psychiatric/Behavioral:  The patient is nervous/anxious.        Past Medical History:   Diagnosis Date    ADHD (attention deficit hyperactivity disorder)     Arthritis     Basal cell carcinoma (BCC) of left side of neck 08/2022    Chronic pain     Depression     Hydrocele in adult     Hyperlipidemia     Hypertension     Migraine     Squamous " cell skin cancer, face 08/2022     Past Surgical History:   Procedure Laterality Date    BACK SURGERY  11/2020    CRANIOTOMY N/A 3/3/2021    Procedure: CRANIOTOMY, USING FRAMELESS STEREOTAXY, OPEN, BIFRONTAL;  Surgeon: Raheel Green MD;  Location: [address];  Service: Neurosurgery;  Laterality: N/A;  TOR I  ASA I  TYPE & CROSS 2 UNITS  REGULAR BED  PORRAS HEADREST  CHILDRESS  STEALTH CT    EXCISION OF LESION Left 8/24/2022    Procedure: EXCISION, LESION basal cell CA left neck--2 separate lesions;  Surgeon: Catalina Vidal MD;  Location: [facility] OR;  Service: ENT;  Laterality: Left;    FLAP PROCEDURE N/A 3/3/2021    Procedure: CREATION, FREE FLAP;  Surgeon: Ayaan Aguilar MD;  Location: [address];  Service: ENT;  Laterality: N/A;  Latissimus free flap. Will need bean bag and Porras.     FLAP PROCEDURE Left 3/5/2021    Procedure: CREATION, FREE FLAP;  Surgeon: Ayaan Aguilar MD;  Location: [address];  Service: ENT;  Laterality: Left;    INCISION AND DRAINAGE, LOWER EXTREMITY Right 8/7/2024    Procedure: INCISION AND DRAINAGE, LOWER EXTREMITY;  Surgeon: Blake Barton MD;  Location: [place] OR;  Service: Orthopedics;  Laterality: Right;    LAPAROSCOPIC REPAIR OF HERNIA      RHIZOTOMY W/ RADIOFREQUENCY ABLATION Bilateral     two procedures    SURGICAL REMOVAL OF LESION OF FACE Right 8/24/2022    Procedure: EXCISION, LESION, FACE ;  Surgeon: Catalina Vidal MD;  Location: [facility];  Service: ENT;  Laterality: Right;    TENDON REPAIR Right     thumb     Family History   Problem Relation Name Age of Onset    Depression Mother      Early death Mother  40    Alcohol abuse Father      Depression Father      No Known Problems Sister      Headaches Sister      Hypertension Brother      No Known Problems Maternal Aunt      No Known Problems Maternal Uncle      No Known Problems Paternal Aunt      No Known Problems Paternal Uncle      No Known Problems Maternal Grandmother      No Known Problems Maternal  Grandfather      No Known Problems Paternal Grandmother      No Known Problems Paternal Grandfather      No Known Problems Other       Review of patient's allergies indicates:  No Known Allergies  Social Drivers of Health     Tobacco Use: High Risk (11/13/2024)    Patient History     Smoking Tobacco Use: Every Day     Smokeless Tobacco Use: Former     Passive Exposure: Past   Alcohol Use: Not At Risk (8/29/2024)    AUDIT-C     Frequency of Alcohol Consumption: 2-4 times a month     Average Number of Drinks: 1 or 2     Frequency of Binge Drinking: Never   Financial Resource Strain: High Risk (8/29/2024)    Overall Financial Resource Strain (CARDIA)     Difficulty of Paying Living Expenses: Hard   Food Insecurity: Food Insecurity Present (8/29/2024)    Hunger Vital Sign     Worried About Running Out of Food in the Last Year: Sometimes true     Ran Out of Food in the Last Year: Sometimes true   Transportation Needs: Not on file   Physical Activity: Unknown (8/29/2024)    Exercise Vital Sign     Days of Exercise per Week: 4 days     Minutes of Exercise per Session: Not on file   Stress: Stress Concern Present (8/29/2024)    [employer] [place] [employer] - Occupational Stress Questionnaire     Feeling of Stress : Very much   Housing Stability: High Risk (8/29/2024)    Housing Stability Vital Sign     Unable to Pay for Housing in the Last Year: Yes     Homeless in the Last Year: Not on file   Depression: High Risk (10/10/2024)    Depression     Last PHQ-4: Flowsheet Data: 9   Utilities: At Risk (8/29/2024)    [facility] Utilities     Threatened with loss of utilities: Yes   Health Literacy: Inadequate Health Literacy (8/29/2024)     Health Literacy     Frequency of need for help with medical instructions: Sometimes   Social Isolation: Not on file            OBJECTIVE:     Physical Exam:  Vitals:    Physical Exam  HENT:      Head:      Comments: Right sided facial and head scars noted  Neurological:      Mental  Status: He is alert and oriented to person, place, and time.   Psychiatric:         Mood and Affect: Mood normal.           Review of Symptoms      Symptom Assessment (ESAS 0-10 Scale)  Pain:  0  Dyspnea:  0  Anxiety:  0  Nausea:  0  Depression:  0  Anorexia:  0  Fatigue:  8  Insomnia:  0  Restlessness:  0  Agitation:  0       Anxiety:  Is nervous/anxious    ECOG Performance Status ndGndrndanddndend:nd nd2nd Living Arrangements:  Lives with family    Psychosocial/Cultural:   See Palliative Psychosocial Note: Yes  **Primary  to Follow**  Palliative Care  Consult: No      Advance Care Planning   Advance Directives:   Living Will: No    [place]: No      Decision Making:  Patient answered questions  Goals of Care: The patient endorses that what is most important right now is to focus on symptom/pain control and curative/life-prolongation (regardless of treatment burdens)    Accordingly, we have decided that the best plan to meet the patient's goals includes continuing with treatment           Medications:    Current Outpatient Medications:     acetaminophen (TYLENOL) 325 MG tablet, Take 325 mg by mouth every 4 (four) hours as needed., Disp: , Rfl:     amlodipine-benazepril 10-20mg (LOTREL) 10-20 mg per capsule, Take 1 capsule by mouth once daily., Disp: , Rfl:     aspirin (ECOTRIN) 81 MG EC tablet, Take 81 mg by mouth., Disp: , Rfl:     atorvastatin (LIPITOR) 10 MG tablet, Take 10 mg by mouth., Disp: , Rfl:     azelastine (ASTELIN) 137 mcg (0.1 %) nasal spray, 1 puff in each nostril Nasally Twice a day for 30 days, Disp: , Rfl:     buPROPion (WELLBUTRIN XL) 150 MG TB24 tablet, Take by mouth., Disp: , Rfl:     gabapentin (NEURONTIN) 600 MG tablet, Take 600 mg by mouth., Disp: , Rfl:     ibuprofen (ADVIL,MOTRIN) 800 MG tablet, Take 800 mg by mouth every 8 (eight) hours as needed., Disp: , Rfl:     pantoprazole (PROTONIX) 40 MG tablet, Take 40 mg by mouth., Disp: , Rfl:     prednisoLONE acetate (PRED FORTE) 1 %  DrpS, Place 1 drop into the right eye 4 (four) times daily for 7 days, THEN 1 drop 3 (three) times daily for 7 days, THEN 1 drop 2 (two) times daily for 7 days, THEN 1 drop Daily for 7 days., Disp: 5 mL, Rfl: 0    sildenafiL (VIAGRA) 50 MG tablet, Take 50 mg by mouth daily as needed., Disp: , Rfl:     tadalafiL (CIALIS) 20 MG Tab, Take 20 mg by mouth As instructed., Disp: , Rfl:     Labs:  CBC:   WBC   Date Value Ref Range Status   11/14/2024 9.17 3.90 - 12.70 K/uL Final     Hemoglobin   Date Value Ref Range Status   11/14/2024 13.8 (L) 14.0 - 18.0 g/dL Final     POC Hematocrit   Date Value Ref Range Status   03/03/2021 29 (L) 36 - 54 %PCV Final     Hematocrit   Date Value Ref Range Status   11/14/2024 43.5 40.0 - 54.0 % Final     MCV   Date Value Ref Range Status   11/14/2024 91 82 - 98 fL Final     Platelets   Date Value Ref Range Status   11/14/2024 278 150 - 450 K/uL Final       LFT:   Lab Results   Component Value Date    AST 12 08/08/2024    ALKPHOS 83 08/08/2024    BILITOT 0.4 08/08/2024       Albumin:   Albumin   Date Value Ref Range Status   08/08/2024 3.8 3.5 - 5.2 g/dL Final     Protein:   Total Protein   Date Value Ref Range Status   08/08/2024 6.5 6.0 - 8.4 g/dL Final       Signature: Kimberly Silver NP

## 2024-11-15 ENCOUNTER — TELEPHONE (OUTPATIENT)
Dept: HEMATOLOGY/ONCOLOGY | Facility: CLINIC | Age: 59
End: 2024-11-15
Payer: MEDICARE

## 2024-11-15 ENCOUNTER — OFFICE VISIT (OUTPATIENT)
Dept: HEMATOLOGY/ONCOLOGY | Facility: CLINIC | Age: 59
End: 2024-11-15
Payer: MEDICARE

## 2024-11-15 VITALS
OXYGEN SATURATION: 99 % | RESPIRATION RATE: 16 BRPM | SYSTOLIC BLOOD PRESSURE: 136 MMHG | WEIGHT: 138.25 LBS | HEIGHT: 69 IN | BODY MASS INDEX: 20.48 KG/M2 | HEART RATE: 62 BPM | DIASTOLIC BLOOD PRESSURE: 89 MMHG | TEMPERATURE: 98 F

## 2024-11-15 DIAGNOSIS — G89.3 CANCER ASSOCIATED PAIN: ICD-10-CM

## 2024-11-15 DIAGNOSIS — I10 HYPERTENSION, UNSPECIFIED TYPE: ICD-10-CM

## 2024-11-15 DIAGNOSIS — E78.2 MIXED HYPERLIPIDEMIA: ICD-10-CM

## 2024-11-15 DIAGNOSIS — S01.00XS OPEN WOUND OF SCALP, UNSPECIFIED OPEN WOUND TYPE, SEQUELA: ICD-10-CM

## 2024-11-15 DIAGNOSIS — F32.A DEPRESSION, UNSPECIFIED DEPRESSION TYPE: ICD-10-CM

## 2024-11-15 DIAGNOSIS — Z87.891 SMOKING HISTORY: ICD-10-CM

## 2024-11-15 DIAGNOSIS — S01.00XD OPEN WOUND OF SCALP, UNSPECIFIED OPEN WOUND TYPE, SUBSEQUENT ENCOUNTER: ICD-10-CM

## 2024-11-15 DIAGNOSIS — R42 LIGHTHEADEDNESS: ICD-10-CM

## 2024-11-15 DIAGNOSIS — Z79.899 IMMUNOSUPPRESSION DUE TO DRUG THERAPY: ICD-10-CM

## 2024-11-15 DIAGNOSIS — C44.41 BASAL CELL CARCINOMA (BCC) OF LEFT SIDE OF NECK: Primary | ICD-10-CM

## 2024-11-15 DIAGNOSIS — D84.821 IMMUNOSUPPRESSION DUE TO DRUG THERAPY: ICD-10-CM

## 2024-11-15 PROCEDURE — 99999 PR PBB SHADOW E&M-EST. PATIENT-LVL IV: CPT | Mod: PBBFAC,,, | Performed by: INTERNAL MEDICINE

## 2024-11-15 NOTE — TELEPHONE ENCOUNTER
Appointment provider for patient on 11/18 at 11 with Dr Ely. Patient cancelled appointment on 11/18 and rescheduled for 12/12 at 2:30.

## 2024-11-15 NOTE — PROGRESS NOTES
Subjective:       Name: Margarito Mcnamara  : 1965  MRN: [id number]    Chief Complaint   Patient presents with    Follow-up     Basal cell carcinoma (BCC) of left side of neck        Patient is in clinic by himself.    HPI: Margarito Mcnamara is a 59 y.o. male presents for evaluation of his recurrent Follow-up (Basal cell carcinoma (BCC) of left side of neck)    He started on Erivedge on 2024.  He is reporting nausea lightheadedness dizziness and dysgeusia.    His excessive tearfulness has resolved after seeing the ophthalmologist.    The patient denies CP, cough, SOB, abdominal pain, nausea, vomiting, constipation.  The patient denies fever, chills, night sweats, weight loss, new lumps or bumps, easy bruising or bleeding.    Sister had breast cane at the age of 43 yo  Father had bone cancer.    ONCOLOGIC HISTORY:  Patient of Dr. Vidal who was last seen 3/2023. He has a history of recurrent basal cell carcinoma of the right forehead and scalp. Patient was previously treated with a hedgehog inhibitor, Erivedge, in 2018 with good response. However, patient discontinued this prematurely after 6 months and the tumor has recurred. It recurred over 2 years year ago. Erivedge was again prescribed but denied by the insurance company X 2 but was finally started at the time he last saw Dr. Vidal in 3/2023.      He is having a lot of pain in his back and also on his head at the area of the recurrence. He has not been taking any pain medication for the last 2 years. He first started with an open sore around 1.5 years ago on his right forehead that has gotten worse. He has been having drainage from the site on his forehead. He has not been seen yet for this area of concern. He has not been seen by derm for a couple of years.      TREATMENT HISTORY:  2018: treated with a hedgehog inhibitor, Erivedge, in 2018 with good response and again in 2023     3/2021:   1.  Bifrontal craniotomy for tumor.  2.  Stealth  navigation.  3.  Cranioplasty greater than 6 cm.   1.  Wide local excision of basal cell carcinoma of the scalp measuring roughly 15 x 15 cm  2.  Right myocutaneous latissimus dorsi free flap with microvascular anastomosis to right superficial temporal vessels and right facial artery  3.  Revere of right external jugular vein graft measuring 8 cm in length  4.  Revere of split-thickness skin graft from right thigh measuring 8 x 10 cm  5.  Indocyanine green angiography     6/2021: radiation      9/2022: excision of 3 carcinomas involving his left neck and right jawline     Oncology History   Basal cell carcinoma (BCC) of left side of neck   1/26/2021 Initial Diagnosis    Basal cell carcinoma (BCC) of scalp     1/28/2021 Tumor Conference    His case was discussed at the Multidisciplinary Head and Neck Team Planning Meeting.    Representatives from Medical Oncology, Radiation Oncology, Head and Neck Surgical Oncology, Psychosocial Oncology, and Speech and Language Pathology discussed the case with the following recommendations:    1) WLE with reconstruction  2) adjuvant radiation               2/4/2021 Tumor Conference    His case was discussed at the Multidisciplinary Head and Neck Team Planning Meeting.    Representatives from Medical Oncology, Radiation Oncology, Head and Neck Surgical Oncology, Psychosocial Oncology, and Speech and Language Pathology discussed the case with the following recommendations:    1) MRI with and without contrast  2) surgery with flap reconstruction              3/3/2021 Surgery    1.  Wide local excision of basal cell carcinoma of the scalp measuring roughly 15 x 15 cm  2.  Right myocutaneous latissimus dorsi free flap with microvascular anastomosis to right superficial temporal vessels and right facial artery  3.  Revere of right external jugular vein graft measuring 8 cm in length  4.  Revere of split-thickness skin graft from right thigh measuring 8 x 10 cm  5.   Bifrontal  craniotomy for tumor.  6.  Stealth navigation.  7.  Cranioplasty greater than 6 cm.        3/5/2021 Surgery    1.  Preparation of scalp wound measuring 15 x 15 cm  2.  Left l myocutaneous latissimus dorsi free flap with microvascular anastomosis to left superficial temporal vessels  3.  Split-thickness skin graft from left thigh measuring approximately 8 x 10 cm     6/22/2021 Cancer Staged    Staging form: Cutaneous Carcinoma of the Head and Neck, AJCC 8th Edition  - Pathologic stage from 6/22/2021: pT4b, pN0          Past Medical History:   Diagnosis Date    ADHD (attention deficit hyperactivity disorder)     Arthritis     Basal cell carcinoma (BCC) of left side of neck 08/2022    Chronic pain     Depression     Hydrocele in adult     Hyperlipidemia     Hypertension     Migraine     Squamous cell skin cancer, face 08/2022       Past Surgical History:   Procedure Laterality Date    BACK SURGERY  11/2020    CRANIOTOMY N/A 3/3/2021    Procedure: CRANIOTOMY, USING FRAMELESS STEREOTAXY, OPEN, BIFRONTAL;  Surgeon: Raheel Green MD;  Location: [address];  Service: Neurosurgery;  Laterality: N/A;  TOR I  ASA I  TYPE & CROSS 2 UNITS  REGULAR BED  [facility]  CHILDRESS  [facility] CT    EXCISION OF LESION Left 8/24/2022    Procedure: EXCISION, LESION basal cell CA left neck--2 separate lesions;  Surgeon: Catalina Vidal MD;  Location: [facility];  Service: ENT;  Laterality: Left;    FLAP PROCEDURE N/A 3/3/2021    Procedure: CREATION, FREE FLAP;  Surgeon: Ayaan Aguilar MD;  Location: [facility] OR [address] [place] [address];  Service: ENT;  Laterality: N/A;  Latissimus free flap. Will need [facility] and [facility].     FLAP PROCEDURE Left 3/5/2021    Procedure: CREATION, FREE FLAP;  Surgeon: Ayaan Aguilar MD;  [facility];  Service: ENT;  Laterality: Left;    INCISION AND DRAINAGE, LOWER EXTREMITY Right 8/7/2024    Procedure: INCISION AND DRAINAGE, LOWER EXTREMITY;  Surgeon: Blake Barton MD;  Location: [facility] OR;   Service: Orthopedics;  Laterality: Right;    LAPAROSCOPIC REPAIR OF HERNIA      RHIZOTOMY W/ RADIOFREQUENCY ABLATION Bilateral     two procedures    SURGICAL REMOVAL OF LESION OF FACE Right 8/24/2022    Procedure: EXCISION, LESION, FACE ;  Surgeon: Catalina Vidal MD;  Location[facility];  Service: ENT;  Laterality: Right;    TENDON REPAIR Right     thumb       Family History   Problem Relation Name Age of Onset    Depression Mother      Early death Mother  40    Alcohol abuse Father      Depression Father      No Known Problems Sister      Headaches Sister      Hypertension Brother      No Known Problems Maternal Aunt      No Known Problems Maternal Uncle      No Known Problems Paternal Aunt      No Known Problems Paternal Uncle      No Known Problems Maternal Grandmother      No Known Problems Maternal Grandfather      No Known Problems Paternal Grandmother      No Known Problems Paternal Grandfather      No Known Problems Other         Social History     Socioeconomic History    Marital status: Single    Number of children: 1   Occupational History     Comment: [employer]   Tobacco Use    Smoking status: Every Day     Current packs/day: 0.50     Average packs/day: 0.5 packs/day for 10.0 years (5.0 ttl pk-yrs)     Types: Cigarettes     Passive exposure: Past    Smokeless tobacco: Former    Tobacco comments:     1/2 pack per day   Substance and Sexual Activity    Alcohol use: Yes     Comment: ocassioanl beer    Drug use: Not Currently    Sexual activity: Yes     Partners: Female   Social History Narrative    Lives in a house with 25 year old son     Social Drivers of Health     Financial Resource Strain: High Risk (8/29/2024)    Overall Financial Resource Strain (CARDIA)     Difficulty of Paying Living Expenses: Hard   Food Insecurity: Food Insecurity Present (8/29/2024)    Hunger Vital Sign     Worried About Running Out of Food in the Last Year: Sometimes true     Ran Out of Food in the Last Year: Sometimes true  "  Physical Activity: Unknown (8/29/2024)    Exercise Vital Sign     Days of Exercise per Week: 4 days   Stress: Stress Concern Present (8/29/2024)    [employer] - Occupational Stress Questionnaire     Feeling of Stress : Very much   Housing Stability: High Risk (8/29/2024)    Housing Stability Vital Sign     Unable to Pay for Housing in the Last Year: Yes       Review of patient's allergies indicates:  No Known Allergies    Review of Systems   Constitutional:  Positive for fatigue. Negative for appetite change.   Gastrointestinal:  Positive for nausea.   Neurological:  Positive for dizziness and light-headedness.   Psychiatric/Behavioral:  The patient is nervous/anxious.             Objective:     Vitals:    11/15/24 0905   BP: 136/89   BP Location: Right arm   Patient Position: Sitting   Pulse: 62   Resp: 16   Temp: 97.7 °F (36.5 °C)   TempSrc: Temporal   SpO2: 99%   Weight: 62.7 kg (138 lb 3.7 oz)   Height: 5' 9" (1.753 m)        Physical Exam  Vitals reviewed.   Constitutional:       Appearance: He is ill-appearing.   Eyes:      General: No scleral icterus.     Pupils: Pupils are equal, round, and reactive to light.   Cardiovascular:      Rate and Rhythm: Normal rate and regular rhythm.      Pulses: Normal pulses.      Heart sounds: Normal heart sounds.   Pulmonary:      Effort: Pulmonary effort is normal.      Breath sounds: Normal breath sounds.   Abdominal:      General: Bowel sounds are normal. There is no distension.   Musculoskeletal:         General: No swelling.   Lymphadenopathy:      Cervical: No cervical adenopathy.   Skin:     General: Skin is warm.      Findings: Lesion (Presence of a scabbing lesion affecting his frontal scalp.  During this exam part of his metal plate is protruded as well.  There is no signs of local infection.) present.   Neurological:      General: No focal deficit present.      Mental Status: He is alert.   Psychiatric:         Mood and Affect: Mood " normal.                Current Outpatient Medications on File Prior to Visit   Medication Sig    acetaminophen (TYLENOL) 325 MG tablet Take 325 mg by mouth every 4 (four) hours as needed.    amlodipine-benazepril 10-20mg (LOTREL) 10-20 mg per capsule Take 1 capsule by mouth once daily.    aspirin (ECOTRIN) 81 MG EC tablet Take 81 mg by mouth.    atorvastatin (LIPITOR) 10 MG tablet Take 10 mg by mouth.    azelastine (ASTELIN) 137 mcg (0.1 %) nasal spray 1 puff in each nostril Nasally Twice a day for 30 days    buPROPion (WELLBUTRIN XL) 150 MG TB24 tablet Take by mouth.    gabapentin (NEURONTIN) 600 MG tablet Take 600 mg by mouth.    HYDROcodone-acetaminophen (NORCO)  mg per tablet Take 1 tablet by mouth every 8 (eight) hours as needed for Pain.    ibuprofen (ADVIL,MOTRIN) 800 MG tablet Take 800 mg by mouth every 8 (eight) hours as needed.    pantoprazole (PROTONIX) 40 MG tablet Take 40 mg by mouth.    prednisoLONE acetate (PRED FORTE) 1 % DrpS Place 1 drop into the right eye 4 (four) times daily for 7 days, THEN 1 drop 3 (three) times daily for 7 days, THEN 1 drop 2 (two) times daily for 7 days, THEN 1 drop Daily for 7 days.    sildenafiL (VIAGRA) 50 MG tablet Take 50 mg by mouth daily as needed.    tadalafiL (CIALIS) 20 MG Tab Take 20 mg by mouth As instructed.     No current facility-administered medications on file prior to visit.       CBC:  Lab Results   Component Value Date    WBC 9.17 11/14/2024    HGB 13.8 (L) 11/14/2024    HCT 43.5 11/14/2024    MCV 91 11/14/2024     11/14/2024         CMP:  Sodium   Date Value Ref Range Status   11/14/2024 139 136 - 145 mmol/L Final     Potassium   Date Value Ref Range Status   11/14/2024 4.5 3.5 - 5.1 mmol/L Final     Chloride   Date Value Ref Range Status   11/14/2024 104 95 - 110 mmol/L Final     CO2   Date Value Ref Range Status   11/14/2024 28 23 - 29 mmol/L Final     Glucose   Date Value Ref Range Status   11/14/2024 69 (L) 70 - 110 mg/dL Final     BUN    Date Value Ref Range Status   11/14/2024 26 (H) 6 - 20 mg/dL Final     Creatinine   Date Value Ref Range Status   11/14/2024 1.0 0.5 - 1.4 mg/dL Final     Calcium   Date Value Ref Range Status   11/14/2024 10.1 8.7 - 10.5 mg/dL Final     Total Protein   Date Value Ref Range Status   11/14/2024 7.1 6.0 - 8.4 g/dL Final     Albumin   Date Value Ref Range Status   11/14/2024 4.5 3.5 - 5.2 g/dL Final     Total Bilirubin   Date Value Ref Range Status   11/14/2024 0.7 0.1 - 1.0 mg/dL Final     Comment:     For infants and newborns, interpretation of results should be based  on gestational age, weight and in agreement with clinical  observations.    Premature Infant recommended reference ranges:  Up to 24 hours.............<8.0 mg/dL  Up to 48 hours............<12.0 mg/dL  3-5 days..................<15.0 mg/dL  6-29 days.................<15.0 mg/dL       Alkaline Phosphatase   Date Value Ref Range Status   11/14/2024 74 55 - 135 U/L Final     AST   Date Value Ref Range Status   11/14/2024 26 10 - 40 U/L Final     ALT   Date Value Ref Range Status   11/14/2024 34 10 - 44 U/L Final     Anion Gap   Date Value Ref Range Status   11/14/2024 7 (L) 8 - 16 mmol/L Final     eGFR if    Date Value Ref Range Status   03/18/2021 >60.0 >60 mL/min/1.73 m^2 Final     eGFR if non    Date Value Ref Range Status   03/18/2021 >60.0 >60 mL/min/1.73 m^2 Final     Comment:     Calculation used to obtain the estimated glomerular filtration  rate (eGFR) is the CKD-EPI equation.          External Photography - OU - Both Eyes  On exam, the patient has recurrence of the basal cell carcinoma along the   right forehead and scalp. There is visualization of a plate. He has full   extraocular motility. He has 2+ inferior exposure keratopathy on the right   side. He has a 2 mm x 2 mm left lower eyelid margin papilloma.        ECOG SCORE    1 - Restricted in strenuous activity-ambulatory and able to carry out work of a  light nature              Assessment/Plan:     Basal cell carcinoma:   Cancer Staging   Basal cell carcinoma (BCC) of left side of neck  Staging form: Melanoma of the Skin, AJCC 8th Edition  - Clinical: No stage assigned - Unsigned  Staging form: Cutaneous Carcinoma of the Head and Neck, AJCC 8th Edition  - Pathologic stage from 6/22/2021: pT4b, pN0 - Signed by Catalina Vidal MD on 6/22/2021    I reviewed independently the patient medical record including his laboratory pathologic and radiologic findings.    His medical record also were reviewed at length.    His case was discussed with a neck tumor board.  The recommendation was to resume hedgehog inhibitor .  Started on Erivedge on October 25, 2024.  He is tolerating it with minimal side effects.    He has repeat CBC CMP yesterday were within normal range.    He will be seen back again normal for a follow up visit with repeat CBC CMP.        Open wound affecting his scalp  The patient was advised to stop her antibiotic prevent infection.    He will be referred back to see Dr. Ely in for further recommendation for local wound care.      Lightheadedness and dizziness.    The patient was advised to increase his p.o. intake and to keep himself hydrated.    Even though he has been able to eat his dyskinesia is causing him to eat less.    He was advised to split his meals into small snacks to prevent hypoglycemia.        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  Will continue to monitor fever        Cancer related pain.    Followed by the palliative care team.    Currently on Norco [id number] tablet by mouth every 12 hours as needed.        Dyslipidemia and hypertension:  On atorvastatin and Lotrel.    Tobacco abuse  He was advised to quit smoking    Depression.    The patient currently is on Wellbutrin.    He is followed by Psychiatry           Med Onc Chart Routing      Follow up with physician    Follow up with JODI 1 month. with repeat CBC CMP    Infusion scheduling note    Injection scheduling note    Labs    Imaging    Pharmacy appointment    Other referrals                   Plan was discussed with the patient at length, and he verbalized understanding. Margarito was given an opportunity to ask questions that were answered to his satisfaction, and he was advised to call in the interval if any problems or questions arise.  Signed:  Blanca Loera MD   Hematology and Oncology  [facility] - HEMATOLOGY ONCOLOGY  [facility], [place]

## 2024-11-18 ENCOUNTER — OFFICE VISIT (OUTPATIENT)
Dept: HEMATOLOGY/ONCOLOGY | Facility: CLINIC | Age: 59
End: 2024-11-18
Payer: MEDICARE

## 2024-11-18 VITALS
HEART RATE: 80 BPM | DIASTOLIC BLOOD PRESSURE: 86 MMHG | BODY MASS INDEX: 20.11 KG/M2 | SYSTOLIC BLOOD PRESSURE: 128 MMHG | OXYGEN SATURATION: 99 % | WEIGHT: 135.81 LBS | TEMPERATURE: 98 F | RESPIRATION RATE: 16 BRPM | HEIGHT: 69 IN

## 2024-11-18 DIAGNOSIS — C44.41 BASAL CELL CARCINOMA (BCC) OF SCALP: Primary | ICD-10-CM

## 2024-11-18 PROCEDURE — 99213 OFFICE O/P EST LOW 20 MIN: CPT | Mod: S$GLB,,, | Performed by: STUDENT IN AN ORGANIZED HEALTH CARE EDUCATION/TRAINING PROGRAM

## 2024-11-18 PROCEDURE — 4010F ACE/ARB THERAPY RXD/TAKEN: CPT | Mod: CPTII,S$GLB,, | Performed by: STUDENT IN AN ORGANIZED HEALTH CARE EDUCATION/TRAINING PROGRAM

## 2024-11-18 PROCEDURE — 99999 PR PBB SHADOW E&M-EST. PATIENT-LVL IV: CPT | Mod: PBBFAC,,, | Performed by: STUDENT IN AN ORGANIZED HEALTH CARE EDUCATION/TRAINING PROGRAM

## 2024-11-18 PROCEDURE — 1159F MED LIST DOCD IN RCRD: CPT | Mod: CPTII,S$GLB,, | Performed by: STUDENT IN AN ORGANIZED HEALTH CARE EDUCATION/TRAINING PROGRAM

## 2024-11-18 PROCEDURE — 3074F SYST BP LT 130 MM HG: CPT | Mod: CPTII,S$GLB,, | Performed by: STUDENT IN AN ORGANIZED HEALTH CARE EDUCATION/TRAINING PROGRAM

## 2024-11-18 PROCEDURE — 3044F HG A1C LEVEL LT 7.0%: CPT | Mod: CPTII,S$GLB,, | Performed by: STUDENT IN AN ORGANIZED HEALTH CARE EDUCATION/TRAINING PROGRAM

## 2024-11-18 PROCEDURE — 3008F BODY MASS INDEX DOCD: CPT | Mod: CPTII,S$GLB,, | Performed by: STUDENT IN AN ORGANIZED HEALTH CARE EDUCATION/TRAINING PROGRAM

## 2024-11-18 PROCEDURE — 3079F DIAST BP 80-89 MM HG: CPT | Mod: CPTII,S$GLB,, | Performed by: STUDENT IN AN ORGANIZED HEALTH CARE EDUCATION/TRAINING PROGRAM

## 2024-11-18 RX ORDER — SENNOSIDES 8.6 MG/1
2 TABLET ORAL
COMMUNITY
Start: 2024-11-04

## 2024-11-18 NOTE — PROGRESS NOTES
Note to patients: In accordance with the  Century Cures Act, patients are now granted immediate electronic access to their medical records. This note is primarily intended for communication among medical professionals. As a result, it may incorporate medical terminology, abbreviations, or language that could appear blunt or unfamiliar. If you have questions about this document, we encourage you to discuss it with your physician.      [facility]  Head & Neck Surgical Oncology Clinic    Patient: Margarito Mcnamara    : 1965    MRN: [id number]  Primary Care Provider: Wilfredo Luna MD  Referring Provider: No ref. provider found   Rad Onc: Dr. Cherry  Derm: Kd Scales MD   Rad Onc: Dr. Cherry   Date of Encounter: 2024    DIAGNOSIS:    Cancer Staging   Basal cell carcinoma (BCC) of left side of neck  Staging form: Melanoma of the Skin, AJCC 8th Edition  - Clinical: No stage assigned - Unsigned  Staging form: Cutaneous Carcinoma of the Head and Neck, AJCC 8th Edition  - Pathologic stage from 2021: pT4b, pN0 - Signed by Catalina Vidal MD on 2021      CC:   Chief Complaint   Patient presents with    Basal cell carcinoma (BCC) of left side of neck       HPI:   Margarito Mcnamara is a 59 y.o. male with a past medical history significant for HTN, BCC, malanoma who is being seen today for concerns of cancer re occurrence on his forehead.     Patient of Dr. Vidal who was last seen 3/2023. He has a history of recurrent basal cell carcinoma of the right forehead and scalp. Patient was previously treated with a hedgehog inhibitor, Erivedge, in 2018 with good response. However, patient discontinued this prematurely after 6 months and the tumor has recurred. It recurred over 2 years year ago. Erivedge was again prescribed but denied by the insurance company X 2 but was finally started at the time he last saw Dr. Vidal in 3/2023.     He is having a lot of pain in his back and  "also on his head at the area of his insurance. He has not been taking any pain medication for the last 2 years. He first started with an open sore around 1.5 years ago on his right forehead that has gotten worse. He has been having drainage from the site on his forehead. He has not been seen yet for this area of concern. He has not been seen by derm for a couple of years. He has been having watery eyes for the last 2 years. He has needle sensation in both eyes. He has blurred vision in the right eye for the last 2 years and some in the left eye. He has not followed up with an eye doctor as well.     Patient denies pain, fever, chills, night sweats, unintentional weight loss, neck mass, enlarged lymph nodes outside of the head or neck, odynophagia, dysphagia, globus sensation, voice changes, cough, hemoptysis, shortness of breath, or new or concerning skin lesions. Patient denies personal or family history of head and neck cancer. Patient denies history of radiation exposure.    10/2/24: He is here today to review his pathology report from last visit, which confirmed recurrent basal cell carcinoma. He is still having problems with his eye. He has not made any of the referrals made for him at last visit.    11/18/24: Patient returns today for exposed hardware. He developed a "pimple" at the site that drained. He is on the hedgehog inhibitor still. No pain/bleeding.    Patient lives in [place].    TREATMENT HISTORY:    2018: treated with a hedgehog inhibitor, Erivedge, in 2018 with good response and again in 2023    3/2021:   1.  Bifrontal craniotomy for tumor.  2.  Stealth navigation.  3.  Cranioplasty greater than 6 cm.   1.  Wide local excision of basal cell carcinoma of the scalp measuring roughly 15 x 15 cm  2.  Right myocutaneous latissimus dorsi free flap with microvascular anastomosis to right superficial temporal vessels and right facial artery  3.  [place] of right external jugular vein graft measuring 8 cm " in length  4.  [place] of split-thickness skin graft from right thigh measuring 8 x 10 cm  5.  Indocyanine green angiography    2021: radiation     2022: excision of 3 carcinomas involving his left neck and right jawline     SUBSTANCE USE:  Smokinppd [id number]  Alcohol: occ beer  Denies hookah, chewing tobacco, marijuana, betel nut, illicit drug, heavy cigar, vape use.    ALLERGIES:  Review of patient's allergies indicates:  No Known Allergies      MEDICATIONS:    Current Outpatient Medications:     acetaminophen (TYLENOL) 325 MG tablet, Take 325 mg by mouth every 4 (four) hours as needed., Disp: , Rfl:     amlodipine-benazepril 10-20mg (LOTREL) 10-20 mg per capsule, Take 1 capsule by mouth once daily., Disp: , Rfl:     aspirin (ECOTRIN) 81 MG EC tablet, Take 81 mg by mouth., Disp: , Rfl:     atorvastatin (LIPITOR) 10 MG tablet, Take 10 mg by mouth., Disp: , Rfl:     azelastine (ASTELIN) 137 mcg (0.1 %) nasal spray, 1 puff in each nostril Nasally Twice a day for 30 days, Disp: , Rfl:     buPROPion (WELLBUTRIN XL) 150 MG TB24 tablet, Take by mouth., Disp: , Rfl:     gabapentin (NEURONTIN) 600 MG tablet, Take 600 mg by mouth., Disp: , Rfl:     HYDROcodone-acetaminophen (NORCO)  mg per tablet, Take 1 tablet by mouth every 8 (eight) hours as needed for Pain., Disp: 90 tablet, Rfl: 0    ibuprofen (ADVIL,MOTRIN) 800 MG tablet, Take 800 mg by mouth every 8 (eight) hours as needed., Disp: , Rfl:     pantoprazole (PROTONIX) 40 MG tablet, Take 40 mg by mouth., Disp: , Rfl:     prednisoLONE acetate (PRED FORTE) 1 % DrpS, Place 1 drop into the right eye 4 (four) times daily for 7 days, THEN 1 drop 3 (three) times daily for 7 days, THEN 1 drop 2 (two) times daily for 7 days, THEN 1 drop Daily for 7 days., Disp: 5 mL, Rfl: 0    SENNA LAXATIVE 8.6 mg tablet, Take 2 tablets by mouth., Disp: , Rfl:     sildenafiL (VIAGRA) 50 MG tablet, Take 50 mg by mouth daily as needed., Disp: , Rfl:     tadalafiL (CIALIS) 20 MG  "Tab, Take 20 mg by mouth As instructed., Disp: , Rfl:     OTHER HISTORY  Past medical, surgical, family, and social histories have been updated and reviewed as needed since last visit.    REVIEW OF SYSTEMS:   Comprehensive review of systems was discussed with the patient.  It is positive only for the above complaints.    PHYSICAL EXAMINATION:  Blood pressure 128/86, pulse 80, temperature 97.7 °F (36.5 °C), temperature source Temporal, resp. rate 16, height 5' 9" (1.753 m), weight 61.6 kg (135 lb 12.9 oz), SpO2 99%.    Constitutional: Ill-appearing  Voice: Non-dysphonic, speaking in full sentences.   Head and face: Deformed anterior skull consistent with surgical sequale, small exposed titanium mesh anteriorly  Skin: Thickened skin with telangectasia concerning for recurrent basal cell  Eyes: Blurred vision  Ears: Bilateral pinna, mastoid, external ear canal normal. Hearing intact.   Nose: External nose appears normal.   Lips: No ulcers or lesions  Neck: Woody, surgical scars, no masses or lymphadenopathy. No palpable thyroid enlargement or nodules.  Respiratory: Chest expansion symmetric, no audible stridor or stertor. Breathing is unlabored. No active cough.    CLINICAL PHOTOS:  11/2024              DATA REVIEWED:     LABORATORY:  N/A    PATHOLOGY:        DIAGNOSIS:   08/28/2022  JL/luis,rohan     1.  SCALP BIOPSY:   - NO TUMOR SEEN.   - ULCER BASE.     2-6.  SKIN AND SUBCUTANEOUS TISSUE LEFT INFERIOR NECK MARGINS, EXCISION:   - NO TUMOR SEEN.     7.  SUBCUTANEOUS TISSUE OF LEFT ANTERIOR NECK, EXCISION:   - BASAL CELL CARCINOMA.   - MARGINS NEGATIVE FOR TUMOR.   - NEGATIVE FOR PERINEURAL INVASION.     8.  SKIN AND SUBCUTANEOUS TISSUE, LEFT SUPERIOR NECK, EXCISION:   - BASAL CELL CARCINOMA.   - MARGINS NEGATIVE FOR TUMOR.   - NEGATIVE FOR PERINEURAL INVASION.   - SEE COMMENT.     9-12.  SKIN AND SUBCUTANEOUS TISSUE RIGHT JAW MARGINS, EXCISION:   - NO TUMOR SEEN.     13.  SKIN AND SUBCUTANEOUS TISSUE RIGHT JAW, " EXCISION:   - IN-SITU SQUAMOUS CARCINOMA.   - MARGINS NEGATIVE FOR TUMOR.     Comment: The tumor in Part 8 is present only on the frozen section    slides.       IMAGING:    CT HEAD WITHOUT CONTRAST 8/3/24  Impression:  -Postoperative changes of the right hemicalvarium with duraplasty material in place.  High attenuation material overlying the right parietal convexity appears to extend from the duraplasty material.  No ruperto hemorrhage.  Follow-up with contrast enhanced MRI of the brain may be obtained, as clinically warranted.   -Abnormal mineralization of the calvarium.  Diffuse marrow infiltration is suspected.  Follow-up with PET-CT scan may be obtained, as clinically warranted  -This report was flagged in Epic as abnormal.    MRI BRAIN W WO CONTRAST 8/19/24  Impression:  Stable MRI of the brain demonstrating postoperative changes of right frontal craniotomy and right parietal pachymeningeal enhancement.    PET 10/2/24  1. No definite FDG avid malignancy is present on today's exam.  2. Symmetric perihilar uptake without a definite lymph node/mass, favored to be reactive.  Attention on follow-up.    5/2024  MRI orbits Asymmetric soft tissue thickening and enhancement involving the right inferior periorbital soft tissues may reflect preseptal cellulitis/infection in the appropriate clinical setting.  There is a small subcentimeter hypoattenuating focus centrally within the region of soft tissue induration which may reflect a tiny abscess as discussed above.  No evidence to suggest postseptal cellulitis.     Partially visualized postoperative changes of the frontal calvarium with mild nonspecific heterogeneity of the visualized inferior calvarium with overlying soft tissue thickening.  This may reflect post-treatment or postoperative change, however clinical correlation and appropriate follow-up is advised.     Mild paranasal sinus disease as above.    RADIOLOGY REVIEWED:  I have independently viewed and agree with  the images and reports which demonstrate surgical sequela as above, with thickening in the right periorbital soft tissue.    ASSESSMENT AND PLAN:  1. Basal cell carcinoma (BCC) of scalp      Margarito Mcnamara is a 59 y.o. male with T4b basal cell carcinoma, lost to followup, now with recurrence. On hedgehog inhibitor with exposed hardware.  - Area overall improved suggesting treatment working  - Continue local wound care  - Plate removal and reconstruction would be extremely morbid - may be necessary at some point but not currently  - Would recommend continuing treatment until BCC is gone and then can address wound unless complications develop    Patient encouraged to call with any questions, concerns, or new or worsening symptoms.     Follow up 1 month for wound recheck

## 2024-11-19 ENCOUNTER — DOCUMENTATION ONLY (OUTPATIENT)
Dept: HEMATOLOGY/ONCOLOGY | Facility: CLINIC | Age: 59
End: 2024-11-19
Payer: MEDICARE

## 2024-11-19 NOTE — NURSING
Chart reviewed after visits with Dr. Loera/Dr. Ely. He is currently on Erivedge for skin cancer. Continuing to follow for any cancer navigation needs

## 2024-11-26 PROBLEM — R20.2 PARESTHESIA OF SKIN: Status: ACTIVE | Noted: 2024-11-26

## 2024-11-26 PROBLEM — D64.9 NORMOCYTIC ANEMIA: Status: ACTIVE | Noted: 2024-11-26

## 2024-12-03 DIAGNOSIS — C44.41 BASAL CELL CARCINOMA (BCC) OF LEFT SIDE OF NECK: ICD-10-CM

## 2024-12-08 ENCOUNTER — HOSPITAL ENCOUNTER (EMERGENCY)
Facility: HOSPITAL | Age: 59
Discharge: HOME | End: 2024-12-08
Attending: STUDENT IN AN ORGANIZED HEALTH CARE EDUCATION/TRAINING PROGRAM
Payer: MEDICARE

## 2024-12-08 VITALS
OXYGEN SATURATION: 98 % | WEIGHT: 140 LBS | SYSTOLIC BLOOD PRESSURE: 123 MMHG | DIASTOLIC BLOOD PRESSURE: 76 MMHG | TEMPERATURE: 98 F | HEIGHT: 69 IN | HEART RATE: 69 BPM | RESPIRATION RATE: 18 BRPM | BODY MASS INDEX: 20.73 KG/M2

## 2024-12-08 DIAGNOSIS — R07.9 CHEST PAIN: ICD-10-CM

## 2024-12-08 DIAGNOSIS — R42 DIZZINESS: ICD-10-CM

## 2024-12-08 LAB
ALBUMIN SERPL BCP-MCNC: 4.6 G/DL (ref 3.5–5.2)
ALP SERPL-CCNC: 104 U/L (ref 55–135)
ALT SERPL W/O P-5'-P-CCNC: 58 U/L (ref 10–44)
ANION GAP SERPL CALC-SCNC: 8 MMOL/L (ref 8–16)
AST SERPL-CCNC: 43 U/L (ref 10–40)
BASOPHILS # BLD AUTO: 0.08 K/UL (ref 0–0.2)
BASOPHILS NFR BLD: 0.7 % (ref 0–1.9)
BILIRUB SERPL-MCNC: 0.6 MG/DL (ref 0.1–1)
BUN SERPL-MCNC: 30 MG/DL (ref 6–20)
CALCIUM SERPL-MCNC: 9.8 MG/DL (ref 8.7–10.5)
CHLORIDE SERPL-SCNC: 103 MMOL/L (ref 95–110)
CO2 SERPL-SCNC: 27 MMOL/L (ref 23–29)
CREAT SERPL-MCNC: 1 MG/DL (ref 0.5–1.4)
DIFFERENTIAL METHOD BLD: ABNORMAL
EOSINOPHIL # BLD AUTO: 0.2 K/UL (ref 0–0.5)
EOSINOPHIL NFR BLD: 1.9 % (ref 0–8)
ERYTHROCYTE [DISTWIDTH] IN BLOOD BY AUTOMATED COUNT: 14 % (ref 11.5–14.5)
EST. GFR  (NO RACE VARIABLE): >60 ML/MIN/1.73 M^2
GLUCOSE SERPL-MCNC: 100 MG/DL (ref 70–110)
HCT VFR BLD AUTO: 41 % (ref 40–54)
HGB BLD-MCNC: 13.2 G/DL (ref 14–18)
IMM GRANULOCYTES # BLD AUTO: 0.04 K/UL (ref 0–0.04)
IMM GRANULOCYTES NFR BLD AUTO: 0.3 % (ref 0–0.5)
LYMPHOCYTES # BLD AUTO: 2.4 K/UL (ref 1–4.8)
LYMPHOCYTES NFR BLD: 19.9 % (ref 18–48)
MAGNESIUM SERPL-MCNC: 2.1 MG/DL (ref 1.6–2.6)
MCH RBC QN AUTO: 28.2 PG (ref 27–31)
MCHC RBC AUTO-ENTMCNC: 32.2 G/DL (ref 32–36)
MCV RBC AUTO: 88 FL (ref 82–98)
MONOCYTES # BLD AUTO: 0.9 K/UL (ref 0.3–1)
MONOCYTES NFR BLD: 8 % (ref 4–15)
NEUTROPHILS # BLD AUTO: 8.2 K/UL (ref 1.8–7.7)
NEUTROPHILS NFR BLD: 69.2 % (ref 38–73)
NRBC BLD-RTO: 0 /100 WBC
PLATELET # BLD AUTO: 267 K/UL (ref 150–450)
PMV BLD AUTO: 8.7 FL (ref 9.2–12.9)
POTASSIUM SERPL-SCNC: 4.4 MMOL/L (ref 3.5–5.1)
PROT SERPL-MCNC: 7.8 G/DL (ref 6–8.4)
RBC # BLD AUTO: 4.68 M/UL (ref 4.6–6.2)
SODIUM SERPL-SCNC: 138 MMOL/L (ref 136–145)
TROPONIN I SERPL HS-MCNC: <2.3 PG/ML (ref 0–14.9)
TSH SERPL DL<=0.005 MIU/L-ACNC: 0.94 UIU/ML (ref 0.34–5.6)
WBC # BLD AUTO: 11.82 K/UL (ref 3.9–12.7)

## 2024-12-08 PROCEDURE — 63600175 PHARM REV CODE 636 W HCPCS: Performed by: STUDENT IN AN ORGANIZED HEALTH CARE EDUCATION/TRAINING PROGRAM

## 2024-12-08 PROCEDURE — 84484 ASSAY OF TROPONIN QUANT: CPT | Performed by: STUDENT IN AN ORGANIZED HEALTH CARE EDUCATION/TRAINING PROGRAM

## 2024-12-08 PROCEDURE — 96374 THER/PROPH/DIAG INJ IV PUSH: CPT

## 2024-12-08 PROCEDURE — 96361 HYDRATE IV INFUSION ADD-ON: CPT

## 2024-12-08 PROCEDURE — 83735 ASSAY OF MAGNESIUM: CPT | Performed by: STUDENT IN AN ORGANIZED HEALTH CARE EDUCATION/TRAINING PROGRAM

## 2024-12-08 PROCEDURE — 80053 COMPREHEN METABOLIC PANEL: CPT | Performed by: STUDENT IN AN ORGANIZED HEALTH CARE EDUCATION/TRAINING PROGRAM

## 2024-12-08 PROCEDURE — 93010 ELECTROCARDIOGRAM REPORT: CPT | Mod: ,,, | Performed by: INTERNAL MEDICINE

## 2024-12-08 PROCEDURE — 99285 EMERGENCY DEPT VISIT HI MDM: CPT | Mod: 25

## 2024-12-08 PROCEDURE — 84443 ASSAY THYROID STIM HORMONE: CPT | Performed by: STUDENT IN AN ORGANIZED HEALTH CARE EDUCATION/TRAINING PROGRAM

## 2024-12-08 PROCEDURE — 93005 ELECTROCARDIOGRAM TRACING: CPT | Performed by: INTERNAL MEDICINE

## 2024-12-08 PROCEDURE — 25000003 PHARM REV CODE 250: Performed by: STUDENT IN AN ORGANIZED HEALTH CARE EDUCATION/TRAINING PROGRAM

## 2024-12-08 PROCEDURE — 85025 COMPLETE CBC W/AUTO DIFF WBC: CPT | Performed by: STUDENT IN AN ORGANIZED HEALTH CARE EDUCATION/TRAINING PROGRAM

## 2024-12-08 RX ORDER — MECLIZINE HCL 12.5 MG 12.5 MG/1
25 TABLET ORAL
Status: COMPLETED | OUTPATIENT
Start: 2024-12-08 | End: 2024-12-08

## 2024-12-08 RX ORDER — METOCLOPRAMIDE HYDROCHLORIDE 5 MG/ML
10 INJECTION INTRAMUSCULAR; INTRAVENOUS
Status: COMPLETED | OUTPATIENT
Start: 2024-12-08 | End: 2024-12-08

## 2024-12-08 RX ORDER — MECLIZINE HYDROCHLORIDE 25 MG/1
25 TABLET ORAL 3 TIMES DAILY PRN
Qty: 15 TABLET | Refills: 0 | Status: SHIPPED | OUTPATIENT
Start: 2024-12-08 | End: 2024-12-13

## 2024-12-08 RX ADMIN — METOCLOPRAMIDE 10 MG: 5 INJECTION, SOLUTION INTRAMUSCULAR; INTRAVENOUS at 09:12

## 2024-12-08 RX ADMIN — SODIUM CHLORIDE, POTASSIUM CHLORIDE, SODIUM LACTATE AND CALCIUM CHLORIDE 1000 ML: 600; 310; 30; 20 INJECTION, SOLUTION INTRAVENOUS at 10:12

## 2024-12-08 RX ADMIN — MECLIZINE 25 MG: 12.5 TABLET ORAL at 09:12

## 2024-12-09 LAB
OHS QRS DURATION: 80 MS
OHS QTC CALCULATION: 414 MS

## 2024-12-09 NOTE — ED PROVIDER NOTES
Encounter Date: 12/8/2024       History     Chief Complaint   Patient presents with    Dizziness     Patient reports history of vertigo. He states that he is having vertigo now     HPI  59-year-old man with a history of basal cell carcinoma, vertigo presents for evaluation of vertigo.  He reports about 30 minutes ago he was in the store he turned his head he felt a room spinning and lightheaded sensation.  It feels like his prior episodes of vertigo.  It happens fairly frequently to him.  He reports it lasts happened 6 weeks ago.  Usually he is able to tough it out at home.  Denies chest pain shortness of breath.  He reports nausea without vomiting.  He denies belly pain change in urination change in bowel habits.  Reports ringing in his left ear No falls  Review of patient's allergies indicates:  No Known Allergies  Past Medical History:   Diagnosis Date    ADHD (attention deficit hyperactivity disorder)     Arthritis     Basal cell carcinoma (BCC) of left side of neck 08/2022    Chronic pain     Depression     Hydrocele in adult     Hyperlipidemia     Hypertension     Migraine     Squamous cell skin cancer, face 08/2022     Past Surgical History:   Procedure Laterality Date    BACK SURGERY  11/2020    CRANIOTOMY N/A 3/3/2021    Procedure: CRANIOTOMY, USING FRAMELESS STEREOTAXY, OPEN, BIFRONTAL;  Surgeon: Raheel Green MD;  Location: [facility] OR [address] [place] [address];  Service: Neurosurgery;  Laterality: N/A;  TOR I  ASA I  TYPE & CROSS 2 UNITS  REGULAR BED  [place] HEADREST  [facility] CT    EXCISION OF LESION Left 8/24/2022    Procedure: EXCISION, LESION basal cell CA left neck--2 separate lesions;  Surgeon: Catalina Vidal MD;  Location: [facility];  Service: ENT;  Laterality: Left;    FLAP PROCEDURE N/A 3/3/2021    Procedure: CREATION, FREE FLAP;  Surgeon: Ayaan Aguilar MD;  Location: [facility] OR [address] [place] [address];  Service: ENT;  Laterality: N/A;  Latissimus free flap. Will need [facility] [place].     FLAP PROCEDURE Left 3/5/2021     Procedure: CREATION, FREE FLAP;  Surgeon: Ayaan Aguilar MD;  Location: [facility] OR [facility] FLR;  Service: ENT;  Laterality: Left;    INCISION AND DRAINAGE, LOWER EXTREMITY Right 8/7/2024    Procedure: INCISION AND DRAINAGE, LOWER EXTREMITY;  Surgeon: Blake Barton MD;  Location: [facility] OR;  Service: Orthopedics;  Laterality: Right;    LAPAROSCOPIC REPAIR OF HERNIA      RHIZOTOMY W/ RADIOFREQUENCY ABLATION Bilateral     two procedures    SURGICAL REMOVAL OF LESION OF FACE Right 8/24/2022    Procedure: EXCISION, LESION, FACE ;  Surgeon: Catalina Vidal MD;  Location[facility] OR;  Service: ENT;  Laterality: Right;    TENDON REPAIR Right     thumb     Family History   Problem Relation Name Age of Onset    Depression Mother      Early death Mother  40    Alcohol abuse Father      Depression Father      No Known Problems Sister      Headaches Sister      Hypertension Brother      No Known Problems Maternal Aunt      No Known Problems Maternal Uncle      No Known Problems Paternal Aunt      No Known Problems Paternal Uncle      No Known Problems Maternal Grandmother      No Known Problems Maternal Grandfather      No Known Problems Paternal Grandmother      No Known Problems Paternal Grandfather      No Known Problems Other       Social History     Tobacco Use    Smoking status: Every Day     Current packs/day: 0.50     Average packs/day: 0.5 packs/day for 10.0 years (5.0 ttl pk-yrs)     Types: Cigarettes     Passive exposure: Past    Smokeless tobacco: Former    Tobacco comments:     1/2 pack per day   Substance Use Topics    Alcohol use: Yes     Comment: ocassioanl beer    Drug use: Not Currently     Review of Systems   All other systems reviewed and are negative.      Physical Exam     Initial Vitals [12/08/24 1955]   BP Pulse Resp Temp SpO2   112/78 71 18 97.5 °F (36.4 °C) 97 %      MAP       --         Physical Exam    Nursing note and vitals reviewed.  Constitutional: He appears well-developed and well-nourished.    HENT:   Postoperative changes to forehead, chronic   Eyes: Right eye exhibits no discharge. Left eye exhibits no discharge.   Neck: Neck supple. No tracheal deviation present.   Cardiovascular:  Normal rate and regular rhythm.           Pulmonary/Chest: Breath sounds normal. No stridor. No respiratory distress.   Abdominal: Abdomen is soft. Bowel sounds are normal. There is no abdominal tenderness.   Musculoskeletal:         General: No edema.      Cervical back: Neck supple.     Neurological: He is alert and oriented to person, place, and time. He has normal strength. No cranial nerve deficit.   Finger-to-nose and heel-to-shin are normal   Skin: Skin is warm and dry.         ED Course   Procedures  Labs Reviewed   CBC W/ AUTO DIFFERENTIAL - Abnormal       Result Value    WBC 11.82      RBC 4.68      Hemoglobin 13.2 (*)     Hematocrit 41.0      MCV 88      MCH 28.2      MCHC 32.2      RDW 14.0      Platelets 267      MPV 8.7 (*)     Immature Granulocytes 0.3      Gran # (ANC) 8.2 (*)     Immature Grans (Abs) 0.04      Lymph # 2.4      Mono # 0.9      Eos # 0.2      Baso # 0.08      nRBC 0      Gran % 69.2      Lymph % 19.9      Mono % 8.0      Eosinophil % 1.9      Basophil % 0.7      Differential Method Automated     COMPREHENSIVE METABOLIC PANEL - Abnormal    Sodium 138      Potassium 4.4      Chloride 103      CO2 27      Glucose 100      BUN 30 (*)     Creatinine 1.0      Calcium 9.8      Total Protein 7.8      Albumin 4.6      Total Bilirubin 0.6      Alkaline Phosphatase 104      AST 43 (*)     ALT 58 (*)     eGFR >60.0      Anion Gap 8     MAGNESIUM    Magnesium 2.1     TROPONIN I HIGH SENSITIVITY    Troponin I High Sensitivity <2.3     TSH    TSH 0.936          ECG Results              EKG 12-lead (In process)        Collection Time Result Time QRS Duration [facility] QTC Calculation    12/08/24 20:09:13 12/09/24 04:57:59 80 414                     In process by Interface, Lab In [facility] (12/09/24 04:58:06)                    Narrative:    Test Reason : R42,    Vent. Rate :  64 BPM     Atrial Rate :  64 BPM     P-R Int : 158 ms          QRS Dur :  80 ms      QT Int : 402 ms       P-R-T Axes :  71  47  64 degrees    QTcB Int : 414 ms    Normal sinus rhythm  Normal ECG  No previous ECGs available    Referred By: AAAREFERRAL SELF           Confirmed By:                                   Imaging Results              X-Ray Chest AP Portable (Final result)  Result time 12/08/24 21:02:19      Final result by Nadia Jain MD (12/08/24 21:02:19)                   Impression:      No acute findings.      Electronically signed by: Nadia Jain  Date:    12/08/2024  Time:    21:02               Narrative:    EXAMINATION:  XR CHEST AP PORTABLE    CLINICAL HISTORY:  Chest Pain;    TECHNIQUE:  Single frontal view of the chest was performed.    COMPARISON:  03/11/2021    FINDINGS:  Lungs are clear. No focal consolidation. No pleural effusion. No pneumothorax. Normal heart size.                                       Medications   meclizine tablet 25 mg (25 mg Oral Given 12/8/24 2133)   lactated ringers bolus 1,000 mL (0 mLs Intravenous Stopped 12/8/24 2310)   metoclopramide injection 10 mg (10 mg Intravenous Given 12/8/24 2133)     Medical Decision Making  59-year-old man with a history of vertigo presents for exacerbation of his vertigo, vital signs are within normal limit on exam he looks uncomfortable but is nontoxic, he has a nonfocal neurologic exam.  With his sudden onset intense spinning paroxysmal aggravated by position nausea tinnitus presentation it is more consistent with peripheral cause.  I will treat him.  I will get a presyncope workup as well.  We will go from there.  I do not think it needs to be stroke activated or need advanced imaging of his head at this time.      Amount and/or Complexity of Data Reviewed  Independent Historian: friend     Details: Reports they are at the store when he began to  experience these symptoms  External Data Reviewed: notes.  Labs: ordered.  Radiology: ordered and independent interpretation performed. Decision-making details documented in ED Course.  ECG/medicine tests: ordered and independent interpretation performed. Decision-making details documented in ED Course.     Details: EKG on my independent interpretation QTC less than 500 no STEMI normal sinus rhythm    Risk  Prescription drug management.               ED Course as of 12/09/24 0528   [place] Dec 08, 2024   2246 Chest x-ray on my independent interpretation without focal consolidation or pneumothorax [IC]   2255 He reports feeling much better, he wants to go home.  With his history, I offered to obtain advanced imaging to look for stroke or other pathology.  He is comfortable going home at this time, Return precautions/follow up instructions/treatment plan given, expressed agreement and understanding.    [IC]      ED Course User Index  [IC] Subhash Campoverde MD                           Clinical Impression:  Final diagnoses:  [R42] Dizziness  [R07.9] Chest pain          ED Disposition Condition    Discharge Stable          ED Prescriptions       Medication Sig Dispense Start Date End Date Auth. Provider    meclizine (ANTIVERT) 25 mg tablet Take 1 tablet (25 mg total) by mouth 3 (three) times daily as needed for Dizziness. 15 tablet 12/8/2024 12/13/2024 Subhash Campoverde MD          Follow-up Information       Follow up With Specialties Details Why Contact Info    Wilfredo Luna MD Family Medicine, Urgent Care Schedule an appointment as soon as possible for a visit   [address] [facility] [address]  [phone]               Subhash Campoverde MD  12/09/24 0149       Subhash Campoverde MD  12/09/24 0528

## 2024-12-09 NOTE — DISCHARGE INSTRUCTIONS
You were seen for vertigo.  You were treated with a medicine called meclizine.      Please return to this facility or another ED as needed for any new or worsening symptoms including chest pain, shortness of breath, abdominal pain, nausea or vomiting, fever or chills. Please follow up with your primary care doctor in 3 days. Please take all medicines as prescribed.

## 2024-12-13 NOTE — PROGRESS NOTES
PATIENT: Margarito Mcnamara  MRN: [id number]  DATE: 12/16/2024      Diagnosis:   1. Basal cell carcinoma (BCC) of left side of neck    2. Mixed hyperlipidemia    3. Smoking history    4. Immunosuppression due to drug therapy    5. Open wound of scalp, unspecified open wound type, sequela    6. Cancer associated pain    7. Hypertension, unspecified type    8. Depression, unspecified depression type    9. Lightheadedness        Chief Complaint: Follow-up ( 1mths f/u/)          Subjective:    Interval History: Mr. Mcnamara is a 59 y.o. male who returns for follow up. He reports a recent episode of dizziness and vertigo. He is currently taking meclizine with some relief. Reports that he has not hydrated well over the past few weeks. Denies fever, chills, sob, cp, palpitations, swelling, fatigue, numbness, tingling, n/v, diarrhea, constipation, abdominal pain, new bumps, lumps, bleeding, bruising. Chronic back pain reported.     Oncologic History:   Per Record:   He started on Erivedge on October 25, 2024.  He is reporting nausea lightheadedness dizziness and dysgeusia.     His excessive tearfulness has resolved after seeing the ophthalmologist.     Sister had breast cane at the age of 41 yo  Father had bone cancer.     ONCOLOGIC HISTORY:  Patient of Dr. Vidal who was last seen 3/2023. He has a history of recurrent basal cell carcinoma of the right forehead and scalp. Patient was previously treated with a hedgehog inhibitor, Erivedge, in 2018 with good response. However, patient discontinued this prematurely after 6 months and the tumor has recurred. It recurred over 2 years year ago. Erivedge was again prescribed but denied by the insurance company X 2 but was finally started at the time he last saw Dr. Vidal in 3/2023.      He is having a lot of pain in his back and also on his head at the area of the recurrence. He has not been taking any pain medication for the last 2 years. He first started with an open sore around 1.5 years  ago on his right forehead that has gotten worse. He has been having drainage from the site on his forehead. He has not been seen yet for this area of concern. He has not been seen by derm for a couple of years.        TREATMENT HISTORY:  2018: treated with a hedgehog inhibitor, Erivedge, in 2018 with good response and again in 2023     3/2021:   1.  Bifrontal craniotomy for tumor.  2.  Stealth navigation.  3.  Cranioplasty greater than 6 cm.   1.  Wide local excision of basal cell carcinoma of the scalp measuring roughly 15 x 15 cm  2.  Right myocutaneous latissimus dorsi free flap with microvascular anastomosis to right superficial temporal vessels and right facial artery  3.  [place] of right external jugular vein graft measuring 8 cm in length  4.  [place] of split-thickness skin graft from right thigh measuring 8 x 10 cm  5.  Indocyanine green angiography     6/2021: radiation      9/2022: excision of 3 carcinomas involving his left neck and right jawline      Oncology History   Basal cell carcinoma (BCC) of left side of neck   1/26/2021 Initial Diagnosis    Basal cell carcinoma (BCC) of scalp     1/28/2021 Tumor Conference    His case was discussed at the Multidisciplinary Head and Neck Team Planning Meeting.    Representatives from Medical Oncology, Radiation Oncology, Head and Neck Surgical Oncology, Psychosocial Oncology, and Speech and Language Pathology discussed the case with the following recommendations:    1) WLE with reconstruction  2) adjuvant radiation               2/4/2021 Tumor Conference    His case was discussed at the Multidisciplinary Head and Neck Team Planning Meeting.    Representatives from Medical Oncology, Radiation Oncology, Head and Neck Surgical Oncology, Psychosocial Oncology, and Speech and Language Pathology discussed the case with the following recommendations:    1) MRI with and without contrast  2) surgery with flap reconstruction              3/3/2021 Surgery    1.  Wide local  excision of basal cell carcinoma of the scalp measuring roughly 15 x 15 cm  2.  Right myocutaneous latissimus dorsi free flap with microvascular anastomosis to right superficial temporal vessels and right facial artery  3.  [place] of right external jugular vein graft measuring 8 cm in length  4.  [place] of split-thickness skin graft from right thigh measuring 8 x 10 cm  5.   Bifrontal craniotomy for tumor.  6.  Stealth navigation.  7.  Cranioplasty greater than 6 cm.        3/5/2021 Surgery    1.  Preparation of scalp wound measuring 15 x 15 cm  2.  Left l myocutaneous latissimus dorsi free flap with microvascular anastomosis to left superficial temporal vessels  3.  Split-thickness skin graft from left thigh measuring approximately 8 x 10 cm     6/22/2021 Cancer Staged    Staging form: Cutaneous Carcinoma of the Head and Neck, AJCC 8th Edition  - Pathologic stage from 6/22/2021: pT4b, pN0         Past Medical History:   Past Medical History:   Diagnosis Date    ADHD (attention deficit hyperactivity disorder)     Arthritis     Basal cell carcinoma (BCC) of left side of neck 08/2022    Chronic pain     Depression     Hydrocele in adult     Hyperlipidemia     Hypertension     Migraine     Squamous cell skin cancer, face 08/2022       Past Surgical HIstory:   Past Surgical History:   Procedure Laterality Date    BACK SURGERY  11/2020    CRANIOTOMY N/A 3/3/2021    Procedure: CRANIOTOMY, USING FRAMELESS STEREOTAXY, OPEN, BIFRONTAL;  Surgeon: Raheel Green MD;  Location: [address];  Service: Neurosurgery;  Laterality: N/A;  TOR I  ASA I  TYPE & CROSS 2 UNITS  REGULAR BED  [place] [facility]  CHILDRESS  [facility] CT    EXCISION OF LESION Left 8/24/2022    Procedure: EXCISION, LESION basal cell CA left neck--2 separate lesions;  Surgeon: Catalina Vidal MD;  Location: [facility] OR;  Service: ENT;  Laterality: Left;    FLAP PROCEDURE N/A 3/3/2021    Procedure: CREATION, FREE FLAP;  Surgeon: Ayaan Aguilar MD;  Location:  [facility] OR 2ND FLR;  Service: ENT;  Laterality: N/A;  Latissimus free flap. Will need bean bag and Chiquita.     FLAP PROCEDURE Left 3/5/2021    Procedure: CREATION, FREE FLAP;  Surgeon: Ayaan Aguilar MD;  Location: [facility] OR 2ND FLR;  Service: ENT;  Laterality: Left;    INCISION AND DRAINAGE, LOWER EXTREMITY Right 8/7/2024    Procedure: INCISION AND DRAINAGE, LOWER EXTREMITY;  Surgeon: Blake Barton MD;  Location: [facility] OR;  Service: Orthopedics;  Laterality: Right;    LAPAROSCOPIC REPAIR OF HERNIA      RHIZOTOMY W/ RADIOFREQUENCY ABLATION Bilateral     two procedures    SURGICAL REMOVAL OF LESION OF FACE Right 8/24/2022    Procedure: EXCISION, LESION, FACE ;  Surgeon: Catalina Vidal MD;  Location: [facility] OR;  Service: ENT;  Laterality: Right;    TENDON REPAIR Right     thumb       Family History:   Family History   Problem Relation Name Age of Onset    Depression Mother      Early death Mother  40    Alcohol abuse Father      Depression Father      No Known Problems Sister      Headaches Sister      Hypertension Brother      No Known Problems Maternal Aunt      No Known Problems Maternal Uncle      No Known Problems Paternal Aunt      No Known Problems Paternal Uncle      No Known Problems Maternal Grandmother      No Known Problems Maternal Grandfather      No Known Problems Paternal Grandmother      No Known Problems Paternal Grandfather      No Known Problems Other         Social History:  reports that he has been smoking cigarettes. He has a 5 pack-year smoking history. He has been exposed to tobacco smoke. He has quit using smokeless tobacco. He reports current alcohol use. He reports that he does not currently use drugs.    Allergies:  Review of patient's allergies indicates:  No Known Allergies    Medications:  Current Outpatient Medications   Medication Sig Dispense Refill    acetaminophen (TYLENOL) 325 MG tablet Take 325 mg by mouth every 4 (four) hours as needed.      amlodipine-benazepril 10-20mg  "(LOTREL) 10-20 mg per capsule Take 1 capsule by mouth once daily.      aspirin (ECOTRIN) 81 MG EC tablet Take 81 mg by mouth.      atorvastatin (LIPITOR) 10 MG tablet Take 10 mg by mouth.      azelastine (ASTELIN) 137 mcg (0.1 %) nasal spray 1 puff in each nostril Nasally Twice a day for 30 days      buPROPion (WELLBUTRIN XL) 150 MG TB24 tablet Take by mouth.      gabapentin (NEURONTIN) 600 MG tablet Take 600 mg by mouth.      ibuprofen (ADVIL,MOTRIN) 800 MG tablet Take 800 mg by mouth every 8 (eight) hours as needed.      pantoprazole (PROTONIX) 40 MG tablet Take 40 mg by mouth.      SENNA LAXATIVE 8.6 mg tablet Take 2 tablets by mouth.      sildenafiL (VIAGRA) 50 MG tablet Take 50 mg by mouth daily as needed.      tadalafiL (CIALIS) 20 MG Tab Take 20 mg by mouth As instructed.      vismodegib (ERIVEDGE) 150 mg Cap Take 1 capsule (150 mg) by mouth once daily. 30 capsule 3    meclizine (ANTIVERT) 25 mg tablet Take 1 tablet (25 mg total) by mouth 3 (three) times daily as needed for Dizziness. 15 tablet 0     No current facility-administered medications for this visit.       Review of Systems   Constitutional:  Negative for fever.   HENT: Negative.     Respiratory:  Negative for shortness of breath.    Cardiovascular:  Negative for chest pain and palpitations.   Gastrointestinal: Negative.    Genitourinary: Negative.    Musculoskeletal:  Positive for back pain.   Skin:  Positive for wound (Frontal scalp).   Hematological: Negative.    Psychiatric/Behavioral: Negative.         ECOG Performance Status:   ECOG SCORE             Objective:      Vitals:   Vitals:    12/16/24 0852   BP: 112/78   BP Location: Right arm   Patient Position: Sitting   Pulse: 66   Resp: 16   Temp: 98.4 °F (36.9 °C)   TempSrc: Temporal   SpO2: 96%   Weight: 62.1 kg (136 lb 14.5 oz)   Height: 5' 9" (1.753 m)     BMI: Body mass index is 20.22 kg/m².    Physical Exam  HENT:      Head:        Comments: Surgery site, exposed hardware to R forehead. No " signs infection to site.      Nose: Nose normal.      Mouth/Throat:      Mouth: Mucous membranes are moist.      Pharynx: Oropharynx is clear.   Eyes:      Pupils: Pupils are equal, round, and reactive to light.   Cardiovascular:      Rate and Rhythm: Normal rate and regular rhythm.      Heart sounds: Normal heart sounds.   Pulmonary:      Effort: Pulmonary effort is normal.      Breath sounds: Normal breath sounds.   Abdominal:      General: Bowel sounds are normal.   Musculoskeletal:         General: Normal range of motion.      Cervical back: Normal range of motion.   Skin:     General: Skin is warm and dry.   Neurological:      Mental Status: He is alert.         Laboratory Data:  Recent Results (from the past 24 hours)   CBC Auto Differential    Collection Time: 12/16/24  8:28 AM   Result Value Ref Range    WBC 8.22 3.90 - 12.70 K/uL    RBC 4.59 (L) 4.60 - 6.20 M/uL    Hemoglobin 13.3 (L) 14.0 - 18.0 g/dL    Hematocrit 39.4 (L) 40.0 - 54.0 %    MCV 86 82 - 98 fL    MCH 29.0 27.0 - 31.0 pg    MCHC 33.8 32.0 - 36.0 g/dL    RDW 13.6 11.5 - 14.5 %    Platelets 251 150 - 450 K/uL    MPV 8.5 (L) 9.2 - 12.9 fL    Immature Granulocytes 0.2 0.0 - 0.5 %    Gran # (ANC) 4.9 1.8 - 7.7 K/uL    Immature Grans (Abs) 0.02 0.00 - 0.04 K/uL    Lymph # 2.1 1.0 - 4.8 K/uL    Mono # 0.9 0.3 - 1.0 K/uL    Eos # 0.3 0.0 - 0.5 K/uL    Baso # 0.07 0.00 - 0.20 K/uL    nRBC 0 0 /100 WBC    Gran % 59.6 38.0 - 73.0 %    Lymph % 25.3 18.0 - 48.0 %    Mono % 10.5 4.0 - 15.0 %    Eosinophil % 3.5 0.0 - 8.0 %    Basophil % 0.9 0.0 - 1.9 %    Differential Method Automated    Comprehensive Metabolic Panel    Collection Time: 12/16/24  8:28 AM   Result Value Ref Range    Sodium 139 136 - 145 mmol/L    Potassium 4.5 3.5 - 5.1 mmol/L    Chloride 106 95 - 110 mmol/L    CO2 21 (L) 23 - 29 mmol/L    Glucose 86 70 - 110 mg/dL    BUN 26 (H) 6 - 20 mg/dL    Creatinine 1.0 0.5 - 1.4 mg/dL    Calcium 9.7 8.7 - 10.5 mg/dL    Total Protein 7.2 6.0 - 8.4 g/dL     Albumin 4.1 3.5 - 5.2 g/dL    Total Bilirubin 0.6 0.1 - 1.0 mg/dL    Alkaline Phosphatase 86 40 - 150 U/L    AST 27 10 - 40 U/L    ALT 49 (H) 10 - 44 U/L    eGFR >60.0 >60 mL/min/1.73 m^2    Anion Gap 12 8 - 16 mmol/L          Imaging: NM PET CT FDG SKULL BASE TO MID THIGH     CLINICAL HISTORY:  Head/neck cancer, assess treatment response; Unspecified malignant neoplasm of skin, unspecified     TECHNIQUE:  10.9 mCi F18-FDG was administered intravenously via the left arm.  Approximately 60 minutes after radiotracer distribution, PET images were acquired from the skull vertex to the mid thigh. Transmission images were acquired to correct for attenuation using a whole body low-dose CT scan without contrast. Glycemia at the time of injection was 66 mg/dL.     COMPARISON:  None     FINDINGS:  Head and Neck:     Brain demonstrates normal metabolism.  However, FDG-PET has an approximate 60% sensitivity for brain metastases which are best detected by MRI with gadolinium.  Right frontal craniectomy changes are present.  Symmetric physiologic uptake is in the palatine tonsils.  No enlarged or FDG avid lymph nodes are in the neck.     Chest:     No FDG avid pulmonary lesions are present. Mild perihilar uptake is present bilaterally without a focal lymph node identified on noncontrast CT.  Max SUV of 3.0 on the right and 3.0 on the left.     Abdomen and Pelvis:     Physiologic uptake is in the liver, spleen, urinary system and bowel. No enlarged or FDG avid lymph nodes are in the abdomen or pelvis. Adrenal glands are normal.     Musculoskeletal:     No FDG avid osseous lesions are present.     Impression:     1. No definite FDG avid malignancy is present on today's exam.  2. Symmetric perihilar uptake without a definite lymph node/mass, favored to be reactive.  Attention on follow-up.        Electronically signed by:Nicolas Stone MD  Date:                                            10/02/2024   Assessment:       1. Basal  cell carcinoma (BCC) of left side of neck    2. Mixed hyperlipidemia    3. Smoking history    4. Immunosuppression due to drug therapy    5. Open wound of scalp, unspecified open wound type, sequela    6. Cancer associated pain    7. Hypertension, unspecified type    8. Depression, unspecified depression type    9. Lightheadedness           Plan:     Basal cell carcinoma:   Cancer Staging   Basal cell carcinoma (BCC) of left side of neck  Staging form: Melanoma of the Skin, AJCC 8th Edition  - Clinical: No stage assigned - Unsigned  Staging form: Cutaneous Carcinoma of the Head and Neck, AJCC 8th Edition  - Pathologic stage from 6/22/2021: pT4b, pN0 - Signed by Catalina Vidal MD on 6/22/2021     I reviewed independently the patient medical record including his laboratory pathologic and radiologic findings.    His medical record also were reviewed at length.    His case was discussed with a neck tumor board.  The recommendation was to resume hedgehog inhibitor .  Started on Erivedge on October 25, 2024.  He is tolerating it with minimal side effects.    Labs drawn on 12/16/24 stable.   He will be seen back again in 1 month for a follow up visit with repeat CBC CMP.      Visit today included increased complexity associated with the care of the episodic problem  addressed and managing the longitudinal care of the patient due to the serious and/or complex managed problem(s) Basal cell carcinoma.     Open wound affecting his scalp  The patient was advised  antibiotic prevent infection.    He will be referred back to see Dr. Ely in for further recommendation for local wound care. Follow up scheduled 1/23/25.     Lightheadedness and dizziness.    The patient was advised to increase his p.o. intake and to keep himself hydrated.    Even though he has been able to eat his dyskinesia is causing him to eat less.    He was advised to split his meals into small snacks to prevent hypoglycemia.       Immunosuppression due to  chemotherapy  No signs of infection.  Will continue to monitor fever       Cancer related pain.    Followed by the palliative care team.    Currently on Norco 10/325 1 tablet by mouth every 12 hours as needed.     Dyslipidemia and hypertension:  On atorvastatin and Lotrel.     Tobacco abuse  He was advised to quit smoking     Depression.    The patient currently is on Wellbutrin.    He is followed by Psychiatry        Med Onc Chart Routing      Follow up with physician 1 month. with Dr Loera with repeat cbc, cmp.   Follow up with JODI    Infusion scheduling note    Injection scheduling note    Labs    Imaging    Pharmacy appointment    Other referrals                  Plan was discussed with the patient at length, and he verbalized understanding. Margarito was given an opportunity to ask questions that were answered to his satisfaction, and he was advised to call in the interval if any problems or questions arise.    Assessment/Plan reviewed and approved by Dr Loera    31 minutes were spent in coordination of patient's care, record review and counseling.    ADRIENNE Finn, FNP-C  Hematology & Oncology

## 2024-12-16 ENCOUNTER — LAB VISIT (OUTPATIENT)
Dept: LAB | Facility: HOSPITAL | Age: 59
End: 2024-12-16
Attending: INTERNAL MEDICINE
Payer: MEDICARE

## 2024-12-16 ENCOUNTER — OFFICE VISIT (OUTPATIENT)
Dept: HEMATOLOGY/ONCOLOGY | Facility: CLINIC | Age: 59
End: 2024-12-16
Payer: MEDICARE

## 2024-12-16 VITALS
BODY MASS INDEX: 20.27 KG/M2 | SYSTOLIC BLOOD PRESSURE: 112 MMHG | HEART RATE: 66 BPM | WEIGHT: 136.88 LBS | OXYGEN SATURATION: 96 % | DIASTOLIC BLOOD PRESSURE: 78 MMHG | TEMPERATURE: 98 F | HEIGHT: 69 IN | RESPIRATION RATE: 16 BRPM

## 2024-12-16 VITALS
TEMPERATURE: 98 F | WEIGHT: 136.88 LBS | SYSTOLIC BLOOD PRESSURE: 112 MMHG | HEIGHT: 69 IN | RESPIRATION RATE: 16 BRPM | OXYGEN SATURATION: 96 % | DIASTOLIC BLOOD PRESSURE: 78 MMHG | HEART RATE: 66 BPM | BODY MASS INDEX: 20.27 KG/M2

## 2024-12-16 DIAGNOSIS — Z79.899 IMMUNOSUPPRESSION DUE TO DRUG THERAPY: ICD-10-CM

## 2024-12-16 DIAGNOSIS — Z87.891 SMOKING HISTORY: ICD-10-CM

## 2024-12-16 DIAGNOSIS — I10 HYPERTENSION, UNSPECIFIED TYPE: ICD-10-CM

## 2024-12-16 DIAGNOSIS — G89.3 CANCER ASSOCIATED PAIN: ICD-10-CM

## 2024-12-16 DIAGNOSIS — E78.2 MIXED HYPERLIPIDEMIA: ICD-10-CM

## 2024-12-16 DIAGNOSIS — C44.41 BASAL CELL CARCINOMA (BCC) OF LEFT SIDE OF NECK: ICD-10-CM

## 2024-12-16 DIAGNOSIS — R42 LIGHTHEADEDNESS: ICD-10-CM

## 2024-12-16 DIAGNOSIS — D84.821 IMMUNOSUPPRESSION DUE TO DRUG THERAPY: ICD-10-CM

## 2024-12-16 DIAGNOSIS — C44.41 BASAL CELL CARCINOMA (BCC) OF LEFT SIDE OF NECK: Primary | ICD-10-CM

## 2024-12-16 DIAGNOSIS — F32.A DEPRESSION, UNSPECIFIED DEPRESSION TYPE: ICD-10-CM

## 2024-12-16 DIAGNOSIS — S01.00XD OPEN WOUND OF SCALP, UNSPECIFIED OPEN WOUND TYPE, SUBSEQUENT ENCOUNTER: Primary | ICD-10-CM

## 2024-12-16 DIAGNOSIS — R42 VERTIGO: ICD-10-CM

## 2024-12-16 DIAGNOSIS — S01.00XS OPEN WOUND OF SCALP, UNSPECIFIED OPEN WOUND TYPE, SEQUELA: ICD-10-CM

## 2024-12-16 LAB
ALBUMIN SERPL BCP-MCNC: 4.1 G/DL (ref 3.5–5.2)
ALP SERPL-CCNC: 86 U/L (ref 40–150)
ALT SERPL W/O P-5'-P-CCNC: 49 U/L (ref 10–44)
ANION GAP SERPL CALC-SCNC: 12 MMOL/L (ref 8–16)
AST SERPL-CCNC: 27 U/L (ref 10–40)
BASOPHILS # BLD AUTO: 0.07 K/UL (ref 0–0.2)
BASOPHILS NFR BLD: 0.9 % (ref 0–1.9)
BILIRUB SERPL-MCNC: 0.6 MG/DL (ref 0.1–1)
BUN SERPL-MCNC: 26 MG/DL (ref 6–20)
CALCIUM SERPL-MCNC: 9.7 MG/DL (ref 8.7–10.5)
CHLORIDE SERPL-SCNC: 106 MMOL/L (ref 95–110)
CO2 SERPL-SCNC: 21 MMOL/L (ref 23–29)
CREAT SERPL-MCNC: 1 MG/DL (ref 0.5–1.4)
DIFFERENTIAL METHOD BLD: ABNORMAL
EOSINOPHIL # BLD AUTO: 0.3 K/UL (ref 0–0.5)
EOSINOPHIL NFR BLD: 3.5 % (ref 0–8)
ERYTHROCYTE [DISTWIDTH] IN BLOOD BY AUTOMATED COUNT: 13.6 % (ref 11.5–14.5)
EST. GFR  (NO RACE VARIABLE): >60 ML/MIN/1.73 M^2
GLUCOSE SERPL-MCNC: 86 MG/DL (ref 70–110)
HCT VFR BLD AUTO: 39.4 % (ref 40–54)
HGB BLD-MCNC: 13.3 G/DL (ref 14–18)
IMM GRANULOCYTES # BLD AUTO: 0.02 K/UL (ref 0–0.04)
IMM GRANULOCYTES NFR BLD AUTO: 0.2 % (ref 0–0.5)
LYMPHOCYTES # BLD AUTO: 2.1 K/UL (ref 1–4.8)
LYMPHOCYTES NFR BLD: 25.3 % (ref 18–48)
MCH RBC QN AUTO: 29 PG (ref 27–31)
MCHC RBC AUTO-ENTMCNC: 33.8 G/DL (ref 32–36)
MCV RBC AUTO: 86 FL (ref 82–98)
MONOCYTES # BLD AUTO: 0.9 K/UL (ref 0.3–1)
MONOCYTES NFR BLD: 10.5 % (ref 4–15)
NEUTROPHILS # BLD AUTO: 4.9 K/UL (ref 1.8–7.7)
NEUTROPHILS NFR BLD: 59.6 % (ref 38–73)
NRBC BLD-RTO: 0 /100 WBC
PLATELET # BLD AUTO: 251 K/UL (ref 150–450)
PMV BLD AUTO: 8.5 FL (ref 9.2–12.9)
POTASSIUM SERPL-SCNC: 4.5 MMOL/L (ref 3.5–5.1)
PROT SERPL-MCNC: 7.2 G/DL (ref 6–8.4)
RBC # BLD AUTO: 4.59 M/UL (ref 4.6–6.2)
SODIUM SERPL-SCNC: 139 MMOL/L (ref 136–145)
WBC # BLD AUTO: 8.22 K/UL (ref 3.9–12.7)

## 2024-12-16 PROCEDURE — 1160F RVW MEDS BY RX/DR IN RCRD: CPT | Mod: CPTII,S$GLB,,

## 2024-12-16 PROCEDURE — 3078F DIAST BP <80 MM HG: CPT | Mod: CPTII,S$GLB,, | Performed by: NURSE PRACTITIONER

## 2024-12-16 PROCEDURE — 3044F HG A1C LEVEL LT 7.0%: CPT | Mod: CPTII,S$GLB,,

## 2024-12-16 PROCEDURE — 3044F HG A1C LEVEL LT 7.0%: CPT | Mod: CPTII,S$GLB,, | Performed by: NURSE PRACTITIONER

## 2024-12-16 PROCEDURE — 4010F ACE/ARB THERAPY RXD/TAKEN: CPT | Mod: CPTII,S$GLB,, | Performed by: NURSE PRACTITIONER

## 2024-12-16 PROCEDURE — 99214 OFFICE O/P EST MOD 30 MIN: CPT | Mod: S$GLB,,,

## 2024-12-16 PROCEDURE — 99213 OFFICE O/P EST LOW 20 MIN: CPT | Mod: S$GLB,,, | Performed by: NURSE PRACTITIONER

## 2024-12-16 PROCEDURE — 36415 COLL VENOUS BLD VENIPUNCTURE: CPT | Mod: PN | Performed by: INTERNAL MEDICINE

## 2024-12-16 PROCEDURE — 3074F SYST BP LT 130 MM HG: CPT | Mod: CPTII,S$GLB,,

## 2024-12-16 PROCEDURE — G2211 COMPLEX E/M VISIT ADD ON: HCPCS | Mod: S$GLB,,,

## 2024-12-16 PROCEDURE — 4010F ACE/ARB THERAPY RXD/TAKEN: CPT | Mod: CPTII,S$GLB,,

## 2024-12-16 PROCEDURE — 85025 COMPLETE CBC W/AUTO DIFF WBC: CPT | Mod: PN | Performed by: INTERNAL MEDICINE

## 2024-12-16 PROCEDURE — 80053 COMPREHEN METABOLIC PANEL: CPT | Mod: PN | Performed by: INTERNAL MEDICINE

## 2024-12-16 PROCEDURE — 99999 PR PBB SHADOW E&M-EST. PATIENT-LVL III: CPT | Mod: PBBFAC,,, | Performed by: NURSE PRACTITIONER

## 2024-12-16 PROCEDURE — 1159F MED LIST DOCD IN RCRD: CPT | Mod: CPTII,S$GLB,,

## 2024-12-16 PROCEDURE — 3008F BODY MASS INDEX DOCD: CPT | Mod: CPTII,S$GLB,, | Performed by: NURSE PRACTITIONER

## 2024-12-16 PROCEDURE — 3074F SYST BP LT 130 MM HG: CPT | Mod: CPTII,S$GLB,, | Performed by: NURSE PRACTITIONER

## 2024-12-16 PROCEDURE — 99999 PR PBB SHADOW E&M-EST. PATIENT-LVL IV: CPT | Mod: PBBFAC,,,

## 2024-12-16 PROCEDURE — 3078F DIAST BP <80 MM HG: CPT | Mod: CPTII,S$GLB,,

## 2024-12-16 PROCEDURE — 3008F BODY MASS INDEX DOCD: CPT | Mod: CPTII,S$GLB,,

## 2024-12-16 NOTE — PROGRESS NOTES
Note to patients: In accordance with the  Century Cures Act, patients are now granted immediate electronic access to their medical records. This note is primarily intended for communication among medical professionals. As a result, it may incorporate medical terminology, abbreviations, or language that could appear blunt or unfamiliar. If you have questions about this document, we encourage you to discuss it with your physician.      [facility]  Head & Neck Surgical Oncology Clinic    Patient: Margarito Mcnamara    : 1965    MRN: [id number]  Primary Care Provider: Wilfredo Luna MD  Referring Provider: No ref. provider found   Rad Onc: Dr. Cherry  Derm: Kd Scales MD   Rad Onc: Dr. Cherry   Date of Encounter: 2024    DIAGNOSIS:    Cancer Staging   Basal cell carcinoma (BCC) of left side of neck  Staging form: Melanoma of the Skin, AJCC 8th Edition  - Clinical: No stage assigned - Unsigned  Staging form: Cutaneous Carcinoma of the Head and Neck, AJCC 8th Edition  - Pathologic stage from 2021: pT4b, pN0 - Signed by Catalina Vidal MD on 2021      CC:   Chief Complaint   Patient presents with    Basal cell carcinoma (BCC) of scalp       HPI:   Margarito Mcnamara is a 59 y.o. male with a past medical history significant for HTN, BCC, malanoma who is being seen today for concerns of cancer re occurrence on his forehead.     Patient of Dr. Vidal who was last seen 3/2023. He has a history of recurrent basal cell carcinoma of the right forehead and scalp. Patient was previously treated with a hedgehog inhibitor, Erivedge, in 2018 with good response. However, patient discontinued this prematurely after 6 months and the tumor has recurred. It recurred over 2 years year ago. Erivedge was again prescribed but denied by the insurance company X 2 but was finally started at the time he last saw Dr. Vidal in 3/2023.     He is having a lot of pain in his back and also on his  "head at the area of his insurance. He has not been taking any pain medication for the last 2 years. He first started with an open sore around 1.5 years ago on his right forehead that has gotten worse. He has been having drainage from the site on his forehead. He has not been seen yet for this area of concern. He has not been seen by derm for a couple of years. He has been having watery eyes for the last 2 years. He has needle sensation in both eyes. He has blurred vision in the right eye for the last 2 years and some in the left eye. He has not followed up with an eye doctor as well.     Patient denies pain, fever, chills, night sweats, unintentional weight loss, neck mass, enlarged lymph nodes outside of the head or neck, odynophagia, dysphagia, globus sensation, voice changes, cough, hemoptysis, shortness of breath, or new or concerning skin lesions. Patient denies personal or family history of head and neck cancer. Patient denies history of radiation exposure.    10/2/24: He is here today to review his pathology report from last visit, which confirmed recurrent basal cell carcinoma. He is still having problems with his eye. He has not made any of the referrals made for him at last visit.    11/18/24: Patient returns today for exposed hardware. He developed a "pimple" at the site that drained. He is on the hedgehog inhibitor still. No pain/bleeding.    12/16/24: He is here today for his 1 month wound check follow-up. He had an appointment with Med Onc today. He was recently seen in the ER for dehydration and given meclizine for vertigo. He was taking meclizine as needed. He did take one this AM and one yesterday. He took it today because he felt slightly dizzy this AM. He started with feeling lightheaded and dizzy the day he went to the ER. He felt room spinning and felt off when it happened. It lasted for hours and got better with IV hydration in the ED.He said he was told the dizziness was due to dehydration at " the ED.  He is having more pain at the area of his wound. He is seeing palliative care for pain control and managed with pain medication as needed. He feels the area on his scalp has gotten bigger.     Patient lives in [place].    TREATMENT HISTORY:    2018: treated with a hedgehog inhibitor, Erivedge, in 2018 with good response and again in 2023    3/2021:   1.  Bifrontal craniotomy for tumor.  2.  Stealth navigation.  3.  Cranioplasty greater than 6 cm.   1.  Wide local excision of basal cell carcinoma of the scalp measuring roughly 15 x 15 cm  2.  Right myocutaneous latissimus dorsi free flap with microvascular anastomosis to right superficial temporal vessels and right facial artery  3.  [place] of right external jugular vein graft measuring 8 cm in length  4.  [place] of split-thickness skin graft from right thigh measuring 8 x 10 cm  5.  Indocyanine green angiography    2021: radiation     2022: excision of 3 carcinomas involving his left neck and right jawline     SUBSTANCE USE:  Smokinppd [id number]  Alcohol: occ beer  Denies hookah, chewing tobacco, marijuana, betel nut, illicit drug, heavy cigar, vape use.    ALLERGIES:  Review of patient's allergies indicates:  No Known Allergies      MEDICATIONS:    Current Outpatient Medications:     acetaminophen (TYLENOL) 325 MG tablet, Take 325 mg by mouth every 4 (four) hours as needed., Disp: , Rfl:     amlodipine-benazepril 10-20mg (LOTREL) 10-20 mg per capsule, Take 1 capsule by mouth once daily., Disp: , Rfl:     aspirin (ECOTRIN) 81 MG EC tablet, Take 81 mg by mouth., Disp: , Rfl:     atorvastatin (LIPITOR) 10 MG tablet, Take 10 mg by mouth., Disp: , Rfl:     azelastine (ASTELIN) 137 mcg (0.1 %) nasal spray, 1 puff in each nostril Nasally Twice a day for 30 days, Disp: , Rfl:     buPROPion (WELLBUTRIN XL) 150 MG TB24 tablet, Take by mouth., Disp: , Rfl:     gabapentin (NEURONTIN) 600 MG tablet, Take 600 mg by mouth., Disp: , Rfl:     ibuprofen  "(ADVIL,MOTRIN) 800 MG tablet, Take 800 mg by mouth every 8 (eight) hours as needed., Disp: , Rfl:     meclizine (ANTIVERT) 25 mg tablet, Take 1 tablet (25 mg total) by mouth 3 (three) times daily as needed for Dizziness., Disp: 15 tablet, Rfl: 0    pantoprazole (PROTONIX) 40 MG tablet, Take 40 mg by mouth., Disp: , Rfl:     SENNA LAXATIVE 8.6 mg tablet, Take 2 tablets by mouth., Disp: , Rfl:     sildenafiL (VIAGRA) 50 MG tablet, Take 50 mg by mouth daily as needed., Disp: , Rfl:     tadalafiL (CIALIS) 20 MG Tab, Take 20 mg by mouth As instructed., Disp: , Rfl:     vismodegib (ERIVEDGE) 150 mg Cap, Take 1 capsule (150 mg) by mouth once daily., Disp: 30 capsule, Rfl: 3    OTHER HISTORY  Past medical, surgical, family, and social histories have been updated and reviewed as needed since last visit.    REVIEW OF SYSTEMS:   Comprehensive review of systems was discussed with the patient.  It is positive only for the above complaints.    PHYSICAL EXAMINATION:  Blood pressure 112/78, pulse 66, temperature 98.4 °F (36.9 °C), temperature source Temporal, resp. rate 16, height 5' 9" (1.753 m), weight 62.1 kg (136 lb 14.5 oz), SpO2 96%.    Constitutional: Ill-appearing  Voice: Non-dysphonic, speaking in full sentences.   Head and face: Deformed anterior skull consistent with surgical sequale, small exposed titanium mesh anteriorly  Skin: Thickened skin with telangectasia concerning for recurrent basal cell  Eyes: Blurred vision  Ears: Bilateral pinna, mastoid, external ear canal normal. Hearing intact.   Nose: External nose appears normal.   Lips: No ulcers or lesions  Neck: Woody, surgical scars, no masses or lymphadenopathy. No palpable thyroid enlargement or nodules.  Respiratory: Chest expansion symmetric, no audible stridor or stertor. Breathing is unlabored. No active cough.    CLINICAL PHOTOS:  12/16/24:       11/2024              DATA REVIEWED:     LABORATORY:  N/A    PATHOLOGY:        DIAGNOSIS:   08/28/2022  JL/luis,sammie "     1.  SCALP BIOPSY:   - NO TUMOR SEEN.   - ULCER BASE.     2-6.  SKIN AND SUBCUTANEOUS TISSUE LEFT INFERIOR NECK MARGINS, EXCISION:   - NO TUMOR SEEN.     7.  SUBCUTANEOUS TISSUE OF LEFT ANTERIOR NECK, EXCISION:   - BASAL CELL CARCINOMA.   - MARGINS NEGATIVE FOR TUMOR.   - NEGATIVE FOR PERINEURAL INVASION.     8.  SKIN AND SUBCUTANEOUS TISSUE, LEFT SUPERIOR NECK, EXCISION:   - BASAL CELL CARCINOMA.   - MARGINS NEGATIVE FOR TUMOR.   - NEGATIVE FOR PERINEURAL INVASION.   - SEE COMMENT.     9-12.  SKIN AND SUBCUTANEOUS TISSUE RIGHT JAW MARGINS, EXCISION:   - NO TUMOR SEEN.     13.  SKIN AND SUBCUTANEOUS TISSUE RIGHT JAW, EXCISION:   - IN-SITU SQUAMOUS CARCINOMA.   - MARGINS NEGATIVE FOR TUMOR.     Comment: The tumor in Part 8 is present only on the frozen section    slides.       IMAGING:    CT HEAD WITHOUT CONTRAST 8/3/24  Impression:  -Postoperative changes of the right hemicalvarium with duraplasty material in place.  High attenuation material overlying the right parietal convexity appears to extend from the duraplasty material.  No ruperto hemorrhage.  Follow-up with contrast enhanced MRI of the brain may be obtained, as clinically warranted.   -Abnormal mineralization of the calvarium.  Diffuse marrow infiltration is suspected.  Follow-up with PET-CT scan may be obtained, as clinically warranted  -This report was flagged in Epic as abnormal.    MRI BRAIN W WO CONTRAST 8/19/24  Impression:  Stable MRI of the brain demonstrating postoperative changes of right frontal craniotomy and right parietal pachymeningeal enhancement.    PET 10/2/24  1. No definite FDG avid malignancy is present on today's exam.  2. Symmetric perihilar uptake without a definite lymph node/mass, favored to be reactive.  Attention on follow-up.    5/2024  MRI orbits Asymmetric soft tissue thickening and enhancement involving the right inferior periorbital soft tissues may reflect preseptal cellulitis/infection in the appropriate clinical  setting.  There is a small subcentimeter hypoattenuating focus centrally within the region of soft tissue induration which may reflect a tiny abscess as discussed above.  No evidence to suggest postseptal cellulitis.     Partially visualized postoperative changes of the frontal calvarium with mild nonspecific heterogeneity of the visualized inferior calvarium with overlying soft tissue thickening.  This may reflect post-treatment or postoperative change, however clinical correlation and appropriate follow-up is advised.     Mild paranasal sinus disease as above.    RADIOLOGY REVIEWED:  I have independently viewed and agree with the images and reports which demonstrate surgical sequela as above, with thickening in the right periorbital soft tissue.    ASSESSMENT AND PLAN:  1. Open wound of scalp, unspecified open wound type, subsequent encounter    2. Basal cell carcinoma (BCC) of left side of neck    3. Immunosuppression due to drug therapy    4. Cancer associated pain    5. Vertigo        Margarito Mcnamara is a 59 y.o. male with T4b basal cell carcinoma, lost to followup, now with recurrence. On hedgehog inhibitor with exposed hardware.  - Continue local wound care  - Plate removal and reconstruction would be extremely morbid - may be necessary at some point but not currently  - Would recommend continuing treatment until BCC is gone and then can address wound unless complications develop  - Follow-up with PCP for dizziness and dehydration  - He took meclizine today so unable to perform Maral babb-pike  maneuver in clinic today  - If restarts with room spinning with head mt call back so I can schedule him and audio and ENT visit next door to assess further    Patient encouraged to call with any questions, concerns, or new or worsening symptoms. 25 minutes spend in direct patient care and documentation     Follow up 1 month for wound recheck    Discussed visit as well as pictures with Dr. Ely and agrees with  above

## 2025-01-02 DIAGNOSIS — C44.41 BASAL CELL CARCINOMA (BCC) OF LEFT SIDE OF NECK: ICD-10-CM

## 2025-01-21 ENCOUNTER — OFFICE VISIT (OUTPATIENT)
Dept: HEMATOLOGY/ONCOLOGY | Facility: CLINIC | Age: 60
End: 2025-01-21
Payer: MEDICARE

## 2025-01-21 DIAGNOSIS — Z87.891 SMOKING HISTORY: ICD-10-CM

## 2025-01-21 DIAGNOSIS — D84.821 IMMUNOSUPPRESSION DUE TO DRUG THERAPY: ICD-10-CM

## 2025-01-21 DIAGNOSIS — C44.41 BASAL CELL CARCINOMA (BCC) OF LEFT SIDE OF NECK: Primary | ICD-10-CM

## 2025-01-21 DIAGNOSIS — Z79.899 IMMUNOSUPPRESSION DUE TO DRUG THERAPY: ICD-10-CM

## 2025-01-21 DIAGNOSIS — S01.00XS OPEN WOUND OF SCALP, UNSPECIFIED OPEN WOUND TYPE, SEQUELA: ICD-10-CM

## 2025-01-21 DIAGNOSIS — F32.A DEPRESSION, UNSPECIFIED DEPRESSION TYPE: ICD-10-CM

## 2025-01-21 DIAGNOSIS — G89.3 CANCER ASSOCIATED PAIN: ICD-10-CM

## 2025-01-21 PROCEDURE — 98005 SYNCH AUDIO-VIDEO EST LOW 20: CPT | Mod: 95,,, | Performed by: INTERNAL MEDICINE

## 2025-01-21 PROCEDURE — G2211 COMPLEX E/M VISIT ADD ON: HCPCS | Mod: 95,,, | Performed by: INTERNAL MEDICINE

## 2025-01-21 NOTE — TELEPHONE ENCOUNTER
----- Message from [place] sent at 1/21/2025 11:46 AM CST -----  Type:  RX Refill Request    Who Called:  Patient   Refill or New Rx:  refill  RX Name and Strength:  hydrocodone  mg  How is the patient currently taking it? (ex. 1XDay):    Is this a 30 day or 90 day RX:    Preferred Pharmacy with phone number: [facility]/pharmacy #[id number] - [place], [place] - [address]   Local or Mail Order:  local  Ordering Provider:  ANDREA Silver  [facility] Call Back Number:  [phone]  Additional Information:  Patient is requesting a refill

## 2025-01-21 NOTE — PROGRESS NOTES
FOLLOW UP TELEMEDICINE VISIT    Subjective:      Patient ID: Margarito Mcnamara is a 59 y.o. male.  MRN: [id number]  : 1965    An audio and visual care visit was performed with the patient because of the COVID-19 pandemic recommendations for social distancing.    TELEMEDICINE  The patient location is: home  The chief complaint leading to consultation is: Basal cell cancer  Visit type: Virtual visit with synchronous audio and video      25 minutes of total time spent on the encounter, which includes face to face time and non-face to face time preparing to see the patient (eg, review of tests), obtaining and/or reviewing separately obtained history, documenting clinical information in the electronic or other health record, independently interpreting results (not separately reported) and communicating results to the patient/family/caregiver, or care coordination (not separately reported).    Each patient to whom he or she provides medical services by telemedicine is:  (1) informed of the relationship between the physician and patient and the respective role of any other health care provider with respect to management of the patient; and (2) notified that he or she may decline to receive medical services by telemedicine and may withdraw from such care at any time.    History of Present Illness:   HPI Margarito Mcnamara is a 59 y.o. male presents for evaluation of his recurrent Basal cell carcinoma (BCC) of left side of neck.  He is reporting to this visit by himself.        He started on Erivedge on 2024.  He is reporting headaches and back pain.    The patient denies CP, cough, SOB, abdominal pain, nausea, vomiting, constipation. The patient denies fever, chills, night sweats, weight loss, new lumps or bumps, easy bruising or bleeding.   Oncology History:  Oncology History   Basal cell carcinoma (BCC) of left side of neck   2021 Initial Diagnosis    Basal cell carcinoma (BCC) of scalp     2021  Tumor Conference    His case was discussed at the Multidisciplinary Head and Neck Team Planning Meeting.    Representatives from Medical Oncology, Radiation Oncology, Head and Neck Surgical Oncology, Psychosocial Oncology, and Speech and Language Pathology discussed the case with the following recommendations:    1) WLE with reconstruction  2) adjuvant radiation               2/4/2021 Tumor Conference    His case was discussed at the Multidisciplinary Head and Neck Team Planning Meeting.    Representatives from Medical Oncology, Radiation Oncology, Head and Neck Surgical Oncology, Psychosocial Oncology, and Speech and Language Pathology discussed the case with the following recommendations:    1) MRI with and without contrast  2) surgery with flap reconstruction              3/3/2021 Surgery    1.  Wide local excision of basal cell carcinoma of the scalp measuring roughly 15 x 15 cm  2.  Right myocutaneous latissimus dorsi free flap with microvascular anastomosis to right superficial temporal vessels and right facial artery  3.  [place] of right external jugular vein graft measuring 8 cm in length  4.  [place] of split-thickness skin graft from right thigh measuring 8 x 10 cm  5.   Bifrontal craniotomy for tumor.  6.  Stealth navigation.  7.  Cranioplasty greater than 6 cm.        3/5/2021 Surgery    1.  Preparation of scalp wound measuring 15 x 15 cm  2.  Left l myocutaneous latissimus dorsi free flap with microvascular anastomosis to left superficial temporal vessels  3.  Split-thickness skin graft from left thigh measuring approximately 8 x 10 cm     6/22/2021 Cancer Staged    Staging form: Cutaneous Carcinoma of the Head and Neck, AJCC 8th Edition  - Pathologic stage from 6/22/2021: pT4b, pN0        Past medical, surgical, family, and social history were reviewed today and there are no changes of note unless mentioned in HPI.   MEDS and ALLERGIES were reviewed with patient and meds reconciled.     History:  Past  Medical History:   Diagnosis Date    ADHD (attention deficit hyperactivity disorder)     Arthritis     Basal cell carcinoma (BCC) of left side of neck 08/2022    Chronic pain     Depression     Hydrocele in adult     Hyperlipidemia     Hypertension     Migraine     Squamous cell skin cancer, face 08/2022      Past Surgical History:   Procedure Laterality Date    BACK SURGERY  11/2020    CRANIOTOMY N/A 3/3/2021    Procedure: CRANIOTOMY, USING FRAMELESS STEREOTAXY, OPEN, BIFRONTAL;  Surgeon: Raheel Green MD;  Location: [facility] OR [address] [place] [address];  Service: Neurosurgery;  Laterality: N/A;  TOR I  ASA I  TYPE & CROSS 2 UNITS  REGULAR BED  [facility] HEADREST  [facility] CT    EXCISION OF LESION Left 8/24/2022    Procedure: EXCISION, LESION basal cell CA left neck--2 separate lesions;  Surgeon: Catalina Vidal MD;  Location: [facility] OR;  Service: ENT;  Laterality: Left;    FLAP PROCEDURE N/A 3/3/2021    Procedure: CREATION, FREE FLAP;  Surgeon: Ayaan Aguilar MD;  Location: [facility] OR [address] [place] [address];  Service: ENT;  Laterality: N/A;  Latissimus free flap. Will need bean bag and Leon.     FLAP PROCEDURE Left 3/5/2021    Procedure: CREATION, FREE FLAP;  Surgeon: Ayaan Aguilar MD;  Location: [facility] OR [address] [place] [address];  Service: ENT;  Laterality: Left;    INCISION AND DRAINAGE, LOWER EXTREMITY Right 8/7/2024    Procedure: INCISION AND DRAINAGE, LOWER EXTREMITY;  Surgeon: Blake Barton MD;  Location: [facility] OR;  Service: Orthopedics;  Laterality: Right;    LAPAROSCOPIC REPAIR OF HERNIA      RHIZOTOMY W/ RADIOFREQUENCY ABLATION Bilateral     two procedures    SURGICAL REMOVAL OF LESION OF FACE Right 8/24/2022    Procedure: EXCISION, LESION, FACE ;  Surgeon: Catalina Vidal MD;  Location: [facility] OR;  Service: ENT;  Laterality: Right;    TENDON REPAIR Right     thumb     Family History   Problem Relation Name Age of Onset    Depression Mother      Early death Mother  40    Alcohol abuse Father      Depression Father      No Known Problems  Sister      Headaches Sister      Hypertension Brother      No Known Problems Maternal Aunt      No Known Problems Maternal Uncle      No Known Problems Paternal Aunt      No Known Problems Paternal Uncle      No Known Problems Maternal Grandmother      No Known Problems Maternal Grandfather      No Known Problems Paternal Grandmother      No Known Problems Paternal Grandfather      No Known Problems Other        Social History     Tobacco Use    Smoking status: Every Day     Current packs/day: 0.50     Average packs/day: 0.5 packs/day for 10.0 years (5.0 ttl pk-yrs)     Types: Cigarettes     Passive exposure: Past    Smokeless tobacco: Former    Tobacco comments:     1/2 pack per day   Substance and Sexual Activity    Alcohol use: Yes     Comment: ocassioanl beer    Drug use: Not Currently    Sexual activity: Yes     Partners: Female        ROS:  Answers submitted by the patient for this visit:  Review of Systems Questionnaire (Submitted on 1/21/2025)  appetite change : Yes  unexpected weight change: Yes  mouth sores: No  visual disturbance: No  cough: No  shortness of breath: No  chest pain: No  abdominal pain: No  diarrhea: No  frequency: No  back pain: Yes  rash: No  headaches: No  adenopathy: No  nervous/ anxious: No      Objective:   There were no vitals filed for this visit.  Wt Readings from Last 10 Encounters:   12/16/24 62.1 kg (136 lb 14.5 oz)   12/16/24 62.1 kg (136 lb 14.5 oz)   12/08/24 63.5 kg (140 lb)   11/18/24 61.6 kg (135 lb 12.9 oz)   11/15/24 62.7 kg (138 lb 3.7 oz)   10/22/24 63.5 kg (139 lb 15.9 oz)   10/14/24 62.2 kg (137 lb 2 oz)   10/10/24 62.5 kg (137 lb 12.6 oz)   10/03/24 63.6 kg (140 lb 3.4 oz)   09/19/24 62.8 kg (138 lb 7.2 oz)       Physical Examination:   Constitutional: he is alert, pleasant, and does not appear to be in any physical distress   HENT: Mouth/Throat:  Tongue is midline without evidence of glossitis  Eyes: No obvious jaundice or conjunctivitis.  EOM are normal.    Pulmonary/Chest: No stridor noted. No excess chest muscle movement.  Neurological: he is alert and oriented to person, place, and time. No cranial nerve deficit.  Skin:    The skin lesions affecting his called most of them has resolved.  Persistence of 1 lesion in the frontal scalp still open and showing the underlying mesh  Psychiatric: he has a normal mood and affect. Speech and memory normal.     Diagnostic Tests:  Significant Imaging:  I have reviewed and interpreted all pertinent imaging results/findings.    Laboratory Data:  Lab Results   Component Value Date    WBC 8.22 12/16/2024    HGB 13.3 (L) 12/16/2024    HCT 39.4 (L) 12/16/2024     12/16/2024    CHOL 217 (H) 09/04/2020    TRIG 88 09/04/2020    HDL 38 (L) 09/04/2020    ALT 49 (H) 12/16/2024    AST 27 12/16/2024     12/16/2024    K 4.5 12/16/2024     12/16/2024    CREATININE 1.0 12/16/2024    BUN 26 (H) 12/16/2024    CO2 21 (L) 12/16/2024    TSH 0.936 12/08/2024    INR 1.0 08/19/2022    HGBA1C 5.9 08/03/2024        Labs:   Lab Results   Component Value Date    WBC 8.22 12/16/2024    RBC 4.59 (L) 12/16/2024    HGB 13.3 (L) 12/16/2024    HCT 39.4 (L) 12/16/2024    MCV 86 12/16/2024     12/16/2024    GLU 86 12/16/2024     12/16/2024    K 4.5 12/16/2024    BUN 26 (H) 12/16/2024    CREATININE 1.0 12/16/2024    AST 27 12/16/2024    ALT 49 (H) 12/16/2024    BILITOT 0.6 12/16/2024         Assessment/Plan:     ECOG SCORE    1 - Restricted in strenuous activity-ambulatory and able to carry out work of a light nature       Basal cell carcinoma:   Cancer Staging   Basal cell carcinoma (BCC) of left side of neck  Staging form: Melanoma of the Skin, AJCC 8th Edition  - Clinical: No stage assigned - Unsigned  Staging form: Cutaneous Carcinoma of the Head and Neck, AJCC 8th Edition  - Pathologic stage from 6/22/2021: pT4b, pN0 - Signed by Catalina Vidal MD on 6/22/2021     I reviewed independently the patient medical record including his  laboratory pathologic and radiologic findings.    His medical record also were reviewed at length.    His case was discussed with a neck tumor board.  The recommendation was to resume hedgehog inhibitor .  Started on Erivedge on October 25, 2024.  He is tolerating it with minimal side effects.    He was advised to repeat his CBC CMP on 1/23/2025.   He will be seen back again in one month for a follow up visit with repeat CBC CMP.          Open wound affecting his scalp  Was seen by  who recommended to continue on the same management.  He is due to be re-evaluated on January 23, 2025.     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  Will continue to monitor fever          Cancer related pain.    Followed by the palliative care team.    Currently on Norco 10/325 1 tablet by mouth every 12 hours as needed.        Tobacco abuse  He was advised to quit smoking     Depression.    The patient currently is on Wellbutrin.    He is followed by Psychiatry               Med Onc Chart Routing      Follow up with physician 1 month. With repeat CBC CMP.   Follow up with JODI    Infusion scheduling note    Injection scheduling note    Labs    Imaging    Pharmacy appointment    Other referrals                   Plan was discussed with the patient at length, and he verbalized understanding. Margarito was given an opportunity to ask questions that were answered to his satisfaction, and he was advised to call in the interval if any problems or questions arise.  Discussed COVID-19 and social distancing in great detail, avoid all non-essential visits out of the home if possible and avoid sick contacts.     Electronically signed by Blanca Loera MD

## 2025-01-23 RX ORDER — HYDROCODONE BITARTRATE AND ACETAMINOPHEN 5; 325 MG/1; MG/1
1 TABLET ORAL EVERY 6 HOURS PRN
Qty: 90 TABLET | Refills: 0 | OUTPATIENT
Start: 2025-01-23

## 2025-01-29 NOTE — PROGRESS NOTES
Note to patients: In accordance with the  Century Cures Act, patients are now granted immediate electronic access to their medical records. This note is primarily intended for communication among medical professionals. As a result, it may incorporate medical terminology, abbreviations, or language that could appear blunt or unfamiliar. If you have questions about this document, we encourage you to discuss it with your physician.      [facility]  Head & Neck Surgical Oncology Clinic    Patient: Margarito Mcnamara    : 1965    MRN: [id number]  Primary Care Provider: Wilfredo Luna MD  Referring Provider: No ref. provider found   Rad Onc: Dr. Cherry  Derm: Kd Scales MD   Rad Onc: Dr. Cherry   Date of Encounter: 2025    DIAGNOSIS:    Cancer Staging   Basal cell carcinoma (BCC) of left side of neck  Staging form: Melanoma of the Skin, AJCC 8th Edition  - Clinical: No stage assigned - Unsigned  Staging form: Cutaneous Carcinoma of the Head and Neck, AJCC 8th Edition  - Pathologic stage from 2021: pT4b, pN0 - Signed by Catalina Vidal MD on 2021      CC:   Chief Complaint   Patient presents with    Open wound of scalp, unspecified open wound type, subsequen       HPI:   Margarito Mcnamara is a 59 y.o. male with a past medical history significant for HTN, BCC, malanoma who is being seen today for concerns of cancer re occurrence on his forehead.     Patient of Dr. Vidal who was last seen 3/2023. He has a history of recurrent basal cell carcinoma of the right forehead and scalp. Patient was previously treated with a hedgehog inhibitor, Erivedge, in 2018 with good response. However, patient discontinued this prematurely after 6 months and the tumor has recurred. It recurred over 2 years year ago. Erivedge was again prescribed but denied by the insurance company [employer] but was finally started at the time he last saw Dr. Vidal in 3/2023.     He is having a lot of pain in  "his back and also on his head at the area of his insurance. He has not been taking any pain medication for the last 2 years. He first started with an open sore around 1.5 years ago on his right forehead that has gotten worse. He has been having drainage from the site on his forehead. He has not been seen yet for this area of concern. He has not been seen by derm for a couple of years. He has been having watery eyes for the last 2 years. He has needle sensation in both eyes. He has blurred vision in the right eye for the last 2 years and some in the left eye. He has not followed up with an eye doctor as well.     Patient denies pain, fever, chills, night sweats, unintentional weight loss, neck mass, enlarged lymph nodes outside of the head or neck, odynophagia, dysphagia, globus sensation, voice changes, cough, hemoptysis, shortness of breath, or new or concerning skin lesions. Patient denies personal or family history of head and neck cancer. Patient denies history of radiation exposure.    10/2/24: He is here today to review his pathology report from last visit, which confirmed recurrent basal cell carcinoma. He is still having problems with his eye. He has not made any of the referrals made for him at last visit.    11/18/24: Patient returns today for exposed hardware. He developed a "pimple" at the site that drained. He is on the hedgehog inhibitor still. No pain/bleeding.    12/16/24: He is here today for his 1 month wound check follow-up. He had an appointment with Med Onc today. He was recently seen in the ER for dehydration and given meclizine for vertigo. He was taking meclizine as needed. He did take one this AM and one yesterday. He took it today because he felt slightly dizzy this AM. He started with feeling lightheaded and dizzy the day he went to the ER. He felt room spinning and felt off when it happened. It lasted for hours and got better with IV hydration in the ED.He said he was told the dizziness " was due to dehydration at the ED.  He is having more pain at the area of his wound. He is seeing palliative care for pain control and managed with pain medication as needed. He feels the area on his scalp has gotten bigger.     25: He is here today for his 1 month wound check. The area of exposed appliance is growing. He also saw Mr. Harris today for his pain. He is still taking his hedgehog inhibitor and had an appointment with Dr. Loera last week. He has noticed a new spot on his scalp in the last week or so. He is keeping the area clean with soap and water and keeping it covered with his bandana.     Patient lives in [place].    TREATMENT HISTORY:    2018: treated with a hedgehog inhibitor, Erivedge, in 2018 with good response and again in 2023    3/2021:   1.  Bifrontal craniotomy for tumor.  2.  Stealth navigation.  3.  Cranioplasty greater than 6 cm.   1.  Wide local excision of basal cell carcinoma of the scalp measuring roughly 15 x 15 cm  2.  Right myocutaneous latissimus dorsi free flap with microvascular anastomosis to right superficial temporal vessels and right facial artery  3.  Gladbrook of right external jugular vein graft measuring 8 cm in length  4.  [place]brook of split-thickness skin graft from right thigh measuring 8 x 10 cm  5.  Indocyanine green angiography    2021: radiation     2022: excision of 3 carcinomas involving his left neck and right jawline     SUBSTANCE USE:  Smokinppd [id number]  Alcohol: occ beer  Denies hookah, chewing tobacco, marijuana, betel nut, illicit drug, heavy cigar, vape use.    ALLERGIES:  Review of patient's allergies indicates:  No Known Allergies      MEDICATIONS:    Current Outpatient Medications:     acetaminophen (TYLENOL) 325 MG tablet, Take 325 mg by mouth every 4 (four) hours as needed., Disp: , Rfl:     amlodipine-benazepril 10-20mg (LOTREL) 10-20 mg per capsule, Take 1 capsule by mouth once daily., Disp: , Rfl:     aspirin (ECOTRIN) 81 MG EC tablet,  "Take 81 mg by mouth., Disp: , Rfl:     atorvastatin (LIPITOR) 10 MG tablet, Take 10 mg by mouth., Disp: , Rfl:     azelastine (ASTELIN) 137 mcg (0.1 %) nasal spray, 1 puff in each nostril Nasally Twice a day for 30 days, Disp: , Rfl:     buPROPion (WELLBUTRIN XL) 150 MG TB24 tablet, Take by mouth., Disp: , Rfl:     gabapentin (NEURONTIN) 300 MG capsule, Take 1 capsule (300 mg total) by mouth 2 (two) times daily., Disp: 60 capsule, Rfl: 0    HYDROcodone-acetaminophen (NORCO)  mg per tablet, Take 1 tablet by mouth every 12 (twelve) hours as needed for Pain., Disp: 90 tablet, Rfl: 0    ibuprofen (ADVIL,MOTRIN) 800 MG tablet, Take 800 mg by mouth every 8 (eight) hours as needed., Disp: , Rfl:     pantoprazole (PROTONIX) 40 MG tablet, Take 40 mg by mouth., Disp: , Rfl:     SENNA LAXATIVE 8.6 mg tablet, Take 2 tablets by mouth., Disp: , Rfl:     sildenafiL (VIAGRA) 50 MG tablet, Take 50 mg by mouth daily as needed., Disp: , Rfl:     tadalafiL (CIALIS) 20 MG Tab, Take 20 mg by mouth As instructed., Disp: , Rfl:     vismodegib (ERIVEDGE) 150 mg Cap, Take 1 capsule (150 mg) by mouth once daily., Disp: 30 capsule, Rfl: 3    OTHER HISTORY  Past medical, surgical, family, and social histories have been updated and reviewed as needed since last visit.    REVIEW OF SYSTEMS:   Comprehensive review of systems was discussed with the patient.  It is positive only for the above complaints.    PHYSICAL EXAMINATION:  Blood pressure 99/67, pulse 75, temperature 98.3 °F (36.8 °C), temperature source Temporal, resp. rate 16, height 5' 9" (1.753 m), weight 61.4 kg (135 lb 5.8 oz), SpO2 99%.    Constitutional: Ill-appearing  Voice: Non-dysphonic, speaking in full sentences.   Head and face: Deformed anterior skull consistent with surgical sequale, small exposed titanium mesh anteriorly  Skin: Thickened skin with telangectasia concerning for recurrent basal cell  Eyes: Blurred vision  Ears: Bilateral pinna, mastoid, external ear canal " normal. Hearing intact.   Nose: External nose appears normal.   Lips: No ulcers or lesions  Neck: Woody, surgical scars, no masses or lymphadenopathy. No palpable thyroid enlargement or nodules.  Respiratory: Chest expansion symmetric, no audible stridor or stertor. Breathing is unlabored. No active cough.    CLINICAL PHOTOS:  1/30/25:             12/16/24:       11/2024              DATA REVIEWED:     LABORATORY:  N/A    PATHOLOGY:        DIAGNOSIS:   08/28/2022  [email or web address]/[email or web address],jl     1.  SCALP BIOPSY:   - NO TUMOR SEEN.   - ULCER BASE.     2-6.  SKIN AND SUBCUTANEOUS TISSUE LEFT INFERIOR NECK MARGINS, EXCISION:   - NO TUMOR SEEN.     7.  SUBCUTANEOUS TISSUE OF LEFT ANTERIOR NECK, EXCISION:   - BASAL CELL CARCINOMA.   - MARGINS NEGATIVE FOR TUMOR.   - NEGATIVE FOR PERINEURAL INVASION.     8.  SKIN AND SUBCUTANEOUS TISSUE, LEFT SUPERIOR NECK, EXCISION:   - BASAL CELL CARCINOMA.   - MARGINS NEGATIVE FOR TUMOR.   - NEGATIVE FOR PERINEURAL INVASION.   - SEE COMMENT.     9-12.  SKIN AND SUBCUTANEOUS TISSUE RIGHT JAW MARGINS, EXCISION:   - NO TUMOR SEEN.     13.  SKIN AND SUBCUTANEOUS TISSUE RIGHT JAW, EXCISION:   - IN-SITU SQUAMOUS CARCINOMA.   - MARGINS NEGATIVE FOR TUMOR.     Comment: The tumor in Part 8 is present only on the frozen section    slides.       IMAGING:    CT HEAD WITHOUT CONTRAST 8/3/24  Impression:  -Postoperative changes of the right hemicalvarium with duraplasty material in place.  High attenuation material overlying the right parietal convexity appears to extend from the duraplasty material.  No ruperto hemorrhage.  Follow-up with contrast enhanced MRI of the brain may be obtained, as clinically warranted.   -Abnormal mineralization of the calvarium.  Diffuse marrow infiltration is suspected.  Follow-up with PET-CT scan may be obtained, as clinically warranted  -This report was flagged in Epic as abnormal.    MRI BRAIN W WO CONTRAST 8/19/24  Impression:  Stable MRI of the brain demonstrating postoperative  changes of right frontal craniotomy and right parietal pachymeningeal enhancement.    PET 10/2/24  1. No definite FDG avid malignancy is present on today's exam.  2. Symmetric perihilar uptake without a definite lymph node/mass, favored to be reactive.  Attention on follow-up.    5/2024  MRI orbits Asymmetric soft tissue thickening and enhancement involving the right inferior periorbital soft tissues may reflect preseptal cellulitis/infection in the appropriate clinical setting.  There is a small subcentimeter hypoattenuating focus centrally within the region of soft tissue induration which may reflect a tiny abscess as discussed above.  No evidence to suggest postseptal cellulitis.     Partially visualized postoperative changes of the frontal calvarium with mild nonspecific heterogeneity of the visualized inferior calvarium with overlying soft tissue thickening.  This may reflect post-treatment or postoperative change, however clinical correlation and appropriate follow-up is advised.     Mild paranasal sinus disease as above.    RADIOLOGY REVIEWED:  I have independently viewed and agree with the images and reports which demonstrate surgical sequela as above, with thickening in the right periorbital soft tissue.    ASSESSMENT AND PLAN:  No diagnosis found.      Margarito Mcnamara is a 59 y.o. male with T4b basal cell carcinoma, lost to followup, now with recurrence. On hedgehog inhibitor with exposed hardware.  - Continue local wound care  - Plate removal and reconstruction would be extremely morbid - may be necessary at some point but not currently  - Would recommend continuing treatment until BCC is gone and then can address wound unless complications develop      Patient encouraged to call with any questions, concerns, or new or worsening symptoms. 25 minutes spend in direct patient care and documentation     Follow up 1 month for wound recheck, Will schedule next visit in [place] with Dr. Aguilar per   Jhoana Bear also examined by Dr. Ely and agrees with above

## 2025-01-30 ENCOUNTER — LAB VISIT (OUTPATIENT)
Dept: LAB | Facility: HOSPITAL | Age: 60
End: 2025-01-30
Attending: INTERNAL MEDICINE
Payer: MEDICARE

## 2025-01-30 ENCOUNTER — OFFICE VISIT (OUTPATIENT)
Dept: HEMATOLOGY/ONCOLOGY | Facility: CLINIC | Age: 60
End: 2025-01-30
Payer: MEDICARE

## 2025-01-30 ENCOUNTER — DOCUMENTATION ONLY (OUTPATIENT)
Dept: INFUSION THERAPY | Facility: HOSPITAL | Age: 60
End: 2025-01-30
Payer: MEDICARE

## 2025-01-30 ENCOUNTER — OFFICE VISIT (OUTPATIENT)
Dept: PALLIATIVE MEDICINE | Facility: CLINIC | Age: 60
End: 2025-01-30
Payer: MEDICARE

## 2025-01-30 VITALS
BODY MASS INDEX: 20.05 KG/M2 | WEIGHT: 135.38 LBS | HEART RATE: 75 BPM | HEIGHT: 69 IN | SYSTOLIC BLOOD PRESSURE: 99 MMHG | RESPIRATION RATE: 16 BRPM | HEART RATE: 75 BPM | WEIGHT: 135.38 LBS | SYSTOLIC BLOOD PRESSURE: 99 MMHG | TEMPERATURE: 98 F | RESPIRATION RATE: 16 BRPM | OXYGEN SATURATION: 99 % | OXYGEN SATURATION: 99 % | TEMPERATURE: 98 F | DIASTOLIC BLOOD PRESSURE: 67 MMHG | BODY MASS INDEX: 20.05 KG/M2 | HEIGHT: 69 IN | DIASTOLIC BLOOD PRESSURE: 67 MMHG

## 2025-01-30 DIAGNOSIS — C44.320 SQUAMOUS CELL SKIN CANCER, FACE: Primary | ICD-10-CM

## 2025-01-30 DIAGNOSIS — Z51.5 PALLIATIVE CARE BY SPECIALIST: ICD-10-CM

## 2025-01-30 DIAGNOSIS — G89.3 CANCER RELATED PAIN: ICD-10-CM

## 2025-01-30 DIAGNOSIS — D84.821 IMMUNOSUPPRESSION DUE TO DRUG THERAPY: ICD-10-CM

## 2025-01-30 DIAGNOSIS — S01.00XS OPEN WOUND OF SCALP, UNSPECIFIED OPEN WOUND TYPE, SEQUELA: ICD-10-CM

## 2025-01-30 DIAGNOSIS — Z85.828 HISTORY OF SKIN CANCER: ICD-10-CM

## 2025-01-30 DIAGNOSIS — C44.41 BASAL CELL CARCINOMA (BCC) OF LEFT SIDE OF NECK: ICD-10-CM

## 2025-01-30 DIAGNOSIS — C44.41 BASAL CELL CARCINOMA (BCC) OF SCALP: Primary | ICD-10-CM

## 2025-01-30 DIAGNOSIS — Z79.899 IMMUNOSUPPRESSION DUE TO DRUG THERAPY: ICD-10-CM

## 2025-01-30 LAB
ALBUMIN SERPL BCP-MCNC: 4.1 G/DL (ref 3.5–5.2)
ALP SERPL-CCNC: 73 U/L (ref 40–150)
ALT SERPL W/O P-5'-P-CCNC: 27 U/L (ref 10–44)
ANION GAP SERPL CALC-SCNC: 9 MMOL/L (ref 8–16)
AST SERPL-CCNC: 19 U/L (ref 10–40)
BASOPHILS # BLD AUTO: 0.1 K/UL (ref 0–0.2)
BASOPHILS NFR BLD: 1.1 % (ref 0–1.9)
BILIRUB SERPL-MCNC: 0.7 MG/DL (ref 0.1–1)
BUN SERPL-MCNC: 31 MG/DL (ref 6–20)
CALCIUM SERPL-MCNC: 9.4 MG/DL (ref 8.7–10.5)
CHLORIDE SERPL-SCNC: 106 MMOL/L (ref 95–110)
CO2 SERPL-SCNC: 23 MMOL/L (ref 23–29)
CREAT SERPL-MCNC: 1 MG/DL (ref 0.5–1.4)
DIFFERENTIAL METHOD BLD: ABNORMAL
EOSINOPHIL # BLD AUTO: 0.4 K/UL (ref 0–0.5)
EOSINOPHIL NFR BLD: 4 % (ref 0–8)
ERYTHROCYTE [DISTWIDTH] IN BLOOD BY AUTOMATED COUNT: 14.4 % (ref 11.5–14.5)
EST. GFR  (NO RACE VARIABLE): >60 ML/MIN/1.73 M^2
GLUCOSE SERPL-MCNC: 88 MG/DL (ref 70–110)
HCT VFR BLD AUTO: 38.7 % (ref 40–54)
HGB BLD-MCNC: 12.9 G/DL (ref 14–18)
IMM GRANULOCYTES # BLD AUTO: 0.02 K/UL (ref 0–0.04)
IMM GRANULOCYTES NFR BLD AUTO: 0.2 % (ref 0–0.5)
LYMPHOCYTES # BLD AUTO: 1.9 K/UL (ref 1–4.8)
LYMPHOCYTES NFR BLD: 22 % (ref 18–48)
MCH RBC QN AUTO: 29 PG (ref 27–31)
MCHC RBC AUTO-ENTMCNC: 33.3 G/DL (ref 32–36)
MCV RBC AUTO: 87 FL (ref 82–98)
MONOCYTES # BLD AUTO: 0.9 K/UL (ref 0.3–1)
MONOCYTES NFR BLD: 10.2 % (ref 4–15)
NEUTROPHILS # BLD AUTO: 5.4 K/UL (ref 1.8–7.7)
NEUTROPHILS NFR BLD: 62.5 % (ref 38–73)
NRBC BLD-RTO: 0 /100 WBC
PLATELET # BLD AUTO: 262 K/UL (ref 150–450)
PMV BLD AUTO: 8.8 FL (ref 9.2–12.9)
POTASSIUM SERPL-SCNC: 4.6 MMOL/L (ref 3.5–5.1)
PROT SERPL-MCNC: 7.1 G/DL (ref 6–8.4)
RBC # BLD AUTO: 4.45 M/UL (ref 4.6–6.2)
SODIUM SERPL-SCNC: 138 MMOL/L (ref 136–145)
WBC # BLD AUTO: 8.72 K/UL (ref 3.9–12.7)

## 2025-01-30 PROCEDURE — 3074F SYST BP LT 130 MM HG: CPT | Mod: CPTII,S$GLB,, | Performed by: NURSE PRACTITIONER

## 2025-01-30 PROCEDURE — 36415 COLL VENOUS BLD VENIPUNCTURE: CPT | Mod: PN

## 2025-01-30 PROCEDURE — 85025 COMPLETE CBC W/AUTO DIFF WBC: CPT | Mod: PN

## 2025-01-30 PROCEDURE — 99999 PR PBB SHADOW E&M-EST. PATIENT-LVL III: CPT | Mod: PBBFAC,,, | Performed by: NURSE PRACTITIONER

## 2025-01-30 PROCEDURE — 3008F BODY MASS INDEX DOCD: CPT | Mod: CPTII,S$GLB,, | Performed by: NURSE PRACTITIONER

## 2025-01-30 PROCEDURE — 99213 OFFICE O/P EST LOW 20 MIN: CPT | Mod: S$GLB,,, | Performed by: NURSE PRACTITIONER

## 2025-01-30 PROCEDURE — 99999 PR PBB SHADOW E&M-EST. PATIENT-LVL IV: CPT | Mod: PBBFAC,,, | Performed by: NURSE PRACTITIONER

## 2025-01-30 PROCEDURE — 3078F DIAST BP <80 MM HG: CPT | Mod: CPTII,S$GLB,, | Performed by: NURSE PRACTITIONER

## 2025-01-30 PROCEDURE — 99214 OFFICE O/P EST MOD 30 MIN: CPT | Mod: S$GLB,,, | Performed by: NURSE PRACTITIONER

## 2025-01-30 PROCEDURE — 80053 COMPREHEN METABOLIC PANEL: CPT | Mod: PN

## 2025-01-30 RX ORDER — GABAPENTIN 300 MG/1
300 CAPSULE ORAL 2 TIMES DAILY
Qty: 60 CAPSULE | Refills: 0 | Status: SHIPPED | OUTPATIENT
Start: 2025-01-30 | End: 2025-03-01

## 2025-01-30 NOTE — PROGRESS NOTES
"1:37 PM    This LCSW met with this pt in the clinic after receiving a message from ALEJANDRA Hernandez regarding the pt's concerns about coverage for his oral chemo medication.  The pt was accompanied by his son, Davey.    The pt was hyper verbal and appeared to have difficulty coping with his diagnosis, the cost of his oral medication and his not being able to work. He shared his finances being a big stressor in his life.    The pt explained his oral chemo (Erividge) is not being fully covered by [employer]. He said he has to pay the first $2000.00 of prescriptions and does not have that kind of money. He said he spoke to Dawson with [facility] pharmacy, but "I don't know know what I am doing with all of this and I am frustrated'.  The pt's son asked to leave and go to the car and the pt vocalized frustration with his son telling him to "just leave".  When his son left, the pt apologized for his outburst. He shared his having a "lifetime of issues with a son who has bi-polar, schizophrenia and ADHD"  The pt shared many details surrounding the ongoing struggles being a single father with a child with mental illness.   This LCSW listened as the pt shared and acknowledged his concerns, then reminded him I am here to help. The patient took a deep breath and said, "thank you I just feel like I was dealt a bad hand of cards in life". He shared more details, this time about his being in foster care as a child and the pain that has caused him. This LCSW validated his feelings as I listened.    This LCSW asked if he would like me to call [facility] and speak to Dawson to get the details on the oral chemo cost/coverage. The pt was agreeable to this plan.    This  called and spoke to Dawson  with [facility] ([phone]) who said this pt was approved for assistance last year, however was not eligible for 2025. He explained the pt has to call [email or web address]'s health Part D medicare plan to ask them to be " enrolled in the Medicare prescription payment plan ([phone])    This LCSW informed pt of the above mentioned information. He said he is on a limited income and cannot work much due to his cancer. This LCSW spoke to the pt about assisting with [employer] with his mortgage, [employer], food pantry and will look into these as potential financial resources to help assist the pt with his financial situation.    This LCSW wrote on a piece of paper a plan for the patient to contact [employer] and what to ask for when he calls, including the phone number.   Further reiterating, we are here to support him, however he has to do his part and I have to do my part to make this work. He was agreeable to this plan.    The pt was provided with contact information to call this LCSW to follow up.

## 2025-01-30 NOTE — PROGRESS NOTES
Office Visit  [facility]      ASSESSMENT/PLAN:     Plan/Recommendations:  Margarito was seen today for palliative care.    Diagnoses and all orders for this visit:    Squamous cell skin cancer, face  Skin cancer  Re-occurrence of disease,   Started on Erivedge on October 25, 2024. He is tolerating it with minimal side effects.   Continue to follow with Oncology    Palliative care by specialist  Patient doing fair, [facility] following for financial assistance    Cancer related pain  -   Continue  HYDROcodone-acetaminophen (NORCO)  mg per tablet; Take 1 tablet by mouth every 12 (twelve) hours as needed for Pain.  Start Gabapentin 300 mg bid  -     Continue senna (SENOKOT) 8.6 mg tablet; Take 2 tablets by mouth once daily.    Follow up: 2 months        SUBJECTIVE:     History of Present Illness:  Patient is a 59 y.o. year old male with h/o HTN, BCC, and melanoma . Patient of Dr. Vidal who was last seen 3/2023, now with recurrent basal cell carcinoma of the right forehead and scalp . He is referred by Dr Ely for Cancer related pain     Palliative Discussion:  Follow up pain evaluation, he is accompanied by his son who is an adult with autism, patient is struggling with care for his son and himself, he has concerns about who will care for his son if his cancer advances or if he has to have surgery in the future. SW is following and providing resources and advice.  Son is being seen at [facility]. He is considering a group home.   Pain is stable , he is using norco about 1-2 per day, he has mor shooting and stabbing pain lately, seems to be worst with heat and cold, pain score 7-8/10 NRS at its worst, discussed addition of gabapentin.  Denies any constipation.  He continues to work part time. Sleep- fair.  He is tolerating his treatment but does have some challenges with copay       From initial visit  Pain  Pain to right side of face  and top head  Pain described as mostly aching ,  constant , has gotten worst in the past year  Has been on oxycodone in the past after initial surgery, has also tried hydrocodone- this seems to work best  Currently not on any medication- ran out and had no insurance  Gabapentin 600 mg tid-  for his feet, - feels like burning, follows with Dr Zavala  Ibuprofen and tylenol does not help  Pain score 10/10, comes down 6/10 with Hydrocodone- he feels he needs it twice per day  Has chronic back pain- s/p rhizotomy       ROS:  Review of Systems   Constitutional:  Positive for fatigue.   HENT:  Negative for mouth sores.    Eyes:  Positive for visual disturbance.   Respiratory:  Negative for cough.    Gastrointestinal:  Negative for abdominal pain and diarrhea.   Skin:  Negative for rash.   Neurological:  Positive for headaches.   Hematological:  Negative for adenopathy.   Psychiatric/Behavioral:  The patient is nervous/anxious.        Past Medical History:   Diagnosis Date    ADHD (attention deficit hyperactivity disorder)     Arthritis     Basal cell carcinoma (BCC) of left side of neck 08/2022    Chronic pain     Depression     Hydrocele in adult     Hyperlipidemia     Hypertension     Migraine     Squamous cell skin cancer, face 08/2022     Past Surgical History:   Procedure Laterality Date    BACK SURGERY  11/2020    CRANIOTOMY N/A 3/3/2021    Procedure: CRANIOTOMY, USING FRAMELESS STEREOTAXY, OPEN, BIFRONTAL;  Surgeon: Raheel Green MD;  Location: [facility] OR [address] [place] [address];  Service: Neurosurgery;  Laterality: N/A;  TOR I  ASA I  TYPE & CROSS 2 UNITS  REGULAR BED  [place] [facility]  [facility] CT    EXCISION OF LESION Left 8/24/2022    Procedure: EXCISION, LESION basal cell CA left neck--2 separate lesions;  Surgeon: Catalina Vidal MD;  [facility];  Service: ENT;  Laterality: Left;    FLAP PROCEDURE N/A 3/3/2021    Procedure: CREATION, FREE FLAP;  Surgeon: Ayaan Aguilar MD;  Location: [facility] OR [address] [place] [address];  Service: ENT;  Laterality: N/A;  Latissimus free flap.  Will need bean bag and Porras.     FLAP PROCEDURE Left 3/5/2021    Procedure: CREATION, FREE FLAP;  Surgeon: Ayaan Aguilar MD;  [facility];  Service: ENT;  Laterality: Left;    INCISION AND DRAINAGE, LOWER EXTREMITY Right 8/7/2024    Procedure: INCISION AND DRAINAGE, LOWER EXTREMITY;  Surgeon: Blake Barton MD;  Location: [facility] OR;  Service: Orthopedics;  Laterality: Right;    LAPAROSCOPIC REPAIR OF HERNIA      RHIZOTOMY W/ RADIOFREQUENCY ABLATION Bilateral     two procedures    SURGICAL REMOVAL OF LESION OF FACE Right 8/24/2022    Procedure: EXCISION, LESION, FACE ;  Surgeon: Catalina Vidal MD;  Location: [facility] OR;  Service: ENT;  Laterality: Right;    TENDON REPAIR Right     thumb     Family History   Problem Relation Name Age of Onset    Depression Mother      Early death Mother  40    Alcohol abuse Father      Depression Father      No Known Problems Sister      Headaches Sister      Hypertension Brother      No Known Problems Maternal Aunt      No Known Problems Maternal Uncle      No Known Problems Paternal Aunt      No Known Problems Paternal Uncle      No Known Problems Maternal Grandmother      No Known Problems Maternal Grandfather      No Known Problems Paternal Grandmother      No Known Problems Paternal Grandfather      No Known Problems Other       Review of patient's allergies indicates:  No Known Allergies  Social Drivers of Health     Tobacco Use: High Risk (12/16/2024)    Patient History     Smoking Tobacco Use: Every Day     Smokeless Tobacco Use: Former     Passive Exposure: Past   Alcohol Use: Not At Risk (8/29/2024)    AUDIT-C     Frequency of Alcohol Consumption: 2-4 times a month     Average Number of Drinks: 1 or 2     Frequency of Binge Drinking: Never   Financial Resource Strain: High Risk (8/29/2024)    Overall Financial Resource Strain (CARDIA)     Difficulty of Paying Living Expenses: Hard   Food Insecurity: Food Insecurity Present (8/29/2024)    Hunger Vital  Sign     Worried About Running Out of Food in the Last Year: Sometimes true     Ran Out of Food in the Last Year: Sometimes true   Transportation Needs: Not on file   Physical Activity: Unknown (8/29/2024)    Exercise Vital Sign     Days of Exercise per Week: 4 days     Minutes of Exercise per Session: Not on file   Stress: Stress Concern Present (8/29/2024)    [employer] - Occupational Stress Questionnaire     Feeling of Stress : Very much   Housing Stability: High Risk (8/29/2024)    Housing Stability Vital Sign     Unable to Pay for Housing in the Last Year: Yes     Homeless in the Last Year: Not on file   Depression: Low Risk  (12/16/2024)    Depression     Last PHQ-4: Flowsheet Data: 0   Recent Concern: Depression - High Risk (10/10/2024)    Depression     Last PHQ-4: Flowsheet Data: 9   Utilities: At Risk (8/29/2024)    [facility] Utilities     Threatened with loss of utilities: Yes   Health Literacy: Inadequate Health Literacy (8/29/2024)     Health Literacy     Frequency of need for help with medical instructions: Sometimes   Social Isolation: Not on file            OBJECTIVE:     Physical Exam:  Vitals:    Physical Exam  HENT:      Head: Normocephalic.      Comments: Right sided facial and head scars noted     Mouth/Throat:      Mouth: Mucous membranes are moist.   Pulmonary:      Effort: Pulmonary effort is normal.   Abdominal:      General: There is no distension.   Musculoskeletal:         General: Normal range of motion.      Cervical back: Normal range of motion.   Skin:     General: Skin is warm and dry.      Findings: Erythema present.   Neurological:      Mental Status: He is alert and oriented to person, place, and time.   Psychiatric:         Mood and Affect: Mood normal.           Review of Symptoms      Symptom Assessment (ESAS 0-10 Scale)  Pain:  0  Dyspnea:  0  Anxiety:  0  Nausea:  0  Depression:  0  Anorexia:  0  Fatigue:  5  Insomnia:  0  Restlessness:   0  Agitation:  0       Anxiety:  Is nervous/anxious    ECOG Performance Status ndGndrndanddndend:nd nd2nd Living Arrangements:  Lives with family    Psychosocial/Cultural:   See Palliative Psychosocial Note: Yes  **Primary  to Follow**  Palliative Care  Consult: No      Advance Care Planning   Advance Directives:   Living Will: No    LaPOST: No      Decision Making:  Patient answered questions  Goals of Care: The patient endorses that what is most important right now is to focus on symptom/pain control and curative/life-prolongation (regardless of treatment burdens)    Accordingly, we have decided that the best plan to meet the patient's goals includes continuing with treatment           Medications:    Current Outpatient Medications:     acetaminophen (TYLENOL) 325 MG tablet, Take 325 mg by mouth every 4 (four) hours as needed., Disp: , Rfl:     amlodipine-benazepril 10-20mg (LOTREL) 10-20 mg per capsule, Take 1 capsule by mouth once daily., Disp: , Rfl:     aspirin (ECOTRIN) 81 MG EC tablet, Take 81 mg by mouth., Disp: , Rfl:     atorvastatin (LIPITOR) 10 MG tablet, Take 10 mg by mouth., Disp: , Rfl:     azelastine (ASTELIN) 137 mcg (0.1 %) nasal spray, 1 puff in each nostril Nasally Twice a day for 30 days, Disp: , Rfl:     buPROPion (WELLBUTRIN XL) 150 MG TB24 tablet, Take by mouth., Disp: , Rfl:     gabapentin (NEURONTIN) 600 MG tablet, Take 600 mg by mouth., Disp: , Rfl:     HYDROcodone-acetaminophen (NORCO)  mg per tablet, Take 1 tablet by mouth every 12 (twelve) hours as needed for Pain., Disp: 90 tablet, Rfl: 0    ibuprofen (ADVIL,MOTRIN) 800 MG tablet, Take 800 mg by mouth every 8 (eight) hours as needed., Disp: , Rfl:     pantoprazole (PROTONIX) 40 MG tablet, Take 40 mg by mouth., Disp: , Rfl:     SENNA LAXATIVE 8.6 mg tablet, Take 2 tablets by mouth., Disp: , Rfl:     sildenafiL (VIAGRA) 50 MG tablet, Take 50 mg by mouth daily as needed., Disp: , Rfl:     tadalafiL (CIALIS) 20 MG Tab,  Take 20 mg by mouth As instructed., Disp: , Rfl:     vismodegib (ERIVEDGE) 150 mg Cap, Take 1 capsule (150 mg) by mouth once daily., Disp: 30 capsule, Rfl: 3    Labs:  CBC:   WBC   Date Value Ref Range Status   12/16/2024 8.22 3.90 - 12.70 K/uL Final     Hemoglobin   Date Value Ref Range Status   12/16/2024 13.3 (L) 14.0 - 18.0 g/dL Final     POC Hematocrit   Date Value Ref Range Status   03/03/2021 29 (L) 36 - 54 %PCV Final     Hematocrit   Date Value Ref Range Status   12/16/2024 39.4 (L) 40.0 - 54.0 % Final     MCV   Date Value Ref Range Status   12/16/2024 86 82 - 98 fL Final     Platelets   Date Value Ref Range Status   12/16/2024 251 150 - 450 K/uL Final       LFT:   Lab Results   Component Value Date    AST 27 12/16/2024    ALKPHOS 86 12/16/2024    BILITOT 0.6 12/16/2024       Albumin:   Albumin   Date Value Ref Range Status   12/16/2024 4.1 3.5 - 5.2 g/dL Final     Protein:   Total Protein   Date Value Ref Range Status   12/16/2024 7.2 6.0 - 8.4 g/dL Final     30 minutes of total time spent on the encounter, which includes face to face time and non-face to face time preparing to see the patient (eg, review of tests), Obtaining and/or reviewing separately obtained history, Documenting clinical information in the electronic or other health record, Independently interpreting results if documented above (not separately reported) and communicating results to the patient/family/caregiver, or Care coordination (not separately reported).     Signature: Kimberly Silver NP

## 2025-02-06 ENCOUNTER — DOCUMENTATION ONLY (OUTPATIENT)
Dept: INFUSION THERAPY | Facility: HOSPITAL | Age: 60
End: 2025-02-06
Payer: MEDICARE

## 2025-02-06 NOTE — PROGRESS NOTES
2:33 PM    This LCSW called this pt to follow up to see if he got in touch with Medicare to set up financial assistance/ payment plan for his oral chemo.  The pt said he did finish getting that done and forgot to call me.  This LCSW reminded him when he comes to the cancer center to ask for a gas card.  Also, to bring proof of his mortgage payment and we will fill out the application for potential assistance for reimbursement for payment.  The pt shared his gratitude for this assistance and plans to bring the papers and ask for this  when he returns.  The pt shared his feeling overwhelmed, but said since I started helping him he feels so much better.

## 2025-02-07 ENCOUNTER — OFFICE VISIT (OUTPATIENT)
Dept: OPHTHALMOLOGY | Facility: CLINIC | Age: 60
End: 2025-02-07
Payer: MEDICARE

## 2025-02-07 DIAGNOSIS — H04.123 DRY EYES: Primary | ICD-10-CM

## 2025-02-07 DIAGNOSIS — H02.9 LESION OF LEFT LOWER EYELID: ICD-10-CM

## 2025-02-07 DIAGNOSIS — H04.541 LACRIMAL CANALICULAR STENOSIS, RIGHT: ICD-10-CM

## 2025-02-07 PROCEDURE — 99214 OFFICE O/P EST MOD 30 MIN: CPT | Mod: S$GLB,,, | Performed by: OPHTHALMOLOGY

## 2025-02-07 PROCEDURE — 99999 PR PBB SHADOW E&M-EST. PATIENT-LVL III: CPT | Mod: PBBFAC,,, | Performed by: OPHTHALMOLOGY

## 2025-02-07 PROCEDURE — 1159F MED LIST DOCD IN RCRD: CPT | Mod: CPTII,S$GLB,, | Performed by: OPHTHALMOLOGY

## 2025-02-07 PROCEDURE — 1160F RVW MEDS BY RX/DR IN RCRD: CPT | Mod: CPTII,S$GLB,, | Performed by: OPHTHALMOLOGY

## 2025-02-07 PROCEDURE — 92285 EXTERNAL OCULAR PHOTOGRAPHY: CPT | Mod: S$GLB,,, | Performed by: OPHTHALMOLOGY

## 2025-02-07 NOTE — PROGRESS NOTES
HPI    Margarito Mcnamara is a/an 59 y.o. male here for epiphora follow up   Referred by:   Eye Meds:   REFRESH PRN     Patient sts tearing is back but did really well right after last visit   with the pred taper but eventually returned on right side only. Pt sts he   never picked up Pataday but will if he needs it.   Last edited by Zayra Osorio on 2/7/2025  3:39 PM.            Assessment /Plan     For exam results, see Encounter Report.    Dry eyes  -     External Photography - OU - Both Eyes    Lacrimal canalicular stenosis, right    Lesion of left lower eyelid      The patient is a pleasant 59-year-old male here for follow-up evaluation of right-sided tearing.  The patient had significant improvement of his tearing after prior tear duct probing and taper of prednisolone acetate.  The patient has recently noticed recurrence of the tearing on the right side over the last few days.      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  He has good closure.  He has exposure of the right frontal plate and hardware.  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      These findings were discussed with the patient.      Recommend placement of mini [place] stent in the minor procedure room at the same time that we biopsy the left lower eyelid lesion.    Pertinent risks benefits alternatives were discussed with the patient prior to obtaining informed consent.      Return for surgery

## 2025-02-28 DIAGNOSIS — H04.541 LACRIMAL CANALICULAR STENOSIS, RIGHT: Primary | ICD-10-CM

## 2025-03-02 RX ORDER — DIAZEPAM 5 MG/1
TABLET ORAL
Qty: 1 TABLET | Refills: 0 | Status: SHIPPED | OUTPATIENT
Start: 2025-03-02

## 2025-03-10 ENCOUNTER — TELEPHONE (OUTPATIENT)
Dept: HEMATOLOGY/ONCOLOGY | Facility: CLINIC | Age: 60
End: 2025-03-10
Payer: MEDICARE

## 2025-03-13 ENCOUNTER — OFFICE VISIT (OUTPATIENT)
Dept: PALLIATIVE MEDICINE | Facility: CLINIC | Age: 60
End: 2025-03-13
Payer: MEDICARE

## 2025-03-13 VITALS
BODY MASS INDEX: 19.86 KG/M2 | RESPIRATION RATE: 17 BRPM | HEART RATE: 62 BPM | WEIGHT: 134.06 LBS | SYSTOLIC BLOOD PRESSURE: 126 MMHG | DIASTOLIC BLOOD PRESSURE: 85 MMHG | OXYGEN SATURATION: 97 % | HEIGHT: 69 IN | TEMPERATURE: 98 F

## 2025-03-13 DIAGNOSIS — G62.9 NEUROPATHY: ICD-10-CM

## 2025-03-13 DIAGNOSIS — R42 VERTIGO: Primary | ICD-10-CM

## 2025-03-13 DIAGNOSIS — C44.90 SKIN CANCER: ICD-10-CM

## 2025-03-13 DIAGNOSIS — Z51.5 PALLIATIVE CARE BY SPECIALIST: ICD-10-CM

## 2025-03-13 DIAGNOSIS — C44.320 SQUAMOUS CELL SKIN CANCER, FACE: ICD-10-CM

## 2025-03-13 DIAGNOSIS — G89.3 CANCER ASSOCIATED PAIN: ICD-10-CM

## 2025-03-13 PROCEDURE — 1159F MED LIST DOCD IN RCRD: CPT | Mod: CPTII,S$GLB,, | Performed by: NURSE PRACTITIONER

## 2025-03-13 PROCEDURE — 3074F SYST BP LT 130 MM HG: CPT | Mod: CPTII,S$GLB,, | Performed by: NURSE PRACTITIONER

## 2025-03-13 PROCEDURE — 99214 OFFICE O/P EST MOD 30 MIN: CPT | Mod: S$GLB,,, | Performed by: NURSE PRACTITIONER

## 2025-03-13 PROCEDURE — 3008F BODY MASS INDEX DOCD: CPT | Mod: CPTII,S$GLB,, | Performed by: NURSE PRACTITIONER

## 2025-03-13 PROCEDURE — 3079F DIAST BP 80-89 MM HG: CPT | Mod: CPTII,S$GLB,, | Performed by: NURSE PRACTITIONER

## 2025-03-13 PROCEDURE — 99999 PR PBB SHADOW E&M-EST. PATIENT-LVL IV: CPT | Mod: PBBFAC,,, | Performed by: NURSE PRACTITIONER

## 2025-03-13 RX ORDER — HYDROCODONE BITARTRATE AND ACETAMINOPHEN 10; 325 MG/1; MG/1
1 TABLET ORAL EVERY 6 HOURS PRN
Qty: 120 TABLET | Refills: 0 | Status: SHIPPED | OUTPATIENT
Start: 2025-03-13

## 2025-03-13 RX ORDER — MECLIZINE HCL 12.5 MG 12.5 MG/1
12.5 TABLET ORAL 3 TIMES DAILY PRN
Qty: 45 TABLET | Refills: 0 | Status: SHIPPED | OUTPATIENT
Start: 2025-03-13 | End: 2025-03-28

## 2025-03-13 RX ORDER — GABAPENTIN 300 MG/1
600 CAPSULE ORAL 2 TIMES DAILY
Qty: 120 CAPSULE | Refills: 0 | Status: SHIPPED | OUTPATIENT
Start: 2025-03-13 | End: 2025-04-12

## 2025-03-13 NOTE — PROGRESS NOTES
Office Visit  [facility]      ASSESSMENT/PLAN:     Plan/Recommendations:  Margarito was seen today for palliative care.    Diagnoses and all orders for this visit:    Squamous cell skin cancer, face  Skin cancer  Re-occurrence of disease, noted hardware exposure- following with Dr Ely  Considering surgery for plate removal- high risk  On hedgehog inhibitor   Patient needs to re-schedule missed appointment with Dr Loera    Palliative care by specialist  Vertigo  Patient doing fair  Trial meclizine 12.5 mg  tid/prn  #45/0 for vertigo- encouraged to follow up with Oncology    Cancer related pain  Neuropathy   Mild increase in pain, patient awaiting to have surgery               Norco 10/325 mg every 6 hours/prn for pain #120/0 (increased from every 12 hours)              Increase gabapentin to 600 mg bid  -     Continue senna (SENOKOT) 8.6 mg tablet; Take 2 tablets by mouth once daily.    Follow up: 2 months        SUBJECTIVE:     History of Present Illness:  Patient is a 59 y.o. year old male with h/o HTN, BCC, and melanoma . Patient of Dr. Vidal who was last seen 3/2023, now with recurrent basal cell carcinoma of the right forehead and scalp . He is referred by Dr Ely for Cancer related pain     Palliative Discussion:  Follow up pain evaluation, patient with ongoing hardware exposure to right side of head- seems to be getting worst. He is doing some wound care, tries to keep it covered. He reports pain also seems to be worsening he has been needing norco every 6-7 hours. He is following with Dr Ely, they have discussed surgical removal however surgery is considered high risk. Patient wants to wait until it is absolutely necessary, he is caring for his adult child with Autism and has to make arrangements for him.   Pain score 7/10 NRS- constant throbbing. Occasional vertigo. 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 This has been affected by his pain  and vertigo, Will trial Meclizine but he needs to follow up with Oncology. He is currently doing chemo. He missed his last appointment and needs lab work.   He has neuropathy to his feet- he is taking gabapentin- feels like tingling in feet- believes it to be related to his chemo       From initial visit  Pain  Pain to right side of face  and top head  Pain described as mostly aching , constant , has gotten worst in the past year  Has been on oxycodone in the past after initial surgery, has also tried hydrocodone- this seems to work best  Currently not on any medication- ran out and had no insurance  Gabapentin 600 mg tid-  for his feet, - feels like burning, follows with Dr Zavala  Ibuprofen and tylenol does not help  Pain score 10/10, comes down 6/10 with Hydrocodone- he feels he needs it twice per day  Has chronic back pain- s/p rhizotomy       ROS:  Review of Systems   Constitutional:  Positive for fatigue.   HENT:  Negative for mouth sores.    Eyes:  Positive for visual disturbance.   Respiratory:  Negative for cough.    Gastrointestinal:  Negative for abdominal pain and diarrhea.   Skin:  Negative for rash.   Neurological:  Positive for headaches.   Hematological:  Negative for adenopathy.   Psychiatric/Behavioral:  The patient is nervous/anxious.        Past Medical History:   Diagnosis Date    ADHD (attention deficit hyperactivity disorder)     Arthritis     Basal cell carcinoma (BCC) of left side of neck 08/2022    Chronic pain     Depression     Hydrocele in adult     Hyperlipidemia     Hypertension     Migraine     Squamous cell skin cancer, face 08/2022     Past Surgical History:   Procedure Laterality Date    BACK SURGERY  11/2020    CRANIOTOMY N/A 3/3/2021    Procedure: CRANIOTOMY, USING FRAMELESS STEREOTAXY, OPEN, BIFRONTAL;  Surgeon: Raheel Green MD;  Location: [facility] OR [address] [place] [address];  Service: Neurosurgery;  Laterality: N/A;  TOR I  ASA I  TYPE & CROSS 2 UNITS  [facility]    EXCISION OF LESION Left 8/24/2022    Procedure: EXCISION, LESION basal cell CA left neck--2 separate lesions;  Surgeon: Catalina Vidal MD;  Location: [facility] OR;  Service: ENT;  Laterality: Left;    FLAP PROCEDURE N/A 3/3/2021    Procedure: CREATION, FREE FLAP;  Surgeon: Ayaan Aguilar MD;  Location: [facility] OR 2ND FLR;  Service: ENT;  Laterality: N/A;  Latissimus free flap. Will need bean bag and Porras.     FLAP PROCEDURE Left 3/5/2021    Procedure: CREATION, FREE FLAP;  Surgeon: Ayaan Aguilar MD;  Location: [facility] OR 2ND FLR;  Service: ENT;  Laterality: Left;    INCISION AND DRAINAGE, LOWER EXTREMITY Right 8/7/2024    Procedure: INCISION AND DRAINAGE, LOWER EXTREMITY;  Surgeon: Blake Barton MD;  Location: [facility] OR;  Service: Orthopedics;  Laterality: Right;    LAPAROSCOPIC REPAIR OF HERNIA      RHIZOTOMY W/ RADIOFREQUENCY ABLATION Bilateral     two procedures    SURGICAL REMOVAL OF LESION OF FACE Right 8/24/2022    Procedure: EXCISION, LESION, FACE ;  Surgeon: Catalina Vidal MD;  Location: [facility] OR;  Service: ENT;  Laterality: Right;    TENDON REPAIR Right     thumb     Family History   Problem Relation Name Age of Onset    Depression Mother      Early death Mother  40    Alcohol abuse Father      Depression Father      No Known Problems Sister      Headaches Sister      Hypertension Brother      No Known Problems Maternal Aunt      No Known Problems Maternal Uncle      No Known Problems Paternal Aunt      No Known Problems Paternal Uncle      No Known Problems Maternal Grandmother      No Known Problems Maternal Grandfather      No Known Problems Paternal Grandmother      No Known Problems Paternal Grandfather      No Known Problems Other       Review of patient's allergies indicates:  No Known Allergies  Social Drivers of Health     Tobacco Use: High Risk (3/13/2025)    Patient History     Smoking Tobacco Use: Every Day     Smokeless Tobacco Use: Former     Passive  Exposure: Past   Alcohol Use: Not At Risk (8/29/2024)    AUDIT-C     Frequency of Alcohol Consumption: 2-4 times a month     Average Number of Drinks: 1 or 2     Frequency of Binge Drinking: Never   Financial Resource Strain: High Risk (8/29/2024)    Overall Financial Resource Strain (CARDIA)     Difficulty of Paying Living Expenses: Hard   Food Insecurity: Food Insecurity Present (8/29/2024)    Hunger Vital Sign     Worried About Running Out of Food in the Last Year: Sometimes true     Ran Out of Food in the Last Year: Sometimes true   Transportation Needs: Not on file   Physical Activity: Unknown (8/29/2024)    Exercise Vital Sign     Days of Exercise per Week: 4 days     Minutes of Exercise per Session: Not on file   Stress: Stress Concern Present (8/29/2024)    [employer] - Occupational Stress Questionnaire     Feeling of Stress : Very much   Housing Stability: High Risk (8/29/2024)    Housing Stability Vital Sign     Unable to Pay for Housing in the Last Year: Yes     Number of Times Moved in the Last Year: Not on file     Homeless in the Last Year: Not on file   Depression: Low Risk  (3/13/2025)    Depression     Last PHQ-4: Flowsheet Data: 2   Recent Concern: Depression - High Risk (1/30/2025)    Depression     Last PHQ-4: Flowsheet Data: 15   Utilities: At Risk (8/29/2024)    [facility] Utilities     Threatened with loss of utilities: Yes   Health Literacy: Inadequate Health Literacy (8/29/2024)     Health Literacy     Frequency of need for help with medical instructions: Sometimes   Social Isolation: Not on file            OBJECTIVE:     Physical Exam:  Vitals: Temp: 97.7 °F (36.5 °C) (03/13/25 0926)  Pulse: 62 (03/13/25 0926)  Resp: 17 (03/13/25 0926)  BP: 126/85 (03/13/25 0926)  SpO2: 97 % (03/13/25 0926)  Physical Exam  Constitutional:       Appearance: He is underweight.   HENT:      Head: Normocephalic.      Comments: Right sided facial and head scars noted     Mouth/Throat:       Mouth: Mucous membranes are moist.   Pulmonary:      Effort: Pulmonary effort is normal.   Abdominal:      General: There is no distension.   Musculoskeletal:         General: Normal range of motion.      Cervical back: Normal range of motion.   Skin:     General: Skin is warm and dry.      Findings: Erythema present.   Neurological:      Mental Status: He is alert and oriented to person, place, and time.   Psychiatric:         Mood and Affect: Mood normal.           Review of Symptoms      Symptom Assessment (ESAS 0-10 Scale)  Pain:  7  Dyspnea:  1  Anxiety:  4  Nausea:  1  Depression:  7  Anorexia:  4  Fatigue:  3  Insomnia:  4  Restlessness:  4  Agitation:  2       Anxiety:  Is nervous/anxious    Pain Assessment:    Location(s): none      ECOG Performance Status ndGndrndanddndend:nd nd2nd Living Arrangements:  Lives with family    Psychosocial/Cultural:   See Palliative Psychosocial Note: Yes  **Primary  to Follow**  Palliative Care  Consult: No    Advance Care Planning   Advance Directives:   Living Will: No    LaPOST: No      Decision Making:  Patient answered questions  Goals of Care: The patient endorses that what is most important right now is to focus on symptom/pain control and curative/life-prolongation (regardless of treatment burdens)    Accordingly, we have decided that the best plan to meet the patient's goals includes continuing with treatment           Medications:    Current Outpatient Medications:     acetaminophen (TYLENOL) 325 MG tablet, Take 325 mg by mouth every 4 (four) hours as needed., Disp: , Rfl:     amlodipine-benazepril 10-20mg (LOTREL) 10-20 mg per capsule, Take 1 capsule by mouth once daily., Disp: , Rfl:     aspirin (ECOTRIN) 81 MG EC tablet, Take 81 mg by mouth., Disp: , Rfl:     atorvastatin (LIPITOR) 10 MG tablet, Take 10 mg by mouth., Disp: , Rfl:     azelastine (ASTELIN) 137 mcg (0.1 %) nasal spray, 1 puff in each nostril Nasally Twice a day for 30 days, Disp: ,  Rfl:     buPROPion (WELLBUTRIN XL) 150 MG TB24 tablet, Take by mouth., Disp: , Rfl:     diazePAM (VALIUM) 5 MG tablet, Bring to appointment., Disp: 1 tablet, Rfl: 0    HYDROcodone-acetaminophen (NORCO)  mg per tablet, Take 1 tablet by mouth every 12 (twelve) hours as needed for Pain., Disp: 90 tablet, Rfl: 0    ibuprofen (ADVIL,MOTRIN) 800 MG tablet, Take 800 mg by mouth every 8 (eight) hours as needed., Disp: , Rfl:     pantoprazole (PROTONIX) 40 MG tablet, Take 40 mg by mouth., Disp: , Rfl:     SENNA LAXATIVE 8.6 mg tablet, Take 2 tablets by mouth., Disp: , Rfl:     sildenafiL (VIAGRA) 50 MG tablet, Take 50 mg by mouth daily as needed., Disp: , Rfl:     tadalafiL (CIALIS) 20 MG Tab, Take 20 mg by mouth As instructed., Disp: , Rfl:     vismodegib (ERIVEDGE) 150 mg Cap, Take 1 capsule (150 mg) by mouth once daily., Disp: 30 capsule, Rfl: 3    gabapentin (NEURONTIN) 300 MG capsule, Take 1 capsule (300 mg total) by mouth 2 (two) times daily., Disp: 60 capsule, Rfl: 0    Labs:  CBC:   WBC   Date Value Ref Range Status   01/30/2025 8.72 3.90 - 12.70 K/uL Final     Hemoglobin   Date Value Ref Range Status   01/30/2025 12.9 (L) 14.0 - 18.0 g/dL Final     POC Hematocrit   Date Value Ref Range Status   03/03/2021 29 (L) 36 - 54 %PCV Final     Hematocrit   Date Value Ref Range Status   01/30/2025 38.7 (L) 40.0 - 54.0 % Final     MCV   Date Value Ref Range Status   01/30/2025 87 82 - 98 fL Final     Platelets   Date Value Ref Range Status   01/30/2025 262 150 - 450 K/uL Final       LFT:   Lab Results   Component Value Date    AST 19 01/30/2025    ALKPHOS 73 01/30/2025    BILITOT 0.7 01/30/2025       Albumin:   Albumin   Date Value Ref Range Status   01/30/2025 4.1 3.5 - 5.2 g/dL Final     Protein:   Total Protein   Date Value Ref Range Status   01/30/2025 7.1 6.0 - 8.4 g/dL Final     30 minutes of total time spent on the encounter, which includes face to face time and non-face to face time preparing to see the patient  (eg, review of tests), Obtaining and/or reviewing separately obtained history, Documenting clinical information in the electronic or other health record, Independently interpreting results if documented above (not separately reported) and communicating results to the patient/family/caregiver, or Care coordination (not separately reported).     Signature: Kimberly Silver NP

## 2025-03-26 ENCOUNTER — OFFICE VISIT (OUTPATIENT)
Dept: HEMATOLOGY/ONCOLOGY | Facility: CLINIC | Age: 60
End: 2025-03-26
Payer: MEDICARE

## 2025-03-26 ENCOUNTER — LAB VISIT (OUTPATIENT)
Dept: LAB | Facility: HOSPITAL | Age: 60
End: 2025-03-26
Attending: INTERNAL MEDICINE
Payer: MEDICARE

## 2025-03-26 VITALS
HEIGHT: 69 IN | HEART RATE: 70 BPM | TEMPERATURE: 98 F | SYSTOLIC BLOOD PRESSURE: 114 MMHG | DIASTOLIC BLOOD PRESSURE: 70 MMHG | OXYGEN SATURATION: 94 % | RESPIRATION RATE: 16 BRPM | BODY MASS INDEX: 20.93 KG/M2 | WEIGHT: 141.31 LBS

## 2025-03-26 VITALS
TEMPERATURE: 98 F | HEIGHT: 69 IN | HEART RATE: 70 BPM | SYSTOLIC BLOOD PRESSURE: 114 MMHG | WEIGHT: 135.38 LBS | RESPIRATION RATE: 16 BRPM | DIASTOLIC BLOOD PRESSURE: 70 MMHG | OXYGEN SATURATION: 94 % | BODY MASS INDEX: 20.05 KG/M2

## 2025-03-26 DIAGNOSIS — D84.821 IMMUNOSUPPRESSION DUE TO DRUG THERAPY: ICD-10-CM

## 2025-03-26 DIAGNOSIS — Z79.899 IMMUNOSUPPRESSION DUE TO DRUG THERAPY: ICD-10-CM

## 2025-03-26 DIAGNOSIS — C44.41 BASAL CELL CARCINOMA (BCC) OF LEFT SIDE OF NECK: ICD-10-CM

## 2025-03-26 DIAGNOSIS — S01.00XS OPEN WOUND OF SCALP, UNSPECIFIED OPEN WOUND TYPE, SEQUELA: ICD-10-CM

## 2025-03-26 DIAGNOSIS — C44.41 BASAL CELL CARCINOMA (BCC) OF LEFT SIDE OF NECK: Primary | ICD-10-CM

## 2025-03-26 DIAGNOSIS — G89.3 CANCER ASSOCIATED PAIN: ICD-10-CM

## 2025-03-26 DIAGNOSIS — D64.9 NORMOCYTIC ANEMIA: ICD-10-CM

## 2025-03-26 DIAGNOSIS — Z87.891 SMOKING HISTORY: ICD-10-CM

## 2025-03-26 LAB
ABSOLUTE EOSINOPHIL (OHS): 0.43 K/UL
ABSOLUTE MONOCYTE (OHS): 0.85 K/UL (ref 0.3–1)
ABSOLUTE NEUTROPHIL COUNT (OHS): 4.52 K/UL (ref 1.8–7.7)
ALBUMIN SERPL BCP-MCNC: 4.2 G/DL (ref 3.5–5.2)
ALP SERPL-CCNC: 83 UNIT/L (ref 40–150)
ALT SERPL W/O P-5'-P-CCNC: 54 UNIT/L (ref 10–44)
ANION GAP (OHS): 11 MMOL/L (ref 8–16)
AST SERPL-CCNC: 34 UNIT/L (ref 11–45)
BASOPHILS # BLD AUTO: 0.12 K/UL
BASOPHILS NFR BLD AUTO: 1.5 %
BILIRUB SERPL-MCNC: 0.8 MG/DL (ref 0.1–1)
BUN SERPL-MCNC: 30 MG/DL (ref 6–20)
CALCIUM SERPL-MCNC: 9.5 MG/DL (ref 8.7–10.5)
CHLORIDE SERPL-SCNC: 104 MMOL/L (ref 95–110)
CO2 SERPL-SCNC: 21 MMOL/L (ref 23–29)
CREAT SERPL-MCNC: 1.1 MG/DL (ref 0.5–1.4)
ERYTHROCYTE [DISTWIDTH] IN BLOOD BY AUTOMATED COUNT: 13.8 % (ref 11.5–14.5)
GFR SERPLBLD CREATININE-BSD FMLA CKD-EPI: >60 ML/MIN/1.73/M2
GLUCOSE SERPL-MCNC: 87 MG/DL (ref 70–110)
HCT VFR BLD AUTO: 37.9 % (ref 40–54)
HGB BLD-MCNC: 12.7 GM/DL (ref 14–18)
IMM GRANULOCYTES # BLD AUTO: 0.02 K/UL (ref 0–0.04)
IMM GRANULOCYTES NFR BLD AUTO: 0.2 % (ref 0–0.5)
LYMPHOCYTES # BLD AUTO: 2.3 K/UL (ref 1–4.8)
MCH RBC QN AUTO: 29.7 PG (ref 27–50)
MCHC RBC AUTO-ENTMCNC: 33.5 G/DL (ref 32–36)
MCV RBC AUTO: 89 FL (ref 82–98)
NUCLEATED RBC (/100WBC) (OHS): 0 /100 WBC
PLATELET # BLD AUTO: 282 K/UL (ref 150–450)
PMV BLD AUTO: 8.6 FL (ref 9.2–12.9)
POTASSIUM SERPL-SCNC: 4.9 MMOL/L (ref 3.5–5.1)
PROT SERPL-MCNC: 7.6 GM/DL (ref 6–8.4)
RBC # BLD AUTO: 4.28 M/UL (ref 4.6–6.2)
RELATIVE EOSINOPHIL (OHS): 5.2 %
RELATIVE LYMPHOCYTE (OHS): 27.9 % (ref 18–48)
RELATIVE MONOCYTE (OHS): 10.3 % (ref 4–15)
RELATIVE NEUTROPHIL (OHS): 54.9 % (ref 38–73)
SODIUM SERPL-SCNC: 136 MMOL/L (ref 136–145)
WBC # BLD AUTO: 8.24 K/UL (ref 3.9–12.7)

## 2025-03-26 PROCEDURE — 3078F DIAST BP <80 MM HG: CPT | Mod: CPTII,S$GLB,, | Performed by: NURSE PRACTITIONER

## 2025-03-26 PROCEDURE — 1159F MED LIST DOCD IN RCRD: CPT | Mod: CPTII,S$GLB,, | Performed by: INTERNAL MEDICINE

## 2025-03-26 PROCEDURE — 99213 OFFICE O/P EST LOW 20 MIN: CPT | Mod: S$GLB,,, | Performed by: NURSE PRACTITIONER

## 2025-03-26 PROCEDURE — 82040 ASSAY OF SERUM ALBUMIN: CPT | Mod: PN

## 2025-03-26 PROCEDURE — 3078F DIAST BP <80 MM HG: CPT | Mod: CPTII,S$GLB,, | Performed by: INTERNAL MEDICINE

## 2025-03-26 PROCEDURE — 99999 PR PBB SHADOW E&M-EST. PATIENT-LVL IV: CPT | Mod: PBBFAC,,, | Performed by: INTERNAL MEDICINE

## 2025-03-26 PROCEDURE — 85025 COMPLETE CBC W/AUTO DIFF WBC: CPT | Mod: PN

## 2025-03-26 PROCEDURE — 3008F BODY MASS INDEX DOCD: CPT | Mod: CPTII,S$GLB,, | Performed by: NURSE PRACTITIONER

## 2025-03-26 PROCEDURE — 99999 PR PBB SHADOW E&M-EST. PATIENT-LVL III: CPT | Mod: PBBFAC,,, | Performed by: NURSE PRACTITIONER

## 2025-03-26 PROCEDURE — 36415 COLL VENOUS BLD VENIPUNCTURE: CPT | Mod: PN

## 2025-03-26 PROCEDURE — G2211 COMPLEX E/M VISIT ADD ON: HCPCS | Mod: S$GLB,,, | Performed by: INTERNAL MEDICINE

## 2025-03-26 PROCEDURE — 3074F SYST BP LT 130 MM HG: CPT | Mod: CPTII,S$GLB,, | Performed by: INTERNAL MEDICINE

## 2025-03-26 PROCEDURE — 3074F SYST BP LT 130 MM HG: CPT | Mod: CPTII,S$GLB,, | Performed by: NURSE PRACTITIONER

## 2025-03-26 PROCEDURE — 3008F BODY MASS INDEX DOCD: CPT | Mod: CPTII,S$GLB,, | Performed by: INTERNAL MEDICINE

## 2025-03-26 PROCEDURE — 99214 OFFICE O/P EST MOD 30 MIN: CPT | Mod: S$GLB,,, | Performed by: INTERNAL MEDICINE

## 2025-03-26 NOTE — PROGRESS NOTES
Note to patients: In accordance with the  Cent[employer] Cures Act, patients are now granted immediate electronic access to their medical records. This note is primarily intended for communication among medical professionals. As a result, it may incorporate medical terminology, abbreviations, or language that could appear blunt or unfamiliar. If you have questions about this document, we encourage you to discuss it with your physician.      [facility]  Head & Neck [facility] Oncology [facility]    Patient: Margarito Mcnamara    : 1965    MRN: [id number]  Primary Care Provider: Wilfredo Luna MD  Referring Provider: No ref. provider found   Rad Onc: Dr. Cherry  Derm: Kd Scales MD   Rad Onc: Dr. Cherry   Date of Encounter: 2025    DIAGNOSIS:    Cancer Staging   Basal cell carcinoma (BCC) of left side of neck  Staging form: Melanoma of the Skin, AJCC 8th Edition  - Clinical: No stage assigned - Unsigned  Staging form: Cutaneous Carcinoma of the Head and Neck, AJCC 8th Edition  - Pathologic stage from 2021: pT4b, pN0 - Signed by Catalina Vidal MD on 2021      CC: wound check       HPI:   Margarito Mcnamara is a 59 y.o. male with a past medical history significant for HTN, BCC, malanoma who is being seen today for concerns of cancer re occurrence on his forehead.     Patient of Dr. Vidal who was last seen 3/2023. He has a history of recurrent basal cell carcinoma of the right forehead and scalp. Patient was previously treated with a hedgehog inhibitor, Erivedge, in 2018 with good response. However, patient discontinued this prematurely after 6 months and the tumor has recurred. It recurred over 2 years year ago. Erivedge was again prescribed but denied by the insurance company [employer] but was finally started at the time he last saw Dr. Vidal in 3/2023.     He is having a lot of pain in his back and also on his head at the area of his insurance. He has not been taking any pain  "medication for the last 2 years. He first started with an open sore around 1.5 years ago on his right forehead that has gotten worse. He has been having drainage from the site on his forehead. He has not been seen yet for this area of concern. He has not been seen by derm for a couple of years. He has been having watery eyes for the last 2 years. He has needle sensation in both eyes. He has blurred vision in the right eye for the last 2 years and some in the left eye. He has not followed up with an eye doctor as well.     Patient denies pain, fever, chills, night sweats, unintentional weight loss, neck mass, enlarged lymph nodes outside of the head or neck, odynophagia, dysphagia, globus sensation, voice changes, cough, hemoptysis, shortness of breath, or new or concerning skin lesions. Patient denies personal or family history of head and neck cancer. Patient denies history of radiation exposure.    10/2/24: He is here today to review his pathology report from last visit, which confirmed recurrent basal cell carcinoma. He is still having problems with his eye. He has not made any of the referrals made for him at last visit.    11/18/24: Patient returns today for exposed hardware. He developed a "pimple" at the site that drained. He is on the hedgehog inhibitor still. No pain/bleeding.    12/16/24: He is here today for his 1 month wound check follow-up. He had an appointment with Med Onc today. He was recently seen in the ER for dehydration and given meclizine for vertigo. He was taking meclizine as needed. He did take one this AM and one yesterday. He took it today because he felt slightly dizzy this AM. He started with feeling lightheaded and dizzy the day he went to the ER. He felt room spinning and felt off when it happened. It lasted for hours and got better with IV hydration in the ED.He said he was told the dizziness was due to dehydration at the ED.  He is having more pain at the area of his wound. He is " seeing palliative care for pain control and managed with pain medication as needed. He feels the area on his scalp has gotten bigger.     25: He is here today for his 1 month wound check. The area of exposed appliance is growing. He also saw Mr. Harris today for his pain. He is still taking his hedgehog inhibitor and had an appointment with Dr. Loera last week. He has noticed a new spot on his scalp in the last week or so. He is keeping the area clean with soap and water and keeping it covered with his bandana.     3/26/25: He is here today for wound check while he was seeing Dr. Loera for follow-up. He states that the area of his forehead has continued to get bigger in size. He missed his appointment with Dr. Aguilar on 3/3 but he missed it due to vertigo. He did not rescheduled. He has a new spot on his face that he wants looked at. He has not seen derm yet about it. He has increased pain and seeing palliative. He does have some drain aeg to the site and having been putting silvadene and triple antibiotic ointment on it. He denies any fever or redness.     Patient lives in [place].    TREATMENT HISTORY:    2018: treated with a hedgehog inhibitor, Erivedge, in 2018 with good response and again in 2023    3/2021:   1.  Bifrontal craniotomy for tumor.  2.  Stealth navigation.  3.  Cranioplasty greater than 6 cm.   1.  Wide local excision of basal cell carcinoma of the scalp measuring roughly 15 x 15 cm  2.  Right myocutaneous latissimus dorsi free flap with microvascular anastomosis to right superficial temporal vessels and right facial artery  3.  [place] of right external jugular vein graft measuring 8 cm in length  4.  [place] of split-thickness skin graft from right thigh measuring 8 x 10 cm  5.  Indocyanine green angiography    2021: radiation     2022: excision of 3 carcinomas involving his left neck and right jawline     SUBSTANCE USE:  Smokinppd [id number]  Alcohol: occ beer  Denies hookah, chewing  tobacco, marijuana, betel nut, illicit drug, heavy cigar, vape use.    ALLERGIES:  Review of patient's allergies indicates:  No Known Allergies      MEDICATIONS:    Current Outpatient Medications:     acetaminophen (TYLENOL) 325 MG tablet, Take 325 mg by mouth every 4 (four) hours as needed., Disp: , Rfl:     amlodipine-benazepril 10-20mg (LOTREL) 10-20 mg per capsule, Take 1 capsule by mouth once daily., Disp: , Rfl:     aspirin (ECOTRIN) 81 MG EC tablet, Take 81 mg by mouth., Disp: , Rfl:     atorvastatin (LIPITOR) 10 MG tablet, Take 10 mg by mouth., Disp: , Rfl:     azelastine (ASTELIN) 137 mcg (0.1 %) nasal spray, 1 puff in each nostril Nasally Twice a day for 30 days, Disp: , Rfl:     buPROPion (WELLBUTRIN XL) 150 MG TB24 tablet, Take by mouth., Disp: , Rfl:     diazePAM (VALIUM) 5 MG tablet, Bring to appointment., Disp: 1 tablet, Rfl: 0    gabapentin (NEURONTIN) 300 MG capsule, Take 2 capsules (600 mg total) by mouth 2 (two) times daily., Disp: 120 capsule, Rfl: 0    HYDROcodone-acetaminophen (NORCO)  mg per tablet, Take 1 tablet by mouth every 6 (six) hours as needed for Pain., Disp: 120 tablet, Rfl: 0    ibuprofen (ADVIL,MOTRIN) 800 MG tablet, Take 800 mg by mouth every 8 (eight) hours as needed., Disp: , Rfl:     meclizine (ANTIVERT) 12.5 mg tablet, Take 1 tablet (12.5 mg total) by mouth 3 (three) times daily as needed for Dizziness., Disp: 45 tablet, Rfl: 0    pantoprazole (PROTONIX) 40 MG tablet, Take 40 mg by mouth., Disp: , Rfl:     SENNA LAXATIVE 8.6 mg tablet, Take 2 tablets by mouth., Disp: , Rfl:     sildenafiL (VIAGRA) 50 MG tablet, Take 50 mg by mouth daily as needed., Disp: , Rfl:     tadalafiL (CIALIS) 20 MG Tab, Take 20 mg by mouth As instructed., Disp: , Rfl:     vismodegib (ERIVEDGE) 150 mg Cap, Take 1 capsule (150 mg) by mouth once daily., Disp: 30 capsule, Rfl: 3    OTHER HISTORY  Past medical, surgical, family, and social histories have been updated and reviewed as needed since last  visit.    REVIEW OF SYSTEMS:   Comprehensive review of systems was discussed with the patient.  It is positive only for the above complaints.    PHYSICAL EXAMINATION:  There were no vitals taken for this visit.    Constitutional: Ill-appearing  Voice: Non-dysphonic, speaking in full sentences.   Head and face: Deformed anterior skull consistent with surgical sequale, exposed titanium mesh anteriorly  Skin: Thickened skin with telangectasia concerning for recurrent basal cell  Eyes: Blurred vision  Ears: Bilateral pinna, mastoid, external ear canal normal. Hearing intact.   Nose: External nose appears normal.   Lips: No ulcers or lesions  Neck: Woody, surgical scars, no masses or lymphadenopathy. No palpable thyroid enlargement or nodules.  Respiratory: Chest expansion symmetric, no audible stridor or stertor. Breathing is unlabored. No active cough.    CLINICAL PHOTOS:  3/26/25:       1/30/25:             12/16/24:       11/2024              DATA REVIEWED:     LABORATORY:  N/A    PATHOLOGY:        DIAGNOSIS:   08/28/2022  RAMIREZ/luis,ramirez     1.  SCALP BIOPSY:   - NO TUMOR SEEN.   - ULCER BASE.     2-6.  SKIN AND SUBCUTANEOUS TISSUE LEFT INFERIOR NECK MARGINS, EXCISION:   - NO TUMOR SEEN.     7.  SUBCUTANEOUS TISSUE OF LEFT ANTERIOR NECK, EXCISION:   - BASAL CELL CARCINOMA.   - MARGINS NEGATIVE FOR TUMOR.   - NEGATIVE FOR PERINEURAL INVASION.     8.  SKIN AND SUBCUTANEOUS TISSUE, LEFT SUPERIOR NECK, EXCISION:   - BASAL CELL CARCINOMA.   - MARGINS NEGATIVE FOR TUMOR.   - NEGATIVE FOR PERINEURAL INVASION.   - SEE COMMENT.     9-12.  SKIN AND SUBCUTANEOUS TISSUE RIGHT JAW MARGINS, EXCISION:   - NO TUMOR SEEN.     13.  SKIN AND SUBCUTANEOUS TISSUE RIGHT JAW, EXCISION:   - IN-SITU SQUAMOUS CARCINOMA.   - MARGINS NEGATIVE FOR TUMOR.     Comment: The tumor in Part 8 is present only on the frozen section    slides.       IMAGING:    CT HEAD WITHOUT CONTRAST 8/3/24  Impression:  -Postoperative changes of the right hemicalvarium  with duraplasty material in place.  High attenuation material overlying the right parietal convexity appears to extend from the duraplasty material.  No ruperto hemorrhage.  Follow-up with contrast enhanced MRI of the brain may be obtained, as clinically warranted.   -Abnormal mineralization of the calvarium.  Diffuse marrow infiltration is suspected.  Follow-up with PET-CT scan may be obtained, as clinically warranted  -This report was flagged in Epic as abnormal.    MRI BRAIN W WO CONTRAST 8/19/24  Impression:  Stable MRI of the brain demonstrating postoperative changes of right frontal craniotomy and right parietal pachymeningeal enhancement.    PET 10/2/24  1. No definite FDG avid malignancy is present on today's exam.  2. Symmetric perihilar uptake without a definite lymph node/mass, favored to be reactive.  Attention on follow-up.    5/2024  MRI orbits Asymmetric soft tissue thickening and enhancement involving the right inferior periorbital soft tissues may reflect preseptal cellulitis/infection in the appropriate clinical setting.  There is a small subcentimeter hypoattenuating focus centrally within the region of soft tissue induration which may reflect a tiny abscess as discussed above.  No evidence to suggest postseptal cellulitis.     Partially visualized postoperative changes of the frontal calvarium with mild nonspecific heterogeneity of the visualized inferior calvarium with overlying soft tissue thickening.  This may reflect post-treatment or postoperative change, however clinical correlation and appropriate follow-up is advised.     Mild paranasal sinus disease as above.    RADIOLOGY REVIEWED:  I have independently viewed and agree with the images and reports which demonstrate surgical sequela as above, with thickening in the right periorbital soft tissue.    ASSESSMENT AND PLAN:  No diagnosis found.      Margarito Mcnamara is a 59 y.o. male with T4b basal cell carcinoma, lost to followup, now with  recurrence. On hedgehog inhibitor with exposed hardware.  - Continue local wound care  - Stop silvadene and triple antibiotic ointment, use Aquaphor instead  - Keep area clean  - Call with any redness, increase pain, fever, drainage or concerns for infection  - Plate removal and reconstruction would be extremely morbid - may be necessary at some point but not currently  - Would recommend continuing treatment until BCC is gone and then can address wound unless complications develop  -refer to derm for new areas of concern on face   - Refer to smoking cessation program     Patient encouraged to call with any questions, concerns, or new or worsening symptoms. 25 minutes spend in direct patient care and documentation     Follow up 1 month for wound recheck, Will schedule next visit in [place] with Dr. Aguilar per Dr. Ely    Pictures reviewed with Dr. Ely

## 2025-03-26 NOTE — PROGRESS NOTES
Subjective:       Name: Margarito Mcnamara  : 1965  MRN: [id number]    Chief Complaint   Patient presents with    Follow-up     Basal cell carcinoma (BCC) of left side of neck        Patient is in clinic by himself.    HPI: Margarito Mcnamara is a 59 y.o. male presents for evaluation of his recurrent Follow-up (Basal cell carcinoma (BCC) of left side of neck)    He started on Erivedge on 2024. He is reporting diffuse joint pain.    The patient denies CP, cough, SOB, abdominal pain, nausea, vomiting, constipation.  The patient denies fever, chills, night sweats, weight loss, new lumps or bumps, easy bruising or bleeding.    Sister had breast cane at the age of 43 yo  Father had bone cancer.    ONCOLOGIC HISTORY:  Patient of Dr. Vidal who was last seen 3/2023. He has a history of recurrent basal cell carcinoma of the right forehead and scalp. Patient was previously treated with a hedgehog inhibitor, Erivedge, in 2018 with good response. However, patient discontinued this prematurely after 6 months and the tumor has recurred. It recurred over 2 years year ago. Erivedge was again prescribed but denied by the insurance company X 2 but was finally started at the time he last saw Dr. Vidal in 3/2023.      He is having a lot of pain in his back and also on his head at the area of the recurrence. He has not been taking any pain medication for the last 2 years. He first started with an open sore around 1.5 years ago on his right forehead that has gotten worse. He has been having drainage from the site on his forehead. He has not been seen yet for this area of concern. He has not been seen by derm for a couple of years.      TREATMENT HISTORY:  2018: treated with a hedgehog inhibitor, Erivedge, in 2018 with good response and again in 2023     3/2021:   1.  Bifrontal craniotomy for tumor.  2.  Stealth navigation.  3.  Cranioplasty greater than 6 cm.   1.  Wide local excision of basal cell carcinoma of the scalp  measuring roughly 15 x 15 cm  2.  Right myocutaneous latissimus dorsi free flap with microvascular anastomosis to right superficial temporal vessels and right facial artery  3.  [place] of right external jugular vein graft measuring 8 cm in length  4.  [place] of split-thickness skin graft from right thigh measuring 8 x 10 cm  5.  Indocyanine green angiography     6/2021: radiation      9/2022: excision of 3 carcinomas involving his left neck and right jawline     Oncology History   Basal cell carcinoma (BCC) of left side of neck   1/26/2021 Initial Diagnosis    Basal cell carcinoma (BCC) of scalp     1/28/2021 Tumor Conference    His case was discussed at the Multidisciplinary Head and Neck Team Planning Meeting.    Representatives from Medical Oncology, Radiation Oncology, Head and Neck Surgical Oncology, Psychosocial Oncology, and Speech and Language Pathology discussed the case with the following recommendations:    1) WLE with reconstruction  2) adjuvant radiation               2/4/2021 Tumor Conference    His case was discussed at the Multidisciplinary Head and Neck Team Planning Meeting.    Representatives from Medical Oncology, Radiation Oncology, Head and Neck Surgical Oncology, Psychosocial Oncology, and Speech and Language Pathology discussed the case with the following recommendations:    1) MRI with and without contrast  2) surgery with flap reconstruction              3/3/2021 Surgery    1.  Wide local excision of basal cell carcinoma of the scalp measuring roughly 15 x 15 cm  2.  Right myocutaneous latissimus dorsi free flap with microvascular anastomosis to right superficial temporal vessels and right facial artery  3.  [place] of right external jugular vein graft measuring 8 cm in length  4.  [place] of split-thickness skin graft from right thigh measuring 8 x 10 cm  5.   Bifrontal craniotomy for tumor.  6.  Stealth navigation.  7.  Cranioplasty greater than 6 cm.        3/5/2021 Surgery    1.   Preparation of scalp wound measuring 15 x 15 cm  2.  Left l myocutaneous latissimus dorsi free flap with microvascular anastomosis to left superficial temporal vessels  3.  Split-thickness skin graft from left thigh measuring approximately 8 x 10 cm     6/22/2021 Cancer Staged    Staging form: Cutaneous Carcinoma of the Head and Neck, AJCC 8th Edition  - Pathologic stage from 6/22/2021: pT4b, pN0          Past Medical History:   Diagnosis Date    ADHD (attention deficit hyperactivity disorder)     Arthritis     Basal cell carcinoma (BCC) of left side of neck 08/2022    Chronic pain     Depression     Hydrocele in adult     Hyperlipidemia     Hypertension     Migraine     Squamous cell skin cancer, face 08/2022       Past Surgical History:   Procedure Laterality Date    BACK SURGERY  11/2020    CRANIOTOMY N/A 3/3/2021    Procedure: CRANIOTOMY, USING FRAMELESS STEREOTAXY, OPEN, BIFRONTAL;  Surgeon: Raheel Green MD;  Location: [facility] OR [address] [place] [address];  Service: Neurosurgery;  Laterality: N/A;  TOR I  ASA I  TYPE & CROSS 2 UNITS  REGULAR BED  [place] [facility]    EXCISION OF LESION Left 8/24/2022    Procedure: EXCISION, LESION basal cell CA left neck--2 separate lesions;  Surgeon: Catalina Vidal MD;  Location: [facility];  Service: ENT;  Laterality: Left;    FLAP PROCEDURE N/A 3/3/2021    Procedure: CREATION, FREE FLAP;  Surgeon: Ayaan Aguilar MD;  Location: [facility] OR [address] [place] [address];  Service: ENT;  Laterality: N/A;  Latissimus free flap. Will need bean bag and [place].     FLAP PROCEDURE Left 3/5/2021    Procedure: CREATION, FREE FLAP;  Surgeon: Ayaan Aguilar MD;  Location: [facility] OR [address] [place] [address];  Service: ENT;  Laterality: Left;    INCISION AND DRAINAGE, LOWER EXTREMITY Right 8/7/2024    Procedure: INCISION AND DRAINAGE, LOWER EXTREMITY;  Surgeon: Blake Barton MD;  Location: [facility] OR;  Service: Orthopedics;  Laterality: Right;    LAPAROSCOPIC REPAIR OF HERNIA      RHIZOTOMY W/ RADIOFREQUENCY ABLATION  Bilateral     two procedures    SURGICAL REMOVAL OF LESION OF FACE Right 8/24/2022    Procedure: EXCISION, LESION, FACE ;  Surgeon: Catalina Vidal MD;  Location: [facility] OR;  Service: ENT;  Laterality: Right;    TENDON REPAIR Right     thumb       Family History   Problem Relation Name Age of Onset    Depression Mother      Early death Mother  40    Alcohol abuse Father      Depression Father      No Known Problems Sister      Headaches Sister      Hypertension Brother      No Known Problems Maternal Aunt      No Known Problems Maternal Uncle      No Known Problems Paternal Aunt      No Known Problems Paternal Uncle      No Known Problems Maternal Grandmother      No Known Problems Maternal Grandfather      No Known Problems Paternal Grandmother      No Known Problems Paternal Grandfather      No Known Problems Other         Social History     Socioeconomic History    Marital status: Single    Number of children: 1   Occupational History     Comment: [employer]   Tobacco Use    Smoking status: Every Day     Current packs/day: 0.50     Average packs/day: 0.5 packs/day for 10.0 years (5.0 ttl pk-yrs)     Types: Cigarettes     Passive exposure: Past    Smokeless tobacco: Former    Tobacco comments:     1/2 pack per day   Substance and Sexual Activity    Alcohol use: Yes     Comment: ocassioanl beer    Drug use: Not Currently    Sexual activity: Yes     Partners: Female   Social History Narrative    Lives in a house with 25 year old son     Social Drivers of Health     Financial Resource Strain: High Risk (8/29/2024)    Overall Financial Resource Strain (CARDIA)     Difficulty of Paying Living Expenses: Hard   Food Insecurity: Food Insecurity Present (8/29/2024)    Hunger Vital Sign     Worried About Running Out of Food in the Last Year: Sometimes true     Ran Out of Food in the Last Year: Sometimes true   Transportation Needs: Low Risk  (2/16/2025)    Received from [facility] SDOH Screening     Has lack of  "transportation kept you from medical appointments meetings work or from getting things needed for daily living?: Yes it has kept me from medical appointments or from getting my medicationsyes it has kept me fro...   Physical Activity: Unknown (8/29/2024)    Exercise Vital Sign     Days of Exercise per Week: 4 days   Stress: Stress Concern Present (8/29/2024)    [employer] - Occupational Stress Questionnaire     Feeling of Stress : Very much   Housing Stability: High Risk (8/29/2024)    Housing Stability Vital Sign     Unable to Pay for Housing in the Last Year: Yes       Review of patient's allergies indicates:  No Known Allergies    Review of Systems   Constitutional:  Positive for fatigue. Negative for appetite change.   Gastrointestinal:  Positive for nausea.   Neurological:  Positive for dizziness and light-headedness.   Psychiatric/Behavioral:  The patient is nervous/anxious.             Objective:     Vitals:    03/26/25 1310   BP: 114/70   BP Location: Right arm   Patient Position: Sitting   Pulse: 70   Resp: 16   Temp: 98 °F (36.7 °C)   TempSrc: Temporal   SpO2: (!) 94%   Weight: 61.4 kg (135 lb 5.8 oz)   Height: 5' 9" (1.753 m)        Physical Exam  Vitals reviewed.   Constitutional:       Appearance: He is ill-appearing.   Eyes:      General: No scleral icterus.     Pupils: Pupils are equal, round, and reactive to light.   Cardiovascular:      Rate and Rhythm: Normal rate and regular rhythm.      Pulses: Normal pulses.      Heart sounds: Normal heart sounds.   Pulmonary:      Effort: Pulmonary effort is normal.      Breath sounds: Normal breath sounds.   Abdominal:      General: Bowel sounds are normal. There is no distension.   Musculoskeletal:         General: No swelling.   Lymphadenopathy:      Cervical: No cervical adenopathy.   Skin:     General: Skin is warm.      Findings: Lesion (Presence of a scabbing lesion affecting his frontal scalp.  During this exam part of his " metal plate is protruded as well.  There is no signs of local infection.) present.   Neurological:      General: No focal deficit present.      Mental Status: He is alert.   Psychiatric:         Mood and Affect: Mood normal.                Current Outpatient Medications on File Prior to Visit   Medication Sig    acetaminophen (TYLENOL) 325 MG tablet Take 325 mg by mouth every 4 (four) hours as needed.    amlodipine-benazepril 10-20mg (LOTREL) 10-20 mg per capsule Take 1 capsule by mouth once daily.    aspirin (ECOTRIN) 81 MG EC tablet Take 81 mg by mouth.    atorvastatin (LIPITOR) 10 MG tablet Take 10 mg by mouth.    azelastine (ASTELIN) 137 mcg (0.1 %) nasal spray 1 puff in each nostril Nasally Twice a day for 30 days    buPROPion (WELLBUTRIN XL) 150 MG TB24 tablet Take by mouth.    diazePAM (VALIUM) 5 MG tablet Bring to appointment.    gabapentin (NEURONTIN) 300 MG capsule Take 2 capsules (600 mg total) by mouth 2 (two) times daily.    HYDROcodone-acetaminophen (NORCO)  mg per tablet Take 1 tablet by mouth every 6 (six) hours as needed for Pain.    ibuprofen (ADVIL,MOTRIN) 800 MG tablet Take 800 mg by mouth every 8 (eight) hours as needed.    meclizine (ANTIVERT) 12.5 mg tablet Take 1 tablet (12.5 mg total) by mouth 3 (three) times daily as needed for Dizziness.    pantoprazole (PROTONIX) 40 MG tablet Take 40 mg by mouth.    SENNA LAXATIVE 8.6 mg tablet Take 2 tablets by mouth.    sildenafiL (VIAGRA) 50 MG tablet Take 50 mg by mouth daily as needed.    tadalafiL (CIALIS) 20 MG Tab Take 20 mg by mouth As instructed.    vismodegib (ERIVEDGE) 150 mg Cap Take 1 capsule (150 mg) by mouth once daily.     No current facility-administered medications on file prior to visit.       CBC:  Lab Results   Component Value Date    WBC 8.24 03/26/2025    HGB 12.7 (L) 03/26/2025    HCT 37.9 (L) 03/26/2025    MCV 89 03/26/2025     03/26/2025         CMP:  Sodium   Date Value Ref Range Status   03/26/2025 136 136 - 145  mmol/L Final   01/30/2025 138 136 - 145 mmol/L Final     Potassium   Date Value Ref Range Status   03/26/2025 4.9 3.5 - 5.1 mmol/L Final   01/30/2025 4.6 3.5 - 5.1 mmol/L Final     Chloride   Date Value Ref Range Status   03/26/2025 104 95 - 110 mmol/L Final   01/30/2025 106 95 - 110 mmol/L Final     CO2   Date Value Ref Range Status   03/26/2025 21 (L) 23 - 29 mmol/L Final   01/30/2025 23 23 - 29 mmol/L Final     Glucose   Date Value Ref Range Status   01/30/2025 88 70 - 110 mg/dL Final     BUN   Date Value Ref Range Status   03/26/2025 30 (H) 6 - 20 mg/dL Final     Creatinine   Date Value Ref Range Status   03/26/2025 1.1 0.5 - 1.4 mg/dL Final     Calcium   Date Value Ref Range Status   03/26/2025 9.5 8.7 - 10.5 mg/dL Final   01/30/2025 9.4 8.7 - 10.5 mg/dL Final     Total Protein   Date Value Ref Range Status   01/30/2025 7.1 6.0 - 8.4 g/dL Final     Albumin   Date Value Ref Range Status   03/26/2025 4.2 3.5 - 5.2 g/dL Final   01/30/2025 4.1 3.5 - 5.2 g/dL Final     Total Bilirubin   Date Value Ref Range Status   01/30/2025 0.7 0.1 - 1.0 mg/dL Final     Comment:     For infants and newborns, interpretation of results should be based  on gestational age, weight and in agreement with clinical  observations.    Premature Infant recommended reference ranges:  Up to 24 hours.............<8.0 mg/dL  Up to 48 hours............<12.0 mg/dL  3-5 days..................<15.0 mg/dL  6-29 days.................<15.0 mg/dL       Bilirubin Total   Date Value Ref Range Status   03/26/2025 0.8 0.1 - 1.0 mg/dL Final     Comment:     For infants and newborns, interpretation of results should be based   on gestational age, weight and in agreement with clinical   observations.    Premature Infant recommended reference ranges:   0-24 hours:  <8.0 mg/dL   24-48 hours: <12.0 mg/dL   3-5 days:    <15.0 mg/dL   6-29 days:   <15.0 mg/dL     Alkaline Phosphatase   Date Value Ref Range Status   01/30/2025 73 40 - 150 U/L Final     ALP   Date  Value Ref Range Status   03/26/2025 83 40 - 150 unit/L Final     AST   Date Value Ref Range Status   03/26/2025 34 11 - 45 unit/L Final   01/30/2025 19 10 - 40 U/L Final     ALT   Date Value Ref Range Status   03/26/2025 54 (H) 10 - 44 unit/L Final   01/30/2025 27 10 - 44 U/L Final     Anion Gap   Date Value Ref Range Status   03/26/2025 11 8 - 16 mmol/L Final     eGFR if    Date Value Ref Range Status   03/18/2021 >60.0 >60 mL/min/1.73 m^2 Final     eGFR if non    Date Value Ref Range Status   03/18/2021 >60.0 >60 mL/min/1.73 m^2 Final     Comment:     Calculation used to obtain the estimated glomerular filtration  rate (eGFR) is the CKD-EPI equation.          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 He has good closure. He has exposure of the right frontal plate and   hardware. 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        ECOG SCORE    1 - Restricted in strenuous activity-ambulatory and able to carry out work of a light nature              Assessment/Plan:     Basal cell carcinoma:   Cancer Staging   Basal cell carcinoma (BCC) of left side of neck  Staging form: Melanoma of the Skin, AJCC 8th Edition  - Clinical: No stage assigned - Unsigned  Staging form: Cutaneous Carcinoma of the Head and Neck, AJCC 8th Edition  - Pathologic stage from 6/22/2021: pT4b, pN0 - Signed by Catalina Vidal MD on 6/22/2021    I reviewed independently the patient medical record including his laboratory pathologic and radiologic findings.    His medical record also were reviewed at length.    His case was discussed with a neck tumor board.  The recommendation was to resume hedgehog inhibitor .  Started on Erivedge on October 25, 2024.  He is tolerating it with minimal side effects.        He will be seen back again normal for a follow up visit with repeat CBC CMP.    He will be scheduled to have a Whole  body PET scan for restaging purposes.    Normocytic anemia:  Hgb: 12.7 g/dl  Reflecting anemia of chronic disease.  Will continue to monitor.    Open wound affecting his scalp  He has been following with Dr. Ely closely for local wound care/ possible surgery.          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  Will continue to monitor fever        Cancer related pain.    Followed by the palliative care team.    Currently on Norco [id number] tablet by mouth every 12 hours as needed.    Tobacco abuse  He was advised to quit smoking            Med Onc Chart Routing      Follow up with physician 1 month. with repeat CBC CMP ferritin and a Whole body PET scan   Follow up with [facility]    Infusion scheduling note    Injection scheduling note    Labs    Imaging    Pharmacy appointment    Other referrals                   Plan was discussed with the patient at length, and he verbalized understanding. Margarito was given an opportunity to ask questions that were answered to his satisfaction, and he was advised to call in the interval if any problems or questions arise.  Signed:  Blanca Loera MD   Hematology and Oncology  [facility] - HEMATOLOGY ONCOLOGY  [facility], [place]

## 2025-03-27 ENCOUNTER — OFFICE VISIT (OUTPATIENT)
Dept: PSYCHIATRY | Facility: CLINIC | Age: 60
End: 2025-03-27
Payer: MEDICARE

## 2025-03-27 DIAGNOSIS — F32.A DEPRESSION, UNSPECIFIED DEPRESSION TYPE: ICD-10-CM

## 2025-03-27 DIAGNOSIS — C44.41 BASAL CELL CARCINOMA (BCC) OF LEFT SIDE OF NECK: ICD-10-CM

## 2025-03-27 DIAGNOSIS — F06.4 ANXIETY DISORDER DUE TO MEDICAL CONDITION: Primary | ICD-10-CM

## 2025-03-27 NOTE — PROGRESS NOTES
PSYCHO-ONCOLOGY NOTE/ Individual Psychotherapy     Date: 3/27/2025   Site:  Telemedicine (audiovisual)     The patient location is: [place], [place]    Each patient to whom he or she provides medical services by telemedicine is:  (1) informed of the relationship between the physician and patient and the respective role of any other health care provider with respect to management of the patient; and (2) notified that he or she may decline to receive medical services by telemedicine and may withdraw from such care at any time.    Therapeutic Intervention: Met with patient.  Outpatient - Supportive psychotherapy 45 min - CPT Code 05688    This includes face to face time and non-face to face time preparing to see the patient, obtaining and/or reviewing separately obtained history, documenting clinical information in the electronic or other health record, independently interpreting results and communicating results to the patient/family/caregiver, or care coordinator.    Patient was last seen by me on 10/22/2024    Problem list  Patient Active Problem List   Diagnosis    Basal cell carcinoma (BCC) of left side of neck    Bone erosion    Depression    HTN (hypertension)    Hyperlipidemia    Arthritis    Erectile dysfunction    Migraine    Acute blood loss anemia    Hypotension due to drugs    Agitation    Impaired functional mobility and endurance    Open wound of scalp    Squamous cell skin cancer, face    Strain of metacarpophalangeal joint of right hand    Right hand pain    Other chronic postprocedural pain    Infrapatellar bursitis of right knee    Smoking history    Scalp pain    Chronic pain    Skin cancer    Immunosuppression due to drug therapy    Cancer associated pain    Lightheadedness    Paresthesia of skin    Normocytic anemia       Chief complaint/reason for encounter: adjustment to illness and anxiety    Met with patient to evaluate psychosocial adaptation to diagnosis/treatment/survivorship of skin cancer.      Current Medications  Current Outpatient Medications   Medication    acetaminophen (TYLENOL) 325 MG tablet    amlodipine-benazepril 10-20mg (LOTREL) 10-20 mg per capsule    aspirin (ECOTRIN) 81 MG EC tablet    atorvastatin (LIPITOR) 10 MG tablet    azelastine (ASTELIN) 137 mcg (0.1 %) nasal spray    buPROPion (WELLBUTRIN XL) 150 MG TB24 tablet    diazePAM (VALIUM) 5 MG tablet    gabapentin (NEURONTIN) 300 MG capsule    HYDROcodone-acetaminophen (NORCO)  mg per tablet    ibuprofen (ADVIL,MOTRIN) 800 MG tablet    meclizine (ANTIVERT) 12.5 mg tablet    pantoprazole (PROTONIX) 40 MG tablet    SENNA LAXATIVE 8.6 mg tablet    sildenafiL (VIAGRA) 50 MG tablet    tadalafiL (CIALIS) 20 MG Tab    vismodegib (ERIVEDGE) 150 mg Cap     No current facility-administered medications for this visit.       ONCOLOGY HISTORY  Oncology History   Basal cell carcinoma (BCC) of left side of neck   1/26/2021 Initial Diagnosis    Basal cell carcinoma (BCC) of scalp     1/28/2021 Tumor Conference    His case was discussed at the Multidisciplinary Head and Neck Team Planning Meeting.    Representatives from Medical Oncology, Radiation Oncology, Head and Neck Surgical Oncology, Psychosocial Oncology, and Speech and Language Pathology discussed the case with the following recommendations:    1) WLE with reconstruction  2) adjuvant radiation               2/4/2021 Tumor Conference    His case was discussed at the Multidisciplinary Head and Neck Team Planning Meeting.    Representatives from Medical Oncology, Radiation Oncology, Head and Neck Surgical Oncology, Psychosocial Oncology, and Speech and Language Pathology discussed the case with the following recommendations:    1) MRI with and without contrast  2) surgery with flap reconstruction              3/3/2021 Surgery    1.  Wide local excision of basal cell carcinoma of the scalp measuring roughly 15 x 15 cm  2.  Right myocutaneous latissimus dorsi free flap with microvascular  anastomosis to right superficial temporal vessels and right facial artery  3.  Kula of right external jugular vein graft measuring 8 cm in length  4.  Kula of split-thickness skin graft from right thigh measuring 8 x 10 cm  5.   Bifrontal craniotomy for tumor.  6.  Stealth navigation.  7.  Cranioplasty greater than 6 cm.        3/5/2021 Surgery    1.  Preparation of scalp wound measuring 15 x 15 cm  2.  Left l myocutaneous latissimus dorsi free flap with microvascular anastomosis to left superficial temporal vessels  3.  Split-thickness skin graft from left thigh measuring approximately 8 x 10 cm     6/22/2021 Cancer Staged    Staging form: Cutaneous Carcinoma of the Head and Neck, AJCC 8th Edition  - Pathologic stage from 6/22/2021: pT4b, pN0         Objective:  Margarito Mcnamara arrived promptly for the session.   Mr. Mcnamara was independently ambulatory at the time of session. The patient was fully cooperative throughout the session.  Appearance: age appropriate, appropriately  dressed, adequately  groomed  Behavior/Cooperation: friendly and cooperative  Speech: normal in rate, volume, and tone and appropriate quality, quantity and organization of sentences  Mood: steady  Affect: mood congruent  Thought Process: goal-directed, logical  Thought Content: normal,  No delusions or paranoia; did not appear to be responding to internal stimuli during the session  Orientation: grossly intact  Memory: grossly intact  Attention Span/Concentration: Attends to session without distraction; reports no difficulty  Fund of Knowledge: average  Estimate of Intelligence: average from verbal skills and history  Cognition: grossly intact  Insight: patient has awareness of illness; good insight into own behavior and behavior of others  Judgment: the patient's behavior is adequate to circumstances    NCCN Distress thermometer:       3/26/2025     1:18 PM 3/13/2025     9:28 AM 1/30/2025    11:05 AM 1/21/2025    11:18 AM 12/16/2024  "    8:53 AM 11/18/2024     3:12 PM 11/15/2024     9:08 AM   DISTRESS SCREENING   Distress Score 5 5 10 - Extreme Distress 3 3 0 - No Distress 0 - No Distress   Practical Concerns None of these  None of these  Taking care of myself;Taking care of others;Work;Housing;Finances;Insurance;Transportation;Having enough food;Access to medicine;Treatment decisions  None of these  None of these  None of these    Social Concerns None of these  None of these Relationship with children None of these None of these None of these   Emotional Concerns None of these Worry or anxiety;Sadness or depression Worry or anxiety;Sadness or depression;Loss of interest or enjoyment;Anger;Changes in appearance;Feelings of worthlessness or being a burden Worry or anxiety;Sadness or depression;Loss of interest or enjoyment;Fear;Loneliness;Anger;Changes in appearance;Feelings of worthlessness or being a burden Worry or anxiety;Fear None of these None of these   Spiritual or Zoroastrianism Concerns None of these  None of these Sense of meaning or purpose;Death, dying, or afterlife None of these None of these None of these   Physical Concerns Pain Pain;Sleep;Fatigue Changes in eating;Fatigue;Sleep;Pain Pain;Fatigue;Sexual health;Changes in eating None of these None of these Pain   Other Problems    Financial          Data saved with a previous flowsheet row definition        Interval history and content of current session: The patient noted overall stable moods but experienced periods of frustration related to his son not maintaining household responsibilities. He expressed concern that if he were to pass away, his son would be left without resources. Supportive therapeutic techniques were employed to validate the patients emotional experience and help him process the meaning of his concerns. He acknowledged a desire to complete all the tasks on his "to-do" list so he could enjoy a peaceful life, but financial constraints and limited social support " present challenges. Socratic and open-ended questions were used to facilitate discussion on ways to navigate these obstacles while maintaining his well-being. Pacing strategies were identified and reinforced to assist the patient with fatigue management.     Risk parameters:   Patient reports no suicidal ideation  Patient reports no homicidal ideation  Patient reports no self-injurious behavior  Patient reports no violent behavior     Safety needs:  None at this time      Verbal deficits: None     Patient's response to intervention:The patient's response to intervention is accepting.      Progress to date:Progress - Ongoing, but Slow      Patient Strengths: verbal, intelligent, commitment to wellness    Treatment Plan:individual psychotherapy and medication management by physician  Target symptoms: anxiety , adjustment  Why chosen therapy is appropriate versus another modality: relevant to diagnosis  Outcome monitoring methods: self-report, observation  Therapeutic intervention type: insight oriented psychotherapy, supportive psychotherapy  Prognosis: Fair     Return to clinic: 1 month     Length of Service (minutes direct face-to-face contact): 45    Diagnosis:     ICD-10-CM ICD-9-CM   1. Anxiety disorder due to medical condition  F06.4 293.84   2. Depression, unspecified depression type  F32.A 311   3. Basal cell carcinoma (BCC) of left side of neck  C44.41 173.41       Bri Albarran PsyD   Clinical Health Psychology Fellow

## 2025-04-03 ENCOUNTER — TUMOR BOARD CONFERENCE (OUTPATIENT)
Dept: HEMATOLOGY/ONCOLOGY | Facility: CLINIC | Age: 60
End: 2025-04-03
Payer: MEDICARE

## 2025-04-03 NOTE — PROGRESS NOTES
Tumor Board Documentation      Margarito Mcnamara was presented by Zayra Ely MD at our Tumor Board on 4/3/2025, which included representatives from Medical Oncology, Radiation Oncology, Hematology, Surgical Oncology, Head and Neck.    Margarito currently presents as a current patient with Head and neck cancer, with history of the following treatments:  surgery, radiation, hedgehog inhibitor     Additionally, we reviewed previous medical and familial history, history of present illness, and recent lab results along with all available histopathologic and imaging studies. The tumor board considered available treatment options and made the following recommendations: Will take a break from hedgehog inhibitor to avoid continued wound healing concerns. If concerns for reoccurrence can restart hedgehog inhibitor or can start Libtayo.          The following procedures/referrals were also placed: No orders of the defined types were placed in this encounter.      Clinical Trial Status:   N/A    National site-specific guidelines were discussed with respect to the case.    Tumor board is a meeting of clinicians from various specialty areas who evaluate and discuss patients for whom a multidisciplinary approach is being considered. Final determinations in the plan of care are those of the provider(s). The responsibility for follow up of recommendations given during tumor board is that of the provider.     Zayra Ely MD

## 2025-04-07 ENCOUNTER — OFFICE VISIT (OUTPATIENT)
Dept: SURGICAL ONCOLOGY | Facility: CLINIC | Age: 60
End: 2025-04-07
Payer: MEDICARE

## 2025-04-07 VITALS — WEIGHT: 137.56 LBS | BODY MASS INDEX: 20.38 KG/M2 | HEIGHT: 69 IN

## 2025-04-07 DIAGNOSIS — C44.41 BASAL CELL CARCINOMA (BCC) OF SCALP: ICD-10-CM

## 2025-04-07 DIAGNOSIS — C44.90 SKIN CANCER: Primary | ICD-10-CM

## 2025-04-07 PROCEDURE — 1160F RVW MEDS BY RX/DR IN RCRD: CPT | Mod: CPTII,S$GLB,, | Performed by: OTOLARYNGOLOGY

## 2025-04-07 PROCEDURE — 1159F MED LIST DOCD IN RCRD: CPT | Mod: CPTII,S$GLB,, | Performed by: OTOLARYNGOLOGY

## 2025-04-07 PROCEDURE — 3008F BODY MASS INDEX DOCD: CPT | Mod: CPTII,S$GLB,, | Performed by: OTOLARYNGOLOGY

## 2025-04-07 PROCEDURE — 99214 OFFICE O/P EST MOD 30 MIN: CPT | Mod: S$GLB,,, | Performed by: OTOLARYNGOLOGY

## 2025-04-07 PROCEDURE — 99999 PR PBB SHADOW E&M-EST. PATIENT-LVL III: CPT | Mod: PBBFAC,,, | Performed by: OTOLARYNGOLOGY

## 2025-04-07 RX ORDER — SULFAMETHOXAZOLE AND TRIMETHOPRIM 800; 160 MG/1; MG/1
1 TABLET ORAL 2 TIMES DAILY
Qty: 20 TABLET | Refills: 0 | Status: SHIPPED | OUTPATIENT
Start: 2025-04-07

## 2025-04-08 NOTE — ASSESSMENT & PLAN NOTE
Good response to Erivedge.  Will hold Erivedge given wound breakdown.  This was expressed to the most recent tumor board.  I explained to him that he will likely need further surgery to remove the hardware and close the wound.  I will see him again in 1 month.  If he is clinically stable, we will consider surgery at that time.  Will discuss details with Dr. Ely since she is more familiar with his recent treatment history.

## 2025-04-08 NOTE — PROGRESS NOTES
Chief Complaint   Patient presents with    Follow-up     Basal cell carcinoma (BCC) of scalp         HPI   59 y.o. male presents for evaluation of a wound of the right frontal scalp.  He is known to me for history of basal cell carcinoma of the scalp treated several years ago with surgical resection and free flap reconstruction.  No significant complaints today.  He does report some drainage from the site.  This has been an ongoing problem for several months.    Review of Systems   Constitutional: Negative for fatigue and unexpected weight change.   HENT: Per HPI.  Eyes: Negative for visual disturbance.   Respiratory: Negative for shortness of breath, hemoptysis   Cardiovascular: Negative for chest pain and palpitations.   Musculoskeletal: Negative for decreased ROM, back pain.   Skin: Negative for rash, sunburn, itching.   Neurological: Negative for dizziness and seizures.   Hematological: Negative for adenopathy. Does not bruise/bleed easily.   Endocrine: Negative for rapid weight loss/weight gain, heat/cold intolerance.     Past Medical History   Problem List[1]        Past Surgical History   Past Surgical History:   Procedure Laterality Date    BACK SURGERY  11/2020    CRANIOTOMY N/A 3/3/2021    Procedure: CRANIOTOMY, USING FRAMELESS STEREOTAXY, OPEN, BIFRONTAL;  Surgeon: Raheel Green MD;  Location: [facility] OR [address] [place] [address];  Service: Neurosurgery;  Laterality: N/A;  TOR I  ASA I  TYPE & CROSS 2 UNITS  REGULAR BED  [place] HEADREST  [facility] CT    EXCISION OF LESION Left 8/24/2022    Procedure: EXCISION, LESION basal cell CA left neck--2 separate lesions;  Surgeon: Catalina Vidal MD;  Location: [facility] OR;  Service: ENT;  Laterality: Left;    FLAP PROCEDURE N/A 3/3/2021    Procedure: CREATION, FREE FLAP;  Surgeon: Ayaan Aguilar MD;  Location: [facility] OR [address] [place] [address];  Service: ENT;  Laterality: N/A;  Latissimus free flap. Will need [facility] and [place].     FLAP PROCEDURE Left 3/5/2021    Procedure: CREATION, FREE  FLAP;  Surgeon: Ayaan Aguilar MD;  Location: [facility] OR [facility] FLR;  Service: ENT;  Laterality: Left;    INCISION AND DRAINAGE, LOWER EXTREMITY Right 8/7/2024    Procedure: INCISION AND DRAINAGE, LOWER EXTREMITY;  Surgeon: Blake Barton MD;  Location: [facility] OR;  Service: Orthopedics;  Laterality: Right;    LAPAROSCOPIC REPAIR OF HERNIA      RHIZOTOMY W/ RADIOFREQUENCY ABLATION Bilateral     two procedures    SURGICAL REMOVAL OF LESION OF FACE Right 8/24/2022    Procedure: EXCISION, LESION, FACE ;  Surgeon: Catalina Vidal MD;  Location: [facility] OR;  Service: ENT;  Laterality: Right;    TENDON REPAIR Right     thumb         Family History   Family History   Problem Relation Name Age of Onset    Depression Mother      Early death Mother  40    Alcohol abuse Father      Depression Father      No Known Problems Sister      Headaches Sister      Hypertension Brother      No Known Problems Maternal Aunt      No Known Problems Maternal Uncle      No Known Problems Paternal Aunt      No Known Problems Paternal Uncle      No Known Problems Maternal Grandmother      No Known Problems Maternal Grandfather      No Known Problems Paternal Grandmother      No Known Problems Paternal Grandfather      No Known Problems Other             Social History   .Social History[2]      Allergies   Review of patient's allergies indicates:  No Known Allergies        Physical Exam     There were no vitals filed for this visit.      Body mass index is 20.32 kg/m².      General: AOx3, NAD   Respiratory:  Symmetric chest rise, normal effort  Right Ear: External Auditory Canal WNL,TM w/o masses/lesions/perforations.  Middle ear without evidence of effusion.   Neck:  Well-healed scars No cervical lymphadenopathy, thyromegaly or thyroid nodules.  Normal range of motion.    Face: House Brackmann I bilaterally.  See photo below.  Scant purulent drainage noted.      From visit with Kenzie Clayton NP.    Assessment/Plan  Problem List Items Addressed  This Visit          Oncology    RESOLVED: Basal cell carcinoma    Good response to Erivedge.  Will hold Erivedge given wound breakdown.  This was expressed to the most recent tumor board.  I explained to him that he will likely need further surgery to remove the hardware and close the wound.  I will see him again in 1 month.  If he is clinically stable, we will consider surgery at that time.  Will discuss details with Dr. Ely since she is more familiar with his recent treatment history.         Skin cancer - Primary    Relevant Medications    sulfamethoxazole-trimethoprim 800-160mg (BACTRIM DS) 800-160 mg Tab                    [1]   Patient Active Problem List  Diagnosis    Basal cell carcinoma (BCC) of left side of neck    Bone erosion    Depression    HTN (hypertension)    Hyperlipidemia    Arthritis    Erectile dysfunction    Migraine    Acute blood loss anemia    Hypotension due to drugs    Agitation    Impaired functional mobility and endurance    Open wound of scalp    Squamous cell skin cancer, face    Strain of metacarpophalangeal joint of right hand    Right hand pain    Other chronic postprocedural pain    Infrapatellar bursitis of right knee    Smoking history    Scalp pain    Chronic pain    Skin cancer    Immunosuppression due to drug therapy    Cancer associated pain    Lightheadedness    Paresthesia of skin    Normocytic anemia   [2]   Social History  Socioeconomic History    Marital status: Single    Number of children: 1   Occupational History     Comment: [employer]   Tobacco Use    Smoking status: Every Day     Current packs/day: 0.50     Average packs/day: 0.5 packs/day for 10.0 years (5.0 ttl pk-yrs)     Types: Cigarettes     Passive exposure: Past    Smokeless tobacco: Former    Tobacco comments:     1/2 pack per day   Substance and Sexual Activity    Alcohol use: Yes     Comment: ocassioanl beer    Drug use: Not Currently    Sexual activity: Yes     Partners: Female   Social History  Narrative    Lives in a house with 25 year old son     Social Drivers of Health     Financial Resource Strain: High Risk (8/29/2024)    Overall Financial Resource Strain (CARDIA)     Difficulty of Paying Living Expenses: Hard   Food Insecurity: Food Insecurity Present (8/29/2024)    Hunger Vital Sign     Worried About Running Out of Food in the Last Year: Sometimes true     Ran Out of Food in the Last Year: Sometimes true   Transportation Needs: Low Risk  (2/16/2025)    Received from [facility] SDOH Screening     Has lack of transportation kept you from medical appointments meetings work or from getting things needed for daily living?: Yes it has kept me from medical appointments or from getting my medicationsyes it has kept me fro...   Physical Activity: Unknown (8/29/2024)    Exercise Vital Sign     Days of Exercise per Week: 4 days   Stress: Stress Concern Present (8/29/2024)    [employer] [place] [employer] - Occupational Stress Questionnaire     Feeling of Stress : Very much   Housing Stability: High Risk (8/29/2024)    Housing Stability Vital Sign     Unable to Pay for Housing in the Last Year: Yes

## 2025-04-15 ENCOUNTER — HOSPITAL ENCOUNTER (OUTPATIENT)
Dept: RADIOLOGY | Facility: HOSPITAL | Age: 60
Discharge: HOME | End: 2025-04-15
Attending: INTERNAL MEDICINE
Payer: MEDICARE

## 2025-04-15 DIAGNOSIS — G89.3 CANCER ASSOCIATED PAIN: ICD-10-CM

## 2025-04-15 DIAGNOSIS — C44.41 BASAL CELL CARCINOMA (BCC) OF LEFT SIDE OF NECK: ICD-10-CM

## 2025-04-15 LAB — GLUCOSE SERPL-MCNC: 87 MG/DL (ref 70–110)

## 2025-04-15 PROCEDURE — A9552 F18 FDG: HCPCS | Mod: PN | Performed by: INTERNAL MEDICINE

## 2025-04-15 PROCEDURE — 78816 PET IMAGE W/CT FULL BODY: CPT | Mod: TC,PN

## 2025-04-15 PROCEDURE — 78816 PET IMAGE W/CT FULL BODY: CPT | Mod: 26,PS,, | Performed by: RADIOLOGY

## 2025-04-15 RX ORDER — FLUDEOXYGLUCOSE F18 500 MCI/ML
12.1 INJECTION INTRAVENOUS
Status: COMPLETED | OUTPATIENT
Start: 2025-04-15 | End: 2025-04-15

## 2025-04-15 RX ADMIN — FLUDEOXYGLUCOSE F-18 12.1 MILLICURIE: 500 INJECTION INTRAVENOUS at 12:04

## 2025-04-15 NOTE — PROGRESS NOTES
PET Imaging Questionnaire    Are you a Diabetic? Recent Blood Sugar level? No    Are you anemic? Bone Marrow Stimulation Meds? No    Have you had a CT Scan, if so when & where was your last one? Yes -     Have you had a PET Scan, if so when & where was your last one? Yes -     Chemotherapy or currently on Chemotherapy? Yes    Radiation therapy? Yes    Surgical History:   Past Surgical History:   Procedure Laterality Date    BACK SURGERY  11/2020    CRANIOTOMY N/A 3/3/2021    Procedure: CRANIOTOMY, USING FRAMELESS STEREOTAXY, OPEN, BIFRONTAL;  Surgeon: Raheel Green MD;  Location: [facility] OR [address] [place] [address];  Service: Neurosurgery;  Laterality: N/A;  TOR I  ASA I  TYPE & CROSS 2 UNITS  REGULAR BED  [place] HEADREST  [facility] CT    EXCISION OF LESION Left 8/24/2022    Procedure: EXCISION, LESION basal cell CA left neck--2 separate lesions;  Surgeon: Catalina Vidal MD;  Location: [facility];  Service: ENT;  Laterality: Left;    FLAP PROCEDURE N/A 3/3/2021    Procedure: CREATION, FREE FLAP;  Surgeon: Ayana Aguilar MD;  Location: [address];  Service: ENT;  Laterality: N/A;  Latissimus free flap. Will need bean bag and [place].     FLAP PROCEDURE Left 3/5/2021    Procedure: CREATION, FREE FLAP;  Surgeon: Ayaan Aguilar MD;  Location: [facility] OR [address] [place] [address];  Service: ENT;  Laterality: Left;    INCISION AND DRAINAGE, LOWER EXTREMITY Right 8/7/2024    Procedure: INCISION AND DRAINAGE, LOWER EXTREMITY;  Surgeon: Blake Barton MD;  Location: [facility] OR;  Service: Orthopedics;  Laterality: Right;    LAPAROSCOPIC REPAIR OF HERNIA      RHIZOTOMY W/ RADIOFREQUENCY ABLATION Bilateral     two procedures    SURGICAL REMOVAL OF LESION OF FACE Right 8/24/2022    Procedure: EXCISION, LESION, FACE ;  Surgeon: Catalina Vidal MD;  Location: [facility] OR;  Service: ENT;  Laterality: Right;    TENDON REPAIR Right     thumb        Have you been fasting for at least 6 hours? Yes    Is there any chance you may be pregnant or breastfeeding?  No    Assay: 12.3 MCi@:12:06   Injection Site:RT AC    Residual: 0.2 mCi@: 12:10   Technologist: Jose Manuel Alvarenga Injected:12.1 mCi

## 2025-04-28 ENCOUNTER — LAB VISIT (OUTPATIENT)
Dept: LAB | Facility: HOSPITAL | Age: 60
End: 2025-04-28
Attending: FAMILY MEDICINE
Payer: MEDICARE

## 2025-04-28 DIAGNOSIS — G89.3 CANCER ASSOCIATED PAIN: ICD-10-CM

## 2025-04-28 DIAGNOSIS — D64.9 NORMOCYTIC ANEMIA: ICD-10-CM

## 2025-04-28 DIAGNOSIS — C44.41 BASAL CELL CARCINOMA (BCC) OF LEFT SIDE OF NECK: ICD-10-CM

## 2025-04-28 LAB
ABSOLUTE EOSINOPHIL (SMH): 0.21 K/UL
ABSOLUTE MONOCYTE (SMH): 0.67 K/UL (ref 0.3–1)
ABSOLUTE NEUTROPHIL COUNT (SMH): 4.6 K/UL (ref 1.8–7.7)
ALBUMIN SERPL-MCNC: 4.2 G/DL (ref 3.5–5.2)
ALP SERPL-CCNC: 57 UNIT/L (ref 55–135)
ALT SERPL-CCNC: 17 UNIT/L (ref 10–44)
ANION GAP (SMH): 9 MMOL/L (ref 8–16)
AST SERPL-CCNC: 17 UNIT/L (ref 10–40)
BASOPHILS # BLD AUTO: 0.06 K/UL
BASOPHILS NFR BLD AUTO: 0.8 %
BILIRUB SERPL-MCNC: 0.4 MG/DL (ref 0.1–1)
BUN SERPL-MCNC: 19 MG/DL (ref 6–20)
CALCIUM SERPL-MCNC: 9.4 MG/DL (ref 8.7–10.5)
CHLORIDE SERPL-SCNC: 105 MMOL/L (ref 95–110)
CO2 SERPL-SCNC: 24 MMOL/L (ref 23–29)
CREAT SERPL-MCNC: 1.1 MG/DL (ref 0.5–1.4)
ERYTHROCYTE [DISTWIDTH] IN BLOOD BY AUTOMATED COUNT: 14 % (ref 11.5–14.5)
FERRITIN SERPL-MCNC: 42.5 NG/ML (ref 20–300)
GFR SERPLBLD CREATININE-BSD FMLA CKD-EPI: >60 ML/MIN/1.73/M2
GLUCOSE SERPL-MCNC: 111 MG/DL (ref 70–110)
HCT VFR BLD AUTO: 40.2 % (ref 40–54)
HGB BLD-MCNC: 12.7 GM/DL (ref 14–18)
IMM GRANULOCYTES # BLD AUTO: 0.02 K/UL (ref 0–0.04)
IMM GRANULOCYTES NFR BLD AUTO: 0.3 % (ref 0–0.5)
LYMPHOCYTES # BLD AUTO: 1.56 K/UL (ref 1–4.8)
MCH RBC QN AUTO: 29.1 PG (ref 27–31)
MCHC RBC AUTO-ENTMCNC: 31.6 G/DL (ref 32–36)
MCV RBC AUTO: 92 FL (ref 82–98)
NUCLEATED RBC (/100WBC) (SMH): 0 /100 WBC
PLATELET # BLD AUTO: 264 K/UL (ref 150–450)
PMV BLD AUTO: 9 FL (ref 9.2–12.9)
POTASSIUM SERPL-SCNC: 4.4 MMOL/L (ref 3.5–5.1)
PROT SERPL-MCNC: 6.7 GM/DL (ref 6–8.4)
RBC # BLD AUTO: 4.36 M/UL (ref 4.6–6.2)
RELATIVE EOSINOPHIL (SMH): 2.9 % (ref 0–8)
RELATIVE LYMPHOCYTE (SMH): 21.9 % (ref 18–48)
RELATIVE MONOCYTE (SMH): 9.4 % (ref 4–15)
RELATIVE NEUTROPHIL (SMH): 64.7 % (ref 38–73)
SODIUM SERPL-SCNC: 138 MMOL/L (ref 136–145)
WBC # BLD AUTO: 7.12 K/UL (ref 3.9–12.7)

## 2025-04-28 PROCEDURE — 84075 ASSAY ALKALINE PHOSPHATASE: CPT

## 2025-04-28 PROCEDURE — 36415 COLL VENOUS BLD VENIPUNCTURE: CPT | Mod: PO

## 2025-04-28 PROCEDURE — 85025 COMPLETE CBC W/AUTO DIFF WBC: CPT

## 2025-04-28 PROCEDURE — 82728 ASSAY OF FERRITIN: CPT

## 2025-04-30 ENCOUNTER — DOCUMENTATION ONLY (OUTPATIENT)
Dept: INFUSION THERAPY | Facility: HOSPITAL | Age: 60
End: 2025-04-30
Payer: MEDICARE

## 2025-04-30 ENCOUNTER — TELEPHONE (OUTPATIENT)
Dept: HEMATOLOGY/ONCOLOGY | Facility: CLINIC | Age: 60
End: 2025-04-30
Payer: MEDICARE

## 2025-04-30 ENCOUNTER — OFFICE VISIT (OUTPATIENT)
Dept: HEMATOLOGY/ONCOLOGY | Facility: CLINIC | Age: 60
End: 2025-04-30
Payer: MEDICARE

## 2025-04-30 VITALS
HEIGHT: 69 IN | WEIGHT: 136.69 LBS | RESPIRATION RATE: 18 BRPM | HEART RATE: 61 BPM | OXYGEN SATURATION: 99 % | BODY MASS INDEX: 20.24 KG/M2 | SYSTOLIC BLOOD PRESSURE: 144 MMHG | DIASTOLIC BLOOD PRESSURE: 98 MMHG | TEMPERATURE: 99 F

## 2025-04-30 DIAGNOSIS — G89.3 CANCER ASSOCIATED PAIN: ICD-10-CM

## 2025-04-30 DIAGNOSIS — D64.9 NORMOCYTIC ANEMIA: ICD-10-CM

## 2025-04-30 DIAGNOSIS — D84.821 IMMUNOSUPPRESSION DUE TO DRUG THERAPY: ICD-10-CM

## 2025-04-30 DIAGNOSIS — Z87.891 SMOKING HISTORY: ICD-10-CM

## 2025-04-30 DIAGNOSIS — Z79.899 IMMUNOSUPPRESSION DUE TO DRUG THERAPY: ICD-10-CM

## 2025-04-30 DIAGNOSIS — C44.41 BASAL CELL CARCINOMA (BCC) OF LEFT SIDE OF NECK: Primary | ICD-10-CM

## 2025-04-30 PROCEDURE — 3008F BODY MASS INDEX DOCD: CPT | Mod: CPTII,S$GLB,, | Performed by: INTERNAL MEDICINE

## 2025-04-30 PROCEDURE — 99999 PR PBB SHADOW E&M-EST. PATIENT-LVL IV: CPT | Mod: PBBFAC,,, | Performed by: INTERNAL MEDICINE

## 2025-04-30 PROCEDURE — 3080F DIAST BP >= 90 MM HG: CPT | Mod: CPTII,S$GLB,, | Performed by: INTERNAL MEDICINE

## 2025-04-30 PROCEDURE — 3077F SYST BP >= 140 MM HG: CPT | Mod: CPTII,S$GLB,, | Performed by: INTERNAL MEDICINE

## 2025-04-30 PROCEDURE — 1159F MED LIST DOCD IN RCRD: CPT | Mod: CPTII,S$GLB,, | Performed by: INTERNAL MEDICINE

## 2025-04-30 PROCEDURE — 99214 OFFICE O/P EST MOD 30 MIN: CPT | Mod: S$GLB,,, | Performed by: INTERNAL MEDICINE

## 2025-04-30 PROCEDURE — G2211 COMPLEX E/M VISIT ADD ON: HCPCS | Mod: S$GLB,,, | Performed by: INTERNAL MEDICINE

## 2025-04-30 NOTE — PROGRESS NOTES
Subjective:       Name: Margarito Mcnamara  : 1965  MRN: [id number]    Chief Complaint   Patient presents with    Basal cell carcinoma (BCC) of left side of neck     1 month follow up for PET scan results         Patient is in clinic by himself.    HPI: Margarito Mcnamara is a 59 y.o. male presents for evaluation of his recurrent Basal cell carcinoma (BCC) of left side of neck (1 month follow up for PET scan results )    He started on Erivedge on 2024. He stopped it in 2025  per Dr. Aguilar. He was started on antibiotics with Bactrim DS.    The patient denies CP, cough, SOB, abdominal pain, nausea, vomiting, constipation.  The patient denies fever, chills, night sweats, weight loss, new lumps or bumps, easy bruising or bleeding.    Sister had breast cane at the age of 43 yo  Father had bone cancer.    ONCOLOGIC HISTORY:  Patient of Dr. Vidal who was last seen 3/2023. He has a history of recurrent basal cell carcinoma of the right forehead and scalp. Patient was previously treated with a hedgehog inhibitor, Erivedge, in 2018 with good response. However, patient discontinued this prematurely after 6 months and the tumor has recurred. It recurred over 2 years year ago. Erivedge was again prescribed but denied by the insurance company X 2 but was finally started at the time he last saw Dr. Vidal in 3/2023.      He is having a lot of pain in his back and also on his head at the area of the recurrence. He has not been taking any pain medication for the last 2 years. He first started with an open sore around 1.5 years ago on his right forehead that has gotten worse. He has been having drainage from the site on his forehead. He has not been seen yet for this area of concern. He has not been seen by derm for a couple of years.      TREATMENT HISTORY:  2018: treated with a hedgehog inhibitor, Erivedge, in 2018 with good response and again in 2023     3/2021:   1.  Bifrontal craniotomy for tumor.  2.   Stealth navigation.  3.  Cranioplasty greater than 6 cm.   1.  Wide local excision of basal cell carcinoma of the scalp measuring roughly 15 x 15 cm  2.  Right myocutaneous latissimus dorsi free flap with microvascular anastomosis to right superficial temporal vessels and right facial artery  3.  [place] of right external jugular vein graft measuring 8 cm in length  4.  [place] of split-thickness skin graft from right thigh measuring 8 x 10 cm  5.  Indocyanine green angiography     6/2021: radiation      9/2022: excision of 3 carcinomas involving his left neck and right jawline     Oncology History   Basal cell carcinoma (BCC) of left side of neck   1/26/2021 Initial Diagnosis    Basal cell carcinoma (BCC) of scalp     1/28/2021 Tumor Conference    His case was discussed at the Multidisciplinary Head and Neck Team Planning Meeting.    Representatives from Medical Oncology, Radiation Oncology, Head and Neck Surgical Oncology, Psychosocial Oncology, and Speech and Language Pathology discussed the case with the following recommendations:    1) WLE with reconstruction  2) adjuvant radiation               2/4/2021 Tumor Conference    His case was discussed at the Multidisciplinary Head and Neck Team Planning Meeting.    Representatives from Medical Oncology, Radiation Oncology, Head and Neck Surgical Oncology, Psychosocial Oncology, and Speech and Language Pathology discussed the case with the following recommendations:    1) MRI with and without contrast  2) surgery with flap reconstruction              3/3/2021 Surgery    1.  Wide local excision of basal cell carcinoma of the scalp measuring roughly 15 x 15 cm  2.  Right myocutaneous latissimus dorsi free flap with microvascular anastomosis to right superficial temporal vessels and right facial artery  3.  [place]ippack of right external jugular vein graft measuring 8 cm in length  4.  [place] of split-thickness skin graft from right thigh measuring 8 x 10 cm  5.   Bifrontal  craniotomy for tumor.  6.  Stealth navigation.  7.  Cranioplasty greater than 6 cm.        3/5/2021 Surgery    1.  Preparation of scalp wound measuring 15 x 15 cm  2.  Left l myocutaneous latissimus dorsi free flap with microvascular anastomosis to left superficial temporal vessels  3.  Split-thickness skin graft from left thigh measuring approximately 8 x 10 cm     6/22/2021 Cancer Staged    Staging form: Cutaneous Carcinoma of the Head and Neck, AJCC 8th Edition  - Pathologic stage from 6/22/2021: pT4b, pN0          Past Medical History:   Diagnosis Date    ADHD (attention deficit hyperactivity disorder)     Arthritis     Basal cell carcinoma (BCC) of left side of neck 08/2022    Chronic pain     Depression     Hydrocele in adult     Hyperlipidemia     Hypertension     Migraine     Squamous cell skin cancer, face 08/2022       Past Surgical History:   Procedure Laterality Date    BACK SURGERY  11/2020    CRANIOTOMY N/A 3/3/2021    Procedure: CRANIOTOMY, USING FRAMELESS STEREOTAXY, OPEN, BIFRONTAL;  Surgeon: Raheel Green MD;  Location: [address];  Service: Neurosurgery;  Laterality: N/A;  TOR I  ASA I  TYPE & CROSS 2 UNITS  REGULAR BED  [place] [facility]    EXCISION OF LESION Left 8/24/2022    Procedure: EXCISION, LESION basal cell CA left neck--2 separate lesions;  Surgeon: Catalina Vidal MD;  Location: [facility];  Service: ENT;  Laterality: Left;    FLAP PROCEDURE N/A 3/3/2021    Procedure: CREATION, FREE FLAP;  Surgeon: Ayaan Aguilar MD;  Location: [facility] OR [address] [place] [address];  Service: ENT;  Laterality: N/A;  Latissimus free flap. Will need [facility] and [place].     FLAP PROCEDURE Left 3/5/2021    Procedure: CREATION, FREE FLAP;  Surgeon: Ayaan Aguilar MD;  [facility];  Service: ENT;  Laterality: Left;    INCISION AND DRAINAGE, LOWER EXTREMITY Right 8/7/2024    Procedure: INCISION AND DRAINAGE, LOWER EXTREMITY;  Surgeon: Blake Barton MD;  Location: [facility];   Service: Orthopedics;  Laterality: Right;    LAPAROSCOPIC REPAIR OF HERNIA      RHIZOTOMY W/ RADIOFREQUENCY ABLATION Bilateral     two procedures    SURGICAL REMOVAL OF LESION OF FACE Right 8/24/2022    Procedure: EXCISION, LESION, FACE ;  Surgeon: Catalina Vidal MD;  Location: [facility];  Service: ENT;  Laterality: Right;    TENDON REPAIR Right     thumb       Family History   Problem Relation Name Age of Onset    Depression Mother      Early death Mother  40    Alcohol abuse Father      Depression Father      No Known Problems Sister      Headaches Sister      Hypertension Brother      No Known Problems Maternal Aunt      No Known Problems Maternal Uncle      No Known Problems Paternal Aunt      No Known Problems Paternal Uncle      No Known Problems Maternal Grandmother      No Known Problems Maternal Grandfather      No Known Problems Paternal Grandmother      No Known Problems Paternal Grandfather      No Known Problems Other         Social History     Socioeconomic History    Marital status: Single    Number of children: 1   Occupational History     Comment: [employer] ref[employer]   Tobacco Use    Smoking status: Every Day     Current packs/day: 0.50     Average packs/day: 0.5 packs/day for 10.0 years (5.0 ttl pk-yrs)     Types: Cigarettes     Passive exposure: Past    Smokeless tobacco: Former    Tobacco comments:     1/2 pack per day   Substance and Sexual Activity    Alcohol use: Yes     Comment: ocassioanl beer    Drug use: Not Currently    Sexual activity: Yes     Partners: Female   Social History Narrative    Lives in a house with 25 year old son     Social Drivers of Health     Financial Resource Strain: High Risk (8/29/2024)    Overall Financial Resource Strain (CARDIA)     Difficulty of Paying Living Expenses: Hard   Food Insecurity: Food Insecurity Present (8/29/2024)    Hunger Vital Sign     Worried About Running Out of Food in the Last Year: Sometimes true     Ran Out of Food in the Last Year: Sometimes true  "  Transportation Needs: Low Risk  (2/16/2025)    Received from [facility] SDOH Screening     Has lack of transportation kept you from medical appointments meetings work or from getting things needed for daily living?: Yes it has kept me from medical appointments or from getting my medicationsyes it has kept me fro...   Physical Activity: Unknown (8/29/2024)    Exercise Vital Sign     Days of Exercise per Week: 4 days   Stress: Stress Concern Present (8/29/2024)    [employer] - Occupational Stress Questionnaire     Feeling of Stress : Very much   Housing Stability: High Risk (8/29/2024)    Housing Stability Vital Sign     Unable to Pay for Housing in the Last Year: Yes       Review of patient's allergies indicates:  No Known Allergies    Review of Systems   Constitutional:  Positive for fatigue. Negative for appetite change.   Gastrointestinal:  Positive for nausea.   Neurological:  Positive for dizziness and light-headedness.   Psychiatric/Behavioral:  The patient is nervous/anxious.             Objective:     Vitals:    04/30/25 1252   BP: (!) 144/98   BP Location: Right arm   Patient Position: Sitting   Pulse: 61   Resp: 18   Temp: 98.8 °F (37.1 °C)   TempSrc: Temporal   SpO2: 99%   Weight: 62 kg (136 lb 11 oz)   Height: 5' 9" (1.753 m)        Physical Exam  Vitals reviewed.   Constitutional:       Appearance: He is ill-appearing.   Eyes:      General: No scleral icterus.     Pupils: Pupils are equal, round, and reactive to light.   Cardiovascular:      Rate and Rhythm: Normal rate and regular rhythm.      Pulses: Normal pulses.      Heart sounds: Normal heart sounds.   Pulmonary:      Effort: Pulmonary effort is normal.      Breath sounds: Normal breath sounds.   Abdominal:      General: Bowel sounds are normal. There is no distension.   Musculoskeletal:         General: No swelling.   Lymphadenopathy:      Cervical: No cervical adenopathy.   Skin:     General: Skin is " warm.      Findings: Lesion (Presence of a scabbing lesion affecting his frontal scalp.  During this exam part of his metal plate is protruded as well.  There is no signs of local infection.) present.   Neurological:      General: No focal deficit present.      Mental Status: He is alert.   Psychiatric:         Mood and Affect: Mood normal.                Current Outpatient Medications on File Prior to Visit   Medication Sig    acetaminophen (TYLENOL) 325 MG tablet Take 325 mg by mouth every 4 (four) hours as needed.    amlodipine-benazepril 10-20mg (LOTREL) 10-20 mg per capsule Take 1 capsule by mouth once daily.    aspirin (ECOTRIN) 81 MG EC tablet Take 81 mg by mouth.    atorvastatin (LIPITOR) 10 MG tablet Take 10 mg by mouth.    azelastine (ASTELIN) 137 mcg (0.1 %) nasal spray 1 puff in each nostril Nasally Twice a day for 30 days    buPROPion (WELLBUTRIN XL) 150 MG TB24 tablet Take by mouth.    gabapentin (NEURONTIN) 300 MG capsule Take 2 capsules (600 mg total) by mouth 2 (two) times daily.    HYDROcodone-acetaminophen (NORCO)  mg per tablet Take 1 tablet by mouth every 6 (six) hours as needed for Pain.    ibuprofen (ADVIL,MOTRIN) 800 MG tablet Take 800 mg by mouth every 8 (eight) hours as needed.    meclizine (ANTIVERT) 12.5 mg tablet Take 1 tablet (12.5 mg total) by mouth 3 (three) times daily as needed for Dizziness.    pantoprazole (PROTONIX) 40 MG tablet Take 40 mg by mouth.    SENNA LAXATIVE 8.6 mg tablet Take 2 tablets by mouth.    sildenafiL (VIAGRA) 50 MG tablet Take 50 mg by mouth daily as needed.    sulfamethoxazole-trimethoprim 800-160mg (BACTRIM DS) 800-160 mg Tab Take 1 tablet by mouth 2 (two) times daily.    tadalafiL (CIALIS) 20 MG Tab Take 20 mg by mouth As instructed.    diazePAM (VALIUM) 5 MG tablet Bring to appointment. (Patient not taking: Reported on 4/30/2025)     No current facility-administered medications on file prior to visit.       CBC:  Lab Results   Component Value Date     WBC 7.12 04/28/2025    HGB 12.7 (L) 04/28/2025    HCT 40.2 04/28/2025    MCV 92 04/28/2025     04/28/2025         CMP:  Sodium   Date Value Ref Range Status   04/28/2025 138 136 - 145 mmol/L Final     Potassium   Date Value Ref Range Status   04/28/2025 4.4 3.5 - 5.1 mmol/L Final     Chloride   Date Value Ref Range Status   04/28/2025 105 95 - 110 mmol/L Final     CO2   Date Value Ref Range Status   04/28/2025 24 23 - 29 mmol/L Final     Glucose   Date Value Ref Range Status   04/28/2025 111 (H) 70 - 110 mg/dL Final     BUN   Date Value Ref Range Status   04/28/2025 19 6 - 20 mg/dL Final   03/26/2025 30 (H) 6 - 20 mg/dL Final     Creatinine   Date Value Ref Range Status   04/28/2025 1.1 0.5 - 1.4 mg/dL Final   03/26/2025 1.1 0.5 - 1.4 mg/dL Final     Calcium   Date Value Ref Range Status   04/28/2025 9.4 8.7 - 10.5 mg/dL Final     Protein Total   Date Value Ref Range Status   04/28/2025 6.7 6.0 - 8.4 gm/dL Final     Albumin   Date Value Ref Range Status   04/28/2025 4.2 3.5 - 5.2 g/dL Final     Bilirubin Total   Date Value Ref Range Status   04/28/2025 0.4 0.1 - 1.0 mg/dL Final     Comment:     For infants and newborns, interpretation of results should be based   on gestational age, weight and in agreement with clinical   observations.    Premature Infant recommended reference ranges:   0-24 hours:  <8.0 mg/dL   24-48 hours: <12.0 mg/dL   3-5 days:    <15.0 mg/dL   6-29 days:   <15.0 mg/dL     ALP   Date Value Ref Range Status   04/28/2025 57 55 - 135 unit/L Final     AST   Date Value Ref Range Status   04/28/2025 17 10 - 40 unit/L Final     ALT   Date Value Ref Range Status   04/28/2025 17 10 - 44 unit/L Final     Anion Gap   Date Value Ref Range Status   04/28/2025 9 8 - 16 mmol/L Final   03/26/2025 11 8 - 16 mmol/L Final     eGFR if    Date Value Ref Range Status   03/18/2021 >60.0 >60 mL/min/1.73 m^2 Final     eGFR if non    Date Value Ref Range Status   03/18/2021 >60.0  >60 mL/min/1.73 m^2 Final     Comment:     Calculation used to obtain the estimated glomerular filtration  rate (eGFR) is the CKD-EPI equation.          NM PET CT FDG Whole Body  Narrative: EXAMINATION:  NM PET CT FDG WHOLE BODY    CLINICAL HISTORY:  Basal cell carcinoma of the scalp with skull involvement status post wide local excision, cranioplasty, myocutaneous flap    TECHNIQUE:  12.1 mCi of F18-FDG was administered intravenously in the right antecubital fossa.  After an approximately 60 min distribution time, PET/CT images were acquired from the skull vertex through the feet. Transmission images were acquired to correct for attenuation using a whole body low-dose CT scan without contrast with the arms positioned along the side of the torso.  SUV mean/max in mediastinal blood pool is 1.31/1.5.    COMPARISON:  10/02/2024    FINDINGS:  Head/neck: The patient is status post right frontal cranioplasty and myocutaneous flap placement.  There is no abnormal activity noted in the scalp at the surgical site.  No abnormal intracranial activity is noted, noting that MRI with contrast is more sensitive for the detection of intracranial metastases.  No sites of abnormal activity are noted in the neck.  No cervical adenopathy is present.    Chest: No abnormal FDG uptake is noted in the thorax.  There is no activity above the level of blood pool at the eryn on today's exam.  No adenopathy is present.  There is no pleural effusion.  There is calcific plaque in the aorta and coronary arteries.    There is scarring in the lung apices.  There is a 4 mm nodule in the posterior left apex unchanged on series 3, image 93, also seen on a previous CT of 01/30/2023.    There are surgical clips in the bilateral axillae.    Abdomen/pelvis: No sites of abnormal FDG uptake are present.    Liver, gallbladder, spleen, left adrenal gland are normal.  There is are nonobstructing left renal calculi and bilateral renal cysts.  There is a stable  12 mm nodule of the right adrenal gland without FDG uptake.    The aorta is normal in caliber with scattered calcific plaque.    Urinary bladder collapsed.  Distal colonic diverticula noted.  There is no ascites nor adenopathy.    Bones and extremities: There are no findings to indicate metastatic disease to bone.  There is slight asymmetric activity in the skin of the right lower leg anteriorly best appreciated on the MIP images, without definite CT correlate, likely artifactual.  Impression: Extensive postsurgical changes in the skull and scalp without evidence of metastatic or recurrent disease.    Electronically signed by: Fidelia Burnette Ha  Date:    04/15/2025  Time:    14:21       ECOG SCORE    1 - Restricted in strenuous activity-ambulatory and able to carry out work of a light nature              Assessment/Plan:     Basal cell carcinoma:   Cancer Staging   Basal cell carcinoma (BCC) of left side of neck  Staging form: Melanoma of the Skin, AJCC 8th Edition  - Clinical: No stage assigned - Unsigned  Staging form: Cutaneous Carcinoma of the Head and Neck, AJCC 8th Edition  - Pathologic stage from 6/22/2021: pT4b, pN0 - Signed by Catalina Vidal MD on 6/22/2021    I reviewed independently the patient medical record including his laboratory pathologic and radiologic findings.    His medical record also were reviewed at length.    His case was discussed with a neck tumor board.  The recommendation was to resume hedgehog inhibitor .  Started on Erivedge on October 25, 2024.  Stopped it in April 2025.  -He was seen by  who recommended Bactrim Ds and he will see him in a month to assess if he is medically stable to proceed with surgery.  - Whole body PET scan for restaging purposes showed no evidence of distant metastatic disease.  -He will be seen in 3 months from now.    Normocytic anemia:  Hgb: 12.7 g/dl- ferritin 42.5  Reflecting iron deficiency anemia. He will be started on oral iron Sulafte 325 mg  po daily with Vitamin C.  Will continue to monitor.    Open wound affecting his scalp  He has been following with Dr. Ely closely for local wound care/ possible surgery.          Cancer related pain.    Followed by the palliative care team.    Currently on Norco 10/325 1 tablet by mouth every 12 hours as needed.    Tobacco abuse  He was advised to quit smoking    35 minutes of total time spent on the encounter, which includes face to face time and non-face to face time preparing to see the patient (eg, review of tests), obtaining and/or reviewing separately obtained history, documenting clinical information in the electronic or other health record, independently interpreting results (not separately reported) and communicating results to the patient/family/caregiver, or care coordination (not separately reported).         Med Onc Chart Routing      Follow up with physician 3 months.   Follow up with JODI    Infusion scheduling note    Injection scheduling note    Labs    Imaging    Pharmacy appointment    Other referrals                   Plan was discussed with the patient at length, and he verbalized understanding. Margarito was given an opportunity to ask questions that were answered to his satisfaction, and he was advised to call in the interval if any problems or questions arise.  Signed:  Blanca Loera MD   Hematology and Oncology  [facility] - HEMATOLOGY ONCOLOGY  [facility], [place]

## 2025-04-30 NOTE — PROGRESS NOTES
"Nutrition Note  RD asked to see pt in clinic. Pt looking for assistance with oral nutrition supplements. Pt has medicare and will not pay for supplements. RD discussed calling [employer] to see if he has OTC supplement to help pay. RD provided pt with Nepro supplements. Informed pt that they are to  on May 1, 2025. Pt states "they are going be okay" and took the supplements knowing when they . RD also provided pt with Ensure coupons. Pt appreciative of RD visit. Pt weight has been fluctuating but overall stable.     Wt Readings from Last 20 Encounters:   25 62 kg (136 lb 11 oz)   25 62.4 kg (137 lb 9.1 oz)   25 64.1 kg (141 lb 5 oz)   25 61.4 kg (135 lb 5.8 oz)   25 60.8 kg (134 lb 0.6 oz)   25 61.4 kg (135 lb 5.8 oz)   25 61.4 kg (135 lb 5.8 oz)   24 62.1 kg (136 lb 14.5 oz)   24 62.1 kg (136 lb 14.5 oz)   24 63.5 kg (140 lb)   24 61.6 kg (135 lb 12.9 oz)   11/15/24 62.7 kg (138 lb 3.7 oz)   10/22/24 63.5 kg (139 lb 15.9 oz)   10/14/24 62.2 kg (137 lb 2 oz)   10/10/24 62.5 kg (137 lb 12.6 oz)   10/03/24 63.6 kg (140 lb 3.4 oz)   24 62.8 kg (138 lb 7.2 oz)   24 65.8 kg (145 lb)   24 63.5 kg (139 lb 15.9 oz)   24 63.5 kg (140 lb)       Beti Gallegos, HERMINIAN, LDN   "

## 2025-04-30 NOTE — TELEPHONE ENCOUNTER
Spoke with Mr Mcnamara to inform him that there are no earlier appointment times available this morning. Patient states he will be here for his scheduled appointment time at 11:40 am.    ----- Message from [place] sent at 4/30/2025  8:40 AM CDT -----  Type: Needs Medical AdviceWho Called:  Patient Symptoms (please be specific):  How long has patient had these symptoms:  Pharmacy name and phone #:  Best Call Back Number: [phone] Information: Patient is requesting a call back for an earlier appt. on 4/30 at 11:40.

## 2025-05-05 ENCOUNTER — OFFICE VISIT (OUTPATIENT)
Dept: SURGICAL ONCOLOGY | Facility: CLINIC | Age: 60
End: 2025-05-05
Payer: MEDICARE

## 2025-05-05 VITALS — HEIGHT: 69 IN | WEIGHT: 133.63 LBS | BODY MASS INDEX: 19.79 KG/M2

## 2025-05-05 DIAGNOSIS — C44.90 SKIN CANCER: Primary | ICD-10-CM

## 2025-05-05 DIAGNOSIS — S01.00XS OPEN WOUND OF SCALP, UNSPECIFIED OPEN WOUND TYPE, SEQUELA: ICD-10-CM

## 2025-05-05 PROCEDURE — 99999 PR PBB SHADOW E&M-EST. PATIENT-LVL IV: CPT | Mod: PBBFAC,,, | Performed by: OTOLARYNGOLOGY

## 2025-05-05 PROCEDURE — 3008F BODY MASS INDEX DOCD: CPT | Mod: CPTII,S$GLB,, | Performed by: OTOLARYNGOLOGY

## 2025-05-05 PROCEDURE — 99213 OFFICE O/P EST LOW 20 MIN: CPT | Mod: S$GLB,,, | Performed by: OTOLARYNGOLOGY

## 2025-05-05 PROCEDURE — 1160F RVW MEDS BY RX/DR IN RCRD: CPT | Mod: CPTII,S$GLB,, | Performed by: OTOLARYNGOLOGY

## 2025-05-05 PROCEDURE — 1159F MED LIST DOCD IN RCRD: CPT | Mod: CPTII,S$GLB,, | Performed by: OTOLARYNGOLOGY

## 2025-05-05 NOTE — PROGRESS NOTES
Chief Complaint   Patient presents with    Follow-up     1 mo f.u.        HPI   59 y.o. male presents for evaluation of a wound of the right frontal scalp.  He is known to me for history of basal cell carcinoma of the scalp treated several years ago with surgical resection and free flap reconstruction.  No significant complaints today.  He does report some drainage from the site.  This has been an ongoing problem for several months.    No new complaints.    Review of Systems   Constitutional: Negative for fatigue and unexpected weight change.   HENT: Per HPI.  Eyes: Negative for visual disturbance.   Respiratory: Negative for shortness of breath, hemoptysis   Cardiovascular: Negative for chest pain and palpitations.   Musculoskeletal: Negative for decreased ROM, back pain.   Skin: Negative for rash, sunburn, itching.   Neurological: Negative for dizziness and seizures.   Hematological: Negative for adenopathy. Does not bruise/bleed easily.   Endocrine: Negative for rapid weight loss/weight gain, heat/cold intolerance.     Past Medical History   Problem List[1]        Past Surgical History   Past Surgical History:   Procedure Laterality Date    BACK SURGERY  11/2020    CRANIOTOMY N/A 3/3/2021    Procedure: CRANIOTOMY, USING FRAMELESS STEREOTAXY, OPEN, BIFRONTAL;  Surgeon: Raheel Green MD;  Location: [facility] OR [address] [place] [address];  Service: Neurosurgery;  Laterality: N/A;  TOR I  ASA I  TYPE & CROSS 2 UNITS  REGULAR BED  [place] HEADREST  [facility] CT    EXCISION OF LESION Left 8/24/2022    Procedure: EXCISION, LESION basal cell CA left neck--2 separate lesions;  Surgeon: Catalina Vidal MD;  Location: [facility];  Service: ENT;  Laterality: Left;    FLAP PROCEDURE N/A 3/3/2021    Procedure: CREATION, FREE FLAP;  Surgeon: Ayaan Aguilar MD;  Location: [facility] OR [address] [place] [address];  Service: ENT;  Laterality: N/A;  Latissimus free flap. Will need [facility].     FLAP PROCEDURE Left 3/5/2021    Procedure: CREATION, FREE FLAP;   Surgeon: Ayaan Aguilar MD;  Location: [address] [facility];  Service: ENT;  Laterality: Left;    INCISION AND DRAINAGE, LOWER EXTREMITY Right 8/7/2024    Procedure: INCISION AND DRAINAGE, LOWER EXTREMITY;  Surgeon: Blake Barton MD;  Location: [facility] OR;  Service: Orthopedics;  Laterality: Right;    LAPAROSCOPIC REPAIR OF HERNIA      RHIZOTOMY W/ RADIOFREQUENCY ABLATION Bilateral     two procedures    SURGICAL REMOVAL OF LESION OF FACE Right 8/24/2022    Procedure: EXCISION, LESION, FACE ;  Surgeon: Catalina Vidal MD;  Location: [facility] OR;  Service: ENT;  Laterality: Right;    TENDON REPAIR Right     thumb         Family History   Family History   Problem Relation Name Age of Onset    Depression Mother      Early death Mother  40    Alcohol abuse Father      Depression Father      No Known Problems Sister      Headaches Sister      Hypertension Brother      No Known Problems Maternal Aunt      No Known Problems Maternal Uncle      No Known Problems Paternal Aunt      No Known Problems Paternal Uncle      No Known Problems Maternal Grandmother      No Known Problems Maternal Grandfather      No Known Problems Paternal Grandmother      No Known Problems Paternal Grandfather      No Known Problems Other             Social History   .Social History[2]      Allergies   Review of patient's allergies indicates:  No Known Allergies        Physical Exam     There were no vitals filed for this visit.      Body mass index is 19.73 kg/m².      General: AOx3, NAD   Respiratory:  Symmetric chest rise, normal effort  Right Ear: External Auditory Canal WNL,TM w/o masses/lesions/perforations.  Middle ear without evidence of effusion.   Neck:  Well-healed scars No cervical lymphadenopathy, thyromegaly or thyroid nodules.  Normal range of motion.    Face: House Brackmann I bilaterally.  See photo below.  Scant purulent drainage noted.          Assessment/Plan  Problem List Items Addressed This Visit          Oncology    Skin cancer  - Primary    Relevant Orders    CT Head Without Contrast    CT Soft Tissue Neck With Contrast       Orthopedic    Open wound of scalp    Plan for head CT and neck CT to assess for recipient vessels.  We will contact him with treatment plan following these scans.                           [1]   Patient Active Problem List  Diagnosis    Basal cell carcinoma (BCC) of left side of neck    Bone erosion    Depression    HTN (hypertension)    Hyperlipidemia    Arthritis    Erectile dysfunction    Migraine    Acute blood loss anemia    Hypotension due to drugs    Agitation    Impaired functional mobility and endurance    Open wound of scalp    Squamous cell skin cancer, face    Strain of metacarpophalangeal joint of right hand    Right hand pain    Other chronic postprocedural pain    Infrapatellar bursitis of right knee    Smoking history    Scalp pain    Chronic pain    Skin cancer    Cancer associated pain    Lightheadedness    Paresthesia of skin    Normocytic anemia   [2]   Social History  Socioeconomic History    Marital status: Single    Number of children: 1   Occupational History     Comment: [employer]   Tobacco Use    Smoking status: Every Day     Current packs/day: 0.50     Average packs/day: 0.5 packs/day for 10.0 years (5.0 ttl pk-yrs)     Types: Cigarettes     Passive exposure: Past    Smokeless tobacco: Former    Tobacco comments:     1/2 pack per day   Substance and Sexual Activity    Alcohol use: Yes     Comment: ocassioanl beer    Drug use: Not Currently    Sexual activity: Yes     Partners: Female   Social History Narrative    Lives in a house with 25 year old son     Social Drivers of Health     Financial Resource Strain: High Risk (8/29/2024)    Overall Financial Resource Strain (CARDIA)     Difficulty of Paying Living Expenses: Hard   Food Insecurity: Food Insecurity Present (8/29/2024)    Hunger Vital Sign     Worried About Running Out of Food in the Last Year: Sometimes true     Ran Out of Food in  the Last Year: Sometimes true   Transportation Needs: Low Risk  (2/16/2025)    Received from [facility] SDOH Screening     Has lack of transportation kept you from medical appointments meetings work or from getting things needed for daily living?: Yes it has kept me from medical appointments or from getting my medicationsyes it has kept me fro...   Physical Activity: Unknown (8/29/2024)    Exercise Vital Sign     Days of Exercise per Week: 4 days   Stress: Stress Concern Present (8/29/2024)    [employer] [place] [employer] - Occupational Stress Questionnaire     Feeling of Stress : Very much   Housing Stability: High Risk (8/29/2024)    Housing Stability Vital Sign     Unable to Pay for Housing in the Last Year: Yes

## 2025-05-05 NOTE — ASSESSMENT & PLAN NOTE
Plan for head CT and neck CT to assess for recipient vessels.  We will contact him with treatment plan following these scans.

## 2025-05-08 ENCOUNTER — HOSPITAL ENCOUNTER (OUTPATIENT)
Dept: RADIOLOGY | Facility: HOSPITAL | Age: 60
Discharge: HOME | End: 2025-05-08
Attending: OTOLARYNGOLOGY
Payer: MEDICARE

## 2025-05-08 DIAGNOSIS — C44.90 SKIN CANCER: ICD-10-CM

## 2025-05-08 PROCEDURE — 70450 CT HEAD/BRAIN W/O DYE: CPT | Mod: TC

## 2025-05-08 PROCEDURE — 70491 CT SOFT TISSUE NECK W/DYE: CPT | Mod: 26,,, | Performed by: RADIOLOGY

## 2025-05-08 PROCEDURE — 25500020 PHARM REV CODE 255

## 2025-05-08 PROCEDURE — 70450 CT HEAD/BRAIN W/O DYE: CPT | Mod: 26,,, | Performed by: RADIOLOGY

## 2025-05-08 PROCEDURE — 70491 CT SOFT TISSUE NECK W/DYE: CPT | Mod: TC

## 2025-05-08 RX ADMIN — IOHEXOL 75 ML: 350 INJECTION, SOLUTION INTRAVENOUS at 09:05

## 2025-05-13 ENCOUNTER — OFFICE VISIT (OUTPATIENT)
Dept: PALLIATIVE MEDICINE | Facility: CLINIC | Age: 60
End: 2025-05-13
Payer: MEDICARE

## 2025-05-13 VITALS
DIASTOLIC BLOOD PRESSURE: 86 MMHG | HEART RATE: 70 BPM | OXYGEN SATURATION: 99 % | SYSTOLIC BLOOD PRESSURE: 150 MMHG | TEMPERATURE: 97 F | WEIGHT: 136.44 LBS | BODY MASS INDEX: 20.15 KG/M2

## 2025-05-13 DIAGNOSIS — F32.A DEPRESSION, UNSPECIFIED DEPRESSION TYPE: ICD-10-CM

## 2025-05-13 DIAGNOSIS — G62.9 NEUROPATHY: ICD-10-CM

## 2025-05-13 DIAGNOSIS — Z51.5 PALLIATIVE CARE BY SPECIALIST: Primary | ICD-10-CM

## 2025-05-13 DIAGNOSIS — C44.320 SQUAMOUS CELL SKIN CANCER, FACE: ICD-10-CM

## 2025-05-13 DIAGNOSIS — G89.3 CANCER ASSOCIATED PAIN: ICD-10-CM

## 2025-05-13 DIAGNOSIS — C44.90 SKIN CANCER: ICD-10-CM

## 2025-05-13 PROCEDURE — 1159F MED LIST DOCD IN RCRD: CPT | Mod: CPTII,S$GLB,, | Performed by: NURSE PRACTITIONER

## 2025-05-13 PROCEDURE — 3008F BODY MASS INDEX DOCD: CPT | Mod: CPTII,S$GLB,, | Performed by: NURSE PRACTITIONER

## 2025-05-13 PROCEDURE — 99999 PR PBB SHADOW E&M-EST. PATIENT-LVL III: CPT | Mod: PBBFAC,,, | Performed by: NURSE PRACTITIONER

## 2025-05-13 PROCEDURE — 99215 OFFICE O/P EST HI 40 MIN: CPT | Mod: S$GLB,,, | Performed by: NURSE PRACTITIONER

## 2025-05-13 PROCEDURE — 3077F SYST BP >= 140 MM HG: CPT | Mod: CPTII,S$GLB,, | Performed by: NURSE PRACTITIONER

## 2025-05-13 PROCEDURE — 3079F DIAST BP 80-89 MM HG: CPT | Mod: CPTII,S$GLB,, | Performed by: NURSE PRACTITIONER

## 2025-05-13 RX ORDER — SERTRALINE HYDROCHLORIDE 25 MG/1
25 TABLET, FILM COATED ORAL DAILY
Qty: 30 TABLET | Refills: 0 | Status: SHIPPED | OUTPATIENT
Start: 2025-05-13 | End: 2025-06-12

## 2025-05-13 RX ORDER — GABAPENTIN 300 MG/1
600 CAPSULE ORAL 2 TIMES DAILY
Qty: 120 CAPSULE | Refills: 0 | Status: SHIPPED | OUTPATIENT
Start: 2025-05-13 | End: 2025-06-12

## 2025-05-13 RX ORDER — HYDROCODONE BITARTRATE AND ACETAMINOPHEN 10; 325 MG/1; MG/1
1 TABLET ORAL EVERY 6 HOURS PRN
Qty: 120 TABLET | Refills: 0 | Status: SHIPPED | OUTPATIENT
Start: 2025-05-13

## 2025-05-13 NOTE — PROGRESS NOTES
Office Visit  [facility]      ASSESSMENT/PLAN:     Plan/Recommendations:  Margarito was seen today for palliative care.    Diagnoses and all orders for this visit:    Squamous cell skin cancer, face  Skin cancer  Re-occurrence of disease, noted hardware exposure- following with Dr Ely  Considering surgery for plate removal- high risk  On hedgehog inhibitor   Continue to follow with Dr Loera    Palliative care by specialist/ACP  Encouraged to work on ACP documentation    Depression  Start sertraline 25 mg po daily    Cancer related pain  Neuropathy               Continue  Norco 10/325 mg every 6 hours/prn for pain #120/0 (increased from every 12 hours)              Continue gabapentin to 600 mg bid  -     Continue senna (SENOKOT) 8.6 mg tablet; Take 2 tablets by mouth once daily.    Follow up: 2 months        SUBJECTIVE:     History of Present Illness:  Patient is a 59 y.o. year old male with h/o HTN, BCC, and melanoma . Patient of Dr. Vidal who was last seen 3/2023, now with recurrent basal cell carcinoma of the right forehead and scalp . He is referred by Dr Ely for Cancer related pain     Palliative Discussion:  Follow up pain evaluation, patient with ongoing hardware exposure to right side of head- seems to be getting worst. He is doing some wound care, tries to keep it covered.    Pain about the same, Norco continues to be helpful about 1-2 per day  Has c/o Itching and burning in feet- gabapentin helping  Doing wound care for his head. He reports chemo currently on hold  Dizziness subsided.   He is following with Dr Ely, they have discussed surgical removal however surgery is considered high risk. Patient wants to wait until it is absolutely necessary, he is caring for his adult child with Autism and has to make arrangements for him.   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      ACP  Discussed the importance of documenting his wishes for the  type of care that is unacceptable in the future, also discussed the importance of establishing a HCPOA to make decisions for him when he is not able to. ACP documents provided for review.      ROS:  Review of Systems   Constitutional:  Positive for fatigue.   HENT:  Negative for mouth sores.    Eyes:  Positive for visual disturbance.   Respiratory:  Negative for cough.    Gastrointestinal:  Negative for abdominal pain and diarrhea.   Skin:  Negative for rash.   Neurological:  Positive for headaches.   Hematological:  Negative for adenopathy.   Psychiatric/Behavioral:  The patient is nervous/anxious.        Past Medical History:   Diagnosis Date    ADHD (attention deficit hyperactivity disorder)     Arthritis     Basal cell carcinoma (BCC) of left side of neck 08/2022    Chronic pain     Depression     Hydrocele in adult     Hyperlipidemia     Hypertension     Migraine     Squamous cell skin cancer, face 08/2022     Past Surgical History:   Procedure Laterality Date    BACK SURGERY  11/2020    CRANIOTOMY N/A 3/3/2021    Procedure: CRANIOTOMY, USING FRAMELESS STEREOTAXY, OPEN, BIFRONTAL;  Surgeon: Raheel Green MD;  Location: [facility] OR [address] [place] [address];  Service: Neurosurgery;  Laterality: N/A;  TOR I  ASA I  TYPE & CROSS 2 UNITS  REGULAR BED  [place] HEADREST  [facility] CT    EXCISION OF LESION Left 8/24/2022    Procedure: EXCISION, LESION basal cell CA left neck--2 separate lesions;  Surgeon: Catalina Vidal MD;  Location: [facility];  Service: ENT;  Laterality: Left;    FLAP PROCEDURE N/A 3/3/2021    Procedure: CREATION, FREE FLAP;  Surgeon: Ayaan Aguilar MD;  Location: [facility] OR [address] [place] [address];  Service: ENT;  Laterality: N/A;  Latissimus free flap. Will need [facility].     FLAP PROCEDURE Left 3/5/2021    Procedure: CREATION, FREE FLAP;  Surgeon: Ayaan Aguilar MD;  Location: [facility] OR [address] [place] [address];  Service: ENT;  Laterality: Left;    INCISION AND DRAINAGE, LOWER EXTREMITY Right 8/7/2024    Procedure: INCISION  AND DRAINAGE, LOWER EXTREMITY;  Surgeon: Blake Barton MD;  Location: [facility] OR;  Service: Orthopedics;  Laterality: Right;    LAPAROSCOPIC REPAIR OF HERNIA      RHIZOTOMY W/ RADIOFREQUENCY ABLATION Bilateral     two procedures    SURGICAL REMOVAL OF LESION OF FACE Right 8/24/2022    Procedure: EXCISION, LESION, FACE ;  Surgeon: Catalina Vidal MD;  Location: [facility] OR;  Service: ENT;  Laterality: Right;    TENDON REPAIR Right     thumb     Family History   Problem Relation Name Age of Onset    Depression Mother      Early death Mother  40    Alcohol abuse Father      Depression Father      No Known Problems Sister      Headaches Sister      Hypertension Brother      No Known Problems Maternal Aunt      No Known Problems Maternal Uncle      No Known Problems Paternal Aunt      No Known Problems Paternal Uncle      No Known Problems Maternal Grandmother      No Known Problems Maternal Grandfather      No Known Problems Paternal Grandmother      No Known Problems Paternal Grandfather      No Known Problems Other       Review of patient's allergies indicates:  No Known Allergies  Social Drivers of Health     Tobacco Use: High Risk (5/5/2025)    Patient History     Smoking Tobacco Use: Every Day     Smokeless Tobacco Use: Former     Passive Exposure: Past   Alcohol Use: Not At Risk (8/29/2024)    AUDIT-C     Frequency of Alcohol Consumption: 2-4 times a month     Average Number of Drinks: 1 or 2     Frequency of Binge Drinking: Never   Financial Resource Strain: High Risk (8/29/2024)    Overall Financial Resource Strain (CARDIA)     Difficulty of Paying Living Expenses: Hard   Food Insecurity: Food Insecurity Present (8/29/2024)    Hunger Vital Sign     Worried About Running Out of Food in the Last Year: Sometimes true     Ran Out of Food in the Last Year: Sometimes true   Transportation Needs: Low Risk  (2/16/2025)    Received from [facility] SDOH Screening     Has lack of transportation kept you from  medical appointments meetings work or from getting things needed for daily living?: Yes it has kept me from medical appointments or from getting my medicationsyes it has kept me fro...   Physical Activity: Unknown (8/29/2024)    Exercise Vital Sign     Days of Exercise per Week: 4 days     Minutes of Exercise per Session: Not on file   Stress: Stress Concern Present (8/29/2024)    [employer] [place] [employer] - Occupational Stress Questionnaire     Feeling of Stress : Very much   Housing Stability: High Risk (8/29/2024)    Housing Stability Vital Sign     Unable to Pay for Housing in the Last Year: Yes     Number of Times Moved in the Last Year: Not on file     Homeless in the Last Year: Not on file   Depression: Low Risk  (3/26/2025)    Depression     Last PHQ-4: Flowsheet Data: 0   Recent Concern: Depression - High Risk (1/30/2025)    Depression     Last PHQ-4: Flowsheet Data: 15   Utilities: At Risk (8/29/2024)    [facility] Utilities     Threatened with loss of utilities: Yes   Health Literacy: Inadequate Health Literacy (8/29/2024)     Health Literacy     Frequency of need for help with medical instructions: Sometimes   Social Isolation: Not on file            OBJECTIVE:     Physical Exam:  Vitals: Temp: 97.1 °F (36.2 °C) (05/13/25 1036)  Pulse: 70 (05/13/25 1036)  BP: (!) 150/86 (Pt stated he haven't taken his BP today due to having to pick it up from pharmacy) (05/13/25 1036)  SpO2: 99 % (05/13/25 1036)  Physical Exam  Constitutional:       Appearance: He is underweight.   HENT:      Head: Normocephalic.      Comments: Right sided facial and head scars noted     Mouth/Throat:      Mouth: Mucous membranes are moist.   Pulmonary:      Effort: Pulmonary effort is normal.   Abdominal:      General: There is no distension.   Musculoskeletal:         General: Normal range of motion.      Cervical back: Normal range of motion.   Skin:     General: Skin is warm and dry.      Findings: Erythema present.    Neurological:      Mental Status: He is alert and oriented to person, place, and time.   Psychiatric:         Mood and Affect: Mood normal.           Review of Symptoms      Symptom Assessment (ESAS 0-10 Scale)  Pain:  7  Dyspnea:  1  Anxiety:  4  Nausea:  1  Depression:  7  Anorexia:  4  Fatigue:  3  Insomnia:  4  Restlessness:  4  Agitation:  2       Anxiety:  Is nervous/anxious    Pain Assessment:    Location(s): none      ECOG Performance Status ndGndrndanddndend:nd nd2nd Living Arrangements:  Lives with family    Psychosocial/Cultural:   See Palliative Psychosocial Note: Yes  **Primary  to Follow**  Palliative Care  Consult: No      Advance Care Planning   Advance Directives:   Living Will: No    [place]: No      Decision Making:  Patient answered questions  Goals of Care: The patient endorses that what is most important right now is to focus on symptom/pain control and curative/life-prolongation (regardless of treatment burdens)    Accordingly, we have decided that the best plan to meet the patient's goals includes continuing with treatment           Medications:    Current Outpatient Medications:     acetaminophen (TYLENOL) 325 MG tablet, Take 325 mg by mouth every 4 (four) hours as needed., Disp: , Rfl:     amlodipine-benazepril 10-20mg (LOTREL) 10-20 mg per capsule, Take 1 capsule by mouth once daily., Disp: , Rfl:     aspirin (ECOTRIN) 81 MG EC tablet, Take 81 mg by mouth., Disp: , Rfl:     atorvastatin (LIPITOR) 10 MG tablet, Take 10 mg by mouth., Disp: , Rfl:     azelastine (ASTELIN) 137 mcg (0.1 %) nasal spray, 1 puff in each nostril Nasally Twice a day for 30 days, Disp: , Rfl:     buPROPion (WELLBUTRIN XL) 150 MG TB24 tablet, Take by mouth., Disp: , Rfl:     diazePAM (VALIUM) 5 MG tablet, Bring to appointment., Disp: 1 tablet, Rfl: 0    HYDROcodone-acetaminophen (NORCO)  mg per tablet, Take 1 tablet by mouth every 6 (six) hours as needed for Pain., Disp: 120 tablet, Rfl: 0     ibuprofen (ADVIL,MOTRIN) 800 MG tablet, Take 800 mg by mouth every 8 (eight) hours as needed., Disp: , Rfl:     pantoprazole (PROTONIX) 40 MG tablet, Take 40 mg by mouth., Disp: , Rfl:     SENNA LAXATIVE 8.6 mg tablet, Take 2 tablets by mouth., Disp: , Rfl:     sildenafiL (VIAGRA) 50 MG tablet, Take 50 mg by mouth daily as needed., Disp: , Rfl:     sulfamethoxazole-trimethoprim 800-160mg (BACTRIM DS) 800-160 mg Tab, Take 1 tablet by mouth 2 (two) times daily., Disp: 20 tablet, Rfl: 0    tadalafiL (CIALIS) 20 MG Tab, Take 20 mg by mouth As instructed., Disp: , Rfl:     gabapentin (NEURONTIN) 300 MG capsule, Take 2 capsules (600 mg total) by mouth 2 (two) times daily., Disp: 120 capsule, Rfl: 0    meclizine (ANTIVERT) 12.5 mg tablet, Take 1 tablet (12.5 mg total) by mouth 3 (three) times daily as needed for Dizziness., Disp: 45 tablet, Rfl: 0    Labs:  CBC:   WBC   Date Value Ref Range Status   04/28/2025 7.12 3.90 - 12.70 K/uL Final   03/26/2025 8.24 3.90 - 12.70 K/uL Final     Hgb   Date Value Ref Range Status   04/28/2025 12.7 (L) 14.0 - 18.0 gm/dL Final     POC Hematocrit   Date Value Ref Range Status   03/03/2021 29 (L) 36 - 54 %PCV Final     Hct   Date Value Ref Range Status   04/28/2025 40.2 40.0 - 54.0 % Final     MCV   Date Value Ref Range Status   04/28/2025 92 82 - 98 fL Final     Platelet Count   Date Value Ref Range Status   04/28/2025 264 150 - 450 K/uL Final       LFT:   Lab Results   Component Value Date    AST 17 04/28/2025    ALKPHOS 57 04/28/2025    BILITOT 0.4 04/28/2025       Albumin:   Albumin   Date Value Ref Range Status   04/28/2025 4.2 3.5 - 5.2 g/dL Final     Protein:   Protein Total   Date Value Ref Range Status   04/28/2025 6.7 6.0 - 8.4 gm/dL Final     40 minutes of total time spent on the encounter, which includes face to face time and non-face to face time preparing to see the patient (eg, review of tests), Obtaining and/or reviewing separately obtained history, Documenting clinical  information in the electronic or other health record, Independently interpreting results if documented above (not separately reported) and communicating results to the patient/family/caregiver, or Care coordination (not separately reported).     16 minutes spent doing ACP    Signature: Kimberly Silver NP

## 2025-05-27 ENCOUNTER — TELEPHONE (OUTPATIENT)
Dept: OTOLARYNGOLOGY | Facility: CLINIC | Age: 60
End: 2025-05-27
Payer: MEDICARE

## 2025-05-27 NOTE — TELEPHONE ENCOUNTER
----- Message from Ayaan Aguilar MD sent at 5/27/2025  3:00 PM CDT -----  Please have him see me in [place] next week. Thanks.

## 2025-06-02 ENCOUNTER — OFFICE VISIT (OUTPATIENT)
Dept: SURGICAL ONCOLOGY | Facility: CLINIC | Age: 60
End: 2025-06-02
Payer: MEDICARE

## 2025-06-02 VITALS — WEIGHT: 134.94 LBS | BODY MASS INDEX: 19.92 KG/M2

## 2025-06-02 DIAGNOSIS — C44.320 SQUAMOUS CELL SKIN CANCER, FACE: Primary | ICD-10-CM

## 2025-06-02 DIAGNOSIS — S01.00XS OPEN WOUND OF SCALP, UNSPECIFIED OPEN WOUND TYPE, SEQUELA: ICD-10-CM

## 2025-06-02 PROCEDURE — 3008F BODY MASS INDEX DOCD: CPT | Mod: CPTII,S$GLB,, | Performed by: OTOLARYNGOLOGY

## 2025-06-02 PROCEDURE — 1159F MED LIST DOCD IN RCRD: CPT | Mod: CPTII,S$GLB,, | Performed by: OTOLARYNGOLOGY

## 2025-06-02 PROCEDURE — 99214 OFFICE O/P EST MOD 30 MIN: CPT | Mod: S$GLB,,, | Performed by: OTOLARYNGOLOGY

## 2025-06-02 PROCEDURE — 99999 PR PBB SHADOW E&M-EST. PATIENT-LVL III: CPT | Mod: PBBFAC,,, | Performed by: OTOLARYNGOLOGY

## 2025-06-05 ENCOUNTER — TUMOR BOARD CONFERENCE (OUTPATIENT)
Dept: OTOLARYNGOLOGY | Facility: CLINIC | Age: 60
End: 2025-06-05
Payer: MEDICARE

## 2025-06-07 DIAGNOSIS — F32.A DEPRESSION, UNSPECIFIED DEPRESSION TYPE: ICD-10-CM

## 2025-06-09 RX ORDER — SERTRALINE HYDROCHLORIDE 25 MG/1
25 TABLET, FILM COATED ORAL
Qty: 90 TABLET | Refills: 1 | Status: SHIPPED | OUTPATIENT
Start: 2025-06-09

## 2025-07-03 NOTE — PROGRESS NOTES
Subjective:       Name: Margarito Mcnamara  : 1965  MRN: [id number]    Chief Complaint   Patient presents with    Basal cell carcinoma (BCC) of left side of neck        Patient is in clinic by himself.    HPI: Margarito Mcnamara is a 59 y.o. male presents for evaluation of his recurrent Basal cell carcinoma (BCC) of left side of neck    He started on Erivedge on 2024. He stopped it in 2025  per Dr. Aguilar.     The patient denies CP, cough, SOB, abdominal pain, nausea, vomiting, constipation.  The patient denies fever, chills, night sweats, weight loss, new lumps or bumps, easy bruising or bleeding.    Sister had breast cancer at the age of 43 yo  Father had bone cancer.    ONCOLOGIC HISTORY:  Patient of Dr. Vidal who was last seen 3/2023. He has a history of recurrent basal cell carcinoma of the right forehead and scalp. Patient was previously treated with a hedgehog inhibitor, Erivedge, in 2018 with good response. However, patient discontinued this prematurely after 6 months and the tumor has recurred. It recurred over 2 years year ago. Erivedge was again prescribed but denied by the insurance company X 2 but was finally started at the time he last saw Dr. Vidal in 3/2023.      He is having a lot of pain in his back and also on his head at the area of the recurrence. He has not been taking any pain medication for the last 2 years. He first started with an open sore around 1.5 years ago on his right forehead that has gotten worse. He has been having drainage from the site on his forehead. He has not been seen yet for this area of concern. He has not been seen by derm for a couple of years.      TREATMENT HISTORY:  2018: treated with a hedgehog inhibitor, Erivedge, in 2018 with good response and again in 2023     3/2021:   1.  Bifrontal craniotomy for tumor.  2.  Stealth navigation.  3.  Cranioplasty greater than 6 cm.   1.  Wide local excision of basal cell carcinoma of the scalp measuring  roughly 15 x 15 cm  2.  Right myocutaneous latissimus dorsi free flap with microvascular anastomosis to right superficial temporal vessels and right facial artery  3.  [place] of right external jugular vein graft measuring 8 cm in length  4.  [place] of split-thickness skin graft from right thigh measuring 8 x 10 cm  5.  Indocyanine green angiography     6/2021: radiation      9/2022: excision of 3 carcinomas involving his left neck and right jawline     Oncology History   Basal cell carcinoma (BCC) of left side of neck   1/26/2021 Initial Diagnosis    Basal cell carcinoma (BCC) of scalp     1/28/2021 Tumor Conference    His case was discussed at the Multidisciplinary Head and Neck Team Planning Meeting.    Representatives from Medical Oncology, Radiation Oncology, Head and Neck Surgical Oncology, Psychosocial Oncology, and Speech and Language Pathology discussed the case with the following recommendations:    1) WLE with reconstruction  2) adjuvant radiation               2/4/2021 Tumor Conference    His case was discussed at the Multidisciplinary Head and Neck Team Planning Meeting.    Representatives from Medical Oncology, Radiation Oncology, Head and Neck Surgical Oncology, Psychosocial Oncology, and Speech and Language Pathology discussed the case with the following recommendations:    1) MRI with and without contrast  2) surgery with flap reconstruction              3/3/2021 Surgery    1.  Wide local excision of basal cell carcinoma of the scalp measuring roughly 15 x 15 cm  2.  Right myocutaneous latissimus dorsi free flap with microvascular anastomosis to right superficial temporal vessels and right facial artery  3.  [place] of right external jugular vein graft measuring 8 cm in length  4.  [place] of split-thickness skin graft from right thigh measuring 8 x 10 cm  5.   Bifrontal craniotomy for tumor.  6.  Stealth navigation.  7.  Cranioplasty greater than 6 cm.        3/5/2021 Surgery    1.  Preparation of  scalp wound measuring 15 x 15 cm  2.  Left l myocutaneous latissimus dorsi free flap with microvascular anastomosis to left superficial temporal vessels  3.  Split-thickness skin graft from left thigh measuring approximately 8 x 10 cm     6/22/2021 Cancer Staged    Staging form: Cutaneous Carcinoma of the Head and Neck, AJCC 8th Edition  - Pathologic stage from 6/22/2021: pT4b, pN0          Past Medical History:   Diagnosis Date    ADHD (attention deficit hyperactivity disorder)     Arthritis     Basal cell carcinoma (BCC) of left side of neck 08/2022    Chronic pain     Depression     Hydrocele in adult     Hyperlipidemia     Hypertension     Migraine     Squamous cell skin cancer, face 08/2022       Past Surgical History:   Procedure Laterality Date    BACK SURGERY  11/2020    CRANIOTOMY N/A 3/3/2021    Procedure: CRANIOTOMY, USING FRAMELESS STEREOTAXY, OPEN, BIFRONTAL;  Surgeon: Raheel Green MD;  Location: [facility] OR [address] [place] [address];  Service: Neurosurgery;  Laterality: N/A;  TOR I  ASA I  TYPE & CROSS 2 UNITS  REGULAR BED  [place] [facility]    EXCISION OF LESION Left 8/24/2022    Procedure: EXCISION, LESION basal cell CA left neck--2 separate lesions;  Surgeon: Catalina Vidal MD;  Location: [facility];  Service: ENT;  Laterality: Left;    FLAP PROCEDURE N/A 3/3/2021    Procedure: CREATION, FREE FLAP;  Surgeon: Ayaan Aguilar MD;  Location: [address];  Service: ENT;  Laterality: N/A;  Latissimus free flap. Will need bean bag and [place].     FLAP PROCEDURE Left 3/5/2021    Procedure: CREATION, FREE FLAP;  Surgeon: Ayaan Aguilar MD;  Location: [facility] OR [address] [place] [address];  Service: ENT;  Laterality: Left;    INCISION AND DRAINAGE, LOWER EXTREMITY Right 8/7/2024    Procedure: INCISION AND DRAINAGE, LOWER EXTREMITY;  Surgeon: Blake Barton MD;  Location: [facility] OR;  Service: Orthopedics;  Laterality: Right;    LAPAROSCOPIC REPAIR OF HERNIA      RHIZOTOMY W/ RADIOFREQUENCY ABLATION Bilateral     two  procedures    SURGICAL REMOVAL OF LESION OF FACE Right 8/24/2022    Procedure: EXCISION, LESION, FACE ;  Surgeon: Catalina Vidal MD;  Location: [facility];  Service: ENT;  Laterality: Right;    TENDON REPAIR Right     thumb       Family History   Problem Relation Name Age of Onset    Depression Mother      Early death Mother  40    Alcohol abuse Father      Depression Father      No Known Problems Sister      Headaches Sister      Hypertension Brother      No Known Problems Maternal Aunt      No Known Problems Maternal Uncle      No Known Problems Paternal Aunt      No Known Problems Paternal Uncle      No Known Problems Maternal Grandmother      No Known Problems Maternal Grandfather      No Known Problems Paternal Grandmother      No Known Problems Paternal Grandfather      No Known Problems Other         Social History     Socioeconomic History    Marital status: Single    Number of children: 1   Occupational History     Comment: [employer] ref[employer]   Tobacco Use    Smoking status: Every Day     Current packs/day: 0.50     Average packs/day: 0.5 packs/day for 10.0 years (5.0 ttl pk-yrs)     Types: Cigarettes     Passive exposure: Past    Smokeless tobacco: Former    Tobacco comments:     1/2 pack per day   Substance and Sexual Activity    Alcohol use: Yes     Comment: ocassioanl beer    Drug use: Not Currently    Sexual activity: Yes     Partners: Female   Social History Narrative    Lives in a house with 25 year old son     Social Drivers of Health     Financial Resource Strain: High Risk (8/29/2024)    Overall Financial Resource Strain (CARDIA)     Difficulty of Paying Living Expenses: Hard   Food Insecurity: Food Insecurity Present (8/29/2024)    Hunger Vital Sign     Worried About Running Out of Food in the Last Year: Sometimes true     Ran Out of Food in the Last Year: Sometimes true   Transportation Needs: Low Risk  (2/16/2025)    Received from [facility] SDOH Screening     Has lack of transportation kept  "you from medical appointments meetings work or from getting things needed for daily living?: Yes it has kept me from medical appointments or from getting my medicationsyes it has kept me fro...   Physical Activity: Unknown (8/29/2024)    Exercise Vital Sign     Days of Exercise per Week: 4 days   Stress: Stress Concern Present (8/29/2024)    [employer] [place] [employer] - Occupational Stress Questionnaire     Feeling of Stress : Very much   Housing Stability: High Risk (8/29/2024)    Housing Stability Vital Sign     Unable to Pay for Housing in the Last Year: Yes       Review of patient's allergies indicates:  No Known Allergies    Review of Systems   Constitutional:  Positive for fatigue. Negative for appetite change.   Gastrointestinal:  Positive for nausea.   Neurological:  Positive for dizziness and light-headedness.   Psychiatric/Behavioral:  The patient is nervous/anxious.             Objective:     Vitals:    07/07/25 1052   Pulse: 69   Temp: 97.6 °F (36.4 °C)   TempSrc: Temporal   SpO2: 97%   Weight: 60.7 kg (133 lb 13.1 oz)   Height: 5' 9" (1.753 m)          Physical Exam  Vitals reviewed.   Constitutional:       Appearance: He is ill-appearing.   Eyes:      General: No scleral icterus.     Pupils: Pupils are equal, round, and reactive to light.   Cardiovascular:      Rate and Rhythm: Normal rate and regular rhythm.      Pulses: Normal pulses.      Heart sounds: Normal heart sounds.   Pulmonary:      Effort: Pulmonary effort is normal.      Breath sounds: Normal breath sounds.   Abdominal:      General: Bowel sounds are normal. There is no distension.   Musculoskeletal:         General: No swelling.   Lymphadenopathy:      Cervical: No cervical adenopathy.   Skin:     General: Skin is warm.      Findings: Lesion: During this exam part of his metal plate is protruded as well.  There is no signs of infection..   Neurological:      General: No focal deficit present.      Mental Status: He is alert. "   Psychiatric:         Mood and Affect: Mood normal.                Current Outpatient Medications on File Prior to Visit   Medication Sig    acetaminophen (TYLENOL) 325 MG tablet Take 325 mg by mouth every 4 (four) hours as needed.    amlodipine-benazepril 10-20mg (LOTREL) 10-20 mg per capsule Take 1 capsule by mouth once daily.    aspirin (ECOTRIN) 81 MG EC tablet Take 81 mg by mouth.    atorvastatin (LIPITOR) 10 MG tablet Take 10 mg by mouth.    azelastine (ASTELIN) 137 mcg (0.1 %) nasal spray 1 puff in each nostril Nasally Twice a day for 30 days    buPROPion (WELLBUTRIN XL) 150 MG TB24 tablet Take by mouth.    diazePAM (VALIUM) 5 MG tablet Bring to appointment.    gabapentin (NEURONTIN) 300 MG capsule Take 2 capsules (600 mg total) by mouth 2 (two) times daily.    HYDROcodone-acetaminophen (NORCO)  mg per tablet Take 1 tablet by mouth every 6 (six) hours as needed for Pain.    ibuprofen (ADVIL,MOTRIN) 800 MG tablet Take 800 mg by mouth every 8 (eight) hours as needed.    pantoprazole (PROTONIX) 40 MG tablet Take 40 mg by mouth.    SENNA LAXATIVE 8.6 mg tablet Take 2 tablets by mouth.    sertraline (ZOLOFT) 25 MG tablet TAKE 1 TABLET BY MOUTH EVERY DAY    sildenafiL (VIAGRA) 50 MG tablet Take 50 mg by mouth daily as needed.    tadalafiL (CIALIS) 20 MG Tab Take 20 mg by mouth As instructed.    sulfamethoxazole-trimethoprim 800-160mg (BACTRIM DS) 800-160 mg Tab Take 1 tablet by mouth 2 (two) times daily. (Patient not taking: Reported on 7/7/2025)     No current facility-administered medications on file prior to visit.       CBC:  Lab Results   Component Value Date    WBC 7.12 04/28/2025    HGB 12.7 (L) 04/28/2025    HCT 40.2 04/28/2025    MCV 92 04/28/2025     04/28/2025         CMP:  Sodium   Date Value Ref Range Status   04/28/2025 138 136 - 145 mmol/L Final     Potassium   Date Value Ref Range Status   04/28/2025 4.4 3.5 - 5.1 mmol/L Final     Chloride   Date Value Ref Range Status   04/28/2025 105  95 - 110 mmol/L Final     CO2   Date Value Ref Range Status   04/28/2025 24 23 - 29 mmol/L Final     Glucose   Date Value Ref Range Status   04/28/2025 111 (H) 70 - 110 mg/dL Final     BUN   Date Value Ref Range Status   04/28/2025 19 6 - 20 mg/dL Final   03/26/2025 30 (H) 6 - 20 mg/dL Final     Creatinine   Date Value Ref Range Status   04/28/2025 1.1 0.5 - 1.4 mg/dL Final   03/26/2025 1.1 0.5 - 1.4 mg/dL Final     Calcium   Date Value Ref Range Status   04/28/2025 9.4 8.7 - 10.5 mg/dL Final     Protein Total   Date Value Ref Range Status   04/28/2025 6.7 6.0 - 8.4 gm/dL Final     Albumin   Date Value Ref Range Status   04/28/2025 4.2 3.5 - 5.2 g/dL Final     Bilirubin Total   Date Value Ref Range Status   04/28/2025 0.4 0.1 - 1.0 mg/dL Final     Comment:     For infants and newborns, interpretation of results should be based   on gestational age, weight and in agreement with clinical   observations.    Premature Infant recommended reference ranges:   0-24 hours:  <8.0 mg/dL   24-48 hours: <12.0 mg/dL   3-5 days:    <15.0 mg/dL   6-29 days:   <15.0 mg/dL     ALP   Date Value Ref Range Status   04/28/2025 57 55 - 135 unit/L Final     AST   Date Value Ref Range Status   04/28/2025 17 10 - 40 unit/L Final     ALT   Date Value Ref Range Status   04/28/2025 17 10 - 44 unit/L Final     Anion Gap   Date Value Ref Range Status   04/28/2025 9 8 - 16 mmol/L Final   03/26/2025 11 8 - 16 mmol/L Final     eGFR if    Date Value Ref Range Status   03/18/2021 >60.0 >60 mL/min/1.73 m^2 Final     eGFR if non    Date Value Ref Range Status   03/18/2021 >60.0 >60 mL/min/1.73 m^2 Final     Comment:     Calculation used to obtain the estimated glomerular filtration  rate (eGFR) is the CKD-EPI equation.          CT Head Without Contrast  Narrative: EXAMINATION:  CT HEAD WITHOUT CONTRAST    CLINICAL HISTORY:  exposed cranioplasty mesh; Unspecified malignant neoplasm of skin, unspecified    TECHNIQUE:  Low  dose axial images were obtained through the head.  Coronal and sagittal reformations were also performed. Contrast was not administered.    COMPARISON:  MRI brain 08/19/2024, CT head 08/03/2024    FINDINGS:  Postoperative changes right frontal craniectomy and mesh cranioplasty with overlying flap dehiscence.  Extensive heterogeneous lucency again seen throughout the bifrontal and parietal calvarium without significant interval detrimental change as compared to the prior head CT on 08/03/2024.  Persistent abnormal heterogeneous dural thickening sub adjacent to the cranioplasty and along the posterior right frontal and parietal convexities, without significant interval detrimental change as compared to the most recent exam, measuring up to 6 mm in thickness.  Subtle mass effect on the underlying parenchyma without midline shift or adjacent parenchymal signal abnormality.    Ventricles and sulci are normal in size for age without evidence of hydrocephalus. No new extra-axial blood or fluid collections.  No acute parenchymal hemorrhage.    Gray-white matter differentiation appears preserved without evidence of acute major vascular territory infarct.    The visualized paranasal sinuses are essentially clear. Mastoid air cells are clear.    Visualized portions of the orbits are normal.    No displaced calvarial fracture.  Impression: 1. Stable postoperative changes of right frontal craniectomy and mesh cranioplasty with overlying flap dehiscence.  2. Persistent diffuse heterogeneous lucency of the bifrontoparietal calvarium with grossly stable-appear heterogeneous dural thickening and enhancement subadjacent to the cranioplasty and extending along the posterior right frontoparietal convexity, nonspecific, and while it may be related to postsurgical/post treatment change, findings remain suspicious for residual or recurrent osseous disease with dural involvement.    Electronically signed by: Giovani  Elie  Date:    05/08/2025  Time:    12:21  CT Soft Tissue Neck With Contrast  Narrative: EXAMINATION:  CT SOFT TISSUE NECK WITH CONTRAST    CLINICAL HISTORY:  scalp wound;Unspecified malignant neoplasm of skin, unspecified    TECHNIQUE:  Low dose axial images as well as sagittal and coronal reconstructions were performed from the skull base to the clavicles following the intravenous administration of 75 mL of Omnipaque 350.    COMPARISON:  CT neck of January 26, 2022.    FINDINGS:  There remain multiple small surgical clips in the right face and neck unchanged from prior studies.  The parotid glands or bilaterally symmetric and of normal CT density.  The submandibular glands are bilaterally symmetric and of normal CT density.  The thyroid is small with normal CT density without a nodule identified.  On this study significant adenopathy is not identified.  The largest lymph node in the anterior cervical triangle on the right measures 9 by 4.5 mm.  The largest lymph node on the left in the anterior cervical triangle measures 9 by 5 mm.  Other masses are not identified on this study.    The structures of the pharynx and larynx appear symmetric and within normal limits.  Constriction of the airway are trachea is not seen.  There is degenerative disc disease and spondylosis in the cervical spine at C4-5, C5-6 and C6-7.  Impression: Prior surgery of the right face and neck.  Degenerative disc disease with spondylosis at C4-5, C5-6 and C6-7.  Mass or adenopathy of the neck is not demonstrated on this study.    Electronically signed by: Jose Cunningham MD  Date:    05/08/2025  Time:    10:52       ECOG SCORE    1 - Restricted in strenuous activity-ambulatory and able to carry out work of a light nature              Assessment/Plan:     Basal cell carcinoma:   Cancer Staging   Basal cell carcinoma (BCC) of left side of neck  Staging form: Melanoma of the Skin, AJCC 8th Edition  - Clinical: No stage assigned -  Unsigned  Staging form: Cutaneous Carcinoma of the Head and Neck, AJCC 8th Edition  - Pathologic stage from 6/22/2021: pT4b, pN0 - Signed by Catalina Vidal MD on 6/22/2021    I reviewed independently the patient medical record including his laboratory pathologic and radiologic findings.    His medical record also were reviewed at length.    His case was discussed with a neck tumor board.  The recommendation was to resume hedgehog inhibitor .  Started on Erivedge on October 25, 2024.  Stopped it in April 2025.  -He was seen by  . He is due to proceed with surgery: flap reconstruction  - Whole body PET scan for restaging purposes 4/15/2025  showed no evidence of distant metastatic disease.  -He will be seen in 4 months from now.    Normocytic anemia:  Hgb: 12.7 g/dl- ferritin 42.5  Reflecting iron deficiency anemia. He will be started on oral iron Sulafte 325 mg po daily with Vitamin C.  Will continue to monitor.    Open wound affecting his scalp  Due to proceed with a flap reconstruction.        Cancer related pain.    Followed by the palliative care team.    Currently on Norco 10/325 1 tablet by mouth every 12 hours as needed.    Tobacco abuse  He was advised to quit smoking    25 minutes of total time spent on the encounter, which includes face to face time and non-face to face time preparing to see the patient (eg, review of tests), obtaining and/or reviewing separately obtained history, documenting clinical information in the electronic or other health record, independently interpreting results (not separately reported) and communicating results to the patient/family/caregiver, or care coordination (not separately reported).         Med Onc Chart Routing      Follow up with physician 4 months.   Follow up with JODI    Infusion scheduling note    Injection scheduling note    Labs    Imaging    Pharmacy appointment    Other referrals                 Plan was discussed with the patient at length, and he  verbalized understanding. Margarito was given an opportunity to ask questions that were answered to his satisfaction, and he was advised to call in the interval if any problems or questions arise.  Signed:  Blanca Loera MD   Hematology and Oncology  [facility] - HEMATOLOGY ONCOLOGY  [facility], [place]

## 2025-07-07 ENCOUNTER — OFFICE VISIT (OUTPATIENT)
Dept: HEMATOLOGY/ONCOLOGY | Facility: CLINIC | Age: 60
End: 2025-07-07
Payer: MEDICARE

## 2025-07-07 VITALS
OXYGEN SATURATION: 97 % | TEMPERATURE: 98 F | SYSTOLIC BLOOD PRESSURE: 112 MMHG | WEIGHT: 133.81 LBS | DIASTOLIC BLOOD PRESSURE: 80 MMHG | BODY MASS INDEX: 19.82 KG/M2 | RESPIRATION RATE: 20 BRPM | HEART RATE: 69 BPM | HEIGHT: 69 IN

## 2025-07-07 DIAGNOSIS — Z87.891 SMOKING HISTORY: ICD-10-CM

## 2025-07-07 DIAGNOSIS — G89.3 CANCER ASSOCIATED PAIN: ICD-10-CM

## 2025-07-07 DIAGNOSIS — D64.9 NORMOCYTIC ANEMIA: ICD-10-CM

## 2025-07-07 DIAGNOSIS — C44.41 BASAL CELL CARCINOMA (BCC) OF LEFT SIDE OF NECK: Primary | ICD-10-CM

## 2025-07-07 DIAGNOSIS — S01.00XS OPEN WOUND OF SCALP, UNSPECIFIED OPEN WOUND TYPE, SEQUELA: ICD-10-CM

## 2025-07-07 PROCEDURE — 3079F DIAST BP 80-89 MM HG: CPT | Mod: CPTII,S$GLB,, | Performed by: INTERNAL MEDICINE

## 2025-07-07 PROCEDURE — 1159F MED LIST DOCD IN RCRD: CPT | Mod: CPTII,S$GLB,, | Performed by: INTERNAL MEDICINE

## 2025-07-07 PROCEDURE — G2211 COMPLEX E/M VISIT ADD ON: HCPCS | Mod: S$GLB,,, | Performed by: INTERNAL MEDICINE

## 2025-07-07 PROCEDURE — 3008F BODY MASS INDEX DOCD: CPT | Mod: CPTII,S$GLB,, | Performed by: INTERNAL MEDICINE

## 2025-07-07 PROCEDURE — 99999 PR PBB SHADOW E&M-EST. PATIENT-LVL IV: CPT | Mod: PBBFAC,,, | Performed by: INTERNAL MEDICINE

## 2025-07-07 PROCEDURE — 1160F RVW MEDS BY RX/DR IN RCRD: CPT | Mod: CPTII,S$GLB,, | Performed by: INTERNAL MEDICINE

## 2025-07-07 PROCEDURE — 99213 OFFICE O/P EST LOW 20 MIN: CPT | Mod: S$GLB,,, | Performed by: INTERNAL MEDICINE

## 2025-07-07 PROCEDURE — 3074F SYST BP LT 130 MM HG: CPT | Mod: CPTII,S$GLB,, | Performed by: INTERNAL MEDICINE

## 2025-07-10 ENCOUNTER — DOCUMENTATION ONLY (OUTPATIENT)
Dept: HEMATOLOGY/ONCOLOGY | Facility: CLINIC | Age: 60
End: 2025-07-10
Payer: MEDICARE

## 2025-07-10 ENCOUNTER — OFFICE VISIT (OUTPATIENT)
Dept: PALLIATIVE MEDICINE | Facility: CLINIC | Age: 60
End: 2025-07-10
Payer: MEDICARE

## 2025-07-10 VITALS
DIASTOLIC BLOOD PRESSURE: 78 MMHG | WEIGHT: 132.94 LBS | TEMPERATURE: 98 F | BODY MASS INDEX: 19.69 KG/M2 | HEART RATE: 55 BPM | SYSTOLIC BLOOD PRESSURE: 114 MMHG | RESPIRATION RATE: 17 BRPM | OXYGEN SATURATION: 99 % | HEIGHT: 69 IN

## 2025-07-10 DIAGNOSIS — Z51.5 PALLIATIVE CARE BY SPECIALIST: Primary | ICD-10-CM

## 2025-07-10 DIAGNOSIS — F32.A DEPRESSION, UNSPECIFIED DEPRESSION TYPE: ICD-10-CM

## 2025-07-10 DIAGNOSIS — C44.90 SKIN CANCER: ICD-10-CM

## 2025-07-10 DIAGNOSIS — G89.3 CANCER ASSOCIATED PAIN: ICD-10-CM

## 2025-07-10 PROCEDURE — 3008F BODY MASS INDEX DOCD: CPT | Mod: CPTII,S$GLB,, | Performed by: NURSE PRACTITIONER

## 2025-07-10 PROCEDURE — 3078F DIAST BP <80 MM HG: CPT | Mod: CPTII,S$GLB,, | Performed by: NURSE PRACTITIONER

## 2025-07-10 PROCEDURE — 3074F SYST BP LT 130 MM HG: CPT | Mod: CPTII,S$GLB,, | Performed by: NURSE PRACTITIONER

## 2025-07-10 PROCEDURE — 1159F MED LIST DOCD IN RCRD: CPT | Mod: CPTII,S$GLB,, | Performed by: NURSE PRACTITIONER

## 2025-07-10 PROCEDURE — 99999 PR PBB SHADOW E&M-EST. PATIENT-LVL IV: CPT | Mod: PBBFAC,,, | Performed by: NURSE PRACTITIONER

## 2025-07-10 PROCEDURE — 99214 OFFICE O/P EST MOD 30 MIN: CPT | Mod: S$GLB,,, | Performed by: NURSE PRACTITIONER

## 2025-07-10 RX ORDER — HYDROCODONE BITARTRATE AND ACETAMINOPHEN 10; 325 MG/1; MG/1
1 TABLET ORAL EVERY 6 HOURS PRN
Qty: 120 TABLET | Refills: 0 | Status: SHIPPED | OUTPATIENT
Start: 2025-07-10

## 2025-07-10 RX ORDER — MUPIROCIN 20 MG/G
OINTMENT TOPICAL 2 TIMES DAILY
Qty: 30 G | Refills: 1 | Status: SHIPPED | OUTPATIENT
Start: 2025-07-10 | End: 2025-07-17

## 2025-07-10 RX ORDER — SERTRALINE HYDROCHLORIDE 50 MG/1
50 TABLET, FILM COATED ORAL DAILY
Qty: 30 TABLET | Refills: 0 | Status: SHIPPED | OUTPATIENT
Start: 2025-07-10 | End: 2025-08-09

## 2025-07-10 NOTE — PROGRESS NOTES
Office Visit  [facility]      ASSESSMENT/PLAN:     Plan/Recommendations:  Margarito was seen today for palliative care.    Diagnoses and all orders for this visit:    Squamous cell skin cancer, face  Skin cancer  Re-occurrence of disease, noted hardware exposure- following with Dr Ely and Dr Aguilar  Considering surgery for plate removal- high risk  Continue to follow     Palliative care by specialist/ACP  Patient working full time and caring for his son with autism.    Depression  Increase sertraline to 50 mg po daily    Cancer related pain  Neuropathy               Continue  Norco 10/325 mg every 6 hours/prn for pain #120/0 (increased from every 12 hours)              Continue gabapentin to 600 mg bid  -     Continue senna (SENOKOT) 8.6 mg tablet; Take 2 tablets by mouth once daily.    Follow up: 3 months        SUBJECTIVE:     History of Present Illness:  Patient is a 59 y.o. year old male with h/o HTN, BCC, and melanoma. Patient of Dr. Vidal- jessica saw her 3/2023, now with recurrent basal cell carcinoma of the right forehead and scalp . He is referred by Dr Ely for Cancer related pain     Palliative Discussion:  Follow up pain evaluation, patient by his son who is autistic, ongoing hardware exposure to right side of head- seems to be getting worst. He is following with Otolaryngology - Dr Aguilar. There has been talks about doing surgery. Patient still unsure.  He reports new onset itching to site of exposed hardware, no drainage noted, mild redness on exam. Discussed trial of bactrim ointment for 1 week, if no improvement he should reach out to Oncology.     Pain about the same, Norco continues to be helpful , taking about 1-2 per day, average pain score 4/10 with medicine, 7/10 when things are bad. Denies constipation  Mood fair , he did see some mild improvement with sertraline , would like tot ry and increased dose.   He reports chemo currently on hold    ROS:  Review of Systems    Constitutional:  Positive for fatigue.   HENT:  Negative for mouth sores.    Eyes:  Positive for visual disturbance.   Respiratory:  Negative for cough.    Gastrointestinal:  Negative for abdominal pain and diarrhea.   Skin:  Negative for rash.   Neurological:  Positive for headaches.   Hematological:  Negative for adenopathy.   Psychiatric/Behavioral:  The patient is nervous/anxious.        Past Medical History:   Diagnosis Date    ADHD (attention deficit hyperactivity disorder)     Arthritis     Basal cell carcinoma (BCC) of left side of neck 08/2022    Chronic pain     Depression     Hydrocele in adult     Hyperlipidemia     Hypertension     Migraine     Squamous cell skin cancer, face 08/2022     Past Surgical History:   Procedure Laterality Date    BACK SURGERY  11/2020    CRANIOTOMY N/A 3/3/2021    Procedure: CRANIOTOMY, USING FRAMELESS STEREOTAXY, OPEN, BIFRONTAL;  Surgeon: Raheel Green MD;  Location: [facility] OR [address] [place] [address];  Service: Neurosurgery;  Laterality: N/A;  TOR I  ASA I  TYPE & CROSS 2 UNITS  REGULAR BED  [place] HEADREST  CHILDRESS  STEALTH CT    EXCISION OF LESION Left 8/24/2022    Procedure: EXCISION, LESION basal cell CA left neck--2 separate lesions;  Surgeon: Catalina Vidal MD;  Location: [facility] OR;  Service: ENT;  Laterality: Left;    FLAP PROCEDURE N/A 3/3/2021    Procedure: CREATION, FREE FLAP;  Surgeon: Ayaan Aguilar MD;  Location: [facility] OR [address] [place] [address];  Service: ENT;  Laterality: N/A;  Latissimus free flap. Will need bean bag and [place].     FLAP PROCEDURE Left 3/5/2021    Procedure: CREATION, FREE FLAP;  Surgeon: Ayaan Aguilar MD;  Location: [facility] OR [address] [place] [address];  Service: ENT;  Laterality: Left;    INCISION AND DRAINAGE, LOWER EXTREMITY Right 8/7/2024    Procedure: INCISION AND DRAINAGE, LOWER EXTREMITY;  Surgeon: Blake Barton MD;  Location: [facility] OR;  Service: Orthopedics;  Laterality: Right;    LAPAROSCOPIC REPAIR OF HERNIA      RHIZOTOMY W/ RADIOFREQUENCY ABLATION Bilateral      two procedures    SURGICAL REMOVAL OF LESION OF FACE Right 8/24/2022    Procedure: EXCISION, LESION, FACE ;  Surgeon: Catalina Vidal MD;  Location: [facility];  Service: ENT;  Laterality: Right;    TENDON REPAIR Right     thumb     Family History   Problem Relation Name Age of Onset    Depression Mother      Early death Mother  40    Alcohol abuse Father      Depression Father      No Known Problems Sister      Headaches Sister      Hypertension Brother      No Known Problems Maternal Aunt      No Known Problems Maternal Uncle      No Known Problems Paternal Aunt      No Known Problems Paternal Uncle      No Known Problems Maternal Grandmother      No Known Problems Maternal Grandfather      No Known Problems Paternal Grandmother      No Known Problems Paternal Grandfather      No Known Problems Other       Review of patient's allergies indicates:  No Known Allergies  Social Drivers of Health     Tobacco Use: High Risk (7/10/2025)    Patient History     Smoking Tobacco Use: Every Day     Smokeless Tobacco Use: Former     Passive Exposure: Past   Alcohol Use: Not At Risk (8/29/2024)    AUDIT-C     Frequency of Alcohol Consumption: 2-4 times a month     Average Number of Drinks: 1 or 2     Frequency of Binge Drinking: Never   Financial Resource Strain: High Risk (8/29/2024)    Overall Financial Resource Strain (CARDIA)     Difficulty of Paying Living Expenses: Hard   Food Insecurity: Food Insecurity Present (8/29/2024)    Hunger Vital Sign     Worried About Running Out of Food in the Last Year: Sometimes true     Ran Out of Food in the Last Year: Sometimes true   Transportation Needs: Low Risk  (2/16/2025)    Received from [facility] SDOH Screening     Has lack of transportation kept you from medical appointments meetings work or from getting things needed for daily living?: Yes it has kept me from medical appointments or from getting my medicationsyes it has kept me fro...   Physical Activity: Unknown  (8/29/2024)    Exercise Vital Sign     Days of Exercise per Week: 4 days     Minutes of Exercise per Session: Not on file   Stress: Stress Concern Present (8/29/2024)    [employer] [place] [employer] - Occupational Stress Questionnaire     Feeling of Stress : Very much   Housing Stability: High Risk (8/29/2024)    Housing Stability Vital Sign     Unable to Pay for Housing in the Last Year: Yes     Number of Times Moved in the Last Year: Not on file     Homeless in the Last Year: Not on file   Depression: Low Risk  (7/10/2025)    Depression     Last PHQ-4: Flowsheet Data: 0   Recent Concern: Depression - High Risk (5/13/2025)    Depression     Last PHQ-4: Flowsheet Data: 13   Utilities: At Risk (8/29/2024)    [facility] Utilities     Threatened with loss of utilities: Yes   Health Literacy: Inadequate Health Literacy (8/29/2024)     Health Literacy     Frequency of need for help with medical instructions: Sometimes   Social Isolation: Not on file            OBJECTIVE:     Physical Exam:  Vitals: Temp: 97.9 °F (36.6 °C) (07/10/25 1056)  Pulse: (!) 55 (07/10/25 1056)  Resp: 17 (07/10/25 1056)  BP: 114/78 (07/10/25 1056)  SpO2: 99 % (07/10/25 1056)  Physical Exam  Constitutional:       Appearance: He is underweight.   HENT:      Head: Normocephalic.      Comments: Right sided facial and head scars noted     Mouth/Throat:      Mouth: Mucous membranes are moist.   Pulmonary:      Effort: Pulmonary effort is normal.   Abdominal:      General: There is no distension.   Musculoskeletal:         General: Normal range of motion.      Cervical back: Normal range of motion.   Skin:     General: Skin is warm and dry.      Findings: Erythema present.   Neurological:      Mental Status: He is alert and oriented to person, place, and time.   Psychiatric:         Mood and Affect: Mood normal.           Review of Symptoms      Symptom Assessment (ESAS 0-10 Scale)  Pain:  7  Dyspnea:  3  Anxiety:  9  Nausea:  1  Depression:   9  Anorexia:  1  Fatigue:  7  Insomnia:  3  Restlessness:  5  Agitation:  7       Anxiety:  Is nervous/anxious    Pain Assessment:    Location(s): none      ECOG Performance Status ndGndrndanddndend:nd nd2nd Living Arrangements:  Lives with family    Psychosocial/Cultural:   See Palliative Psychosocial Note: Yes  **Primary  to Follow**  Palliative Care  Consult: No      Advance Care Planning   Advance Directives:   Living Will: No    [place]: No      Decision Making:  Patient answered questions  Goals of Care: The patient endorses that what is most important right now is to focus on symptom/pain control and curative/life-prolongation (regardless of treatment burdens)    Accordingly, we have decided that the best plan to meet the patient's goals includes continuing with treatment           Medications:    Current Outpatient Medications:     acetaminophen (TYLENOL) 325 MG tablet, Take 325 mg by mouth every 4 (four) hours as needed., Disp: , Rfl:     amlodipine-benazepril 10-20mg (LOTREL) 10-20 mg per capsule, Take 1 capsule by mouth once daily., Disp: , Rfl:     aspirin (ECOTRIN) 81 MG EC tablet, Take 81 mg by mouth., Disp: , Rfl:     atorvastatin (LIPITOR) 10 MG tablet, Take 10 mg by mouth., Disp: , Rfl:     azelastine (ASTELIN) 137 mcg (0.1 %) nasal spray, 1 puff in each nostril Nasally Twice a day for 30 days, Disp: , Rfl:     buPROPion (WELLBUTRIN XL) 150 MG TB24 tablet, Take by mouth., Disp: , Rfl:     diazePAM (VALIUM) 5 MG tablet, Bring to appointment., Disp: 1 tablet, Rfl: 0    gabapentin (NEURONTIN) 300 MG capsule, Take 2 capsules (600 mg total) by mouth 2 (two) times daily., Disp: 120 capsule, Rfl: 0    HYDROcodone-acetaminophen (NORCO)  mg per tablet, Take 1 tablet by mouth every 6 (six) hours as needed for Pain., Disp: 120 tablet, Rfl: 0    ibuprofen (ADVIL,MOTRIN) 800 MG tablet, Take 800 mg by mouth every 8 (eight) hours as needed., Disp: , Rfl:     pantoprazole (PROTONIX) 40 MG tablet,  Take 40 mg by mouth., Disp: , Rfl:     SENNA LAXATIVE 8.6 mg tablet, Take 2 tablets by mouth., Disp: , Rfl:     sertraline (ZOLOFT) 25 MG tablet, TAKE 1 TABLET BY MOUTH EVERY DAY, Disp: 90 tablet, Rfl: 1    sildenafiL (VIAGRA) 50 MG tablet, Take 50 mg by mouth daily as needed., Disp: , Rfl:     sulfamethoxazole-trimethoprim 800-160mg (BACTRIM DS) 800-160 mg Tab, Take 1 tablet by mouth 2 (two) times daily., Disp: 20 tablet, Rfl: 0    tadalafiL (CIALIS) 20 MG Tab, Take 20 mg by mouth As instructed., Disp: , Rfl:     Labs:  CBC:   WBC   Date Value Ref Range Status   04/28/2025 7.12 3.90 - 12.70 K/uL Final   03/26/2025 8.24 3.90 - 12.70 K/uL Final     Hgb   Date Value Ref Range Status   04/28/2025 12.7 (L) 14.0 - 18.0 gm/dL Final     POC Hematocrit   Date Value Ref Range Status   03/03/2021 29 (L) 36 - 54 %PCV Final     Hct   Date Value Ref Range Status   04/28/2025 40.2 40.0 - 54.0 % Final     MCV   Date Value Ref Range Status   04/28/2025 92 82 - 98 fL Final     Platelet Count   Date Value Ref Range Status   04/28/2025 264 150 - 450 K/uL Final       LFT:   Lab Results   Component Value Date    AST 17 04/28/2025    ALKPHOS 57 04/28/2025    BILITOT 0.4 04/28/2025       Albumin:   Albumin   Date Value Ref Range Status   04/28/2025 4.2 3.5 - 5.2 g/dL Final     Protein:   Protein Total   Date Value Ref Range Status   04/28/2025 6.7 6.0 - 8.4 gm/dL Final     30 minutes of total time spent on the encounter, which includes face to face time and non-face to face time preparing to see the patient (eg, review of tests), Obtaining and/or reviewing separately obtained history, Documenting clinical information in the electronic or other health record, Independently interpreting results if documented above (not separately reported) and communicating results to the patient/family/caregiver, or Care coordination (not separately reported).     Signature: Kimberly Silver NP

## 2025-08-08 DIAGNOSIS — F32.A DEPRESSION, UNSPECIFIED DEPRESSION TYPE: ICD-10-CM

## 2025-08-08 RX ORDER — SERTRALINE HYDROCHLORIDE 50 MG/1
50 TABLET, FILM COATED ORAL
Qty: 90 TABLET | Refills: 1 | Status: SHIPPED | OUTPATIENT
Start: 2025-08-08

## 2025-09-05 DIAGNOSIS — G89.3 CANCER ASSOCIATED PAIN: ICD-10-CM

## 2025-09-05 DIAGNOSIS — C44.90 SKIN CANCER: ICD-10-CM

## 2025-09-05 DIAGNOSIS — F32.A DEPRESSION, UNSPECIFIED DEPRESSION TYPE: ICD-10-CM

## 2025-09-05 RX ORDER — HYDROCODONE BITARTRATE AND ACETAMINOPHEN 10; 325 MG/1; MG/1
1 TABLET ORAL EVERY 6 HOURS PRN
Qty: 120 TABLET | Refills: 0 | Status: SHIPPED | OUTPATIENT
Start: 2025-09-05

## 2025-09-05 RX ORDER — SERTRALINE HYDROCHLORIDE 50 MG/1
50 TABLET, FILM COATED ORAL DAILY
Qty: 90 TABLET | Refills: 0 | Status: SHIPPED | OUTPATIENT
Start: 2025-09-05 | End: 2025-12-04

## (undated) DEVICE — HOOK STAY ELAS 5MM 8EA/PK

## (undated) DEVICE — PROBE CATH TEMP 16 FRFOLEY 400

## (undated) DEVICE — CONTAINER SPECIMEN STRL 4OZ

## (undated) DEVICE — SYR 3CC LUER LOC

## (undated) DEVICE — GUIDE MICRO-GRID SIL GRN

## (undated) DEVICE — SEE MEDLINE ITEM 154981

## (undated) DEVICE — DRESSING TRANS 8X12 TEGADERM

## (undated) DEVICE — MICRO CLIP

## (undated) DEVICE — DRESSING SURGICAL 1X3

## (undated) DEVICE — DURAPREP SURG SCRUB 26ML

## (undated) DEVICE — SEE MEDLINE ITEM 157194

## (undated) DEVICE — SUT VICRYL PLUS 3-0 SH 18IN

## (undated) DEVICE — DRAPE THYROID WITH ARMBOARD

## (undated) DEVICE — SUT MCRYL PLUS 4-0 PS2 27IN

## (undated) DEVICE — TUBE FRAZIER 5MM 2FT SOFT TIP

## (undated) DEVICE — ROUTER TAPERED 2.3MM

## (undated) DEVICE — SUT 9/0 5IN ETHILON BLK MON

## (undated) DEVICE — PIN MAYFIELD CHILD SKULL STEEL

## (undated) DEVICE — DIFFUSER

## (undated) DEVICE — STAPLER SKIN ROTATING HEAD

## (undated) DEVICE — ELECTRODE REM PLYHSV RETURN 9

## (undated) DEVICE — SPONGE LAP 18X18 PREWASHED

## (undated) DEVICE — SKINMARKER & RULER REGULAR X-F

## (undated) DEVICE — PAD K-THERMIA 24IN X 60IN

## (undated) DEVICE — TRAY LOWER EXTREMITY SMH

## (undated) DEVICE — BLADE SURG #15 CARBON STEEL

## (undated) DEVICE — SEALER LIGASURE CRV 18.8CM

## (undated) DEVICE — SOL NACL IRR 1000ML BTL

## (undated) DEVICE — INSTRUMENT SURG SUCT FRZR W/C

## (undated) DEVICE — GOWN SURGICAL X-LARGE

## (undated) DEVICE — TAPE SURG MEDIPORE 6X72IN

## (undated) DEVICE — CATH IV INTROCAN 20G X 1.1

## (undated) DEVICE — COTTON BALLS 1/2IN

## (undated) DEVICE — LUBRICANT SURGILUBE 2 OZ

## (undated) DEVICE — KIT SAHARA DRAPE DRAW/LIFT

## (undated) DEVICE — HOOK LONE STAR BLUNT 12MM

## (undated) DEVICE — SUT VICRYL PLUS 2-0 CT1 18

## (undated) DEVICE — BLADE DERMATOME 10/BOX

## (undated) DEVICE — SUT 4/0 18IN NUROLON BLK B

## (undated) DEVICE — DRESSING AQUACEL SACRAL 9 X 9

## (undated) DEVICE — TIP SONAPET IQ BARRACUDA 12CM

## (undated) DEVICE — DRAPE SURGICAL STERI IRRG PCH

## (undated) DEVICE — TRAY FOLEY 16FR INFECTION CONT

## (undated) DEVICE — CULTSWAB+ AMIES W/O CHARC SNG

## (undated) DEVICE — CLAMP SINGLE MICRO.

## (undated) DEVICE — SUT CTD VICRYL BR CR/SH VIL

## (undated) DEVICE — SPONGE WEC CEL SPEARS

## (undated) DEVICE — DRAPE STERI INSTRUMENT 1018

## (undated) DEVICE — CASSETTE SONOPET IQ IRRIGATION

## (undated) DEVICE — SEE MEDLINE ITEM 156905

## (undated) DEVICE — SEE MEDLINE ITEM 146292

## (undated) DEVICE — SET DECANTER MEDICHOICE

## (undated) DEVICE — STAPLER SKIN PROXIMATE WIDE

## (undated) DEVICE — SUT LIGACLIP SMALL XTRA

## (undated) DEVICE — KIT MEROCEL INSTRUMENT WIPE

## (undated) DEVICE — CATH EMBOLECTOMY 2F 60CM

## (undated) DEVICE — CORD BIPOLAR 12 FOOT

## (undated) DEVICE — SUT 2/0 30IN SILK BLK BRAI

## (undated) DEVICE — DRAPE INCISE IOBAN 2 13X13IN

## (undated) DEVICE — DRESSING SURGICAL 1X1

## (undated) DEVICE — TRAY MINOR GEN SURG

## (undated) DEVICE — BLADE SURG CARBON STEEL SZ11

## (undated) DEVICE — CLIP MED TICALL

## (undated) DEVICE — DRAPE OPMI STERILE

## (undated) DEVICE — DRESSING ADH ISLAND 3.6 X 14

## (undated) DEVICE — SUT PROLENE 5-0 PS-2 18

## (undated) DEVICE — CATH IV INTROCAN 22G X 1

## (undated) DEVICE — PACK UNIVERSAL SPLIT II

## (undated) DEVICE — BOOT AIR FLUID HEEL ADLT STD

## (undated) DEVICE — DRESSING GAUZE XEROFORM 5X9

## (undated) DEVICE — RUBBERBAND STERILE 3X1/8IN

## (undated) DEVICE — MARKERS SPHERZ PASSIVE

## (undated) DEVICE — GLOVE PI ULTRA TOUCH G SURGEON

## (undated) DEVICE — HEMOSTAT SURGICEL 4X8IN

## (undated) DEVICE — BURR ACORN 7.5MM

## (undated) DEVICE — CARRIER SKIN GRAFT 1.5:1

## (undated) DEVICE — SOL NS 1000CC

## (undated) DEVICE — CUP MEDICINE STERILE 2OZ

## (undated) DEVICE — SPONGE NEURO 1/4X1/4

## (undated) DEVICE — MARKER SKIN STND TIP BLUE BARR

## (undated) DEVICE — BURR ROUTER TAPERED

## (undated) DEVICE — BUR BONE CUT MICRO TPS 3X3.8MM

## (undated) DEVICE — SEE MEDLINE ITEM 152622

## (undated) DEVICE — SUT ETHILON 3-0 PS2 18 BLK

## (undated) DEVICE — SUCTION SURGICAL FRAZR

## (undated) DEVICE — DRESSING SURGICAL 1/2X1/2

## (undated) DEVICE — ELECTRODE BLADE INSULATED 1 IN

## (undated) DEVICE — CLIP DOUBLE MICRO.

## (undated) DEVICE — CARTRIDGE OIL